# Patient Record
Sex: MALE | Race: BLACK OR AFRICAN AMERICAN | NOT HISPANIC OR LATINO | Employment: PART TIME | URBAN - METROPOLITAN AREA
[De-identification: names, ages, dates, MRNs, and addresses within clinical notes are randomized per-mention and may not be internally consistent; named-entity substitution may affect disease eponyms.]

---

## 2020-01-13 ENCOUNTER — APPOINTMENT (OUTPATIENT)
Dept: GENERAL RADIOLOGY | Facility: HOSPITAL | Age: 50
End: 2020-01-13

## 2020-01-13 ENCOUNTER — APPOINTMENT (OUTPATIENT)
Dept: CT IMAGING | Facility: HOSPITAL | Age: 50
End: 2020-01-13

## 2020-01-13 ENCOUNTER — HOSPITAL ENCOUNTER (INPATIENT)
Facility: HOSPITAL | Age: 50
LOS: 10 days | Discharge: HOME | End: 2020-01-23
Attending: EMERGENCY MEDICINE | Admitting: INTERNAL MEDICINE

## 2020-01-13 DIAGNOSIS — E83.41 HYPERMAGNESEMIA: ICD-10-CM

## 2020-01-13 DIAGNOSIS — R40.0 SOMNOLENCE: ICD-10-CM

## 2020-01-13 DIAGNOSIS — E13.10 DIABETIC KETOACIDOSIS WITHOUT COMA ASSOCIATED WITH OTHER SPECIFIED DIABETES MELLITUS (HCC): ICD-10-CM

## 2020-01-13 DIAGNOSIS — E11.9 DIABETES MELLITUS, NEW ONSET (HCC): ICD-10-CM

## 2020-01-13 DIAGNOSIS — N17.9 ACUTE RENAL FAILURE, UNSPECIFIED ACUTE RENAL FAILURE TYPE (HCC): ICD-10-CM

## 2020-01-13 DIAGNOSIS — R93.3 ABNORMAL ESOPHAGRAM: Primary | ICD-10-CM

## 2020-01-13 DIAGNOSIS — G93.40 ENCEPHALOPATHY: ICD-10-CM

## 2020-01-13 DIAGNOSIS — E83.39 HYPERPHOSPHATEMIA: ICD-10-CM

## 2020-01-13 DIAGNOSIS — E86.0 DEHYDRATION: ICD-10-CM

## 2020-01-13 DIAGNOSIS — E87.5 HYPERKALEMIA: ICD-10-CM

## 2020-01-13 PROBLEM — E11.10 DIABETIC KETOACIDOSIS WITHOUT COMA ASSOCIATED WITH TYPE 2 DIABETES MELLITUS (HCC): Status: ACTIVE | Noted: 2020-01-13

## 2020-01-13 LAB
ALBUMIN SERPL-MCNC: 3.7 G/DL (ref 3.5–5.2)
ALBUMIN SERPL-MCNC: 3.7 G/DL (ref 3.5–5.2)
ALBUMIN SERPL-MCNC: 4 G/DL (ref 3.5–5.2)
ALBUMIN/GLOB SERPL: 0.9 G/DL
ALBUMIN/GLOB SERPL: 0.9 G/DL
ALBUMIN/GLOB SERPL: 1 G/DL
ALP SERPL-CCNC: 84 U/L (ref 39–117)
ALP SERPL-CCNC: 84 U/L (ref 39–117)
ALP SERPL-CCNC: 89 U/L (ref 39–117)
ALT SERPL W P-5'-P-CCNC: 13 U/L (ref 1–41)
ALT SERPL W P-5'-P-CCNC: 15 U/L (ref 1–41)
ALT SERPL W P-5'-P-CCNC: 17 U/L (ref 1–41)
ANION GAP SERPL CALCULATED.3IONS-SCNC: 13.1 MMOL/L (ref 5–15)
ANION GAP SERPL CALCULATED.3IONS-SCNC: 17 MMOL/L (ref 5–15)
ANION GAP SERPL CALCULATED.3IONS-SCNC: 19 MMOL/L (ref 5–15)
ANION GAP SERPL CALCULATED.3IONS-SCNC: 28.1 MMOL/L (ref 5–15)
ANION GAP SERPL CALCULATED.3IONS-SCNC: 29.6 MMOL/L (ref 5–15)
ANION GAP SERPL CALCULATED.3IONS-SCNC: 29.9 MMOL/L (ref 5–15)
ARTERIAL PATENCY WRIST A: POSITIVE
AST SERPL-CCNC: 31 U/L (ref 1–40)
AST SERPL-CCNC: 35 U/L (ref 1–40)
AST SERPL-CCNC: 38 U/L (ref 1–40)
ATMOSPHERIC PRESS: 758.1 MMHG
BACTERIA UR QL AUTO: NORMAL /HPF
BASE EXCESS BLDA CALC-SCNC: -11.4 MMOL/L (ref 0–2)
BASOPHILS # BLD AUTO: 0.03 10*3/MM3 (ref 0–0.2)
BASOPHILS NFR BLD AUTO: 0.2 % (ref 0–1.5)
BDY SITE: ABNORMAL
BILIRUB SERPL-MCNC: 0.6 MG/DL (ref 0.2–1.2)
BILIRUB SERPL-MCNC: 0.7 MG/DL (ref 0.2–1.2)
BILIRUB SERPL-MCNC: 0.8 MG/DL (ref 0.2–1.2)
BILIRUB UR QL STRIP: NEGATIVE
BUN BLD-MCNC: 68 MG/DL (ref 6–20)
BUN BLD-MCNC: 84 MG/DL (ref 6–20)
BUN BLD-MCNC: 86 MG/DL (ref 6–20)
BUN BLD-MCNC: 92 MG/DL (ref 6–20)
BUN BLD-MCNC: 96 MG/DL (ref 6–20)
BUN BLD-MCNC: 99 MG/DL (ref 6–20)
BUN/CREAT SERPL: 36.3 (ref 7–25)
BUN/CREAT SERPL: 39 (ref 7–25)
BUN/CREAT SERPL: 40.9 (ref 7–25)
BUN/CREAT SERPL: 41.7 (ref 7–25)
BUN/CREAT SERPL: 42.4 (ref 7–25)
BUN/CREAT SERPL: 46.9 (ref 7–25)
CALCIUM SPEC-SCNC: 8.6 MG/DL (ref 8.6–10.5)
CALCIUM SPEC-SCNC: 8.7 MG/DL (ref 8.6–10.5)
CALCIUM SPEC-SCNC: 9 MG/DL (ref 8.6–10.5)
CALCIUM SPEC-SCNC: 9 MG/DL (ref 8.6–10.5)
CALCIUM SPEC-SCNC: 9.1 MG/DL (ref 8.6–10.5)
CALCIUM SPEC-SCNC: 9.2 MG/DL (ref 8.6–10.5)
CHLORIDE SERPL-SCNC: 100 MMOL/L (ref 98–107)
CHLORIDE SERPL-SCNC: 101 MMOL/L (ref 98–107)
CHLORIDE SERPL-SCNC: 107 MMOL/L (ref 98–107)
CHLORIDE SERPL-SCNC: 75 MMOL/L (ref 98–107)
CHLORIDE SERPL-SCNC: 83 MMOL/L (ref 98–107)
CHLORIDE SERPL-SCNC: 93 MMOL/L (ref 98–107)
CLARITY UR: CLEAR
CO2 SERPL-SCNC: 10.9 MMOL/L (ref 22–29)
CO2 SERPL-SCNC: 13.1 MMOL/L (ref 22–29)
CO2 SERPL-SCNC: 13.4 MMOL/L (ref 22–29)
CO2 SERPL-SCNC: 21 MMOL/L (ref 22–29)
CO2 SERPL-SCNC: 22.9 MMOL/L (ref 22–29)
CO2 SERPL-SCNC: 23 MMOL/L (ref 22–29)
COLOR UR: YELLOW
CREAT BLD-MCNC: 1.45 MG/DL (ref 0.76–1.27)
CREAT BLD-MCNC: 1.98 MG/DL (ref 0.76–1.27)
CREAT BLD-MCNC: 2.06 MG/DL (ref 0.76–1.27)
CREAT BLD-MCNC: 2.25 MG/DL (ref 0.76–1.27)
CREAT BLD-MCNC: 2.46 MG/DL (ref 0.76–1.27)
CREAT BLD-MCNC: 2.73 MG/DL (ref 0.76–1.27)
DEPRECATED RDW RBC AUTO: 36.7 FL (ref 37–54)
EOSINOPHIL # BLD AUTO: 0 10*3/MM3 (ref 0–0.4)
EOSINOPHIL NFR BLD AUTO: 0 % (ref 0.3–6.2)
ERYTHROCYTE [DISTWIDTH] IN BLOOD BY AUTOMATED COUNT: 12.3 % (ref 12.3–15.4)
GFR SERPL CREATININE-BSD FRML MDRD: 30 ML/MIN/1.73
GFR SERPL CREATININE-BSD FRML MDRD: 34 ML/MIN/1.73
GFR SERPL CREATININE-BSD FRML MDRD: 38 ML/MIN/1.73
GFR SERPL CREATININE-BSD FRML MDRD: 42 ML/MIN/1.73
GFR SERPL CREATININE-BSD FRML MDRD: 44 ML/MIN/1.73
GFR SERPL CREATININE-BSD FRML MDRD: 63 ML/MIN/1.73
GLOBULIN UR ELPH-MCNC: 3.8 GM/DL
GLOBULIN UR ELPH-MCNC: 3.9 GM/DL
GLOBULIN UR ELPH-MCNC: 4.3 GM/DL
GLUCOSE BLD-MCNC: 196 MG/DL (ref 65–99)
GLUCOSE BLD-MCNC: 341 MG/DL (ref 65–99)
GLUCOSE BLD-MCNC: 714 MG/DL (ref 65–99)
GLUCOSE BLD-MCNC: 823 MG/DL (ref 65–99)
GLUCOSE BLD-MCNC: 909 MG/DL (ref 65–99)
GLUCOSE BLD-MCNC: 96 MG/DL (ref 65–99)
GLUCOSE BLDC GLUCOMTR-MCNC: 128 MG/DL (ref 70–130)
GLUCOSE BLDC GLUCOMTR-MCNC: 167 MG/DL (ref 70–130)
GLUCOSE BLDC GLUCOMTR-MCNC: 182 MG/DL (ref 70–130)
GLUCOSE BLDC GLUCOMTR-MCNC: 192 MG/DL (ref 70–130)
GLUCOSE BLDC GLUCOMTR-MCNC: 208 MG/DL (ref 70–130)
GLUCOSE BLDC GLUCOMTR-MCNC: 209 MG/DL (ref 70–130)
GLUCOSE BLDC GLUCOMTR-MCNC: 217 MG/DL (ref 70–130)
GLUCOSE BLDC GLUCOMTR-MCNC: 234 MG/DL (ref 70–130)
GLUCOSE BLDC GLUCOMTR-MCNC: 252 MG/DL (ref 70–130)
GLUCOSE BLDC GLUCOMTR-MCNC: 262 MG/DL (ref 70–130)
GLUCOSE BLDC GLUCOMTR-MCNC: 347 MG/DL (ref 70–130)
GLUCOSE BLDC GLUCOMTR-MCNC: 443 MG/DL (ref 70–130)
GLUCOSE BLDC GLUCOMTR-MCNC: 550 MG/DL (ref 70–130)
GLUCOSE BLDC GLUCOMTR-MCNC: 575 MG/DL (ref 70–130)
GLUCOSE BLDC GLUCOMTR-MCNC: 94 MG/DL (ref 70–130)
GLUCOSE BLDC GLUCOMTR-MCNC: >599 MG/DL (ref 70–130)
GLUCOSE BLDC GLUCOMTR-MCNC: >599 MG/DL (ref 70–130)
GLUCOSE UR STRIP-MCNC: ABNORMAL MG/DL
HBA1C MFR BLD: 14.4 % (ref 4.8–5.6)
HCO3 BLDA-SCNC: 12.5 MMOL/L (ref 22–28)
HCT VFR BLD AUTO: 56.6 % (ref 37.5–51)
HGB BLD-MCNC: 18.7 G/DL (ref 13–17.7)
HGB UR QL STRIP.AUTO: ABNORMAL
HYALINE CASTS UR QL AUTO: NORMAL /LPF
IMM GRANULOCYTES # BLD AUTO: 0.12 10*3/MM3 (ref 0–0.05)
IMM GRANULOCYTES NFR BLD AUTO: 0.8 % (ref 0–0.5)
KETONES UR QL STRIP: ABNORMAL
LEUKOCYTE ESTERASE UR QL STRIP.AUTO: NEGATIVE
LYMPHOCYTES # BLD AUTO: 0.71 10*3/MM3 (ref 0.7–3.1)
LYMPHOCYTES NFR BLD AUTO: 4.9 % (ref 19.6–45.3)
MAGNESIUM SERPL-MCNC: 3.5 MG/DL (ref 1.6–2.6)
MAGNESIUM SERPL-MCNC: 3.8 MG/DL (ref 1.6–2.6)
MAGNESIUM SERPL-MCNC: 3.8 MG/DL (ref 1.6–2.6)
MAGNESIUM SERPL-MCNC: 4.2 MG/DL (ref 1.6–2.6)
MAGNESIUM SERPL-MCNC: 4.5 MG/DL (ref 1.6–2.6)
MCH RBC QN AUTO: 28.2 PG (ref 26.6–33)
MCHC RBC AUTO-ENTMCNC: 33 G/DL (ref 31.5–35.7)
MCV RBC AUTO: 85.5 FL (ref 79–97)
MODALITY: ABNORMAL
MONOCYTES # BLD AUTO: 1.22 10*3/MM3 (ref 0.1–0.9)
MONOCYTES NFR BLD AUTO: 8.4 % (ref 5–12)
NEUTROPHILS # BLD AUTO: 12.42 10*3/MM3 (ref 1.7–7)
NEUTROPHILS NFR BLD AUTO: 85.7 % (ref 42.7–76)
NITRITE UR QL STRIP: NEGATIVE
NRBC BLD AUTO-RTO: 0 /100 WBC (ref 0–0.2)
OSMOLALITY SERPL: 379 MOSM/KG (ref 275–300)
PCO2 BLDA: 24.2 MM HG (ref 35–45)
PH BLDA: 7.32 PH UNITS (ref 7.35–7.45)
PH UR STRIP.AUTO: <=5 [PH] (ref 5–8)
PHOSPHATE SERPL-MCNC: 1.7 MG/DL (ref 2.5–4.5)
PHOSPHATE SERPL-MCNC: 3.7 MG/DL (ref 2.5–4.5)
PHOSPHATE SERPL-MCNC: 3.7 MG/DL (ref 2.5–4.5)
PHOSPHATE SERPL-MCNC: 4.4 MG/DL (ref 2.5–4.5)
PHOSPHATE SERPL-MCNC: 6.6 MG/DL (ref 2.5–4.5)
PHOSPHATE SERPL-MCNC: 7.1 MG/DL (ref 2.5–4.5)
PLATELET # BLD AUTO: 425 10*3/MM3 (ref 140–450)
PMV BLD AUTO: 11.1 FL (ref 6–12)
PO2 BLDA: 139.7 MM HG (ref 80–100)
POTASSIUM BLD-SCNC: 3.7 MMOL/L (ref 3.5–5.2)
POTASSIUM BLD-SCNC: 3.8 MMOL/L (ref 3.5–5.2)
POTASSIUM BLD-SCNC: 4.1 MMOL/L (ref 3.5–5.2)
POTASSIUM BLD-SCNC: 4.1 MMOL/L (ref 3.5–5.2)
POTASSIUM BLD-SCNC: 5.6 MMOL/L (ref 3.5–5.2)
POTASSIUM BLD-SCNC: 6.1 MMOL/L (ref 3.5–5.2)
POTASSIUM BLD-SCNC: 7.2 MMOL/L (ref 3.5–5.2)
PROCALCITONIN SERPL-MCNC: 0.37 NG/ML (ref 0.1–0.25)
PROT SERPL-MCNC: 7.5 G/DL (ref 6–8.5)
PROT SERPL-MCNC: 7.6 G/DL (ref 6–8.5)
PROT SERPL-MCNC: 8.3 G/DL (ref 6–8.5)
PROT UR QL STRIP: NEGATIVE
RBC # BLD AUTO: 6.62 10*6/MM3 (ref 4.14–5.8)
RBC # UR: NORMAL /HPF
REF LAB TEST METHOD: NORMAL
SAO2 % BLDCOA: 99 % (ref 92–99)
SODIUM BLD-SCNC: 118 MMOL/L (ref 136–145)
SODIUM BLD-SCNC: 126 MMOL/L (ref 136–145)
SODIUM BLD-SCNC: 132 MMOL/L (ref 136–145)
SODIUM BLD-SCNC: 140 MMOL/L (ref 136–145)
SODIUM BLD-SCNC: 141 MMOL/L (ref 136–145)
SODIUM BLD-SCNC: 143 MMOL/L (ref 136–145)
SP GR UR STRIP: 1.03 (ref 1–1.03)
SQUAMOUS #/AREA URNS HPF: NORMAL /HPF
TOTAL RATE: 18 BREATHS/MINUTE
TROPONIN T SERPL-MCNC: <0.01 NG/ML (ref 0–0.03)
UROBILINOGEN UR QL STRIP: ABNORMAL
WBC NRBC COR # BLD: 14.5 10*3/MM3 (ref 3.4–10.8)
WBC UR QL AUTO: NORMAL /HPF

## 2020-01-13 PROCEDURE — 36600 WITHDRAWAL OF ARTERIAL BLOOD: CPT

## 2020-01-13 PROCEDURE — 82803 BLOOD GASES ANY COMBINATION: CPT

## 2020-01-13 PROCEDURE — 70450 CT HEAD/BRAIN W/O DYE: CPT

## 2020-01-13 PROCEDURE — 84145 PROCALCITONIN (PCT): CPT | Performed by: INTERNAL MEDICINE

## 2020-01-13 PROCEDURE — 36415 COLL VENOUS BLD VENIPUNCTURE: CPT | Performed by: EMERGENCY MEDICINE

## 2020-01-13 PROCEDURE — 25010000002 ENOXAPARIN PER 10 MG: Performed by: INTERNAL MEDICINE

## 2020-01-13 PROCEDURE — 82962 GLUCOSE BLOOD TEST: CPT

## 2020-01-13 PROCEDURE — 80053 COMPREHEN METABOLIC PANEL: CPT | Performed by: INTERNAL MEDICINE

## 2020-01-13 PROCEDURE — 93005 ELECTROCARDIOGRAM TRACING: CPT | Performed by: EMERGENCY MEDICINE

## 2020-01-13 PROCEDURE — 84132 ASSAY OF SERUM POTASSIUM: CPT | Performed by: INTERNAL MEDICINE

## 2020-01-13 PROCEDURE — 93010 ELECTROCARDIOGRAM REPORT: CPT | Performed by: INTERNAL MEDICINE

## 2020-01-13 PROCEDURE — 83735 ASSAY OF MAGNESIUM: CPT | Performed by: INTERNAL MEDICINE

## 2020-01-13 PROCEDURE — 87040 BLOOD CULTURE FOR BACTERIA: CPT | Performed by: INTERNAL MEDICINE

## 2020-01-13 PROCEDURE — P9612 CATHETERIZE FOR URINE SPEC: HCPCS

## 2020-01-13 PROCEDURE — 83036 HEMOGLOBIN GLYCOSYLATED A1C: CPT | Performed by: EMERGENCY MEDICINE

## 2020-01-13 PROCEDURE — 80053 COMPREHEN METABOLIC PANEL: CPT | Performed by: EMERGENCY MEDICINE

## 2020-01-13 PROCEDURE — 25010000002 FUROSEMIDE PER 20 MG: Performed by: EMERGENCY MEDICINE

## 2020-01-13 PROCEDURE — 99285 EMERGENCY DEPT VISIT HI MDM: CPT

## 2020-01-13 PROCEDURE — 25010000002 CALCIUM GLUCONATE PER 10 ML: Performed by: EMERGENCY MEDICINE

## 2020-01-13 PROCEDURE — 84484 ASSAY OF TROPONIN QUANT: CPT | Performed by: EMERGENCY MEDICINE

## 2020-01-13 PROCEDURE — 80048 BASIC METABOLIC PNL TOTAL CA: CPT | Performed by: INTERNAL MEDICINE

## 2020-01-13 PROCEDURE — 83735 ASSAY OF MAGNESIUM: CPT | Performed by: EMERGENCY MEDICINE

## 2020-01-13 PROCEDURE — 63710000001 INSULIN REGULAR HUMAN PER 5 UNITS: Performed by: EMERGENCY MEDICINE

## 2020-01-13 PROCEDURE — 81001 URINALYSIS AUTO W/SCOPE: CPT | Performed by: EMERGENCY MEDICINE

## 2020-01-13 PROCEDURE — 94799 UNLISTED PULMONARY SVC/PX: CPT

## 2020-01-13 PROCEDURE — 25010000002 CALCIUM GLUCONATE PER 10 ML: Performed by: INTERNAL MEDICINE

## 2020-01-13 PROCEDURE — 71045 X-RAY EXAM CHEST 1 VIEW: CPT

## 2020-01-13 PROCEDURE — 83930 ASSAY OF BLOOD OSMOLALITY: CPT | Performed by: EMERGENCY MEDICINE

## 2020-01-13 PROCEDURE — 84100 ASSAY OF PHOSPHORUS: CPT | Performed by: EMERGENCY MEDICINE

## 2020-01-13 PROCEDURE — 85025 COMPLETE CBC W/AUTO DIFF WBC: CPT | Performed by: EMERGENCY MEDICINE

## 2020-01-13 PROCEDURE — 80048 BASIC METABOLIC PNL TOTAL CA: CPT | Performed by: EMERGENCY MEDICINE

## 2020-01-13 PROCEDURE — 94640 AIRWAY INHALATION TREATMENT: CPT

## 2020-01-13 RX ORDER — CLINDAMYCIN HYDROCHLORIDE 150 MG/1
150 CAPSULE ORAL 3 TIMES DAILY
Status: ON HOLD | COMMUNITY
End: 2020-01-13

## 2020-01-13 RX ORDER — MAGNESIUM SULFATE HEPTAHYDRATE 40 MG/ML
2 INJECTION, SOLUTION INTRAVENOUS AS NEEDED
Status: DISCONTINUED | OUTPATIENT
Start: 2020-01-13 | End: 2020-01-23 | Stop reason: HOSPADM

## 2020-01-13 RX ORDER — SODIUM CHLORIDE AND POTASSIUM CHLORIDE 300; 900 MG/100ML; MG/100ML
250 INJECTION, SOLUTION INTRAVENOUS CONTINUOUS PRN
Status: DISCONTINUED | OUTPATIENT
Start: 2020-01-13 | End: 2020-01-14

## 2020-01-13 RX ORDER — SODIUM CHLORIDE 0.9 % (FLUSH) 0.9 %
10 SYRINGE (ML) INJECTION AS NEEDED
Status: DISCONTINUED | OUTPATIENT
Start: 2020-01-13 | End: 2020-01-23 | Stop reason: HOSPADM

## 2020-01-13 RX ORDER — SODIUM CHLORIDE AND POTASSIUM CHLORIDE 150; 900 MG/100ML; MG/100ML
250 INJECTION, SOLUTION INTRAVENOUS CONTINUOUS PRN
Status: DISCONTINUED | OUTPATIENT
Start: 2020-01-13 | End: 2020-01-14

## 2020-01-13 RX ORDER — SODIUM CHLORIDE 9 MG/ML
250 INJECTION, SOLUTION INTRAVENOUS CONTINUOUS
Status: ACTIVE | OUTPATIENT
Start: 2020-01-13 | End: 2020-01-13

## 2020-01-13 RX ORDER — ONDANSETRON 4 MG/1
4 TABLET, FILM COATED ORAL EVERY 6 HOURS PRN
Status: DISCONTINUED | OUTPATIENT
Start: 2020-01-13 | End: 2020-01-23 | Stop reason: HOSPADM

## 2020-01-13 RX ORDER — POTASSIUM CHLORIDE 7.45 MG/ML
10 INJECTION INTRAVENOUS
Status: DISCONTINUED | OUTPATIENT
Start: 2020-01-13 | End: 2020-01-23 | Stop reason: HOSPADM

## 2020-01-13 RX ORDER — MAGNESIUM SULFATE 1 G/100ML
1 INJECTION INTRAVENOUS AS NEEDED
Status: DISCONTINUED | OUTPATIENT
Start: 2020-01-13 | End: 2020-01-23 | Stop reason: HOSPADM

## 2020-01-13 RX ORDER — DEXTROSE, SODIUM CHLORIDE, AND POTASSIUM CHLORIDE 5; .45; .15 G/100ML; G/100ML; G/100ML
250 INJECTION INTRAVENOUS CONTINUOUS PRN
Status: DISCONTINUED | OUTPATIENT
Start: 2020-01-13 | End: 2020-01-14

## 2020-01-13 RX ORDER — DEXTROSE AND SODIUM CHLORIDE 5; .45 G/100ML; G/100ML
250 INJECTION, SOLUTION INTRAVENOUS CONTINUOUS PRN
Status: DISCONTINUED | OUTPATIENT
Start: 2020-01-13 | End: 2020-01-14

## 2020-01-13 RX ORDER — DEXTROSE MONOHYDRATE 25 G/50ML
12.5 INJECTION, SOLUTION INTRAVENOUS AS NEEDED
Status: DISCONTINUED | OUTPATIENT
Start: 2020-01-13 | End: 2020-01-23 | Stop reason: HOSPADM

## 2020-01-13 RX ORDER — POTASSIUM CHLORIDE 1.5 G/1.77G
40 POWDER, FOR SOLUTION ORAL AS NEEDED
Status: DISCONTINUED | OUTPATIENT
Start: 2020-01-13 | End: 2020-01-23 | Stop reason: HOSPADM

## 2020-01-13 RX ORDER — POTASSIUM CHLORIDE 750 MG/1
40 CAPSULE, EXTENDED RELEASE ORAL AS NEEDED
Status: DISCONTINUED | OUTPATIENT
Start: 2020-01-13 | End: 2020-01-23 | Stop reason: HOSPADM

## 2020-01-13 RX ORDER — SODIUM CHLORIDE 9 MG/ML
10 INJECTION, SOLUTION INTRAVENOUS CONTINUOUS PRN
Status: DISCONTINUED | OUTPATIENT
Start: 2020-01-13 | End: 2020-01-14

## 2020-01-13 RX ORDER — SODIUM CHLORIDE 9 MG/ML
250 INJECTION, SOLUTION INTRAVENOUS CONTINUOUS
Status: DISCONTINUED | OUTPATIENT
Start: 2020-01-13 | End: 2020-01-13

## 2020-01-13 RX ORDER — CLINDAMYCIN HYDROCHLORIDE 300 MG/1
300 CAPSULE ORAL 3 TIMES DAILY
COMMUNITY
Start: 2020-01-07 | End: 2020-01-23 | Stop reason: HOSPADM

## 2020-01-13 RX ORDER — FUROSEMIDE 10 MG/ML
40 INJECTION INTRAMUSCULAR; INTRAVENOUS ONCE
Status: COMPLETED | OUTPATIENT
Start: 2020-01-13 | End: 2020-01-13

## 2020-01-13 RX ORDER — SODIUM CHLORIDE 450 MG/100ML
250 INJECTION, SOLUTION INTRAVENOUS CONTINUOUS PRN
Status: DISCONTINUED | OUTPATIENT
Start: 2020-01-13 | End: 2020-01-14

## 2020-01-13 RX ORDER — SODIUM CHLORIDE AND POTASSIUM CHLORIDE 150; 450 MG/100ML; MG/100ML
250 INJECTION, SOLUTION INTRAVENOUS CONTINUOUS PRN
Status: DISCONTINUED | OUTPATIENT
Start: 2020-01-13 | End: 2020-01-14

## 2020-01-13 RX ORDER — DEXTROSE, SODIUM CHLORIDE, AND POTASSIUM CHLORIDE 5; .45; .3 G/100ML; G/100ML; G/100ML
250 INJECTION INTRAVENOUS CONTINUOUS PRN
Status: DISCONTINUED | OUTPATIENT
Start: 2020-01-13 | End: 2020-01-14

## 2020-01-13 RX ORDER — HALOPERIDOL 5 MG/ML
5 INJECTION INTRAMUSCULAR
Status: CANCELLED | OUTPATIENT
Start: 2020-01-13

## 2020-01-13 RX ORDER — MAGNESIUM SULFATE HEPTAHYDRATE 40 MG/ML
4 INJECTION, SOLUTION INTRAVENOUS AS NEEDED
Status: DISCONTINUED | OUTPATIENT
Start: 2020-01-13 | End: 2020-01-23 | Stop reason: HOSPADM

## 2020-01-13 RX ORDER — ONDANSETRON 2 MG/ML
4 INJECTION INTRAMUSCULAR; INTRAVENOUS EVERY 6 HOURS PRN
Status: DISCONTINUED | OUTPATIENT
Start: 2020-01-13 | End: 2020-01-23 | Stop reason: HOSPADM

## 2020-01-13 RX ORDER — SODIUM CHLORIDE 0.9 % (FLUSH) 0.9 %
10 SYRINGE (ML) INJECTION ONCE AS NEEDED
Status: DISCONTINUED | OUTPATIENT
Start: 2020-01-13 | End: 2020-01-23 | Stop reason: HOSPADM

## 2020-01-13 RX ORDER — SODIUM CHLORIDE 0.9 % (FLUSH) 0.9 %
10 SYRINGE (ML) INJECTION EVERY 12 HOURS SCHEDULED
Status: DISCONTINUED | OUTPATIENT
Start: 2020-01-13 | End: 2020-01-23 | Stop reason: HOSPADM

## 2020-01-13 RX ADMIN — DEXTROSE MONOHYDRATE 12.5 G: 25 INJECTION, SOLUTION INTRAVENOUS at 22:40

## 2020-01-13 RX ADMIN — POTASSIUM CHLORIDE, DEXTROSE MONOHYDRATE AND SODIUM CHLORIDE 250 ML/HR: 150; 5; 450 INJECTION, SOLUTION INTRAVENOUS at 15:57

## 2020-01-13 RX ADMIN — SODIUM CHLORIDE 250 ML/HR: 9 INJECTION, SOLUTION INTRAVENOUS at 09:16

## 2020-01-13 RX ADMIN — SODIUM CHLORIDE 250 ML/HR: 9 INJECTION, SOLUTION INTRAVENOUS at 13:31

## 2020-01-13 RX ADMIN — ENOXAPARIN SODIUM 30 MG: 30 INJECTION SUBCUTANEOUS at 19:45

## 2020-01-13 RX ADMIN — SODIUM CHLORIDE, PRESERVATIVE FREE 10 ML: 5 INJECTION INTRAVENOUS at 20:29

## 2020-01-13 RX ADMIN — SODIUM CHLORIDE 1000 ML: 9 INJECTION, SOLUTION INTRAVENOUS at 07:37

## 2020-01-13 RX ADMIN — CALCIUM GLUCONATE 1 G: 98 INJECTION, SOLUTION INTRAVENOUS at 10:07

## 2020-01-13 RX ADMIN — SODIUM CHLORIDE 1000 ML: 9 INJECTION, SOLUTION INTRAVENOUS at 06:25

## 2020-01-13 RX ADMIN — SODIUM CHLORIDE 250 ML/HR: 4.5 INJECTION, SOLUTION INTRAVENOUS at 14:48

## 2020-01-13 RX ADMIN — ALBUTEROL SULFATE 10 MG: 2.5 SOLUTION RESPIRATORY (INHALATION) at 07:44

## 2020-01-13 RX ADMIN — POTASSIUM CHLORIDE AND SODIUM CHLORIDE 250 ML/HR: 450; 150 INJECTION, SOLUTION INTRAVENOUS at 19:50

## 2020-01-13 RX ADMIN — CALCIUM GLUCONATE 1 G: 98 INJECTION, SOLUTION INTRAVENOUS at 08:02

## 2020-01-13 RX ADMIN — SODIUM CHLORIDE 7 UNITS/HR: 9 INJECTION, SOLUTION INTRAVENOUS at 19:46

## 2020-01-13 RX ADMIN — FUROSEMIDE 40 MG: 10 INJECTION, SOLUTION INTRAVENOUS at 07:59

## 2020-01-13 RX ADMIN — INSULIN HUMAN 10 UNITS: 100 INJECTION, SOLUTION PARENTERAL at 07:56

## 2020-01-13 RX ADMIN — SODIUM CHLORIDE 6 UNITS/HR: 9 INJECTION, SOLUTION INTRAVENOUS at 08:10

## 2020-01-13 NOTE — ED PROVIDER NOTES
" EMERGENCY DEPARTMENT ENCOUNTER    CHIEF COMPLAINT  Chief Complaint: AMS  History given by: EMS, Spouse  History limited by: AMS and poor historian  Room Number: 16/16  PMD: No primary care provider on file.      HPI:  Pt is a 49 y.o. male who presents via EMS for AMS. Per spouse, pt was \"not acting himself yesterday\" and appeared increasingly drowsy and generally weak. Today on EMS arrival, pt glucose was >500. He does not have a hx of DM. Pt is supposed to be taking Clindamycin, but is not taking. Pt spouse at bedside provides hx, but is a poor historian.     PAST MEDICAL HISTORY  Active Ambulatory Problems     Diagnosis Date Noted   • No Active Ambulatory Problems     Resolved Ambulatory Problems     Diagnosis Date Noted   • No Resolved Ambulatory Problems     No Additional Past Medical History       PAST SURGICAL HISTORY  No past surgical history on file.    FAMILY HISTORY  No family history on file.    SOCIAL HISTORY       ALLERGIES  Patient has no known allergies.    REVIEW OF SYSTEMS  Review of Systems   Unable to perform ROS: Mental status change       PHYSICAL EXAM  ED Triage Vitals [01/13/20 0628]   Temp Heart Rate Resp BP SpO2   -- 85 20 99/66 92 %      Temp src Heart Rate Source Patient Position BP Location FiO2 (%)   -- Monitor Lying Left arm --         Physical Exam   Constitutional: No distress.   HENT:   Head: Normocephalic and atraumatic.   Eyes: Pupils are equal, round, and reactive to light. EOM are normal.   Neck: Normal range of motion. Neck supple.   Cardiovascular: Regular rhythm and normal heart sounds. Tachycardia present.   Pulmonary/Chest: Effort normal and breath sounds normal. No respiratory distress.   Abdominal: Soft. There is no tenderness. There is no rebound and no guarding.   Musculoskeletal: Normal range of motion. He exhibits no edema.   Neurological: He is alert. He has normal sensation and normal strength.   Skin: Skin is warm and dry.   Nursing note and vitals " reviewed.      LAB RESULTS  Lab Results (last 24 hours)     Procedure Component Value Units Date/Time    POC Glucose Once [[id number]]  (Abnormal) Collected:  01/13/20 0622    Specimen:  Blood Updated:  01/13/20 0624     Glucose >599 mg/dL     CBC & Differential [[id number]] Collected:  01/13/20 0633    Specimen:  Blood Updated:  01/13/20 0649    Narrative:       The following orders were created for panel order CBC & Differential.  Procedure                               Abnormality         Status                     ---------                               -----------         ------                     CBC Auto Differential[[id number]]        Abnormal            Final result                 Please view results for these tests on the individual orders.    Comprehensive Metabolic Panel [[id number]] Collected:  01/13/20 0633    Specimen:  Blood Updated:  01/13/20 0643    Troponin [[id number]] Collected:  01/13/20 0633    Specimen:  Blood Updated:  01/13/20 0643    CBC Auto Differential [[id number]]  (Abnormal) Collected:  01/13/20 0633    Specimen:  Blood Updated:  01/13/20 0649     WBC 14.50 10*3/mm3      RBC 6.62 10*6/mm3      Hemoglobin 18.7 g/dL      Hematocrit 56.6 %      MCV 85.5 fL      MCH 28.2 pg      MCHC 33.0 g/dL      RDW 12.3 %      RDW-SD 36.7 fl      MPV 11.1 fL      Platelets 425 10*3/mm3      Neutrophil % 85.7 %      Lymphocyte % 4.9 %      Monocyte % 8.4 %      Eosinophil % 0.0 %      Basophil % 0.2 %      Immature Grans % 0.8 %      Neutrophils, Absolute 12.42 10*3/mm3      Lymphocytes, Absolute 0.71 10*3/mm3      Monocytes, Absolute 1.22 10*3/mm3      Eosinophils, Absolute 0.00 10*3/mm3      Basophils, Absolute 0.03 10*3/mm3      Immature Grans, Absolute 0.12 10*3/mm3      nRBC 0.0 /100 WBC     Urinalysis With Microscopic If Indicated (No Culture) - Urine, Catheter [[id number]]  (Abnormal) Collected:  01/13/20 0635    Specimen:  Urine, Catheter Updated:  01/13/20 0650     Color, UA Yellow     Appearance,  UA Clear     pH, UA <=5.0     Specific Gravity, UA 1.028     Glucose, UA >=1000 mg/dL (3+)     Ketones, UA 15 mg/dL (1+)     Bilirubin, UA Negative     Blood, UA Trace     Protein, UA Negative     Leuk Esterase, UA Negative     Nitrite, UA Negative     Urobilinogen, UA 0.2 E.U./dL    Urinalysis, Microscopic Only - Urine, Catheter [[id number]] Collected:  01/13/20 0635    Specimen:  Urine, Catheter Updated:  01/13/20 0646    Blood Gas, Arterial [[id number]]  (Abnormal) Collected:  01/13/20 0654    Specimen:  Arterial Blood Updated:  01/13/20 0657     Site Arterial: right radial     Rigoberto's Test Positive     pH, Arterial 7.321 pH units      pCO2, Arterial 24.2 mm Hg      pO2, Arterial 139.7 mm Hg      HCO3, Arterial 12.5 mmol/L      Base Excess, Arterial -11.4 mmol/L      O2 Saturation Calculated 99.0 %      Barometric Pressure for Blood Gas 758.1 mmHg      Modality Room Air     Rate 18 Breaths/minute           I ordered the above labs and reviewed the results    RADIOLOGY  XR Chest 1 View   Final Result   1. No acute process.       This report was finalized on 1/13/2020 6:41 AM by Dr. Reddy Lizama M.D.          CT Head Without Contrast    (Results Pending)        I ordered the above noted radiological studies. Interpreted by radiologist. Reviewed by me in PACS.       PROCEDURES  Procedures  EKG          EKG time: 0633  Rhythm/Rate: NSR,82BPM  P waves and SD: nml  QRS, axis: nml   ST and T waves: early repolarization V2, V3, slightly prolonged QT     Interpreted Contemporaneously by me, independently viewed  No prior EKG for comparison      PROGRESS AND CONSULTS     6:27 AM  Ordered blood work, EKG, CXR, CT head.    7:00 AM  Turned over to Dr. Arzola pending labs for admission.      MEDICAL DECISION MAKING  Results were reviewed/discussed with the patient and they were also made aware of online access. Pt also made aware that some labs, such as cultures, will not be resulted during ER visit and follow up with PMD  is necessary.     MDM  Number of Diagnoses or Management Options     Amount and/or Complexity of Data Reviewed  Clinical lab tests: reviewed and ordered (Glucose >599)  Tests in the radiology section of CPT®: ordered and reviewed  Tests in the medicine section of CPT®: reviewed and ordered (EKG - See EKG procedure note  )  Review and summarize past medical records: yes (Pt seen at [facility] on 1/2 and 1/3/19 for paronychia. Pt had I&D performed and was started on Bactrim.  )           DIAGNOSIS  Final diagnoses:   Diabetes mellitus, new onset (CMS/HCC)   Somnolence       DISPOSITION  ADMISSION    Discussed treatment plan and reason for admission with pt/family and admitting physician.  Pt/family voiced understanding of the plan for admission for further testing/treatment as needed.         Latest Documented Vital Signs:  As of 7:01 AM  BP- 99/66 HR- 85 Temp- 96.8 °F (36 °C) (Tympanic) O2 sat- 92%    --  Documentation assistance provided by jimmie Hagen for Dr Shabazz.  Information recorded by the scribe was done at my direction and has been verified and validated by me.     Noah Hagen  01/13/20 0639       Marlon Shabazz MD  01/13/20 0792

## 2020-01-13 NOTE — ED PROVIDER NOTES
EMERGENCY DEPARTMENT ENCOUNTER    Room number:  N334/1  Date Seen:  1/13/2020  Time of transfer:0700  PCP:  Provider, No Known    HPI  Altered mental status since yesterday, increasingly drowsy and weak.  Had recent paronychia and is on clindamycin but not taking it.  Able to obtain other significant history due to his confusion.    PHYSICAL EXAM  General: Awake, alert, ill-appearing, toxic, nondiaphoretic  HEENT: Mucous membranes dry, atraumatic, normocephalic, EOMI  Neck: Full ROM  Pulm: Symmetric, non-labored, lungs CTAB  Cardiovascular: Regular rate and rhythm, normal S1/S2, intact distal pulses  GI: Soft, non-tender, non-distended, no rebound, no guarding, bowel sounds present  MSK: Full ROM, no deformity  Skin: Warm, dry  Neuro: Alert and oriented to person and place,, GCS 15, moving all extremities, no focal deficits but does demonstrate some confusion  Psych: Calm, cooperative      LABORATORY RESULTS:  Recent Results (from the past 24 hour(s))   POC Glucose Once    Collection Time: 01/13/20  6:22 AM   Result Value Ref Range    Glucose >599 (C) 70 - 130 mg/dL   Comprehensive Metabolic Panel    Collection Time: 01/13/20  6:33 AM   Result Value Ref Range    Glucose 909 (C) 65 - 99 mg/dL    BUN 99 (H) 6 - 20 mg/dL    Creatinine 2.73 (H) 0.76 - 1.27 mg/dL    Sodium 118 (C) 136 - 145 mmol/L    Potassium 7.2 (C) 3.5 - 5.2 mmol/L    Chloride 75 (L) 98 - 107 mmol/L    CO2 13.1 (L) 22.0 - 29.0 mmol/L    Calcium 9.1 8.6 - 10.5 mg/dL    Total Protein 8.3 6.0 - 8.5 g/dL    Albumin 4.00 3.50 - 5.20 g/dL    ALT (SGPT) 15 1 - 41 U/L    AST (SGOT) 38 1 - 40 U/L    Alkaline Phosphatase 89 39 - 117 U/L    Total Bilirubin 0.8 0.2 - 1.2 mg/dL    eGFR  African Amer 30 (L) >60 mL/min/1.73    Globulin 4.3 gm/dL    A/G Ratio 0.9 g/dL    BUN/Creatinine Ratio 36.3 (H) 7.0 - 25.0    Anion Gap 29.9 (H) 5.0 - 15.0 mmol/L   Troponin    Collection Time: 01/13/20  6:33 AM   Result Value Ref Range    Troponin T <0.010 0.000 - 0.030 ng/mL    CBC Auto Differential    Collection Time: 01/13/20  6:33 AM   Result Value Ref Range    WBC 14.50 (H) 3.40 - 10.80 10*3/mm3    RBC 6.62 (H) 4.14 - 5.80 10*6/mm3    Hemoglobin 18.7 (H) 13.0 - 17.7 g/dL    Hematocrit 56.6 (H) 37.5 - 51.0 %    MCV 85.5 79.0 - 97.0 fL    MCH 28.2 26.6 - 33.0 pg    MCHC 33.0 31.5 - 35.7 g/dL    RDW 12.3 12.3 - 15.4 %    RDW-SD 36.7 (L) 37.0 - 54.0 fl    MPV 11.1 6.0 - 12.0 fL    Platelets 425 140 - 450 10*3/mm3    Neutrophil % 85.7 (H) 42.7 - 76.0 %    Lymphocyte % 4.9 (L) 19.6 - 45.3 %    Monocyte % 8.4 5.0 - 12.0 %    Eosinophil % 0.0 (L) 0.3 - 6.2 %    Basophil % 0.2 0.0 - 1.5 %    Immature Grans % 0.8 (H) 0.0 - 0.5 %    Neutrophils, Absolute 12.42 (H) 1.70 - 7.00 10*3/mm3    Lymphocytes, Absolute 0.71 0.70 - 3.10 10*3/mm3    Monocytes, Absolute 1.22 (H) 0.10 - 0.90 10*3/mm3    Eosinophils, Absolute 0.00 0.00 - 0.40 10*3/mm3    Basophils, Absolute 0.03 0.00 - 0.20 10*3/mm3    Immature Grans, Absolute 0.12 (H) 0.00 - 0.05 10*3/mm3    nRBC 0.0 0.0 - 0.2 /100 WBC   Phosphorus    Collection Time: 01/13/20  6:33 AM   Result Value Ref Range    Phosphorus 7.1 (H) 2.5 - 4.5 mg/dL   Magnesium    Collection Time: 01/13/20  6:33 AM   Result Value Ref Range    Magnesium 4.5 (H) 1.6 - 2.6 mg/dL   Hemoglobin A1c    Collection Time: 01/13/20  6:33 AM   Result Value Ref Range    Hemoglobin A1C 14.40 (H) 4.80 - 5.60 %   Urinalysis With Microscopic If Indicated (No Culture) - Urine, Catheter    Collection Time: 01/13/20  6:35 AM   Result Value Ref Range    Color, UA Yellow Yellow, Straw    Appearance, UA Clear Clear    pH, UA <=5.0 5.0 - 8.0    Specific Gravity, UA 1.028 1.005 - 1.030    Glucose, UA >=1000 mg/dL (3+) (A) Negative    Ketones, UA 15 mg/dL (1+) (A) Negative    Bilirubin, UA Negative Negative    Blood, UA Trace (A) Negative    Protein, UA Negative Negative    Leuk Esterase, UA Negative Negative    Nitrite, UA Negative Negative    Urobilinogen, UA 0.2 E.U./dL 0.2 - 1.0 E.U./dL    Urinalysis, Microscopic Only - Urine, Catheter    Collection Time: 01/13/20  6:35 AM   Result Value Ref Range    RBC, UA None Seen None Seen, 0-2 /HPF    WBC, UA None Seen None Seen, 0-2 /HPF    Bacteria, UA None Seen None Seen /HPF    Squamous Epithelial Cells, UA 0-2 None Seen, 0-2 /HPF    Hyaline Casts, UA None Seen None Seen /LPF    Methodology Manual Light Microscopy    Blood Gas, Arterial    Collection Time: 01/13/20  6:54 AM   Result Value Ref Range    Site Arterial: right radial     Rigoberto's Test Positive     pH, Arterial 7.321 (L) 7.350 - 7.450 pH units    pCO2, Arterial 24.2 (L) 35.0 - 45.0 mm Hg    pO2, Arterial 139.7 (H) 80.0 - 100.0 mm Hg    HCO3, Arterial 12.5 (L) 22.0 - 28.0 mmol/L    Base Excess, Arterial -11.4 (L) 0.0 - 2.0 mmol/L    O2 Saturation Calculated 99.0 92.0 - 99.0 %    Barometric Pressure for Blood Gas 758.1 mmHg    Modality Room Air     Rate 18 Breaths/minute   Comprehensive Metabolic Panel    Collection Time: 01/13/20  7:36 AM   Result Value Ref Range    Glucose 823 (C) 65 - 99 mg/dL    BUN 96 (H) 6 - 20 mg/dL    Creatinine 2.46 (H) 0.76 - 1.27 mg/dL    Sodium 126 (L) 136 - 145 mmol/L    Potassium 5.6 (H) 3.5 - 5.2 mmol/L    Chloride 83 (L) 98 - 107 mmol/L    CO2 13.4 (L) 22.0 - 29.0 mmol/L    Calcium 8.7 8.6 - 10.5 mg/dL    Total Protein 7.5 6.0 - 8.5 g/dL    Albumin 3.70 3.50 - 5.20 g/dL    ALT (SGPT) 13 1 - 41 U/L    AST (SGOT) 31 1 - 40 U/L    Alkaline Phosphatase 84 39 - 117 U/L    Total Bilirubin 0.7 0.2 - 1.2 mg/dL    eGFR  African Amer 34 (L) >60 mL/min/1.73    Globulin 3.8 gm/dL    A/G Ratio 1.0 g/dL    BUN/Creatinine Ratio 39.0 (H) 7.0 - 25.0    Anion Gap 29.6 (H) 5.0 - 15.0 mmol/L   Osmolality, Serum    Collection Time: 01/13/20  7:36 AM   Result Value Ref Range    Osmolality 379 (H) 275 - 300 mOsm/kg   Magnesium    Collection Time: 01/13/20  7:36 AM   Result Value Ref Range    Magnesium 4.2 (H) 1.6 - 2.6 mg/dL   Phosphorus    Collection Time: 01/13/20  7:36 AM   Result  Value Ref Range    Phosphorus 6.6 (H) 2.5 - 4.5 mg/dL   POC Glucose Once    Collection Time: 01/13/20  9:12 AM   Result Value Ref Range    Glucose >599 (C) 70 - 130 mg/dL   Comprehensive Metabolic Panel    Collection Time: 01/13/20  9:30 AM   Result Value Ref Range    Glucose 714 (C) 65 - 99 mg/dL    BUN 92 (H) 6 - 20 mg/dL    Creatinine 2.25 (H) 0.76 - 1.27 mg/dL    Sodium 132 (L) 136 - 145 mmol/L    Potassium 6.1 (C) 3.5 - 5.2 mmol/L    Chloride 93 (L) 98 - 107 mmol/L    CO2 10.9 (L) 22.0 - 29.0 mmol/L    Calcium 8.6 8.6 - 10.5 mg/dL    Total Protein 7.6 6.0 - 8.5 g/dL    Albumin 3.70 3.50 - 5.20 g/dL    ALT (SGPT) 17 1 - 41 U/L    AST (SGOT) 35 1 - 40 U/L    Alkaline Phosphatase 84 39 - 117 U/L    Total Bilirubin 0.6 0.2 - 1.2 mg/dL    eGFR  African Amer 38 (L) >60 mL/min/1.73    Globulin 3.9 gm/dL    A/G Ratio 0.9 g/dL    BUN/Creatinine Ratio 40.9 (H) 7.0 - 25.0    Anion Gap 28.1 (H) 5.0 - 15.0 mmol/L   POC Glucose Once    Collection Time: 01/13/20 11:08 AM   Result Value Ref Range    Glucose 550 (C) 70 - 130 mg/dL   POC Glucose Once    Collection Time: 01/13/20 11:44 AM   Result Value Ref Range    Glucose 575 (C) 70 - 130 mg/dL   POC Glucose Once    Collection Time: 01/13/20 12:44 PM   Result Value Ref Range    Glucose 167 (H) 70 - 130 mg/dL   POC Glucose Once    Collection Time: 01/13/20 12:45 PM   Result Value Ref Range    Glucose 217 (H) 70 - 130 mg/dL   POC Glucose Once    Collection Time: 01/13/20 12:46 PM   Result Value Ref Range    Glucose 443 (C) 70 - 130 mg/dL   Basic Metabolic Panel    Collection Time: 01/13/20  1:52 PM   Result Value Ref Range    Glucose 341 (H) 65 - 99 mg/dL    BUN 86 (H) 6 - 20 mg/dL    Creatinine 2.06 (H) 0.76 - 1.27 mg/dL    Sodium 140 136 - 145 mmol/L    Potassium 3.8 3.5 - 5.2 mmol/L    Chloride 100 98 - 107 mmol/L    CO2 21.0 (L) 22.0 - 29.0 mmol/L    Calcium 9.0 8.6 - 10.5 mg/dL    eGFR  African Amer 42 (L) >60 mL/min/1.73    BUN/Creatinine Ratio 41.7 (H) 7.0 - 25.0    Anion  Gap 19.0 (H) 5.0 - 15.0 mmol/L   Magnesium    Collection Time: 01/13/20  1:52 PM   Result Value Ref Range    Magnesium 3.8 (H) 1.6 - 2.6 mg/dL   Phosphorus    Collection Time: 01/13/20  1:52 PM   Result Value Ref Range    Phosphorus 4.4 2.5 - 4.5 mg/dL   POC Glucose Once    Collection Time: 01/13/20  1:53 PM   Result Value Ref Range    Glucose 347 (H) 70 - 130 mg/dL   POC Glucose Once    Collection Time: 01/13/20  2:53 PM   Result Value Ref Range    Glucose 262 (H) 70 - 130 mg/dL     I reviewed the above results.     RADIOLOGY:  CT Head Without Contrast   Final Result   1.  No findings of acute intracranial abnormality.           This report was finalized on 1/13/2020 7:20 AM by Dr. Bobby Clemons M.D.          XR Chest 1 View   Final Result   1. No acute process.       This report was finalized on 1/13/2020 6:41 AM by Dr. Reddy Lizama M.D.            I reviewed the above results    MEDICATIONS ORDERED IN THE ED:  Medications   sodium chloride 0.9 % flush 10 mL (has no administration in time range)   sodium chloride 0.9 % infusion (250 mL/hr Intravenous New Bag 1/13/20 1331)   sodium chloride 0.9 % with KCl 20 mEq/L infusion (has no administration in time range)   sodium chloride 0.9 % with KCl 40 mEq/L infusion (has no administration in time range)   sodium chloride 0.45 % infusion (250 mL/hr Intravenous New Bag 1/13/20 1448)   sodium chloride 0.45 % with KCl 20 mEq/L infusion (has no administration in time range)   sodium chloride 0.45 % 1,000 mL with potassium chloride 40 mEq infusion (has no administration in time range)   dextrose 5 % and sodium chloride 0.45 % infusion (has no administration in time range)   dextrose 5 % and sodium chloride 0.45 % with KCl 20 mEq/L infusion (has no administration in time range)   dextrose 5 % and sodium chloride 0.45 % with KCl 40 mEq/L infusion (has no administration in time range)   insulin regular (HumuLIN R,NovoLIN R) 100 Units in sodium chloride 0.9 % 100 mL (1  Units/mL) infusion (10 Units/hr Intravenous Currently Infusing 1/13/20 1145)   dextrose (D50W) 25 g/ 50mL Intravenous Solution 12.5 g (has no administration in time range)   sodium chloride 0.9 % flush 10 mL (has no administration in time range)   sodium chloride 0.9 % infusion (10 mL/hr Intravenous Canceled Entry 1/13/20 1329)   influenza vac split quad (FLUZONE,FLUARIX,AFLURIA) injection 0.5 mL (has no administration in time range)   sodium chloride 0.9 % infusion (0 mL/hr Intravenous Stopped 1/13/20 1449)   sodium chloride 0.9 % bolus 1,000 mL (0 mL Intravenous Stopped 1/13/20 0736)   insulin regular (humuLIN R,novoLIN R) injection 10 Units (10 Units Intravenous Given 1/13/20 0756)   calcium gluconate 1 g/100 mL (10 mg/mL) NS IVPB (VTB) (0 g Intravenous Stopped 1/13/20 0915)   albuterol (PROVENTIL) nebulizer solution 0.5% 2.5 mg/0.5mL (10 mg Nebulization Given 1/13/20 0744)   furosemide (LASIX) injection 40 mg (40 mg Intravenous Given 1/13/20 0759)   sodium chloride 0.9 % bolus 1,000 mL (0 mL Intravenous Stopped 1/13/20 0915)   calcium gluconate 1 g/100 mL (10 mg/mL) NS IVPB (VTB) (0 g Intravenous Stopped 1/13/20 1110)     PROCEDURES  Critical Care  Performed by: Tacos Arzola MD  Authorized by: Tacos Arzola MD     Critical care provider statement:     Critical care time (minutes):  35    Critical care time was exclusive of:  Separately billable procedures and treating other patients    Critical care was necessary to treat or prevent imminent or life-threatening deterioration of the following conditions:  Endocrine crisis, dehydration and renal failure    Critical care was time spent personally by me on the following activities:  Blood draw for specimens, discussions with consultants, development of treatment plan with patient or surrogate, evaluation of patient's response to treatment, examination of patient, ordering and performing treatments and interventions, ordering and review of laboratory studies,  ordering and review of radiographic studies, pulse oximetry, re-evaluation of patient's condition, review of old charts and obtaining history from patient or surrogate          PROGRESS AND CONSULT NOTES:  0700:  Patient care transferred from Dr. Shabazz pending labs and admission.    0731 pt checked and resting. Wife denies hx of DM and kidney issues. She states pt has had vomiting. Informed wife and pt of plan to admit for further evaluation. Pt and wife understand and agree with the plan. All questions have been answered.    0756 placed call to pulmonology    0838 discussed pt case with Dr. Parrish Petersen, pulmonology, who agrees to admit the pt to the ICU.     DIAGNOSIS:  Final diagnoses:   Diabetes mellitus, new onset (CMS/HCC)   Somnolence   Diabetic ketoacidosis without coma associated with other specified diabetes mellitus (CMS/HCC)   Acute renal failure, unspecified acute renal failure type (CMS/HCC)   Hyperkalemia   Hypermagnesemia   Hyperphosphatemia   Dehydration   Encephalopathy       DISPOSITION:  ADMISSION TO ICU    Discussed treatment plan and reason for admission with pt/family and admitting physician.  Pt/family voiced understanding of the plan for admission for further testing/treatment as needed.      DIAGNOSIS  Final diagnoses:   Diabetes mellitus, new onset (CMS/HCC)   Somnolence   Diabetic ketoacidosis without coma associated with other specified diabetes mellitus (CMS/HCC)   Acute renal failure, unspecified acute renal failure type (CMS/HCC)   Hyperkalemia   Hypermagnesemia   Hyperphosphatemia   Dehydration   Encephalopathy              Provider attestation:  I personally reviewed the past medical history, past surgical history, social history, family history, current medications, and allergies as they appear in the chart.    Documentation assistance provided by jimmie Marin for Dr. Arzola.  Information recorded by the jimmie was done at my direction and has been verified and validated  by me.                 Quin Marin  01/13/20 0842       Tacos Arzola MD  01/13/20 3675

## 2020-01-13 NOTE — PLAN OF CARE
Problem: Patient Care Overview  Goal: Plan of Care Review  1/13/2020 1715 by Bertha Martinez RN  Outcome: Ongoing (interventions implemented as appropriate)  Flowsheets  Taken 1/13/2020 1715  Progress: improving  Taken 1/13/2020 1711  Plan of Care Reviewed With: patient  Note:   Patient admitted from CCU from ED with new onset diabetes with DKA presentation. Vitals stable. Sugars above 800 upon arrival, now below 200. Initially hyperkalemic, resolved with fluids and insulin. Very dehydrated, improving. Blood cultures drawn. Infected right middle finger noted, labs pending. Very letharic, but awakens to voice. Required straight cath x1 for retention, previously incontinent in ED. ERIBERTO noted, monitor. Continue to monitor carefully in unit.

## 2020-01-13 NOTE — ED NOTES
Received report from Heather/Jayla ARMENTA. Assumed care    Pt very somnolent,  Very high bgs, potassium DKA protocol in place.     Ellis García, GEOVANY  01/13/20 6469

## 2020-01-13 NOTE — H&P
Group: [facility]         Critical care H&P admission    Patient Identification:  Joby Kulkarni  49 y.o.  male  1970  [id number]              CC: Confusion and weakness    History of Present Illness:  49-year-old -American male who presents with confusion and weakness.  He is a very poor historian because he is drowsy but he is arousable and he does answer a few questions.  Apparently he does not have a primary care physician and is not on any medications.  Recently went to urgent care and received clindamycin for swollen, painful and possibly infected right third finger, possible paronychia.  He took 1 dose only and then became too confused to continue his prescriptions.  He has had severe weakness over the past 24 hours, increased thirst and urination.  Has had no appetite.    No old records available for review in this hospital's computer system.  Old records have been requested.      Review of Systems   Constitutional: Positive for fatigue. Negative for diaphoresis and fever.   HENT: Negative for ear pain and sore throat.    Eyes: Negative for pain and visual disturbance.   Respiratory: Negative for cough and shortness of breath.    Cardiovascular: Negative for chest pain and leg swelling.   Gastrointestinal: Negative for abdominal pain and diarrhea.   Endocrine: Negative for cold intolerance and polyuria.   Genitourinary: Negative for dysuria and hematuria.   Musculoskeletal: Negative for joint swelling and myalgias.   Skin: Negative for rash and wound.   Neurological: Positive for weakness. Negative for speech difficulty and numbness.   Hematological: Negative for adenopathy. Does not bruise/bleed easily.   Psychiatric/Behavioral: Positive for confusion. Negative for agitation.       Past Medical History:  Past Medical History:   Diagnosis Date   • Finger infection     right middle finger   Diabetes mellitus type 2      Past Surgical History:  Past Surgical History:   Procedure  "Laterality Date   • NO PAST SURGERIES          Home Meds:  Medications Prior to Admission   Medication Sig Dispense Refill Last Dose   • clindamycin (CLEOCIN) 300 MG capsule Take 300 mg by mouth 3 (Three) Times a Day.          Allergies:  No Known Allergies    Social History:   Social History     Socioeconomic History   • Marital status: Unknown     Spouse name: Not on file   • Number of children: Not on file   • Years of education: Not on file   • Highest education level: Not on file   Tobacco Use   • Smoking status: Light Tobacco Smoker     Types: Cigars   • Tobacco comment: 2nd hand smoke from parents; cigars when drinking   Substance and Sexual Activity   • Alcohol use: Yes     Frequency: Monthly or less     Drinks per session: 1 or 2     Binge frequency: Never   • Drug use: Never   • Sexual activity: Never       Family History:  History reviewed. No pertinent family history.    Physical Exam:  /71   Pulse 98   Temp 97 °F (36.1 °C) (Oral)   Resp 22   Ht 182.9 cm (72\")   Wt 63.9 kg (140 lb 14 oz)   SpO2 97%   BMI 19.11 kg/m²  Body mass index is 19.11 kg/m². 97% 63.9 kg (140 lb 14 oz)  Physical Exam   Constitutional:   Male patient who appears poorly nourished, appears acutely ill   HENT:   Right Ear: External ear normal.   Left Ear: External ear normal.   Nose: Nose normal.   Mouth/Throat: Oropharynx is clear and moist.   Eyes: Pupils are equal, round, and reactive to light. Conjunctivae are normal. No scleral icterus.   Neck: No JVD present. No thyromegaly present.   Cardiovascular: Normal rate and regular rhythm.   No murmur heard.  No edema   Pulmonary/Chest: Effort normal. He has no wheezes. He has no rales.   No use of accessory muscles, no dullness to percussion   Abdominal: Soft. He exhibits no distension.   No liver or spleen enlargment palpable   Musculoskeletal: Normal range of motion. He exhibits no deformity.   No deformity in all 4 extrem.  Decreased muscle mass symmetrically throughout. "  There is some swelling and hypopigmentation of the tissue around his nail of the third finger of the right hand, consistent with perhaps paronychia   Neurological: He is alert. No cranial nerve deficit or sensory deficit.   Patient is alert, he does follow commands and moves all 4 extremities without focal deficits but he seems confused on recent events and current situation and location.  He is oriented to himself and his date of birth   Skin: No rash noted. No erythema.   No palpable nodules.  Some cool and clammy extremities and dry extremities.  He has skin tenting over his legs   Psychiatric:   Patient is somewhat confused.  Difficult to determine his mood.  He seems to have flat affect and judgment is impaired       LABS:  Lab Results   Component Value Date    CALCIUM 9.0 01/13/2020    PHOS 4.4 01/13/2020     Results from last 7 days   Lab Units 01/13/20  1352 01/13/20  0930 01/13/20  0736 01/13/20  0633   MAGNESIUM mg/dL 3.8*  --  4.2* 4.5*   SODIUM mmol/L 140 132* 126* 118*   POTASSIUM mmol/L 3.8 6.1* 5.6* 7.2*   CHLORIDE mmol/L 100 93* 83* 75*   CO2 mmol/L 21.0* 10.9* 13.4* 13.1*   BUN mg/dL 86* 92* 96* 99*   CREATININE mg/dL 2.06* 2.25* 2.46* 2.73*   GLUCOSE mg/dL 341* 714* 823* 909*   CALCIUM mg/dL 9.0 8.6 8.7 9.1   WBC 10*3/mm3  --   --   --  14.50*   HEMOGLOBIN g/dL  --   --   --  18.7*   PLATELETS 10*3/mm3  --   --   --  425   ALT (SGPT) U/L  --  17 13 15   AST (SGOT) U/L  --  35 31 38     Lab Results   Component Value Date    TROPONINT <0.010 01/13/2020     Results from last 7 days   Lab Units 01/13/20  0633   TROPONIN T ng/mL <0.010             Results from last 7 days   Lab Units 01/13/20  0654   PH, ARTERIAL pH units 7.321*   PCO2, ARTERIAL mm Hg 24.2*   PO2 ART mm Hg 139.7*   MODALITY  Room Air   O2 SATURATION CALC % 99.0                 No results found for: TSH  Estimated Creatinine Clearance: 40.1 mL/min (A) (by C-G formula based on SCr of 2.06 mg/dL (H)).  Results from last 7 days   Lab Units  01/13/20  0635   NITRITE UA  Negative   WBC UA /HPF None Seen   BACTERIA UA /HPF None Seen   SQUAM EPITHEL UA /HPF 0-2        Imaging: I personally visualized the images of chest x-ray showing no acute cardiopulmonary abnormalities, no infiltrates or pneumonia.    I directly visualized images of CT of the head.  I do not see any intracranial abnormalities, which is in agreement with the radiologist interpretation.      Assessment:  Diabetic ketoacidosis  Altered mental status due to the above  Acute kidney injury  Severe dehydration  Hyponatremia  Metabolic acidosis  Paronychia right hand, third finger      Recommendations:  Patient is critically ill.  Patient will be admitted to critical care unit due to altered mental status and diabetic ketoacidosis.  He will receive large volume IV fluid resuscitation.  DKA protocol will be initiated with IV insulin drip.  All his electrolytes will be replaced aggressively and his urine output will be monitored closely.  Basic metabolic panel will be checked every 4 hours until anion gap improves and other parameters improved.  Nothing by mouth for now.  We will watch him closely and see if his right finger manifests any purulent drainage or swelling, reason for which we may have to begin antibiotics.  Check procalcitonin levels and obtain blood cultures.  Subcutaneous heparin for DVT prophylaxis.  Total critical care time 36 minutes excluding any separately billable procedure time        Cameron Morgan MD  1/13/2020  3:23 PM      Much of this encounter note is an electronic transcription/translation of spoken language to printed text using Dragon Software.

## 2020-01-14 LAB
AMPHET+METHAMPHET UR QL: NEGATIVE
ANION GAP SERPL CALCULATED.3IONS-SCNC: 11 MMOL/L (ref 5–15)
ANION GAP SERPL CALCULATED.3IONS-SCNC: 13.9 MMOL/L (ref 5–15)
ANION GAP SERPL CALCULATED.3IONS-SCNC: 20.9 MMOL/L (ref 5–15)
BARBITURATES UR QL SCN: NEGATIVE
BENZODIAZ UR QL SCN: NEGATIVE
BUN BLD-MCNC: 38 MG/DL (ref 6–20)
BUN BLD-MCNC: 50 MG/DL (ref 6–20)
BUN BLD-MCNC: 62 MG/DL (ref 6–20)
BUN/CREAT SERPL: 30.2 (ref 7–25)
BUN/CREAT SERPL: 37 (ref 7–25)
BUN/CREAT SERPL: 47 (ref 7–25)
CALCIUM SPEC-SCNC: 8 MG/DL (ref 8.6–10.5)
CALCIUM SPEC-SCNC: 8.6 MG/DL (ref 8.6–10.5)
CALCIUM SPEC-SCNC: 8.8 MG/DL (ref 8.6–10.5)
CANNABINOIDS SERPL QL: NEGATIVE
CHLORIDE SERPL-SCNC: 107 MMOL/L (ref 98–107)
CHLORIDE SERPL-SCNC: 117 MMOL/L (ref 98–107)
CHLORIDE SERPL-SCNC: 120 MMOL/L (ref 98–107)
CO2 SERPL-SCNC: 15.1 MMOL/L (ref 22–29)
CO2 SERPL-SCNC: 19.1 MMOL/L (ref 22–29)
CO2 SERPL-SCNC: 21 MMOL/L (ref 22–29)
COCAINE UR QL: NEGATIVE
CREAT BLD-MCNC: 1.26 MG/DL (ref 0.76–1.27)
CREAT BLD-MCNC: 1.32 MG/DL (ref 0.76–1.27)
CREAT BLD-MCNC: 1.35 MG/DL (ref 0.76–1.27)
GFR SERPL CREATININE-BSD FRML MDRD: 69 ML/MIN/1.73
GFR SERPL CREATININE-BSD FRML MDRD: 70 ML/MIN/1.73
GFR SERPL CREATININE-BSD FRML MDRD: 74 ML/MIN/1.73
GLUCOSE BLD-MCNC: 136 MG/DL (ref 65–99)
GLUCOSE BLD-MCNC: 139 MG/DL (ref 65–99)
GLUCOSE BLD-MCNC: 355 MG/DL (ref 65–99)
GLUCOSE BLDC GLUCOMTR-MCNC: 106 MG/DL (ref 70–130)
GLUCOSE BLDC GLUCOMTR-MCNC: 112 MG/DL (ref 70–130)
GLUCOSE BLDC GLUCOMTR-MCNC: 119 MG/DL (ref 70–130)
GLUCOSE BLDC GLUCOMTR-MCNC: 125 MG/DL (ref 70–130)
GLUCOSE BLDC GLUCOMTR-MCNC: 126 MG/DL (ref 70–130)
GLUCOSE BLDC GLUCOMTR-MCNC: 131 MG/DL (ref 70–130)
GLUCOSE BLDC GLUCOMTR-MCNC: 165 MG/DL (ref 70–130)
GLUCOSE BLDC GLUCOMTR-MCNC: 230 MG/DL (ref 70–130)
GLUCOSE BLDC GLUCOMTR-MCNC: 249 MG/DL (ref 70–130)
GLUCOSE BLDC GLUCOMTR-MCNC: 373 MG/DL (ref 70–130)
GLUCOSE BLDC GLUCOMTR-MCNC: 385 MG/DL (ref 70–130)
GLUCOSE BLDC GLUCOMTR-MCNC: 401 MG/DL (ref 70–130)
GLUCOSE BLDC GLUCOMTR-MCNC: 446 MG/DL (ref 70–130)
GLUCOSE BLDC GLUCOMTR-MCNC: 94 MG/DL (ref 70–130)
MAGNESIUM SERPL-MCNC: 3.2 MG/DL (ref 1.6–2.6)
MAGNESIUM SERPL-MCNC: 3.4 MG/DL (ref 1.6–2.6)
METHADONE UR QL SCN: NEGATIVE
OPIATES UR QL: NEGATIVE
OXYCODONE UR QL SCN: NEGATIVE
PHOSPHATE SERPL-MCNC: 1.9 MG/DL (ref 2.5–4.5)
PHOSPHATE SERPL-MCNC: 2.6 MG/DL (ref 2.5–4.5)
POTASSIUM BLD-SCNC: 3.8 MMOL/L (ref 3.5–5.2)
POTASSIUM BLD-SCNC: 4 MMOL/L (ref 3.5–5.2)
POTASSIUM BLD-SCNC: 6.1 MMOL/L (ref 3.5–5.2)
SODIUM BLD-SCNC: 143 MMOL/L (ref 136–145)
SODIUM BLD-SCNC: 150 MMOL/L (ref 136–145)
SODIUM BLD-SCNC: 152 MMOL/L (ref 136–145)

## 2020-01-14 PROCEDURE — G0008 ADMIN INFLUENZA VIRUS VAC: HCPCS | Performed by: INTERNAL MEDICINE

## 2020-01-14 PROCEDURE — 80307 DRUG TEST PRSMV CHEM ANLYZR: CPT | Performed by: INTERNAL MEDICINE

## 2020-01-14 PROCEDURE — 82962 GLUCOSE BLOOD TEST: CPT

## 2020-01-14 PROCEDURE — 25010000002 INFLUENZA VAC SPLIT QUAD 0.5 ML SUSPENSION PREFILLED SYRINGE: Performed by: INTERNAL MEDICINE

## 2020-01-14 PROCEDURE — 83735 ASSAY OF MAGNESIUM: CPT | Performed by: EMERGENCY MEDICINE

## 2020-01-14 PROCEDURE — 25010000002 FUROSEMIDE PER 20 MG: Performed by: INTERNAL MEDICINE

## 2020-01-14 PROCEDURE — 80048 BASIC METABOLIC PNL TOTAL CA: CPT | Performed by: INTERNAL MEDICINE

## 2020-01-14 PROCEDURE — 84100 ASSAY OF PHOSPHORUS: CPT | Performed by: EMERGENCY MEDICINE

## 2020-01-14 PROCEDURE — 80048 BASIC METABOLIC PNL TOTAL CA: CPT | Performed by: EMERGENCY MEDICINE

## 2020-01-14 PROCEDURE — 63710000001 INSULIN REGULAR HUMAN PER 5 UNITS: Performed by: INTERNAL MEDICINE

## 2020-01-14 PROCEDURE — 25010000002 ENOXAPARIN PER 10 MG: Performed by: INTERNAL MEDICINE

## 2020-01-14 PROCEDURE — 25010000002 CEFTRIAXONE PER 250 MG: Performed by: INTERNAL MEDICINE

## 2020-01-14 PROCEDURE — 90686 IIV4 VACC NO PRSV 0.5 ML IM: CPT | Performed by: INTERNAL MEDICINE

## 2020-01-14 PROCEDURE — 25010000002 HALOPERIDOL LACTATE PER 5 MG: Performed by: INTERNAL MEDICINE

## 2020-01-14 PROCEDURE — 63710000001 INSULIN LISPRO (HUMAN) PER 5 UNITS: Performed by: INTERNAL MEDICINE

## 2020-01-14 PROCEDURE — 63710000001 INSULIN GLARGINE PER 5 UNITS: Performed by: INTERNAL MEDICINE

## 2020-01-14 RX ORDER — NICOTINE POLACRILEX 4 MG
15 LOZENGE BUCCAL
Status: DISCONTINUED | OUTPATIENT
Start: 2020-01-14 | End: 2020-01-22 | Stop reason: SDUPTHER

## 2020-01-14 RX ORDER — HALOPERIDOL 5 MG/ML
2 INJECTION INTRAMUSCULAR EVERY 6 HOURS PRN
Status: DISCONTINUED | OUTPATIENT
Start: 2020-01-14 | End: 2020-01-17

## 2020-01-14 RX ORDER — SODIUM CHLORIDE 9 MG/ML
500 INJECTION, SOLUTION INTRAVENOUS CONTINUOUS
Status: ACTIVE | OUTPATIENT
Start: 2020-01-14 | End: 2020-01-14

## 2020-01-14 RX ORDER — HALOPERIDOL 5 MG/ML
5 INJECTION INTRAMUSCULAR ONCE
Status: COMPLETED | OUTPATIENT
Start: 2020-01-14 | End: 2020-01-14

## 2020-01-14 RX ORDER — DEXTROSE MONOHYDRATE 25 G/50ML
25 INJECTION, SOLUTION INTRAVENOUS
Status: DISCONTINUED | OUTPATIENT
Start: 2020-01-14 | End: 2020-01-22 | Stop reason: SDUPTHER

## 2020-01-14 RX ORDER — FUROSEMIDE 10 MG/ML
40 INJECTION INTRAMUSCULAR; INTRAVENOUS ONCE
Status: COMPLETED | OUTPATIENT
Start: 2020-01-14 | End: 2020-01-14

## 2020-01-14 RX ORDER — NICOTINE POLACRILEX 4 MG
15 LOZENGE BUCCAL
Status: DISCONTINUED | OUTPATIENT
Start: 2020-01-14 | End: 2020-01-23 | Stop reason: HOSPADM

## 2020-01-14 RX ORDER — CEFTRIAXONE SODIUM 1 G/50ML
1 INJECTION, SOLUTION INTRAVENOUS EVERY 24 HOURS
Status: COMPLETED | OUTPATIENT
Start: 2020-01-14 | End: 2020-01-16

## 2020-01-14 RX ORDER — DEXTROSE MONOHYDRATE 25 G/50ML
25 INJECTION, SOLUTION INTRAVENOUS
Status: DISCONTINUED | OUTPATIENT
Start: 2020-01-14 | End: 2020-01-23 | Stop reason: HOSPADM

## 2020-01-14 RX ORDER — MORPHINE SULFATE 2 MG/ML
4 INJECTION, SOLUTION INTRAMUSCULAR; INTRAVENOUS EVERY 4 HOURS PRN
Status: DISCONTINUED | OUTPATIENT
Start: 2020-01-14 | End: 2020-01-15

## 2020-01-14 RX ORDER — INSULIN GLARGINE 100 [IU]/ML
25 INJECTION, SOLUTION SUBCUTANEOUS DAILY
Status: DISCONTINUED | OUTPATIENT
Start: 2020-01-14 | End: 2020-01-17

## 2020-01-14 RX ADMIN — SODIUM CHLORIDE, PRESERVATIVE FREE 10 ML: 5 INJECTION INTRAVENOUS at 08:07

## 2020-01-14 RX ADMIN — SODIUM CHLORIDE, PRESERVATIVE FREE 10 ML: 5 INJECTION INTRAVENOUS at 21:18

## 2020-01-14 RX ADMIN — SODIUM CHLORIDE 500 ML/HR: 9 INJECTION, SOLUTION INTRAVENOUS at 14:17

## 2020-01-14 RX ADMIN — SODIUM CHLORIDE 250 ML/HR: 4.5 INJECTION, SOLUTION INTRAVENOUS at 11:58

## 2020-01-14 RX ADMIN — CEFTRIAXONE SODIUM 1 G: 1 INJECTION, SOLUTION INTRAVENOUS at 14:15

## 2020-01-14 RX ADMIN — DEXMEDETOMIDINE HYDROCHLORIDE 0.2 MCG/KG/HR: 100 INJECTION, SOLUTION, CONCENTRATE INTRAVENOUS at 23:00

## 2020-01-14 RX ADMIN — HALOPERIDOL LACTATE 2 MG: 5 INJECTION INTRAMUSCULAR at 22:50

## 2020-01-14 RX ADMIN — INSULIN GLARGINE 25 UNITS: 100 INJECTION, SOLUTION SUBCUTANEOUS at 15:25

## 2020-01-14 RX ADMIN — INSULIN HUMAN 10 UNITS: 100 INJECTION, SOLUTION PARENTERAL at 07:59

## 2020-01-14 RX ADMIN — SODIUM CHLORIDE 250 ML/HR: 4.5 INJECTION, SOLUTION INTRAVENOUS at 07:46

## 2020-01-14 RX ADMIN — FUROSEMIDE 40 MG: 40 INJECTION, SOLUTION INTRAMUSCULAR; INTRAVENOUS at 04:19

## 2020-01-14 RX ADMIN — DEXTROSE AND SODIUM CHLORIDE 250 ML/HR: 5; 450 INJECTION, SOLUTION INTRAVENOUS at 14:21

## 2020-01-14 RX ADMIN — INFLUENZA VIRUS VACCINE 0.5 ML: 15; 15; 15; 15 SUSPENSION INTRAMUSCULAR at 12:01

## 2020-01-14 RX ADMIN — SODIUM CHLORIDE 500 ML/HR: 9 INJECTION, SOLUTION INTRAVENOUS at 12:30

## 2020-01-14 RX ADMIN — SODIUM CHLORIDE 1000 ML: 9 INJECTION, SOLUTION INTRAVENOUS at 04:19

## 2020-01-14 RX ADMIN — SODIUM CHLORIDE 6 UNITS/HR: 9 INJECTION, SOLUTION INTRAVENOUS at 07:59

## 2020-01-14 RX ADMIN — SODIUM PHOSPHATE, MONOBASIC, MONOHYDRATE 20 MMOL: 276; 142 INJECTION, SOLUTION INTRAVENOUS at 01:25

## 2020-01-14 RX ADMIN — HALOPERIDOL LACTATE 5 MG: 5 INJECTION INTRAMUSCULAR at 00:24

## 2020-01-14 RX ADMIN — POTASSIUM PHOSPHATE, MONOBASIC AND POTASSIUM PHOSPHATE, DIBASIC 15 MMOL: 224; 236 INJECTION, SOLUTION INTRAVENOUS at 17:34

## 2020-01-14 RX ADMIN — INSULIN LISPRO 5 UNITS: 100 INJECTION, SOLUTION INTRAVENOUS; SUBCUTANEOUS at 17:34

## 2020-01-14 NOTE — NURSING NOTE
Pt has been agitated for past hour, attempting to pull out IV's an refusing care. Will not let us check his blood sugar or touch his IV's. Pt attempting to get out of bed, pt refusing to talk to nurse when asked questions, refusing to do what the nurse is asking pt to do and stares in distance blankly. Refuses to answer orientation questions. Mother at bedside trying to console pt and get him to be compliant but pt refusing to listen. SR RN

## 2020-01-14 NOTE — PROGRESS NOTES
Dr. TSERING Morgan    [facility]    1/14/2020    Patient ID:  Name:  Joby Kulkarni  MRN:  [id number]  1970  49 y.o.  male            CC/Reason for visit: Diabetic ketoacidosis, agitation    Interval hx: Patient became agitated and confused, combative last night.  Interfering in his own medical care.  Not cooperating with staff, pulling out his IV lines.  Found covered in urine and blood earlier today after removing his IV sites.    ROS: Unobtainable due to confusion    Vitals:  Vitals:    01/14/20 1100 01/14/20 1200 01/14/20 1206 01/14/20 1210   BP: 103/68  110/66    BP Location:       Patient Position:       Pulse: 106 106  109   Resp:  28     Temp:       TempSrc:       SpO2: 99% 98%  99%   Weight:       Height:               Body mass index is 18.99 kg/m².    Intake/Output Summary (Last 24 hours) at 1/14/2020 1335  Last data filed at 1/14/2020 1201  Gross per 24 hour   Intake 4037 ml   Output 1550 ml   Net 2487 ml       Exam:  GEN:  Restless  Awake, does not follow all my commands.  He is not answering my questions coherently.  He is confused  LUNGS: Clear breath sounds bilat, no use of accessory muscles  CV:  Normal S1S2, without murmur, no edema  ABD:  Non tender, no enlarged liver or masses      Scheduled meds:    enoxaparin 30 mg Subcutaneous Q24H   sodium chloride 10 mL Intravenous Q12H     IV meds:                        dextrose 5 % and sodium chloride 0.45 % 250 mL/hr    dextrose 5 % and sodium chloride 0.45 % with KCl 20 mEq/L 250 mL/hr Last Rate: Stopped (01/13/20 2300)   dextrose 5 % and sodium chloride 0.45 % with KCl 40 mEq/L 250 mL/hr    insulin 6 Units/hr Last Rate: 7 Units/hr (01/14/20 1301)   custom IV KCl infusion builder 250 mL/hr    sodium chloride 250 mL/hr Last Rate: 250 mL/hr (01/14/20 1158)   sodium chloride 0.45 % with KCl 20 mEq 250 mL/hr Last Rate: 250 mL/hr (01/13/20 1950)   sodium chloride 10 mL/hr    sodium chloride 500 mL/hr Last Rate: 500 mL/hr (01/14/20  1230)   sodium chloride 0.9 % with KCl 20 mEq 250 mL/hr    sodium chloride 0.9 % with KCl 40 mEq/L 250 mL/hr        Data Review:   I reviewed the patient's medications and new clinical results.  Lab Results   Component Value Date    CALCIUM 8.8 01/14/2020    PHOS 2.6 01/14/2020    MG 3.4 (H) 01/14/2020    MG 3.5 (H) 01/13/2020    MG 3.8 (H) 01/13/2020     Results from last 7 days   Lab Units 01/14/20  0248 01/13/20  2235 01/13/20  1545  01/13/20  0930 01/13/20  0736 01/13/20  0633   SODIUM mmol/L 143 143 141   < > 132* 126* 118*   POTASSIUM mmol/L 6.1* 4.1  4.1 3.7   < > 6.1* 5.6* 7.2*   CHLORIDE mmol/L 107 107 101   < > 93* 83* 75*   CO2 mmol/L 15.1* 22.9 23.0   < > 10.9* 13.4* 13.1*   BUN mg/dL 62* 68* 84*   < > 92* 96* 99*   CREATININE mg/dL 1.32* 1.45* 1.98*   < > 2.25* 2.46* 2.73*   CALCIUM mg/dL 8.8 9.0 9.2   < > 8.6 8.7 9.1   BILIRUBIN mg/dL  --   --   --   --  0.6 0.7 0.8   ALK PHOS U/L  --   --   --   --  84 84 89   ALT (SGPT) U/L  --   --   --   --  17 13 15   AST (SGOT) U/L  --   --   --   --  35 31 38   GLUCOSE mg/dL 355* 96 196*   < > 714* 823* 909*   WBC 10*3/mm3  --   --   --   --   --   --  14.50*   HEMOGLOBIN g/dL  --   --   --   --   --   --  18.7*   PLATELETS 10*3/mm3  --   --   --   --   --   --  425   PROCALCITONIN ng/mL  --   --  0.37*  --   --   --   --     < > = values in this interval not displayed.     Results from last 7 days   Lab Units 01/13/20  1545   BLOODCX  No growth at less than 24 hours  No growth at less than 24 hours           Results from last 7 days   Lab Units 01/13/20  0633   TROPONIN T ng/mL <0.010     Results from last 7 days   Lab Units 01/13/20  0654   PH, ARTERIAL pH units 7.321*   PCO2, ARTERIAL mm Hg 24.2*   PO2 ART mm Hg 139.7*   MODALITY  Room Air   O2 SATURATION CALC % 99.0       Estimated Creatinine Clearance: 62.1 mL/min (A) (by C-G formula based on SCr of 1.32 mg/dL (H)).      ASSESSMENT:   Diabetic ketoacidosis  Altered mental status due to the above  Acute  kidney injury  Severe dehydration  Hyponatremia  Metabolic acidosis  Paronychia right hand, third finger      PLAN:  Patient became confused and agitated, belligerent and combative.  Not cooperating with care.  We will have to give IV sedation, possibly Ativan to keep him calm if he does not cooperate.  Procalcitonin level is elevated.  I will start Rocephin empirically for recent paronychia, finger infection.  So far blood cultures are negative.  We will have to resume insulin drip and IV fluid boluses with DKA protocol after he pulled out all of his IVs.  Discussed with nursing staff        Cameron Morgan MD  1/14/2020

## 2020-01-14 NOTE — NURSING NOTE
Pt found in room covered in urine and blood. Had pulled out both right arm AC PIV sites. As result right forearm PIV leaking profusely. Patient cleaned up, bath done. Attempted reorientation - pt still refuses to talk or make eye contact w/ nurse. Placed new PIV on left forearm. IV therapy called to place additional sites. Pt difficult stick w/ diabetes and dehydration.

## 2020-01-14 NOTE — PLAN OF CARE
Problem: Patient Care Overview  Goal: Plan of Care Review  Outcome: Ongoing (interventions implemented as appropriate)  Flowsheets (Taken 1/14/2020 6609)  Progress: improving  Plan of Care Reviewed With: patient  Note:   Patient remains in CCU. Restarted DKA protocol/insulin gtt this morning around 0800 this morning. Successfully closed anion gap with this afternoon's labs. Started on diet and subQ insulin. Confusion has improved, but still very flat and irritated w/ nursing care. Phosphorus replacement initiated. IVF stopped after 2L NS bolus along w/ DKA fluids. Continue to monitor carefully.

## 2020-01-14 NOTE — NURSING NOTE
Pt still refusing care, will not let nurse assess him, nurse able to barely auscultate lungs without pt getting agitated. Pt has threatened to leave AMA again, threatening to pull IVs and started getting frustrated when administering IV meds. Pt still refusing to answer any orientation questions or respond to nurse at all.  RN

## 2020-01-14 NOTE — PROGRESS NOTES
Discharge Planning Assessment  [facility]     Patient Name: Joby Kulkarni  MRN: [id number]  Today's Date: 1/14/2020    Admit Date: 1/13/2020    Discharge Needs Assessment     Row Name 01/14/20 1403       Living Environment    Lives With  alone    Current Living Arrangements  home/apartment/condo    Primary Care Provided by  self;parent(s)    Family Caregiver if Needed  parent(s);sibling(s)    Able to Return to Prior Arrangements  other (see comments)       Resource/Environmental Concerns    Resource/Environmental Concerns  none    Transportation Concerns  car, none       Transition Planning    Patient/Family Anticipates Transition to  home with family    Patient/Family Anticipated Services at Transition  none    Transportation Anticipated  family or friend will provide       Discharge Needs Assessment    Concerns to be Addressed  discharge planning    Equipment Currently Used at Home  none    Anticipated Changes Related to Illness  none    Equipment Needed After Discharge  none        Discharge Plan     Row Name 01/14/20 1404       Plan    Plan  Undetermined    Plan Comments  IMM noted  CCP spoke to patient's mother [place] [phone] to discuss d/c planning. Face sheet verified. CCP role explained. Pt emergency contact is his mother.   Pt lives in a first story apartment  with his mother  He just reseently moved in with her when he started getting sick.  Pt is independent with ADL's.  He uses no DME to ambulate.  His primary care provider is   not known.  Pt is confused presently.    He has no home health history.  He has not been to rehab.  Plan is undecided.  CCP following        Destination      Coordination has not been started for this encounter.      Durable Medical Equipment      Coordination has not been started for this encounter.      Dialysis/Infusion      Coordination has not been started for this encounter.      Home Medical Care      Coordination has not been started for this encounter.       Therapy      Coordination has not been started for this encounter.      Community Resources      Coordination has not been started for this encounter.          Demographic Summary    No documentation.       Functional Status    No documentation.       Psychosocial    No documentation.       Abuse/Neglect    No documentation.       Legal    No documentation.       Substance Abuse    No documentation.       Patient Forms    No documentation.           Augusta Simon RN

## 2020-01-14 NOTE — NURSING NOTE
"Pt tried to get out of bed because he had to void. Three nurses came in and helped his legs get back in bed because he was resisting. Pt eventually relaxed and allowed nurses to put him back to bed. Pt voided all over himself, the bed and his lines. Lines were all cleaned with CHG and linens change. Pt was also cleaned. Pt still refusing to respond to nurse or her questions or any of the other nurses on the unit. Pt shows no emotion in face and will not follow simple commands when being cleaned like \"turn towards the other nurse.\" Pt still showing signs of irritation and potential aggression. SR RN  "

## 2020-01-14 NOTE — PAYOR COMM NOTE
"Joby Kulkarni (49 y.o. Male)                            ATTENTION NURSE REVIEW , AUTH PENDING [id number], CLINICALS FOR NURSE REVIEW.                        REPLY TO UR DEPT, [facility] N  UR  [phone]                        SUBSCRIBER ID # UNKNOWN AT THIS TIME, PATIENT ADMITTED TO THE INTENSIVE CARE UNIT       Date of Birth Social Security Number Address Home Phone MRN    1970  [id number] [address] [phone] [id number]    Mandaeism Marital Status          None Unknown       Admission Date Admission Type Admitting Provider Attending Provider Department, Room/Bed    1/13/20 Emergency Parrish Petersen MD Anaya, Ervin H., MD [facility], N334/1    Discharge Date Discharge Disposition Discharge Destination                       Attending Provider:  Cameron Morgan MD    Allergies:  No Known Allergies    Isolation:  None   Infection:  None   Code Status:  CPR    Ht:  182.9 cm (72\")   Wt:  63.5 kg (139 lb 15.9 oz)    Admission Cmt:  None   Principal Problem:  Diabetic ketoacidosis without coma associated with type 2 diabetes mellitus (CMS/[facility]) [E11.10]                 Active Insurance as of 1/13/2020     [employer]            Emergency Contacts      (Rel.) Home Phone Work Phone Mobile Phone    CARMELITA KULKARNI (Mother) [phone] -- [phone]    Daksha Churchill (Sister) -- -- [phone]               History & Physical      Cameron Morgan MD at 01/13/20 1523          Group: [facility]         Critical care H&P admission    Patient Identification:  Joby Kulkarni  49 y.o.  male  1970  [id number]              CC: Confusion and weakness    History of Present Illness:  49-year-old -American male who presents with confusion and weakness.  He is a very poor historian because he is drowsy but he is arousable and he does answer a few questions.  Apparently he does not " have a primary care physician and is not on any medications.  Recently went to urgent care and received clindamycin for swollen, painful and possibly infected right third finger, possible paronychia.  He took 1 dose only and then became too confused to continue his prescriptions.  He has had severe weakness over the past 24 hours, increased thirst and urination.  Has had no appetite.    No old records available for review in this hospital's computer system.  Old records have been requested.      Review of Systems   Constitutional: Positive for fatigue. Negative for diaphoresis and fever.   HENT: Negative for ear pain and sore throat.    Eyes: Negative for pain and visual disturbance.   Respiratory: Negative for cough and shortness of breath.    Cardiovascular: Negative for chest pain and leg swelling.   Gastrointestinal: Negative for abdominal pain and diarrhea.   Endocrine: Negative for cold intolerance and polyuria.   Genitourinary: Negative for dysuria and hematuria.   Musculoskeletal: Negative for joint swelling and myalgias.   Skin: Negative for rash and wound.   Neurological: Positive for weakness. Negative for speech difficulty and numbness.   Hematological: Negative for adenopathy. Does not bruise/bleed easily.   Psychiatric/Behavioral: Positive for confusion. Negative for agitation.       Past Medical History:  Past Medical History:   Diagnosis Date   • Finger infection     right middle finger   Diabetes mellitus type 2      Past Surgical History:  Past Surgical History:   Procedure Laterality Date   • NO PAST SURGERIES          Home Meds:  Medications Prior to Admission   Medication Sig Dispense Refill Last Dose   • clindamycin (CLEOCIN) 300 MG capsule Take 300 mg by mouth 3 (Three) Times a Day.          Allergies:  No Known Allergies    Social History:   Social History     Socioeconomic History   • Marital status: Unknown     Spouse name: Not on file   • Number of children: Not on file   • Years of  "education: Not on file   • Highest education level: Not on file   Tobacco Use   • Smoking status: Light Tobacco Smoker     Types: Cigars   • Tobacco comment: 2nd hand smoke from parents; cigars when drinking   Substance and Sexual Activity   • Alcohol use: Yes     Frequency: Monthly or less     Drinks per session: 1 or 2     Binge frequency: Never   • Drug use: Never   • Sexual activity: Never       Family History:  History reviewed. No pertinent family history.    Physical Exam:  /71   Pulse 98   Temp 97 °F (36.1 °C) (Oral)   Resp 22   Ht 182.9 cm (72\")   Wt 63.9 kg (140 lb 14 oz)   SpO2 97%   BMI 19.11 kg/m²   Body mass index is 19.11 kg/m². 97% 63.9 kg (140 lb 14 oz)  Physical Exam   Constitutional:   Male patient who appears poorly nourished, appears acutely ill   HENT:   Right Ear: External ear normal.   Left Ear: External ear normal.   Nose: Nose normal.   Mouth/Throat: Oropharynx is clear and moist.   Eyes: Pupils are equal, round, and reactive to light. Conjunctivae are normal. No scleral icterus.   Neck: No JVD present. No thyromegaly present.   Cardiovascular: Normal rate and regular rhythm.   No murmur heard.  No edema   Pulmonary/Chest: Effort normal. He has no wheezes. He has no rales.   No use of accessory muscles, no dullness to percussion   Abdominal: Soft. He exhibits no distension.   No liver or spleen enlargment palpable   Musculoskeletal: Normal range of motion. He exhibits no deformity.   No deformity in all 4 extrem.  Decreased muscle mass symmetrically throughout.  There is some swelling and hypopigmentation of the tissue around his nail of the third finger of the right hand, consistent with perhaps paronychia   Neurological: He is alert. No cranial nerve deficit or sensory deficit.   Patient is alert, he does follow commands and moves all 4 extremities without focal deficits but he seems confused on recent events and current situation and location.  He is oriented to himself and " his date of birth   Skin: No rash noted. No erythema.   No palpable nodules.  Some cool and clammy extremities and dry extremities.  He has skin tenting over his legs   Psychiatric:   Patient is somewhat confused.  Difficult to determine his mood.  He seems to have flat affect and judgment is impaired       LABS:  Lab Results   Component Value Date    CALCIUM 9.0 01/13/2020    PHOS 4.4 01/13/2020     Results from last 7 days   Lab Units 01/13/20  1352 01/13/20  0930 01/13/20  0736 01/13/20  0633   MAGNESIUM mg/dL 3.8*  --  4.2* 4.5*   SODIUM mmol/L 140 132* 126* 118*   POTASSIUM mmol/L 3.8 6.1* 5.6* 7.2*   CHLORIDE mmol/L 100 93* 83* 75*   CO2 mmol/L 21.0* 10.9* 13.4* 13.1*   BUN mg/dL 86* 92* 96* 99*   CREATININE mg/dL 2.06* 2.25* 2.46* 2.73*   GLUCOSE mg/dL 341* 714* 823* 909*   CALCIUM mg/dL 9.0 8.6 8.7 9.1   WBC 10*3/mm3  --   --   --  14.50*   HEMOGLOBIN g/dL  --   --   --  18.7*   PLATELETS 10*3/mm3  --   --   --  425   ALT (SGPT) U/L  --  17 13 15   AST (SGOT) U/L  --  35 31 38     Lab Results   Component Value Date    TROPONINT <0.010 01/13/2020     Results from last 7 days   Lab Units 01/13/20  0633   TROPONIN T ng/mL <0.010             Results from last 7 days   Lab Units 01/13/20  0654   PH, ARTERIAL pH units 7.321*   PCO2, ARTERIAL mm Hg 24.2*   PO2 ART mm Hg 139.7*   MODALITY  Room Air   O2 SATURATION CALC % 99.0                 No results found for: TSH  Estimated Creatinine Clearance: 40.1 mL/min (A) (by C-G formula based on SCr of 2.06 mg/dL (H)).  Results from last 7 days   Lab Units 01/13/20  0635   NITRITE UA  Negative   WBC UA /HPF None Seen   BACTERIA UA /HPF None Seen   SQUAM EPITHEL UA /HPF 0-2        Imaging: I personally visualized the images of chest x-ray showing no acute cardiopulmonary abnormalities, no infiltrates or pneumonia.    I directly visualized images of CT of the head.  I do not see any intracranial abnormalities, which is in agreement with the radiologist  "interpretation.      Assessment:  Diabetic ketoacidosis  Altered mental status due to the above  Acute kidney injury  Severe dehydration  Hyponatremia  Metabolic acidosis  Paronychia right hand, third finger      Recommendations:  Patient is critically ill.  Patient will be admitted to critical care unit due to altered mental status and diabetic ketoacidosis.  He will receive large volume IV fluid resuscitation.  DKA protocol will be initiated with IV insulin drip.  All his electrolytes will be replaced aggressively and his urine output will be monitored closely.  Basic metabolic panel will be checked every 4 hours until anion gap improves and other parameters improved.  Nothing by mouth for now.  We will watch him closely and see if his right finger manifests any purulent drainage or swelling, reason for which we may have to begin antibiotics.  Check procalcitonin levels and obtain blood cultures.  Subcutaneous heparin for DVT prophylaxis.  Total critical care time 36 minutes excluding any separately billable procedure time        Cameron Morgan MD  1/13/2020  3:23 PM      Much of this encounter note is an electronic transcription/translation of spoken language to printed text using Dragon Software.    Electronically signed by Cameron Morgan MD at 01/13/20 1531          Emergency Department Notes      Marlon Shabazz MD at 01/13/20 0619           EMERGENCY DEPARTMENT ENCOUNTER    CHIEF COMPLAINT  Chief Complaint: AMS  History given by: EMS, Spouse  History limited by: AMS and poor historian  Room Number: 16/16  PMD: No primary care provider on file.      HPI:  Pt is a 49 y.o. male who presents via EMS for AMS. Per spouse, pt was \"not acting himself yesterday\" and appeared increasingly drowsy and generally weak. Today on EMS arrival, pt glucose was >500. He does not have a hx of DM. Pt is supposed to be taking Clindamycin, but is not taking. Pt spouse at bedside provides hx, but is a poor historian.     PAST " MEDICAL HISTORY  Active Ambulatory Problems     Diagnosis Date Noted   • No Active Ambulatory Problems     Resolved Ambulatory Problems     Diagnosis Date Noted   • No Resolved Ambulatory Problems     No Additional Past Medical History       PAST SURGICAL HISTORY  No past surgical history on file.    FAMILY HISTORY  No family history on file.    SOCIAL HISTORY       ALLERGIES  Patient has no known allergies.    REVIEW OF SYSTEMS  Review of Systems   Unable to perform ROS: Mental status change       PHYSICAL EXAM  ED Triage Vitals [01/13/20 0628]   Temp Heart Rate Resp BP SpO2   -- 85 20 99/66 92 %      Temp src Heart Rate Source Patient Position BP Location FiO2 (%)   -- Monitor Lying Left arm --         Physical Exam   Constitutional: No distress.   HENT:   Head: Normocephalic and atraumatic.   Eyes: Pupils are equal, round, and reactive to light. EOM are normal.   Neck: Normal range of motion. Neck supple.   Cardiovascular: Regular rhythm and normal heart sounds. Tachycardia present.   Pulmonary/Chest: Effort normal and breath sounds normal. No respiratory distress.   Abdominal: Soft. There is no tenderness. There is no rebound and no guarding.   Musculoskeletal: Normal range of motion. He exhibits no edema.   Neurological: He is alert. He has normal sensation and normal strength.   Skin: Skin is warm and dry.   Nursing note and vitals reviewed.      LAB RESULTS  Lab Results (last 24 hours)     Procedure Component Value Units Date/Time    POC Glucose Once [[id number]]  (Abnormal) Collected:  01/13/20 0622    Specimen:  Blood Updated:  01/13/20 0624     Glucose >599 mg/dL     CBC & Differential [[id number]] Collected:  01/13/20 0633    Specimen:  Blood Updated:  01/13/20 0649    Narrative:       The following orders were created for panel order CBC & Differential.  Procedure                               Abnormality         Status                     ---------                               -----------         ------                      CBC Auto Differential[[id number]]        Abnormal            Final result                 Please view results for these tests on the individual orders.    Comprehensive Metabolic Panel [[id number]] Collected:  01/13/20 0633    Specimen:  Blood Updated:  01/13/20 0643    Troponin [[id number]] Collected:  01/13/20 0633    Specimen:  Blood Updated:  01/13/20 0643    CBC Auto Differential [[id number]]  (Abnormal) Collected:  01/13/20 0633    Specimen:  Blood Updated:  01/13/20 0649     WBC 14.50 10*3/mm3      RBC 6.62 10*6/mm3      Hemoglobin 18.7 g/dL      Hematocrit 56.6 %      MCV 85.5 fL      MCH 28.2 pg      MCHC 33.0 g/dL      RDW 12.3 %      RDW-SD 36.7 fl      MPV 11.1 fL      Platelets 425 10*3/mm3      Neutrophil % 85.7 %      Lymphocyte % 4.9 %      Monocyte % 8.4 %      Eosinophil % 0.0 %      Basophil % 0.2 %      Immature Grans % 0.8 %      Neutrophils, Absolute 12.42 10*3/mm3      Lymphocytes, Absolute 0.71 10*3/mm3      Monocytes, Absolute 1.22 10*3/mm3      Eosinophils, Absolute 0.00 10*3/mm3      Basophils, Absolute 0.03 10*3/mm3      Immature Grans, Absolute 0.12 10*3/mm3      nRBC 0.0 /100 WBC     Urinalysis With Microscopic If Indicated (No Culture) - Urine, Catheter [[id number]]  (Abnormal) Collected:  01/13/20 0635    Specimen:  Urine, Catheter Updated:  01/13/20 0650     Color, UA Yellow     Appearance, UA Clear     pH, UA <=5.0     Specific Gravity, UA 1.028     Glucose, UA >=1000 mg/dL (3+)     Ketones, UA 15 mg/dL (1+)     Bilirubin, UA Negative     Blood, UA Trace     Protein, UA Negative     Leuk Esterase, UA Negative     Nitrite, UA Negative     Urobilinogen, UA 0.2 E.U./dL    Urinalysis, Microscopic Only - Urine, Catheter [[id number]] Collected:  01/13/20 0635    Specimen:  Urine, Catheter Updated:  01/13/20 0646    Blood Gas, Arterial [[id number]]  (Abnormal) Collected:  01/13/20 0654    Specimen:  Arterial Blood Updated:  01/13/20 0657     Site Arterial: right radial      Rigoberto's Test Positive     pH, Arterial 7.321 pH units      pCO2, Arterial 24.2 mm Hg      pO2, Arterial 139.7 mm Hg      HCO3, Arterial 12.5 mmol/L      Base Excess, Arterial -11.4 mmol/L      O2 Saturation Calculated 99.0 %      Barometric Pressure for Blood Gas 758.1 mmHg      Modality Room Air     Rate 18 Breaths/minute           I ordered the above labs and reviewed the results    RADIOLOGY  XR Chest 1 View   Final Result   1. No acute process.       This report was finalized on 1/13/2020 6:41 AM by Dr. Reddy Lizama M.D.          CT Head Without Contrast    (Results Pending)        I ordered the above noted radiological studies. Interpreted by radiologist. Reviewed by me in PACS.       PROCEDURES  Procedures  EKG          EKG time: 0633  Rhythm/Rate: NSR,82BPM  P waves and UT: nml  QRS, axis: nml   ST and T waves: early repolarization V2, V3, slightly prolonged QT     Interpreted Contemporaneously by me, independently viewed  No prior EKG for comparison      PROGRESS AND CONSULTS     6:27 AM  Ordered blood work, EKG, CXR, CT head.    7:00 AM  Turned over to Dr. Arzola pending labs for admission.      MEDICAL DECISION MAKING  Results were reviewed/discussed with the patient and they were also made aware of online access. Pt also made aware that some labs, such as cultures, will not be resulted during ER visit and follow up with PMD is necessary.     MDM  Number of Diagnoses or Management Options     Amount and/or Complexity of Data Reviewed  Clinical lab tests: reviewed and ordered (Glucose >599)  Tests in the radiology section of CPT®:  ordered and reviewed  Tests in the medicine section of CPT®:  reviewed and ordered (EKG - See EKG procedure note  )  Review and summarize past medical records: yes (Pt seen at [facility] on 1/2 and 1/3/19 for paronychia. Pt had I&D performed and was started on Bactrim.  )           DIAGNOSIS  Final diagnoses:   Diabetes mellitus, new onset (CMS/HCC)   Somnolence        DISPOSITION  ADMISSION    Discussed treatment plan and reason for admission with pt/family and admitting physician.  Pt/family voiced understanding of the plan for admission for further testing/treatment as needed.         Latest Documented Vital Signs:  As of 7:01 AM  BP- 99/66 HR- 85 Temp- 96.8 °F (36 °C) (Tympanic) O2 sat- 92%    --  Documentation assistance provided by jimmie Hagen for Dr Shabazz.  Information recorded by the scribe was done at my direction and has been verified and validated by me.     Noah Hagen  01/13/20 0639       Marlon Shabazz MD  01/13/20 0701      Electronically signed by Marlon Shabazz MD at 01/13/20 0701     Heather Schmitt, RN at 01/13/20 0637        600ml urine output     Heather Schmitt RN  01/13/20 0637      Electronically signed by Heather Schmitt, RN at 01/13/20 0637     Ellis García RN at 01/13/20 0715        Received report from Heather/Jayla ARMENTA. Assumed care    Pt very somnolent,  Very high bgs, potassium DKA protocol in place.     Ellis García, GEOVANY  01/13/20 1057      Electronically signed by Ellis García RN at 01/13/20 1057     Tacos Arzola MD at 01/13/20 0729      Procedure Orders    1. Critical Care [[id number]] ordered by Tacos Arzola MD at 01/13/20 0841                EMERGENCY DEPARTMENT ENCOUNTER    Room number:  N334/1  Date Seen:  1/13/2020  Time of transfer:0700  PCP:  Provider, No Known    HPI  Altered mental status since yesterday, increasingly drowsy and weak.  Had recent paronychia and is on clindamycin but not taking it.  Able to obtain other significant history due to his confusion.    PHYSICAL EXAM  General: Awake, alert, ill-appearing, toxic, nondiaphoretic  HEENT: Mucous membranes dry, atraumatic, normocephalic, EOMI  Neck: Full ROM  Pulm: Symmetric, non-labored, lungs CTAB  Cardiovascular: Regular rate and rhythm, normal S1/S2, intact distal pulses  GI: Soft, non-tender, non-distended, no rebound, no guarding, bowel  sounds present  MSK: Full ROM, no deformity  Skin: Warm, dry  Neuro: Alert and oriented to person and place,, GCS 15, moving all extremities, no focal deficits but does demonstrate some confusion  Psych: Calm, cooperative      LABORATORY RESULTS:  Recent Results (from the past 24 hour(s))   POC Glucose Once    Collection Time: 01/13/20  6:22 AM   Result Value Ref Range    Glucose >599 (C) 70 - 130 mg/dL   Comprehensive Metabolic Panel    Collection Time: 01/13/20  6:33 AM   Result Value Ref Range    Glucose 909 (C) 65 - 99 mg/dL    BUN 99 (H) 6 - 20 mg/dL    Creatinine 2.73 (H) 0.76 - 1.27 mg/dL    Sodium 118 (C) 136 - 145 mmol/L    Potassium 7.2 (C) 3.5 - 5.2 mmol/L    Chloride 75 (L) 98 - 107 mmol/L    CO2 13.1 (L) 22.0 - 29.0 mmol/L    Calcium 9.1 8.6 - 10.5 mg/dL    Total Protein 8.3 6.0 - 8.5 g/dL    Albumin 4.00 3.50 - 5.20 g/dL    ALT (SGPT) 15 1 - 41 U/L    AST (SGOT) 38 1 - 40 U/L    Alkaline Phosphatase 89 39 - 117 U/L    Total Bilirubin 0.8 0.2 - 1.2 mg/dL    eGFR  African Amer 30 (L) >60 mL/min/1.73    Globulin 4.3 gm/dL    A/G Ratio 0.9 g/dL    BUN/Creatinine Ratio 36.3 (H) 7.0 - 25.0    Anion Gap 29.9 (H) 5.0 - 15.0 mmol/L   Troponin    Collection Time: 01/13/20  6:33 AM   Result Value Ref Range    Troponin T <0.010 0.000 - 0.030 ng/mL   CBC Auto Differential    Collection Time: 01/13/20  6:33 AM   Result Value Ref Range    WBC 14.50 (H) 3.40 - 10.80 10*3/mm3    RBC 6.62 (H) 4.14 - 5.80 10*6/mm3    Hemoglobin 18.7 (H) 13.0 - 17.7 g/dL    Hematocrit 56.6 (H) 37.5 - 51.0 %    MCV 85.5 79.0 - 97.0 fL    MCH 28.2 26.6 - 33.0 pg    MCHC 33.0 31.5 - 35.7 g/dL    RDW 12.3 12.3 - 15.4 %    RDW-SD 36.7 (L) 37.0 - 54.0 fl    MPV 11.1 6.0 - 12.0 fL    Platelets 425 140 - 450 10*3/mm3    Neutrophil % 85.7 (H) 42.7 - 76.0 %    Lymphocyte % 4.9 (L) 19.6 - 45.3 %    Monocyte % 8.4 5.0 - 12.0 %    Eosinophil % 0.0 (L) 0.3 - 6.2 %    Basophil % 0.2 0.0 - 1.5 %    Immature Grans % 0.8 (H) 0.0 - 0.5 %    Neutrophils,  Absolute 12.42 (H) 1.70 - 7.00 10*3/mm3    Lymphocytes, Absolute 0.71 0.70 - 3.10 10*3/mm3    Monocytes, Absolute 1.22 (H) 0.10 - 0.90 10*3/mm3    Eosinophils, Absolute 0.00 0.00 - 0.40 10*3/mm3    Basophils, Absolute 0.03 0.00 - 0.20 10*3/mm3    Immature Grans, Absolute 0.12 (H) 0.00 - 0.05 10*3/mm3    nRBC 0.0 0.0 - 0.2 /100 WBC   Phosphorus    Collection Time: 01/13/20  6:33 AM   Result Value Ref Range    Phosphorus 7.1 (H) 2.5 - 4.5 mg/dL   Magnesium    Collection Time: 01/13/20  6:33 AM   Result Value Ref Range    Magnesium 4.5 (H) 1.6 - 2.6 mg/dL   Hemoglobin A1c    Collection Time: 01/13/20  6:33 AM   Result Value Ref Range    Hemoglobin A1C 14.40 (H) 4.80 - 5.60 %   Urinalysis With Microscopic If Indicated (No Culture) - Urine, Catheter    Collection Time: 01/13/20  6:35 AM   Result Value Ref Range    Color, UA Yellow Yellow, Straw    Appearance, UA Clear Clear    pH, UA <=5.0 5.0 - 8.0    Specific Gravity, UA 1.028 1.005 - 1.030    Glucose, UA >=1000 mg/dL (3+) (A) Negative    Ketones, UA 15 mg/dL (1+) (A) Negative    Bilirubin, UA Negative Negative    Blood, UA Trace (A) Negative    Protein, UA Negative Negative    Leuk Esterase, UA Negative Negative    Nitrite, UA Negative Negative    Urobilinogen, UA 0.2 E.U./dL 0.2 - 1.0 E.U./dL   Urinalysis, Microscopic Only - Urine, Catheter    Collection Time: 01/13/20  6:35 AM   Result Value Ref Range    RBC, UA None Seen None Seen, 0-2 /HPF    WBC, UA None Seen None Seen, 0-2 /HPF    Bacteria, UA None Seen None Seen /HPF    Squamous Epithelial Cells, UA 0-2 None Seen, 0-2 /HPF    Hyaline Casts, UA None Seen None Seen /LPF    Methodology Manual Light Microscopy    Blood Gas, Arterial    Collection Time: 01/13/20  6:54 AM   Result Value Ref Range    Site Arterial: right radial     Rigoberto's Test Positive     pH, Arterial 7.321 (L) 7.350 - 7.450 pH units    pCO2, Arterial 24.2 (L) 35.0 - 45.0 mm Hg    pO2, Arterial 139.7 (H) 80.0 - 100.0 mm Hg    HCO3, Arterial 12.5 (L)  22.0 - 28.0 mmol/L    Base Excess, Arterial -11.4 (L) 0.0 - 2.0 mmol/L    O2 Saturation Calculated 99.0 92.0 - 99.0 %    Barometric Pressure for Blood Gas 758.1 mmHg    Modality Room Air     Rate 18 Breaths/minute   Comprehensive Metabolic Panel    Collection Time: 01/13/20  7:36 AM   Result Value Ref Range    Glucose 823 (C) 65 - 99 mg/dL    BUN 96 (H) 6 - 20 mg/dL    Creatinine 2.46 (H) 0.76 - 1.27 mg/dL    Sodium 126 (L) 136 - 145 mmol/L    Potassium 5.6 (H) 3.5 - 5.2 mmol/L    Chloride 83 (L) 98 - 107 mmol/L    CO2 13.4 (L) 22.0 - 29.0 mmol/L    Calcium 8.7 8.6 - 10.5 mg/dL    Total Protein 7.5 6.0 - 8.5 g/dL    Albumin 3.70 3.50 - 5.20 g/dL    ALT (SGPT) 13 1 - 41 U/L    AST (SGOT) 31 1 - 40 U/L    Alkaline Phosphatase 84 39 - 117 U/L    Total Bilirubin 0.7 0.2 - 1.2 mg/dL    eGFR  African Amer 34 (L) >60 mL/min/1.73    Globulin 3.8 gm/dL    A/G Ratio 1.0 g/dL    BUN/Creatinine Ratio 39.0 (H) 7.0 - 25.0    Anion Gap 29.6 (H) 5.0 - 15.0 mmol/L   Osmolality, Serum    Collection Time: 01/13/20  7:36 AM   Result Value Ref Range    Osmolality 379 (H) 275 - 300 mOsm/kg   Magnesium    Collection Time: 01/13/20  7:36 AM   Result Value Ref Range    Magnesium 4.2 (H) 1.6 - 2.6 mg/dL   Phosphorus    Collection Time: 01/13/20  7:36 AM   Result Value Ref Range    Phosphorus 6.6 (H) 2.5 - 4.5 mg/dL   POC Glucose Once    Collection Time: 01/13/20  9:12 AM   Result Value Ref Range    Glucose >599 (C) 70 - 130 mg/dL   Comprehensive Metabolic Panel    Collection Time: 01/13/20  9:30 AM   Result Value Ref Range    Glucose 714 (C) 65 - 99 mg/dL    BUN 92 (H) 6 - 20 mg/dL    Creatinine 2.25 (H) 0.76 - 1.27 mg/dL    Sodium 132 (L) 136 - 145 mmol/L    Potassium 6.1 (C) 3.5 - 5.2 mmol/L    Chloride 93 (L) 98 - 107 mmol/L    CO2 10.9 (L) 22.0 - 29.0 mmol/L    Calcium 8.6 8.6 - 10.5 mg/dL    Total Protein 7.6 6.0 - 8.5 g/dL    Albumin 3.70 3.50 - 5.20 g/dL    ALT (SGPT) 17 1 - 41 U/L    AST (SGOT) 35 1 - 40 U/L    Alkaline Phosphatase  84 39 - 117 U/L    Total Bilirubin 0.6 0.2 - 1.2 mg/dL    eGFR  African Amer 38 (L) >60 mL/min/1.73    Globulin 3.9 gm/dL    A/G Ratio 0.9 g/dL    BUN/Creatinine Ratio 40.9 (H) 7.0 - 25.0    Anion Gap 28.1 (H) 5.0 - 15.0 mmol/L   POC Glucose Once    Collection Time: 01/13/20 11:08 AM   Result Value Ref Range    Glucose 550 (C) 70 - 130 mg/dL   POC Glucose Once    Collection Time: 01/13/20 11:44 AM   Result Value Ref Range    Glucose 575 (C) 70 - 130 mg/dL   POC Glucose Once    Collection Time: 01/13/20 12:44 PM   Result Value Ref Range    Glucose 167 (H) 70 - 130 mg/dL   POC Glucose Once    Collection Time: 01/13/20 12:45 PM   Result Value Ref Range    Glucose 217 (H) 70 - 130 mg/dL   POC Glucose Once    Collection Time: 01/13/20 12:46 PM   Result Value Ref Range    Glucose 443 (C) 70 - 130 mg/dL   Basic Metabolic Panel    Collection Time: 01/13/20  1:52 PM   Result Value Ref Range    Glucose 341 (H) 65 - 99 mg/dL    BUN 86 (H) 6 - 20 mg/dL    Creatinine 2.06 (H) 0.76 - 1.27 mg/dL    Sodium 140 136 - 145 mmol/L    Potassium 3.8 3.5 - 5.2 mmol/L    Chloride 100 98 - 107 mmol/L    CO2 21.0 (L) 22.0 - 29.0 mmol/L    Calcium 9.0 8.6 - 10.5 mg/dL    eGFR  African Amer 42 (L) >60 mL/min/1.73    BUN/Creatinine Ratio 41.7 (H) 7.0 - 25.0    Anion Gap 19.0 (H) 5.0 - 15.0 mmol/L   Magnesium    Collection Time: 01/13/20  1:52 PM   Result Value Ref Range    Magnesium 3.8 (H) 1.6 - 2.6 mg/dL   Phosphorus    Collection Time: 01/13/20  1:52 PM   Result Value Ref Range    Phosphorus 4.4 2.5 - 4.5 mg/dL   POC Glucose Once    Collection Time: 01/13/20  1:53 PM   Result Value Ref Range    Glucose 347 (H) 70 - 130 mg/dL   POC Glucose Once    Collection Time: 01/13/20  2:53 PM   Result Value Ref Range    Glucose 262 (H) 70 - 130 mg/dL     I reviewed the above results.     RADIOLOGY:  CT Head Without Contrast   Final Result   1.  No findings of acute intracranial abnormality.           This report was finalized on 1/13/2020 7:20 AM by   Bobby Clemons M.D.          XR Chest 1 View   Final Result   1. No acute process.       This report was finalized on 1/13/2020 6:41 AM by Dr. Reddy Lizama M.D.            I reviewed the above results    MEDICATIONS ORDERED IN THE ED:  Medications   sodium chloride 0.9 % flush 10 mL (has no administration in time range)   sodium chloride 0.9 % infusion (250 mL/hr Intravenous New Bag 1/13/20 1331)   sodium chloride 0.9 % with KCl 20 mEq/L infusion (has no administration in time range)   sodium chloride 0.9 % with KCl 40 mEq/L infusion (has no administration in time range)   sodium chloride 0.45 % infusion (250 mL/hr Intravenous New Bag 1/13/20 1448)   sodium chloride 0.45 % with KCl 20 mEq/L infusion (has no administration in time range)   sodium chloride 0.45 % 1,000 mL with potassium chloride 40 mEq infusion (has no administration in time range)   dextrose 5 % and sodium chloride 0.45 % infusion (has no administration in time range)   dextrose 5 % and sodium chloride 0.45 % with KCl 20 mEq/L infusion (has no administration in time range)   dextrose 5 % and sodium chloride 0.45 % with KCl 40 mEq/L infusion (has no administration in time range)   insulin regular (HumuLIN R,NovoLIN R) 100 Units in sodium chloride 0.9 % 100 mL (1 Units/mL) infusion (10 Units/hr Intravenous Currently Infusing 1/13/20 1145)   dextrose (D50W) 25 g/ 50mL Intravenous Solution 12.5 g (has no administration in time range)   sodium chloride 0.9 % flush 10 mL (has no administration in time range)   sodium chloride 0.9 % infusion (10 mL/hr Intravenous Canceled Entry 1/13/20 1329)   influenza vac split quad (FLUZONE,FLUARIX,AFLURIA) injection 0.5 mL (has no administration in time range)   sodium chloride 0.9 % infusion (0 mL/hr Intravenous Stopped 1/13/20 1449)   sodium chloride 0.9 % bolus 1,000 mL (0 mL Intravenous Stopped 1/13/20 0736)   insulin regular (humuLIN R,novoLIN R) injection 10 Units (10 Units Intravenous Given 1/13/20 0756)    calcium gluconate 1 g/100 mL (10 mg/mL) NS IVPB (VTB) (0 g Intravenous Stopped 1/13/20 0915)   albuterol (PROVENTIL) nebulizer solution 0.5% 2.5 mg/0.5mL (10 mg Nebulization Given 1/13/20 0744)   furosemide (LASIX) injection 40 mg (40 mg Intravenous Given 1/13/20 0759)   sodium chloride 0.9 % bolus 1,000 mL (0 mL Intravenous Stopped 1/13/20 0915)   calcium gluconate 1 g/100 mL (10 mg/mL) NS IVPB (VTB) (0 g Intravenous Stopped 1/13/20 1110)     PROCEDURES  Critical Care  Performed by: Tacos Arzola MD  Authorized by: Tacos Arzola MD     Critical care provider statement:     Critical care time (minutes):  35    Critical care time was exclusive of:  Separately billable procedures and treating other patients    Critical care was necessary to treat or prevent imminent or life-threatening deterioration of the following conditions:  Endocrine crisis, dehydration and renal failure    Critical care was time spent personally by me on the following activities:  Blood draw for specimens, discussions with consultants, development of treatment plan with patient or surrogate, evaluation of patient's response to treatment, examination of patient, ordering and performing treatments and interventions, ordering and review of laboratory studies, ordering and review of radiographic studies, pulse oximetry, re-evaluation of patient's condition, review of old charts and obtaining history from patient or surrogate          PROGRESS AND CONSULT NOTES:  0700:  Patient care transferred from Dr. Shabazz pending labs and admission.    0731 pt checked and resting. Wife denies hx of DM and kidney issues. She states pt has had vomiting. Informed wife and pt of plan to admit for further evaluation. Pt and wife understand and agree with the plan. All questions have been answered.    0756 placed call to pulmonology    0838 discussed pt case with Dr. Parrish Petersen, pulmonology, who agrees to admit the pt to the ICU.     DIAGNOSIS:  Final  diagnoses:   Diabetes mellitus, new onset (CMS/HCC)   Somnolence   Diabetic ketoacidosis without coma associated with other specified diabetes mellitus (CMS/HCC)   Acute renal failure, unspecified acute renal failure type (CMS/HCC)   Hyperkalemia   Hypermagnesemia   Hyperphosphatemia   Dehydration   Encephalopathy       DISPOSITION:  ADMISSION TO ICU    Discussed treatment plan and reason for admission with pt/family and admitting physician.  Pt/family voiced understanding of the plan for admission for further testing/treatment as needed.      DIAGNOSIS  Final diagnoses:   Diabetes mellitus, new onset (CMS/HCC)   Somnolence   Diabetic ketoacidosis without coma associated with other specified diabetes mellitus (CMS/HCC)   Acute renal failure, unspecified acute renal failure type (CMS/HCC)   Hyperkalemia   Hypermagnesemia   Hyperphosphatemia   Dehydration   Encephalopathy              Provider attestation:  I personally reviewed the past medical history, past surgical history, social history, family history, current medications, and allergies as they appear in the chart.    Documentation assistance provided by jimmie Marin for Dr. Arzola.  Information recorded by the scribe was done at my direction and has been verified and validated by me.                 Quin Marin  01/13/20 0842       Tacos Arzola MD  01/13/20 1512      Electronically signed by Tacos Arzola MD at 01/13/20 1512       Vital Signs (last day)     Date/Time   Temp   Temp src   Pulse   Resp   BP   Patient Position   SpO2    01/14/20 1418   --   --   101   --   --   --   98    01/14/20 1400   --   --   101   --   123/71   --   98    01/14/20 1210   --   --   109   --   --   --   99    01/14/20 1206   --   --   --   --   110/66   --   --    01/14/20 1200   --   --   106   28   --   Lying   98    01/14/20 1100   --   --   106   --   103/68   --   99    01/14/20 1000   --   --   --   --   100/62   --   --    01/14/20 0912   --   --    110   --   --   --   97    01/14/20 0900   --   --   --   --   (!) 88/57   --   --    01/14/20 0808   --   --   107   --   --   --   97    01/14/20 0800   --   --   106   (!) 34   99/63   Lying   97    01/14/20 0700   --   --   107   --   102/66   --   97    01/14/20 0600   --   --   111   --   101/72   --   98    01/14/20 0505   --   --   105   --   110/61   --   98    01/14/20 0400   --   --   106   --   93/54   --   96    01/14/20 0300   --   --   104   --   109/64   --   97    01/14/20 0105   --   --   103   --   96/48   --   96    01/14/20 0000   --   --   101   --   111/75   --   98    01/13/20 2300   --   --   101   --   124/74   --   96    01/13/20 2200   --   --   103   --   124/84   --   98    01/13/20 2100   --   --   97   --   117/75   --   98    01/13/20 2000   --   --   101   --   112/78   --   98    01/13/20 1926   97.2 (36.2)   Oral   101   --   --   --   99    01/13/20 1900   --   --   --   --   130/95   --   --    01/13/20 1850   --   --   100   --   --   --   98    01/13/20 1800   --   --   --   --   117/80   --   --    01/13/20 1703   --   --   98   --   --   --   99    01/13/20 1700   --   --   --   --   121/82   --   --    01/13/20 1610   --   --   100   --   --   --   98    01/13/20 1600   97.4 (36.3)   Oral   99   --   107/69   --   97    01/13/20 1500   --   --   98   --   --   --   97    01/13/20 1448   --   --   --   --   117/71   --   --    01/13/20 1430   --   --   101   --   (!) 99/36   --   98    01/13/20 1400   --   --   101   22   105/66   Lying   99    01/13/20 1330   --   --   --   --   109/71   --   --    01/13/20 1313   --   --   92   --   --   --   98    01/13/20 1300   --   --   --   --   108/70   --   --    01/13/20 1251   --   --   98   --   --   --   99    01/13/20 1230   --   --   --   --   111/75   --   --    01/13/20 1217   --   --   93   --   --   --   97    01/13/20 1215   --   --   95   --   --   --   98    01/13/20 1200   --   --   90   --   108/75   --   98     01/13/20 1145   97 (36.1)   Oral   90   22   --   Lying   98    01/13/20 1131   --   --   --   --   --   --   99    01/13/20 1130   --   --   91   --   --   --   99    01/13/20 1120   --   --   --   --   118/69   --   --    01/13/20 1105   --   --   89   --   98/74   --   98    01/13/20 1102   --   --   92   --   108/74   --   97    01/13/20 1035   --   --   91   --   99/64   --   98    01/13/20 1020   --   --   85   --   105/72   --   98    01/13/20 1005   --   --   86   --   108/62   --   99    01/13/20 0950   --   --   89   --   107/70   --   97    01/13/20 0935   --   --   88   --   113/80   --   99    01/13/20 0810   --   --   83   --   130/76   --   --    01/13/20 0755   --   --   81   --   112/79   --   99    01/13/20 0748   --   --   82   --   --   --   100    01/13/20 0744   --   --   83   18   --   --   99    01/13/20 0740   --   --   83   --   113/74   --   100    01/13/20 0725   --   --   81   --   108/62   --   99    01/13/20 0710   --   --   80   --   110/65   --   98    01/13/20 0640   96.8 (36)   Tympanic   --   --   --   --   --    01/13/20 06:28:14   --   --   85   20   99/66   Lying   92              Oxygen Therapy (last day)     Date/Time   SpO2   Device (Oxygen Therapy)   Flow (L/min)   Oxygen Concentration (%)   ETCO2 (mmHg)    01/14/20 1418   98   --   --   --   --    01/14/20 1400   98   --   --   --   --    01/14/20 1210   99   --   --   --   --    01/14/20 1200   98   room air   --   --   --    01/14/20 1100   99   --   --   --   --    01/14/20 0912   97   --   --   --   --    01/14/20 0808   97   --   --   --   --    01/14/20 0800   97   room air   --   --   --    01/14/20 0700   97   --   --   --   --    01/14/20 0600   98   --   --   --   --    01/14/20 0505   98   --   --   --   --    01/14/20 0400   96   room air   --   --   --    01/14/20 0300   97   --   --   --   --    01/14/20 0105   96   --   --   --   --    01/14/20 0000   98   room air   --   --   --    01/13/20 2300   96   --    --   --   --    01/13/20 2200   98   --   --   --   --    01/13/20 2100   98   --   --   --   --    01/13/20 2000   98   room air   --   --   --    01/13/20 1926   99   --   --   --   --    01/13/20 1850   98   --   --   --   --    01/13/20 1703   99   --   --   --   --    01/13/20 1610   98   --   --   --   --    01/13/20 1600   97   room air   --   --   --    01/13/20 1500   97   --   --   --   --    01/13/20 1430   98   --   --   --   --    01/13/20 1400   99   room air   --   --   --    01/13/20 1313   98   --   --   --   --    01/13/20 1251   99   --   --   --   --    01/13/20 1217   97   --   --   --   --    01/13/20 1215   98   --   --   --   --    01/13/20 1200   98   --   --   --   --    01/13/20 1145   98   room air   --   --   --    01/13/20 1131   99   --   --   --   --    01/13/20 1130   99   --   --   --   --    01/13/20 1105   98   --   --   --   --    01/13/20 1102   97   --   --   --   --    01/13/20 1035   98   --   --   --   --    01/13/20 1020   98   --   --   --   --    01/13/20 1005   99   --   --   --   --    01/13/20 0950   97   --   --   --   --    01/13/20 0935   99   --   --   --   --    01/13/20 0755   99   --   --   --   --    01/13/20 0748   100   --   --   --   --    01/13/20 0744   99   room air   --   --   --    01/13/20 0740   100   --   --   --   --    01/13/20 0725   99   --   --   --   --    01/13/20 0710   98   --   --   --   --    01/13/20 06:28:14   92   room air   --   --   --              Lines, Drains & Airways    Active LDAs     Name:   Placement date:   Placement time:   Site:   Days:    Peripheral IV 01/14/20 0253 Anterior;Right Forearm   01/14/20    0253    Forearm   less than 1    Peripheral IV 01/14/20 1223 Anterior;Distal;Left Hand   01/14/20    1223    Hand   less than 1    Peripheral IV 01/14/20 1130 Left Forearm   01/14/20    1130    Forearm   less than 1         Inactive LDAs     Name:   Placement date:   Placement time:   Removal date:   Removal time:   Site:    Days:    [REMOVED] Peripheral IV 01/13/20 0601 Right Antecubital   01/13/20    0601    01/14/20    1100    Antecubital   1    [REMOVED] Peripheral IV 01/13/20 0601 Left Antecubital   01/13/20    0601    01/13/20    1127    Antecubital   less than 1    [REMOVED] Peripheral IV 01/13/20 0623 Right Antecubital   01/13/20    0623    01/14/20    1100    Antecubital   1    [REMOVED] Peripheral IV 01/13/20 1128 Left Forearm   01/13/20    1128    01/14/20    0253    Forearm   less than 1    [REMOVED] External Urinary Catheter   01/13/20    1145    01/13/20    1400    --   less than 1                  Facility-Administered Medications as of 1/14/2020   Medication Dose Route Frequency Provider Last Rate Last Dose   • [COMPLETED] albuterol (PROVENTIL) nebulizer solution 0.5% 2.5 mg/0.5mL  10 mg Nebulization Once Tacos Arzola MD   10 mg at 01/13/20 0744   • [COMPLETED] calcium gluconate 1 g/100 mL (10 mg/mL) NS IVPB (VTB)  1 g Intravenous Once Tacos Arzola MD   Stopped at 01/13/20 0915   • [COMPLETED] calcium gluconate 1 g/100 mL (10 mg/mL) NS IVPB (VTB)  1 g Intravenous Once Parrish Petersen MD   Stopped at 01/13/20 1110   • cefTRIAXone (ROCEPHIN) IVPB 1 g  1 g Intravenous Q24H Cameron Morgan  mL/hr at 01/14/20 1415 1 g at 01/14/20 1415   • dextrose (D50W) 25 g/ 50mL Intravenous Solution 12.5 g  12.5 g Intravenous PRN Tacos Arzola MD   12.5 g at 01/13/20 2240   • dextrose (D50W) 25 g/ 50mL Intravenous Solution 25 g  25 g Intravenous Q15 Min PRN Shandra Delcid MD       • dextrose (D50W) 25 g/ 50mL Intravenous Solution 25 g  25 g Intravenous Q15 Min PRN Cameron Morgan MD       • dextrose (GLUTOSE) oral gel 15 g  15 g Oral Q15 Min PRN Shandra Delcid MD       • dextrose (GLUTOSE) oral gel 15 g  15 g Oral Q15 Min PRN Cameron Morgan MD       • enoxaparin (LOVENOX) syringe 30 mg  30 mg Subcutaneous Q24H Cameron Morgan MD   30 mg at 01/13/20 1945   • [COMPLETED] furosemide (LASIX) injection 40 mg  40 mg  Intravenous Once Tacos Arzola MD   40 mg at 01/13/20 0759   • [COMPLETED] furosemide (LASIX) injection 40 mg  40 mg Intravenous Once Shandra Delcid MD   40 mg at 01/14/20 0419   • glucagon (human recombinant) (GLUCAGEN DIAGNOSTIC) injection 1 mg  1 mg Subcutaneous Q15 Min PRN Shandra Delcid MD       • glucagon (human recombinant) (GLUCAGEN DIAGNOSTIC) injection 1 mg  1 mg Subcutaneous Q15 Min PRN Cameron Morgan MD       • [COMPLETED] haloperidol lactate (HALDOL) injection 5 mg  5 mg Intravenous Once Shandra Delcid MD   5 mg at 01/14/20 0024   • [COMPLETED] influenza vac split quad (FLUZONE,FLUARIX,AFLURIA) injection 0.5 mL  0.5 mL Intramuscular Once Parrish Petersen MD   0.5 mL at 01/14/20 1201   • insulin glargine (LANTUS) injection 25 Units  25 Units Subcutaneous Daily Cameron Morgan MD   25 Units at 01/14/20 1525   • insulin lispro (humaLOG) injection 0-9 Units  0-9 Units Subcutaneous Q4H Cameron Morgan MD       • insulin lispro (humaLOG) injection 5 Units  5 Units Subcutaneous TID With Meals Lexie Doe MD       • insulin regular (HumuLIN R,NovoLIN R) 100 Units in sodium chloride 0.9 % 100 mL (1 Units/mL) infusion  6 Units/hr Intravenous Titrated Cameron Morgan MD   Stopped at 01/14/20 1601   • [COMPLETED] insulin regular (humuLIN R,novoLIN R) injection 10 Units  10 Units Intravenous Once Tacos Arzola MD   10 Units at 01/13/20 0756   • [COMPLETED] insulin regular (humuLIN R,novoLIN R) injection 10 Units  10 Units Intravenous Once Cameron Morgan MD   10 Units at 01/14/20 0759   • magnesium sulfate 4 gram infusion - Mg less than or equal to 1mg/dL  4 g Intravenous PRN Cameron Morgan MD        Or   • magnesium sulfate 3 gram infusion (1gm x 3) - Mg 1.1 - 1.5 mg/dL  1 g Intravenous PRN Cameron Morgan MD        Or   • Magnesium Sulfate 2 gram infusion- Mg 1.6 - 1.9 mg/dL  2 g Intravenous PRN Cameron Morgan MD       • morphine injection 4 mg  4 mg Intravenous Q4H PRN Cameron Morgan MD        • ondansetron (ZOFRAN) tablet 4 mg  4 mg Oral Q6H PRN Cameron Morgan MD        Or   • ondansetron (ZOFRAN) injection 4 mg  4 mg Intravenous Q6H PRN Cameron Morgan MD       • potassium chloride (MICRO-K) CR capsule 40 mEq  40 mEq Oral PRN Cameron Morgan MD        Or   • potassium chloride (KLOR-CON) packet 40 mEq  40 mEq Oral PRN Cameorn Morgan MD        Or   • potassium chloride 10 mEq in 100 mL IVPB  10 mEq Intravenous Q1H PRN Cameron Morgan MD       • potassium phosphate 45 mmol in sodium chloride 0.9 % 500 mL infusion  45 mmol Intravenous PRN Cameron Morgan MD        Or   • potassium phosphate 30 mmol in sodium chloride 0.9 % 250 mL infusion  30 mmol Intravenous PRN Cameron Morgan MD        Or   • potassium phosphate 15 mmol in sodium chloride 0.9 % 100 mL infusion  15 mmol Intravenous PRN Cameron Morgan MD        Or   • sodium phosphates 40 mmol in sodium chloride 0.9 % 500 mL IVPB  40 mmol Intravenous PRN Cameron Morgan MD        Or   • sodium phosphates 20 mmol in sodium chloride 0.9 % 250 mL IVPB  20 mmol Intravenous PRN Cameron Morgan MD   Stopped at 20 0746   • [COMPLETED] sodium chloride 0.9 % bolus 1,000 mL  1,000 mL Intravenous Once Marlon Shabazz MD   Stopped at 20 0736   • [COMPLETED] sodium chloride 0.9 % bolus 1,000 mL  1,000 mL Intravenous Once Tacos Arzola MD   Stopped at 20 0915   • [COMPLETED] sodium chloride 0.9 % bolus 1,000 mL  1,000 mL Intravenous Once Shandra Delcid  mL/hr at 20 0419 1,000 mL at 20 0419   • sodium chloride 0.9 % flush 10 mL  10 mL Intravenous PRN Marlon Shabazz MD       • sodium chloride 0.9 % flush 10 mL  10 mL Intravenous Once PRN Tacos Arzola MD       • sodium chloride 0.9 % flush 10 mL  10 mL Intravenous Q12H Cameron Morgan MD   10 mL at 20 0807   • sodium chloride 0.9 % flush 10 mL  10 mL Intravenous PRN Cameron Morgan MD       • [] sodium chloride 0.9 % infusion  250 mL/hr Intravenous  Continuous Marlon Shabazz  mL/hr at 20 1331 250 mL/hr at 20 1331   • [] sodium chloride 0.9 % infusion  500 mL/hr Intravenous Continuous Cameron Morgan  mL/hr at 20 1417 500 mL/hr at 20 1417     Orders (last 24 hrs)      Start     Ordered    20 0600  Basic Metabolic Panel  Daily      20 1514    01/15/20 0600  Procalcitonin  Morning Draw      20 1338    01/15/20 0600  Magnesium  Morning Draw      20 1507    01/15/20 0600  Phosphorus  Morning Draw      20 1507    01/15/20 0600  Platelet Count  Morning Draw      20 1545    20 1800  Basic Metabolic Panel  Every 12 Hours      20 1507    20 1800  insulin lispro (humaLOG) injection 5 Units  3 Times Daily With Meals      20 1609    20 1700  POC Glucose 4x Daily AC & at Bedtime  4 Times Daily Before Meals & at Bedtime,   Status:  Canceled      20 1507    20 1602  POC Glucose Once  Once      20 1559    20 1600  insulin regular (HumuLIN R,NovoLIN R) 100 Units in sodium chloride 0.9 % 100 mL (1 Units/mL) infusion  Titrated      20 1507    20 1600  insulin glargine (LANTUS) injection 25 Units  Daily      20 1507    20 1600  insulin lispro (humaLOG) injection 0-9 Units  Every 4 Hours      20 1507    20 1600  POC Glucose Q4H  Every 4 Hours      20 1514    20 1507  Inpatient Endocrinology Consult  Once     Specialty:  Endocrinology  Provider:  Lexie Doe MD    20 1507    20 1506  Diet Regular; Consistent Carbohydrate  Diet Effective Now      20 1507    20 1504  Do NOT Hold Basal or Correction Scale Insulin When Patient is NPO, Hold Scheduled Mealtime (Bolus) Insulin if NPO  Continuous      20 1507    20 1504  Follow [facility] Hypoglycemia Standing Orders For Blood Glucose Less Than 70 mg/dL  Until Discontinued      20 1507    20 1504  dextrose (GLUTOSE)  oral gel 15 g  Every 15 Minutes PRN      01/14/20 1507    01/14/20 1504  dextrose (D50W) 25 g/ 50mL Intravenous Solution 25 g  Every 15 Minutes PRN      01/14/20 1507    01/14/20 1504  glucagon (human recombinant) (GLUCAGEN DIAGNOSTIC) injection 1 mg  Every 15 Minutes PRN      01/14/20 1507    01/14/20 1504  Hypoglycemia Treatment - Alert Patient That is Not NPO and Can Safely Swallow  Until Discontinued     Comments:  Administer 4 oz Fruit Juice OR 4 oz Regular Soda OR 8 oz Milk OR 15-30 grams (1 tube) of Glucose Gel.  Recheck Blood Glucose 15 Minutes After Ingestion, Repeat Treatment & Continue to Recheck Blood Sugar Every 15 Minutes Until Blood Glucose is 70 mg/dL or Higher.  Once Blood Glucose is 70 mg/dL or Higher and if It Will Be more than 60 Minutes Until the Next Meal, Provide Appropriate Snack (Including Carbohydrate Food) Based on Meal Plan Order. Give Meal Tray As Soon As Possible.    01/14/20 1507    01/14/20 1504  Hypoglycemia Treatment - Patient Has IV Access - Unresponsive, NPO or Unable To Safely Swallow  Until Discontinued     Comments:  Administer 25g (50ml) D50W IV Push.  Recheck Blood Glucose 15 Minutes After Administration, if Blood Glucose Remains Less Than 70 mg/dl, Repeat Treatment   Recheck Blood Glucose 15 Minutes After 2nd Administration, if Blood Glucose Remains Less Than 70 mg/dL After 2nd Dose of D50W, Contact Provider for Further Treatment Orders & Consider Adding IVF With D5W for Maintenance    01/14/20 1507    01/14/20 1504  Hypoglycemia Treatment - Patient Without IV Access - Unresponsive, NPO or Unable To Safely Swallow  Until Discontinued     Comments:  Administer 1mg Glucagon SQ & Establish IV Access.  Turn Patient on Side - Nausea / Vomiting May Occur.  Recheck Blood Glucose 15 Minutes After Administration.  If Blood Glucose Remains Less Than 70, Administer 25g D50W IV Push (50ml).  Recheck Blood Glucose 15 Minutes After Administration of D50W, if Blood Glucose Remains Less  Than 70 mg/dl, Contact Provider for Further Treatment Orders & Consider Adding IVF With D5 for Maintenance    01/14/20 1507    01/14/20 1504  Hypoglycemia Treatment - Document Event & Patient Response to Interventions EMR, Document Medications on MAR  Continuous      01/14/20 1507    01/14/20 1504  Notify Provider - Hypoglycemia Treatment  Until Discontinued      01/14/20 1507    01/14/20 1454  POC Glucose Once  Once      01/14/20 1452    01/14/20 1430  cefTRIAXone (ROCEPHIN) IVPB 1 g  Every 24 Hours      01/14/20 1338    01/14/20 1355  POC Glucose Once  Once      01/14/20 1353    01/14/20 1304  POC Glucose Once  Once      01/14/20 1300    01/14/20 1230  sodium chloride 0.9 % infusion  Continuous      01/14/20 1137    01/14/20 1212  POC Glucose Once  Once      01/14/20 1153    01/14/20 1200  influenza vac split quad (FLUZONE,FLUARIX,AFLURIA) injection 0.5 mL  Once      01/13/20 1207    01/14/20 1128  POC Glucose Once  Once      01/14/20 1120    01/14/20 1002  POC Glucose Once  Once      01/14/20 1000    01/14/20 0910  POC Glucose Once  Once      01/14/20 0907    01/14/20 0845  insulin regular (humuLIN R,novoLIN R) injection 10 Units  Once      01/14/20 0752    01/14/20 0837  POC Glucose Once  Once      01/14/20 0834    01/14/20 0800  insulin lispro (humaLOG) injection 0-7 Units  4 Times Daily With Meals & Nightly,   Status:  Discontinued      01/14/20 0143    01/14/20 0800  Basic Metabolic Panel  Once,   Status:  Canceled      01/14/20 0411    01/14/20 0752  Restart insulin gtt, with bolus x1 and protocol. Restart fluids.  Nursing Communication  Once     Comments:  Restart insulin gtt, with bolus x1 and protocol. Restart fluids.    01/14/20 0752    01/14/20 0751  morphine injection 4 mg  Every 4 Hours PRN      01/14/20 0752    01/14/20 0747  POC Glucose Once  Once      01/14/20 0743    01/14/20 0700  POC Glucose 4x Daily AC & at Bedtime  4 Times Daily Before Meals & at Bedtime,   Status:  Canceled      01/14/20 0143     01/14/20 0500  furosemide (LASIX) injection 40 mg  Once      01/14/20 0411    01/14/20 0500  sodium chloride 0.9 % bolus 1,000 mL  Once      01/14/20 0411    01/14/20 0142  Do NOT Hold Basal or Correction Scale Insulin When Patient is NPO, Hold Scheduled Mealtime (Bolus) Insulin if NPO  Continuous,   Status:  Canceled      01/14/20 0143 01/14/20 0142  Follow [facility] Hypoglycemia Standing Orders For Blood Glucose Less Than 70 mg/dL  Until Discontinued,   Status:  Canceled      01/14/20 0143 01/14/20 0142  Hypoglycemia Treatment - Alert Patient That is Not NPO and Can Safely Swallow  Until Discontinued,   Status:  Canceled     Comments:  Administer 4 oz Fruit Juice OR 4 oz Regular Soda OR 8 oz Milk OR 15-30 grams (1 tube) of Glucose Gel.  Recheck Blood Glucose 15 Minutes After Ingestion, Repeat Treatment & Continue to Recheck Blood Sugar Every 15 Minutes Until Blood Glucose is 70 mg/dL or Higher.  Once Blood Glucose is 70 mg/dL or Higher and if It Will Be more than 60 Minutes Until the Next Meal, Provide Appropriate Snack (Including Carbohydrate Food) Based on Meal Plan Order. Give Meal Tray As Soon As Possible.    01/14/20 0143 01/14/20 0142  Hypoglycemia Treatment - Patient Has IV Access - Unresponsive, NPO or Unable To Safely Swallow  Until Discontinued,   Status:  Canceled     Comments:  Administer 25g (50ml) D50W IV Push.  Recheck Blood Glucose 15 Minutes After Administration, if Blood Glucose Remains Less Than 70 mg/dl, Repeat Treatment   Recheck Blood Glucose 15 Minutes After 2nd Administration, if Blood Glucose Remains Less Than 70 mg/dL After 2nd Dose of D50W, Contact Provider for Further Treatment Orders & Consider Adding IVF With D5W for Maintenance    01/14/20 0143 01/14/20 0142  Hypoglycemia Treatment - Patient Without IV Access - Unresponsive, NPO or Unable To Safely Swallow  Until Discontinued,   Status:  Canceled     Comments:  Administer 1mg Glucagon SQ & Establish IV Access.  Turn Patient  on Side - Nausea / Vomiting May Occur.  Recheck Blood Glucose 15 Minutes After Administration.  If Blood Glucose Remains Less Than 70, Administer 25g D50W IV Push (50ml).  Recheck Blood Glucose 15 Minutes After Administration of D50W, if Blood Glucose Remains Less Than 70 mg/dl, Contact Provider for Further Treatment Orders & Consider Adding IVF With D5 for Maintenance    01/14/20 0143    01/14/20 0142  Hypoglycemia Treatment - Document Event & Patient Response to Interventions EMR, Document Medications on MAR  Continuous,   Status:  Canceled      01/14/20 0143    01/14/20 0142  Notify Provider - Hypoglycemia Treatment  Until Discontinued,   Status:  Canceled      01/14/20 0143    01/14/20 0141  dextrose (GLUTOSE) oral gel 15 g  Every 15 Minutes PRN      01/14/20 0143    01/14/20 0141  dextrose (D50W) 25 g/ 50mL Intravenous Solution 25 g  Every 15 Minutes PRN      01/14/20 0143    01/14/20 0141  glucagon (human recombinant) (GLUCAGEN DIAGNOSTIC) injection 1 mg  Every 15 Minutes PRN      01/14/20 0143    01/14/20 0100  haloperidol lactate (HALDOL) injection 5 mg  Once      01/14/20 0012    01/14/20 0012  Urine Drug Screen - Urine, Clean Catch  Once      01/14/20 0011    01/13/20 2316  Blood Gas, Venous  Once,   Status:  Canceled      01/13/20 2316    01/13/20 2236  POC Glucose Once  Once      01/13/20 2227    01/13/20 2234  Basic Metabolic Panel  STAT      01/13/20 2234    01/13/20 2234  Potassium  STAT      01/13/20 2234    01/13/20 2234  Phosphorus  STAT,   Status:  Canceled      01/13/20 2234    01/13/20 2234  Magnesium  STAT      01/13/20 2234    01/13/20 2134  POC Glucose Once  Once      01/13/20 2130    01/13/20 2100  sodium chloride 0.9 % flush 10 mL  Every 12 Hours Scheduled      01/13/20 1522    01/13/20 2031  POC Glucose Once  Once      01/13/20 2028    01/13/20 2000  enoxaparin (LOVENOX) syringe 30 mg  Every 24 Hours      01/13/20 1723    01/13/20 1947  POC Glucose Once  Once      01/13/20 1944 01/13/20  1838  POC Glucose Once  Once      01/13/20 1836    01/13/20 1728  POC Glucose Once  Once      01/13/20 1714    01/13/20 1724  Place Sequential Compression Device  Once      01/13/20 1723    01/13/20 1724  Maintain Sequential Compression Device  Continuous,   Status:  Canceled      01/13/20 1723    01/13/20 1703  Procalcitonin  Once      01/13/20 1702    01/13/20 1640  POC Glucose Once  Once      01/13/20 1637    01/13/20 1559  potassium phosphate 45 mmol in sodium chloride 0.9 % 500 mL infusion  As Needed      01/13/20 1600    01/13/20 1559  potassium phosphate 30 mmol in sodium chloride 0.9 % 250 mL infusion  As Needed      01/13/20 1600    01/13/20 1559  potassium phosphate 15 mmol in sodium chloride 0.9 % 100 mL infusion  As Needed      01/13/20 1600    01/13/20 1559  sodium phosphates 40 mmol in sodium chloride 0.9 % 500 mL IVPB  As Needed      01/13/20 1600    01/13/20 1559  sodium phosphates 20 mmol in sodium chloride 0.9 % 250 mL IVPB  As Needed      01/13/20 1600    01/13/20 1559  magnesium sulfate 4 gram infusion - Mg less than or equal to 1mg/dL  As Needed      01/13/20 1600    01/13/20 1559  magnesium sulfate 3 gram infusion (1gm x 3) - Mg 1.1 - 1.5 mg/dL  As Needed      01/13/20 1600    01/13/20 1559  Magnesium Sulfate 2 gram infusion- Mg 1.6 - 1.9 mg/dL  As Needed      01/13/20 1600    01/13/20 1559  potassium chloride (MICRO-K) CR capsule 40 mEq  As Needed      01/13/20 1600    01/13/20 1559  potassium chloride (KLOR-CON) packet 40 mEq  As Needed      01/13/20 1600    01/13/20 1559  potassium chloride 10 mEq in 100 mL IVPB  Every 1 Hour PRN      01/13/20 1600    01/13/20 1521  sodium chloride 0.9 % flush 10 mL  As Needed      01/13/20 1522    01/13/20 1521  ondansetron (ZOFRAN) tablet 4 mg  Every 6 Hours PRN      01/13/20 1522    01/13/20 1521  ondansetron (ZOFRAN) injection 4 mg  Every 6 Hours PRN      01/13/20 1522    01/13/20 1430  sodium chloride 0.9 % infusion  Continuous,   Status:  Discontinued       01/13/20 1331    01/13/20 0800  POC Glucose Q1H  Every Hour,   Status:  Canceled      01/13/20 0726 01/13/20 0800  Basic Metabolic Panel  Every 4 Hours,   Status:  Canceled      01/13/20 0726 01/13/20 0800  Magnesium  Every 4 Hours,   Status:  Canceled      01/13/20 0726 01/13/20 0800  Phosphorus  Every 4 Hours,   Status:  Canceled      01/13/20 0726 01/13/20 0728  insulin regular (HumuLIN R,NovoLIN R) 100 Units in sodium chloride 0.9 % 100 mL (1 Units/mL) infusion  Titrated,   Status:  Discontinued      01/13/20 0726 01/13/20 0722  sodium chloride 0.9 % with KCl 40 mEq/L infusion  Continuous PRN,   Status:  Discontinued      01/13/20 0726 01/13/20 0722  sodium chloride 0.45 % infusion  Continuous PRN,   Status:  Discontinued      01/13/20 0726 01/13/20 0722  sodium chloride 0.45 % with KCl 20 mEq/L infusion  Continuous PRN,   Status:  Discontinued      01/13/20 0726 01/13/20 0722  sodium chloride 0.45 % 1,000 mL with potassium chloride 40 mEq infusion  Continuous PRN,   Status:  Discontinued      01/13/20 0726 01/13/20 0722  dextrose 5 % and sodium chloride 0.45 % infusion  Continuous PRN,   Status:  Discontinued      01/13/20 0726 01/13/20 0722  dextrose 5 % and sodium chloride 0.45 % with KCl 20 mEq/L infusion  Continuous PRN,   Status:  Discontinued      01/13/20 0726 01/13/20 0722  dextrose 5 % and sodium chloride 0.45 % with KCl 40 mEq/L infusion  Continuous PRN,   Status:  Discontinued      01/13/20 0726 01/13/20 0722  dextrose (D50W) 25 g/ 50mL Intravenous Solution 12.5 g  As Needed      01/13/20 0726 01/13/20 0722  sodium chloride 0.9 % flush 10 mL  Once As Needed      01/13/20 0726 01/13/20 0722  sodium chloride 0.9 % infusion  Continuous PRN,   Status:  Discontinued      01/13/20 0726 01/13/20 0722  sodium chloride 0.9 % with KCl 20 mEq/L infusion  Continuous PRN,   Status:  Discontinued      01/13/20 0726    01/13/20 0623  sodium chloride 0.9 % flush 10 mL   As Needed      01/13/20 0624    01/07/20 0000  clindamycin (CLEOCIN) 300 MG capsule  3 Times Daily      01/13/20 1204    Unscheduled  ECG 12 Lead  As Needed     Comments:  For standing ICU/CCU Standing Orders.  Nurse to Release if Patient Experiences Acute Chest Pain or Dysrhythmias    01/13/20 1522    Unscheduled  Potassium  As Needed     Comments:  Sudden Ventricular Dysrhythmias. Notify Physician.      01/13/20 1522    Unscheduled  Magnesium  As Needed     Comments:  For ICU/CCU Standing Orders      01/13/20 1522    Unscheduled  Magnesium  As Needed      01/13/20 1600    Unscheduled  Potassium  As Needed      01/13/20 1600                   Physician Progress Notes (last 24 hours) (Notes from 01/13/20 1615 through 01/14/20 1615)      Cameron Morgan MD at 01/14/20 1335          Dr. TSERING Morgan    [facility] CORONARY CARE    1/14/2020    Patient ID:  Name:  Joby Kulkarni  MRN:  [id number]  1970  49 y.o.  male            CC/Reason for visit: Diabetic ketoacidosis, agitation    Interval hx: Patient became agitated and confused, combative last night.  Interfering in his own medical care.  Not cooperating with staff, pulling out his IV lines.  Found covered in urine and blood earlier today after removing his IV sites.    ROS: Unobtainable due to confusion    Vitals:  Vitals:    01/14/20 1100 01/14/20 1200 01/14/20 1206 01/14/20 1210   BP: 103/68  110/66    BP Location:       Patient Position:       Pulse: 106 106  109   Resp:  28     Temp:       TempSrc:       SpO2: 99% 98%  99%   Weight:       Height:               Body mass index is 18.99 kg/m².    Intake/Output Summary (Last 24 hours) at 1/14/2020 1335  Last data filed at 1/14/2020 1201  Gross per 24 hour   Intake 4037 ml   Output 1550 ml   Net 2487 ml       Exam:  GEN:  Restless  Awake, does not follow all my commands.  He is not answering my questions coherently.  He is confused  LUNGS: Clear breath sounds bilat, no use of accessory  muscles  CV:  Normal S1S2, without murmur, no edema  ABD:  Non tender, no enlarged liver or masses      Scheduled meds:    enoxaparin 30 mg Subcutaneous Q24H   sodium chloride 10 mL Intravenous Q12H     IV meds:                        dextrose 5 % and sodium chloride 0.45 % 250 mL/hr    dextrose 5 % and sodium chloride 0.45 % with KCl 20 mEq/L 250 mL/hr Last Rate: Stopped (01/13/20 2300)   dextrose 5 % and sodium chloride 0.45 % with KCl 40 mEq/L 250 mL/hr    insulin 6 Units/hr Last Rate: 7 Units/hr (01/14/20 1301)   custom IV KCl infusion builder 250 mL/hr    sodium chloride 250 mL/hr Last Rate: 250 mL/hr (01/14/20 1158)   sodium chloride 0.45 % with KCl 20 mEq 250 mL/hr Last Rate: 250 mL/hr (01/13/20 1950)   sodium chloride 10 mL/hr    sodium chloride 500 mL/hr Last Rate: 500 mL/hr (01/14/20 1230)   sodium chloride 0.9 % with KCl 20 mEq 250 mL/hr    sodium chloride 0.9 % with KCl 40 mEq/L 250 mL/hr        Data Review:   I reviewed the patient's medications and new clinical results.  Lab Results   Component Value Date    CALCIUM 8.8 01/14/2020    PHOS 2.6 01/14/2020    MG 3.4 (H) 01/14/2020    MG 3.5 (H) 01/13/2020    MG 3.8 (H) 01/13/2020     Results from last 7 days   Lab Units 01/14/20  0248 01/13/20  2235 01/13/20  1545  01/13/20  0930 01/13/20  0736 01/13/20  0633   SODIUM mmol/L 143 143 141   < > 132* 126* 118*   POTASSIUM mmol/L 6.1* 4.1  4.1 3.7   < > 6.1* 5.6* 7.2*   CHLORIDE mmol/L 107 107 101   < > 93* 83* 75*   CO2 mmol/L 15.1* 22.9 23.0   < > 10.9* 13.4* 13.1*   BUN mg/dL 62* 68* 84*   < > 92* 96* 99*   CREATININE mg/dL 1.32* 1.45* 1.98*   < > 2.25* 2.46* 2.73*   CALCIUM mg/dL 8.8 9.0 9.2   < > 8.6 8.7 9.1   BILIRUBIN mg/dL  --   --   --   --  0.6 0.7 0.8   ALK PHOS U/L  --   --   --   --  84 84 89   ALT (SGPT) U/L  --   --   --   --  17 13 15   AST (SGOT) U/L  --   --   --   --  35 31 38   GLUCOSE mg/dL 355* 96 196*   < > 714* 823* 909*   WBC 10*3/mm3  --   --   --   --   --   --  14.50*   HEMOGLOBIN  g/dL  --   --   --   --   --   --  18.7*   PLATELETS 10*3/mm3  --   --   --   --   --   --  425   PROCALCITONIN ng/mL  --   --  0.37*  --   --   --   --     < > = values in this interval not displayed.     Results from last 7 days   Lab Units 01/13/20  1545   BLOODCX  No growth at less than 24 hours  No growth at less than 24 hours           Results from last 7 days   Lab Units 01/13/20  0633   TROPONIN T ng/mL <0.010     Results from last 7 days   Lab Units 01/13/20  0654   PH, ARTERIAL pH units 7.321*   PCO2, ARTERIAL mm Hg 24.2*   PO2 ART mm Hg 139.7*   MODALITY  Room Air   O2 SATURATION CALC % 99.0       Estimated Creatinine Clearance: 62.1 mL/min (A) (by C-G formula based on SCr of 1.32 mg/dL (H)).      ASSESSMENT:   Diabetic ketoacidosis  Altered mental status due to the above  Acute kidney injury  Severe dehydration  Hyponatremia  Metabolic acidosis  Paronychia right hand, third finger      PLAN:  Patient became confused and agitated, belligerent and combative.  Not cooperating with care.  We will have to give IV sedation, possibly Ativan to keep him calm if he does not cooperate.  Procalcitonin level is elevated.  I will start Rocephin empirically for recent paronychia, finger infection.  So far blood cultures are negative.  We will have to resume insulin drip and IV fluid boluses with DKA protocol after he pulled out all of his IVs.  Discussed with nursing staff        Cameron Morgan MD  1/14/2020    Electronically signed by Cameron Morgan MD at 01/14/20 7389

## 2020-01-15 LAB
ANION GAP SERPL CALCULATED.3IONS-SCNC: 15.2 MMOL/L (ref 5–15)
BUN BLD-MCNC: 25 MG/DL (ref 6–20)
BUN/CREAT SERPL: 24 (ref 7–25)
CALCIUM SPEC-SCNC: 8.5 MG/DL (ref 8.6–10.5)
CHLORIDE SERPL-SCNC: 114 MMOL/L (ref 98–107)
CO2 SERPL-SCNC: 19.8 MMOL/L (ref 22–29)
CREAT BLD-MCNC: 1.04 MG/DL (ref 0.76–1.27)
GFR SERPL CREATININE-BSD FRML MDRD: 93 ML/MIN/1.73
GLUCOSE BLD-MCNC: 153 MG/DL (ref 65–99)
GLUCOSE BLDC GLUCOMTR-MCNC: 132 MG/DL (ref 70–130)
GLUCOSE BLDC GLUCOMTR-MCNC: 134 MG/DL (ref 70–130)
GLUCOSE BLDC GLUCOMTR-MCNC: 138 MG/DL (ref 70–130)
GLUCOSE BLDC GLUCOMTR-MCNC: 153 MG/DL (ref 70–130)
GLUCOSE BLDC GLUCOMTR-MCNC: 156 MG/DL (ref 70–130)
GLUCOSE BLDC GLUCOMTR-MCNC: 83 MG/DL (ref 70–130)
MAGNESIUM SERPL-MCNC: 2.6 MG/DL (ref 1.6–2.6)
PHOSPHATE SERPL-MCNC: 2.3 MG/DL (ref 2.5–4.5)
PLATELET # BLD AUTO: 289 10*3/MM3 (ref 140–450)
POTASSIUM BLD-SCNC: 4.3 MMOL/L (ref 3.5–5.2)
PROCALCITONIN SERPL-MCNC: 0.23 NG/ML (ref 0.1–0.25)
SODIUM BLD-SCNC: 149 MMOL/L (ref 136–145)

## 2020-01-15 PROCEDURE — 85049 AUTOMATED PLATELET COUNT: CPT | Performed by: INTERNAL MEDICINE

## 2020-01-15 PROCEDURE — 80048 BASIC METABOLIC PNL TOTAL CA: CPT | Performed by: INTERNAL MEDICINE

## 2020-01-15 PROCEDURE — 25010000002 ENOXAPARIN PER 10 MG: Performed by: INTERNAL MEDICINE

## 2020-01-15 PROCEDURE — 82962 GLUCOSE BLOOD TEST: CPT

## 2020-01-15 PROCEDURE — 84145 PROCALCITONIN (PCT): CPT | Performed by: INTERNAL MEDICINE

## 2020-01-15 PROCEDURE — 84100 ASSAY OF PHOSPHORUS: CPT | Performed by: INTERNAL MEDICINE

## 2020-01-15 PROCEDURE — 83735 ASSAY OF MAGNESIUM: CPT | Performed by: INTERNAL MEDICINE

## 2020-01-15 PROCEDURE — 63710000001 INSULIN LISPRO (HUMAN) PER 5 UNITS: Performed by: INTERNAL MEDICINE

## 2020-01-15 PROCEDURE — 63710000001 INSULIN GLARGINE PER 5 UNITS: Performed by: INTERNAL MEDICINE

## 2020-01-15 PROCEDURE — 25010000002 CEFTRIAXONE PER 250 MG: Performed by: INTERNAL MEDICINE

## 2020-01-15 RX ADMIN — INSULIN LISPRO 2 UNITS: 100 INJECTION, SOLUTION INTRAVENOUS; SUBCUTANEOUS at 08:01

## 2020-01-15 RX ADMIN — INSULIN LISPRO 5 UNITS: 100 INJECTION, SOLUTION INTRAVENOUS; SUBCUTANEOUS at 12:34

## 2020-01-15 RX ADMIN — ENOXAPARIN SODIUM 40 MG: 40 INJECTION SUBCUTANEOUS at 11:29

## 2020-01-15 RX ADMIN — POTASSIUM PHOSPHATE, MONOBASIC AND POTASSIUM PHOSPHATE, DIBASIC 15 MMOL: 224; 236 INJECTION, SOLUTION INTRAVENOUS at 13:55

## 2020-01-15 RX ADMIN — INSULIN LISPRO 5 UNITS: 100 INJECTION, SOLUTION INTRAVENOUS; SUBCUTANEOUS at 08:01

## 2020-01-15 RX ADMIN — INSULIN GLARGINE 25 UNITS: 100 INJECTION, SOLUTION SUBCUTANEOUS at 08:04

## 2020-01-15 RX ADMIN — INSULIN LISPRO 2 UNITS: 100 INJECTION, SOLUTION INTRAVENOUS; SUBCUTANEOUS at 20:11

## 2020-01-15 RX ADMIN — CEFTRIAXONE SODIUM 1 G: 1 INJECTION, SOLUTION INTRAVENOUS at 13:55

## 2020-01-15 RX ADMIN — SODIUM CHLORIDE, PRESERVATIVE FREE 10 ML: 5 INJECTION INTRAVENOUS at 20:13

## 2020-01-15 RX ADMIN — SODIUM CHLORIDE, PRESERVATIVE FREE 10 ML: 5 INJECTION INTRAVENOUS at 08:02

## 2020-01-15 NOTE — NURSING NOTE
"Pt still refusing to answer nurse questions or follow commands such as \"move your feet or squeeze my hands\". Pt refused to get a lovenox shot at 8pm and said he knows the MD did not order it he has a picture. When told he was in the hospital he stared blankly at the nurse and responded that he had to leave. When told he could not leave that he was very sick and would be risking his life he stared blankly at the nurse and repeated he had to leave. Pt kept saying \"I get off at 8pm for work and it is 8pm right not I have to go home.\" When nurse tried to reorient pt there were no signs of comprehension. Pt refusing to care for himself, refusing to assist in holding urinal. Pt has already pulled out his IV in his forearm. When asked about pulling it out he stared blankly at the nurse and did not respond. Nurse tried to listen to pt's abdomen and at first pt started pushing nurse's hand away. Eventually nurse was able to listen.  RN  "

## 2020-01-15 NOTE — PLAN OF CARE
Problem: Patient Care Overview  Goal: Plan of Care Review  Outcome: Ongoing (interventions implemented as appropriate)  Flowsheets (Taken 1/15/2020 5066)  Progress: improving  Plan of Care Reviewed With: patient  Note:   Patient remains in CCU awaiting med-surg bed. Vitals stable. Precedex stopped early this morning. No further episodes of agitation. Accepting of blood glucose checks and insulin injections. Attempted education w/ diabetes educator, was confused with her. Small amount of confusion noted in conversation w/ patient, but answers orientation questions appropriately for evening assessment. Eating small (25%) amount of meals. Great urine output. Antibiotics for finger infection. Frequent reorientation. Blood sugars 150-80, treated with insulin appropriately per orders. Phosphorus replaced. Still awaiting endocrine MD to see. Continue to monitor and attempt DM education.

## 2020-01-15 NOTE — PLAN OF CARE
Problem: Patient Care Overview  Goal: Plan of Care Review  Outcome: Ongoing (interventions implemented as appropriate)  Flowsheets  Taken 1/15/2020 3391  Progress: improving  Outcome Summary: Pt still very agitated and refusing care but was compliant after precedex started last night. Restraints were placed for a couple hours until meds started to work. Pt acted violent towards nurse, see note. BG has been stable 90's-130's and no coverage insulin was given during shift. PM labs yesterday were good, no electrolyte replacement needed, sodium was coming down. AM labs pending results. Mother still at bedside. Pt still agitated but not refusing care. PO water was given during shift. Pt voided twice during shift.  RN  Taken 1/15/2020 0400  Plan of Care Reviewed With: patient

## 2020-01-15 NOTE — NURSING NOTE
Nurse tried to administer IV medication (haldol) and pt got agitated, started jerking arm away from nurse and attempting to pull out IV's. Pt moved arms towards nurse as if to hit. Pt now in restraints and getting Precedex. Mother entered room after episode and is now at bedside. Education has been done on reason for restraints and safety of restraints and how it will not harm pt all questions by mother answered. Mother voiced understanding of education. SR RN

## 2020-01-15 NOTE — NURSING NOTE
Pt agitated and refusing to get his blood pressure checked, refusing all care, will not let nurse touch him. MD called and tori ordered PRN. SR ARMENTA

## 2020-01-15 NOTE — CONSULTS
"Diabetes Education  Assessment/Teaching    Patient Name:  Joby Kulkarni  YOB: 1970  MRN: [id number]  Admit Date:  1/13/2020      Assessment Date:  1/15/2020    Most Recent Value   General Information    Referral From:  A1c   Height  182.9 cm (72\")   Height Method  Stated   Weight  66.2 kg (145 lb 15.1 oz)   Weight Method  Bed scale   Pregnancy Assessment   Diabetes History   What type of diabetes do you have?  Unknown   Length of Diabetes Diagnosis  Newly diagnosed <6 months   Current DM knowledge  poor   Have you had diabetes education/teaching in the past?  no   Education Preferences   Barriers to Learning  other (comment) [Slight confusion noted.   Staff RN notified.  ]   Nutrition Information   Assessment Topics   Healthy Eating - Assessment  Needs education   Being Active - Assessment  Needs education   Taking Medication - Assessment  Needs education   Problem Solving - Assessment  Needs education   Reducing Risk - Assessment  Needs education   Monitoring - Assessment  Needs education   DM Goals   Healthy Eating - Goal  0-30 days from discharge   Being Active - Goal  0-30 days from discharge   Taking Medication - Goal  0-7 days from discharge   Problem Solving - Goal  0-30 days from discharge   Reducing Risk - Goal  30-90 days from discharge   Monitoring - Goal  30-90 days from discharge   Contact Plan  Follow-up medical care            Most Recent Value   DM Education Needs   Meter  Meter provided   Meter Type  One Touch [Verio Flex w/ Delica lancets]   Frequency of Testing  AC/HS   Blood Glucose Target Range   mg/dL   Problem Solving  Hypoglycemia, Hyperglycemia, Signs, Symptoms, Treatment   Reducing Risks  A1C testing [14.4%]   Healthy Eating  RD consult   Motivation  Moderate   Teaching Method  Discussion, Explanation   Patient Response  Needs reinforcement            Other Comments:  Met with both patient and mother.  Diabetes education initiated.  During process, noted pt's weakness " and inability to be able to hold glucometer supplies in pt's hands to give return demonstration.  Pt also displayed slight verbal confusion. Unable to assess teach back for pt's retention.  Pt verbalized multiple times his concern to leave the hospital to pay his car insurance.      Education halted.  Discussed w/ both patient and mother, patient not appropriate for education at this time.  Mother agreed patient not at baseline and able to retain information.  Above discussed w/ staff RN.          Electronically signed by:  Columba Teixeira RN  01/15/20 9:26 AM

## 2020-01-15 NOTE — PROGRESS NOTES
Dr. TSERING Morgan    [facility] CORONARY CARE    1/15/2020    Patient ID:  Name:  Joby Kulkarni  MRN:  [id number]  1970  49 y.o.  male            CC/Reason for visit: Altered mental status, combative patient, diabetic ketoacidosis    Interval hx: Last night again the patient became combative, confused and belligerent.  Would not allow nurses to provide care, physically trying to strike nurses and refusing care.  He is eating and drinking adequately.    ROS: Complains of someone having stuck his finger with a needle but could not be more specific about the details.  Otherwise denies chest pain or abdominal pain.    Vitals:  Vitals:    01/15/20 1002 01/15/20 1121 01/15/20 1127 01/15/20 1239   BP:   118/68    BP Location:   Left arm    Patient Position:   Lying    Pulse: 86 93 90    Resp:   22    Temp:   98.7 °F (37.1 °C)    TempSrc:   Oral    SpO2: 97% 98% 98% 97%   Weight:       Height:               Body mass index is 19.79 kg/m².    Intake/Output Summary (Last 24 hours) at 1/15/2020 1305  Last data filed at 1/15/2020 1200  Gross per 24 hour   Intake 7122.5 ml   Output 3300 ml   Net 3822.5 ml       Exam:  GEN:  No distress  Alert, oriented x 3.   LUNGS: Clear breath sounds bilat, no use of accessory muscles  CV:  Normal S1S2, without murmur, no edema  ABD:  Non tender, no enlarged liver or masses      Scheduled meds:    cefTRIAXone 1 g Intravenous Q24H   enoxaparin 40 mg Subcutaneous Q24H   insulin glargine 25 Units Subcutaneous Daily   insulin lispro 0-9 Units Subcutaneous Q4H   insulin lispro 5 Units Subcutaneous TID With Meals   sodium chloride 10 mL Intravenous Q12H     IV meds:                           Data Review:   I reviewed the patient's medications and new clinical results.  Lab Results   Component Value Date    CALCIUM 8.5 (L) 01/15/2020    PHOS 2.3 (L) 01/15/2020    MG 2.6 01/15/2020    MG 3.2 (H) 01/14/2020    MG 3.4 (H) 01/14/2020     Results from last 7 days   Lab Units 01/15/20  8693  01/14/20  1824 01/14/20  1358  01/13/20  1545  01/13/20  0930 01/13/20  0736 01/13/20  0633   SODIUM mmol/L 149* 150* 152*   < > 141   < > 132* 126* 118*   POTASSIUM mmol/L 4.3 3.8 4.0   < > 3.7   < > 6.1* 5.6* 7.2*   CHLORIDE mmol/L 114* 117* 120*   < > 101   < > 93* 83* 75*   CO2 mmol/L 19.8* 19.1* 21.0*   < > 23.0   < > 10.9* 13.4* 13.1*   BUN mg/dL 25* 38* 50*   < > 84*   < > 92* 96* 99*   CREATININE mg/dL 1.04 1.26 1.35*   < > 1.98*   < > 2.25* 2.46* 2.73*   CALCIUM mg/dL 8.5* 8.0* 8.6   < > 9.2   < > 8.6 8.7 9.1   BILIRUBIN mg/dL  --   --   --   --   --   --  0.6 0.7 0.8   ALK PHOS U/L  --   --   --   --   --   --  84 84 89   ALT (SGPT) U/L  --   --   --   --   --   --  17 13 15   AST (SGOT) U/L  --   --   --   --   --   --  35 31 38   GLUCOSE mg/dL 153* 139* 136*   < > 196*   < > 714* 823* 909*   WBC 10*3/mm3  --   --   --   --   --   --   --   --  14.50*   HEMOGLOBIN g/dL  --   --   --   --   --   --   --   --  18.7*   PLATELETS 10*3/mm3 289  --   --   --   --   --   --   --  425   PROCALCITONIN ng/mL 0.23  --   --   --  0.37*  --   --   --   --     < > = values in this interval not displayed.     Results from last 7 days   Lab Units 01/13/20  1545   BLOODCX  No growth at 24 hours  No growth at 24 hours         ASSESSMENT:   Diabetic ketoacidosis  Altered mental status due to the above  Acute kidney injury  Severe dehydration  Hyponatremia  Metabolic acidosis  Paronychia right hand, third finger      PLAN:  Diabetic educator evaluated the patient and tried to teach the patient how to use a glucometer and check his own blood sugar but the patient was unable to demonstrate proper technique or verbal understanding.  At this time he is not appropriate for diabetes education because of confusion and lack of understanding.  We will have to try to educate him once again how to use a glucometer, and about his diet tomorrow or over the next few days.  Transfer out of CCU today.  Give a total of 5 days of Rocephin  for possible paronychia with infection.  Procalcitonin was elevated on admission.  Advance diet.  Advance patient's mobility      Cameron Morgan MD  1/15/2020

## 2020-01-16 LAB
ANION GAP SERPL CALCULATED.3IONS-SCNC: 14.2 MMOL/L (ref 5–15)
BASOPHILS # BLD AUTO: 0.02 10*3/MM3 (ref 0–0.2)
BASOPHILS NFR BLD AUTO: 0.2 % (ref 0–1.5)
BUN BLD-MCNC: 12 MG/DL (ref 6–20)
BUN/CREAT SERPL: 14 (ref 7–25)
CALCIUM SPEC-SCNC: 8.7 MG/DL (ref 8.6–10.5)
CHLORIDE SERPL-SCNC: 104 MMOL/L (ref 98–107)
CO2 SERPL-SCNC: 22.8 MMOL/L (ref 22–29)
CREAT BLD-MCNC: 0.86 MG/DL (ref 0.76–1.27)
DEPRECATED RDW RBC AUTO: 39.6 FL (ref 37–54)
EOSINOPHIL # BLD AUTO: 0.1 10*3/MM3 (ref 0–0.4)
EOSINOPHIL NFR BLD AUTO: 1 % (ref 0.3–6.2)
ERYTHROCYTE [DISTWIDTH] IN BLOOD BY AUTOMATED COUNT: 13.4 % (ref 12.3–15.4)
GFR SERPL CREATININE-BSD FRML MDRD: 116 ML/MIN/1.73
GLUCOSE BLD-MCNC: 139 MG/DL (ref 65–99)
GLUCOSE BLDC GLUCOMTR-MCNC: 133 MG/DL (ref 70–130)
GLUCOSE BLDC GLUCOMTR-MCNC: 136 MG/DL (ref 70–130)
GLUCOSE BLDC GLUCOMTR-MCNC: 142 MG/DL (ref 70–130)
GLUCOSE BLDC GLUCOMTR-MCNC: 171 MG/DL (ref 70–130)
GLUCOSE BLDC GLUCOMTR-MCNC: 274 MG/DL (ref 70–130)
HCT VFR BLD AUTO: 40.6 % (ref 37.5–51)
HGB BLD-MCNC: 14 G/DL (ref 13–17.7)
IMM GRANULOCYTES # BLD AUTO: 0.07 10*3/MM3 (ref 0–0.05)
IMM GRANULOCYTES NFR BLD AUTO: 0.7 % (ref 0–0.5)
LYMPHOCYTES # BLD AUTO: 2.1 10*3/MM3 (ref 0.7–3.1)
LYMPHOCYTES NFR BLD AUTO: 21.3 % (ref 19.6–45.3)
MCH RBC QN AUTO: 28.2 PG (ref 26.6–33)
MCHC RBC AUTO-ENTMCNC: 34.5 G/DL (ref 31.5–35.7)
MCV RBC AUTO: 81.9 FL (ref 79–97)
MONOCYTES # BLD AUTO: 1.38 10*3/MM3 (ref 0.1–0.9)
MONOCYTES NFR BLD AUTO: 14 % (ref 5–12)
NEUTROPHILS # BLD AUTO: 6.19 10*3/MM3 (ref 1.7–7)
NEUTROPHILS NFR BLD AUTO: 62.8 % (ref 42.7–76)
NRBC BLD AUTO-RTO: 0 /100 WBC (ref 0–0.2)
PHOSPHATE SERPL-MCNC: 2 MG/DL (ref 2.5–4.5)
PLATELET # BLD AUTO: 286 10*3/MM3 (ref 140–450)
PMV BLD AUTO: 10.6 FL (ref 6–12)
POTASSIUM BLD-SCNC: 3.5 MMOL/L (ref 3.5–5.2)
RBC # BLD AUTO: 4.96 10*6/MM3 (ref 4.14–5.8)
SODIUM BLD-SCNC: 141 MMOL/L (ref 136–145)
WBC NRBC COR # BLD: 9.86 10*3/MM3 (ref 3.4–10.8)

## 2020-01-16 PROCEDURE — 63710000001 INSULIN LISPRO (HUMAN) PER 5 UNITS: Performed by: INTERNAL MEDICINE

## 2020-01-16 PROCEDURE — 80048 BASIC METABOLIC PNL TOTAL CA: CPT | Performed by: INTERNAL MEDICINE

## 2020-01-16 PROCEDURE — 25010000002 CEFTRIAXONE PER 250 MG: Performed by: INTERNAL MEDICINE

## 2020-01-16 PROCEDURE — 92610 EVALUATE SWALLOWING FUNCTION: CPT

## 2020-01-16 PROCEDURE — 85025 COMPLETE CBC W/AUTO DIFF WBC: CPT | Performed by: INTERNAL MEDICINE

## 2020-01-16 PROCEDURE — 63710000001 INSULIN GLARGINE PER 5 UNITS: Performed by: INTERNAL MEDICINE

## 2020-01-16 PROCEDURE — 25010000002 ENOXAPARIN PER 10 MG: Performed by: INTERNAL MEDICINE

## 2020-01-16 PROCEDURE — 82962 GLUCOSE BLOOD TEST: CPT

## 2020-01-16 PROCEDURE — 99254 IP/OBS CNSLTJ NEW/EST MOD 60: CPT | Performed by: INTERNAL MEDICINE

## 2020-01-16 PROCEDURE — 84100 ASSAY OF PHOSPHORUS: CPT | Performed by: INTERNAL MEDICINE

## 2020-01-16 RX ORDER — SENNA AND DOCUSATE SODIUM 50; 8.6 MG/1; MG/1
2 TABLET, FILM COATED ORAL NIGHTLY
Status: DISCONTINUED | OUTPATIENT
Start: 2020-01-16 | End: 2020-01-23 | Stop reason: HOSPADM

## 2020-01-16 RX ADMIN — INSULIN LISPRO 6 UNITS: 100 INJECTION, SOLUTION INTRAVENOUS; SUBCUTANEOUS at 11:56

## 2020-01-16 RX ADMIN — SODIUM CHLORIDE, PRESERVATIVE FREE 10 ML: 5 INJECTION INTRAVENOUS at 08:09

## 2020-01-16 RX ADMIN — INSULIN LISPRO 5 UNITS: 100 INJECTION, SOLUTION INTRAVENOUS; SUBCUTANEOUS at 11:57

## 2020-01-16 RX ADMIN — SENNOSIDES AND DOCUSATE SODIUM 2 TABLET: 8.6; 5 TABLET ORAL at 21:00

## 2020-01-16 RX ADMIN — CEFTRIAXONE SODIUM 1 G: 1 INJECTION, SOLUTION INTRAVENOUS at 17:04

## 2020-01-16 RX ADMIN — INSULIN LISPRO 5 UNITS: 100 INJECTION, SOLUTION INTRAVENOUS; SUBCUTANEOUS at 08:08

## 2020-01-16 RX ADMIN — ENOXAPARIN SODIUM 40 MG: 40 INJECTION SUBCUTANEOUS at 11:56

## 2020-01-16 RX ADMIN — POTASSIUM & SODIUM PHOSPHATES POWDER PACK 280-160-250 MG 2 PACKET: 280-160-250 PACK at 11:57

## 2020-01-16 RX ADMIN — INSULIN LISPRO 2 UNITS: 100 INJECTION, SOLUTION INTRAVENOUS; SUBCUTANEOUS at 17:03

## 2020-01-16 RX ADMIN — INSULIN GLARGINE 25 UNITS: 100 INJECTION, SOLUTION SUBCUTANEOUS at 08:08

## 2020-01-16 RX ADMIN — INSULIN LISPRO 5 UNITS: 100 INJECTION, SOLUTION INTRAVENOUS; SUBCUTANEOUS at 17:04

## 2020-01-16 NOTE — PLAN OF CARE
"  Problem: Patient Care Overview  Goal: Plan of Care Review  Outcome: Ongoing (interventions implemented as appropriate)  Flowsheets (Taken 1/16/2020 1430)  Progress: improving  Plan of Care Reviewed With: patient  Outcome Summary: Patient seen for clinical swallow assessment. Pt c/o \"it's hard to swallow\". Oral mech exam was unremarkable. No overt s/s of aspiration noted across all trials of PO. No oral residue post swallow. Pt did exhibit a consistent double swallow with all PO. No voice change noted. SLP recs continuing current diet, rec small bites and alternating liquids/solids as a precaution. Pt appears very fearful to swallow.      "

## 2020-01-16 NOTE — PLAN OF CARE
Swallow eval- pt stated he was having a hard time swallowing. Pt consulted for unsteady gait. Patient administered dinner dose of insulin- needs practice- almost stuck himself. Constipation-stool softener added. Will CTM

## 2020-01-16 NOTE — PROGRESS NOTES
Dr. TSERING Morgan    [address]    1/16/2020    Patient ID:  Name:  Joby Kulkarni  MRN:  [id number]  1970  49 y.o.  male            CC/Reason for visit: Altered mental status, combative patient, diabetic ketoacidosis     Interval hx:  Patient seems a little more oriented and less belligerence today.  Still does not recall much about how to use glucometer and his phone for Bluetooth connectivity.  Continues to ask me the same questions over and over again.     ROS: No fever, chest pain or abdominal pain    Vitals:  Vitals:    01/15/20 2121 01/15/20 2250 01/16/20 0529 01/16/20 0802   BP: 129/76 132/71  121/69   BP Location: Right arm Right arm  Right arm   Patient Position: Lying Lying  Lying   Pulse:    105   Resp: 18 18  17   Temp:  98.8 °F (37.1 °C)  99.4 °F (37.4 °C)   TempSrc:  Oral  Oral   SpO2:    96%   Weight:   66.2 kg (145 lb 14.4 oz)    Height:               Body mass index is 19.79 kg/m².    Intake/Output Summary (Last 24 hours) at 1/16/2020 1323  Last data filed at 1/15/2020 1800  Gross per 24 hour   Intake 750 ml   Output 725 ml   Net 25 ml       Exam:  GEN:  No distress  Alert, oriented x 3.   LUNGS: Clear breath sounds bilat, no use of accessory muscles  CV:  Normal S1S2, without murmur, no edema  ABD:  Non tender, no enlarged liver or masses      Scheduled meds:    cefTRIAXone 1 g Intravenous Q24H   enoxaparin 40 mg Subcutaneous Q24H   insulin glargine 25 Units Subcutaneous Daily   insulin lispro 0-9 Units Subcutaneous Q4H   insulin lispro 5 Units Subcutaneous TID With Meals   sodium chloride 10 mL Intravenous Q12H     IV meds:                           Data Review:   I reviewed the patient's medications and new clinical results.  Lab Results   Component Value Date    CALCIUM 8.7 01/16/2020    PHOS 2.0 (L) 01/16/2020    MG 2.6 01/15/2020    MG 3.2 (H) 01/14/2020    MG 3.4 (H) 01/14/2020     Results from last 7 days   Lab Units 01/16/20  0535 01/15/20  0449 01/14/20  1824   01/13/20  1545  01/13/20  0930 01/13/20  0736 01/13/20  0633   SODIUM mmol/L 141 149* 150*   < > 141   < > 132* 126* 118*   POTASSIUM mmol/L 3.5 4.3 3.8   < > 3.7   < > 6.1* 5.6* 7.2*   CHLORIDE mmol/L 104 114* 117*   < > 101   < > 93* 83* 75*   CO2 mmol/L 22.8 19.8* 19.1*   < > 23.0   < > 10.9* 13.4* 13.1*   BUN mg/dL 12 25* 38*   < > 84*   < > 92* 96* 99*   CREATININE mg/dL 0.86 1.04 1.26   < > 1.98*   < > 2.25* 2.46* 2.73*   CALCIUM mg/dL 8.7 8.5* 8.0*   < > 9.2   < > 8.6 8.7 9.1   BILIRUBIN mg/dL  --   --   --   --   --   --  0.6 0.7 0.8   ALK PHOS U/L  --   --   --   --   --   --  84 84 89   ALT (SGPT) U/L  --   --   --   --   --   --  17 13 15   AST (SGOT) U/L  --   --   --   --   --   --  35 31 38   GLUCOSE mg/dL 139* 153* 139*   < > 196*   < > 714* 823* 909*   WBC 10*3/mm3 9.86  --   --   --   --   --   --   --  14.50*   HEMOGLOBIN g/dL 14.0  --   --   --   --   --   --   --  18.7*   PLATELETS 10*3/mm3 286 289  --   --   --   --   --   --  425   PROCALCITONIN ng/mL  --  0.23  --   --  0.37*  --   --   --   --     < > = values in this interval not displayed.     Results from last 7 days   Lab Units 01/13/20  1545   BLOODCX  No growth at 2 days  No growth at 2 days         ASSESSMENT:   Diabetic ketoacidosis  Altered mental status due to the above  Acute kidney injury  Severe dehydration  Hyponatremia  Metabolic acidosis  Paronychia right hand, third finger    PLAN:  Patient still does not follow directions and cannot demonstrate proper learning during diabetic teaching and education. For this reason he needs to remain in the hospital.         Cameron Morgan MD  1/16/2020

## 2020-01-16 NOTE — PLAN OF CARE
Patient alert and oriented x4, very pleasant and asking questions on diabetic management outside of hospital, assisted with education on diet, phone apps for meal planing and monitoring with glucometer from hospital, up with standby to assist x1 to BR, BG's well controlled without insulin over shift, on REG CC diet, encouraged low sugar drinks,VSS, will CTM

## 2020-01-16 NOTE — CONSULTS
49 y.o.  Patient Care Team:  Provider, No Known as PCP - General      Chief Complaint   Patient presents with   • Altered Mental Status     Reason for consultation-elevated blood sugars    HPI  49-year-old -American male who presents with confusion and weakness.  Has been placed on the insulin drip.  Patient has never been diagnosed with diabetes.  Endocrine has been consulted for elevated blood sugars.    On discussing with the patient he definitely has an element of confusion.  He asks the same questions again and again.  His blood sugars are decent on the current insulin regimen here in the hospital.  Never been on any diabetic medications.  Does have family history of diabetes in his grandparents.  Did have increased urination and thirst prior to the admission..      Past Medical History:   Diagnosis Date   • Diabetes (CMS/HCC)    • Finger infection     right middle finger       History reviewed. No pertinent family history.    Social History     Socioeconomic History   • Marital status: Unknown     Spouse name: Not on file   • Number of children: Not on file   • Years of education: Not on file   • Highest education level: Not on file   Tobacco Use   • Smoking status: Light Tobacco Smoker     Types: Cigars   • Tobacco comment: 2nd hand smoke from parents; cigars when drinking   Substance and Sexual Activity   • Alcohol use: Yes     Frequency: Monthly or less     Drinks per session: 1 or 2     Binge frequency: Never   • Drug use: Never   • Sexual activity: Never       No Known Allergies      Current Facility-Administered Medications:   •  cefTRIAXone (ROCEPHIN) IVPB 1 g, 1 g, Intravenous, Q24H, Cameron Morgan MD, Last Rate: 100 mL/hr at 01/16/20 1704, 1 g at 01/16/20 1704  •  dextrose (D50W) 25 g/ 50mL Intravenous Solution 12.5 g, 12.5 g, Intravenous, PRN, Tacos Arzola MD, 12.5 g at 01/13/20 2240  •  dextrose (D50W) 25 g/ 50mL Intravenous Solution 25 g, 25 g, Intravenous, Q15 Min PRN, Shandra Delcid  MD  •  dextrose (D50W) 25 g/ 50mL Intravenous Solution 25 g, 25 g, Intravenous, Q15 Min PRN, Cameron Morgan MD  •  dextrose (GLUTOSE) oral gel 15 g, 15 g, Oral, Q15 Min PRN, Shandra Delcid MD  •  dextrose (GLUTOSE) oral gel 15 g, 15 g, Oral, Q15 Min PRN, Cameron Morgan MD  •  enoxaparin (LOVENOX) syringe 40 mg, 40 mg, Subcutaneous, Q24H, Cameron Morgan MD, 40 mg at 01/16/20 1156  •  glucagon (human recombinant) (GLUCAGEN DIAGNOSTIC) injection 1 mg, 1 mg, Subcutaneous, Q15 Min PRN, Shandra Delcid MD  •  glucagon (human recombinant) (GLUCAGEN DIAGNOSTIC) injection 1 mg, 1 mg, Subcutaneous, Q15 Min PRN, Cameron Morgan MD  •  haloperidol lactate (HALDOL) injection 2 mg, 2 mg, Intravenous, Q6H PRN, Shandra Delcid MD, 2 mg at 01/14/20 2250  •  insulin glargine (LANTUS) injection 25 Units, 25 Units, Subcutaneous, Daily, Cameron Morgan MD, 25 Units at 01/16/20 0808  •  insulin lispro (humaLOG) injection 0-9 Units, 0-9 Units, Subcutaneous, Q4H, Cameron Morgan MD, 2 Units at 01/16/20 1703  •  insulin lispro (humaLOG) injection 5 Units, 5 Units, Subcutaneous, TID With Meals, Lexie Doe MD, 5 Units at 01/16/20 1704  •  magnesium sulfate 4 gram infusion - Mg less than or equal to 1mg/dL, 4 g, Intravenous, PRN **OR** magnesium sulfate 3 gram infusion (1gm x 3) - Mg 1.1 - 1.5 mg/dL, 1 g, Intravenous, PRN **OR** Magnesium Sulfate 2 gram infusion- Mg 1.6 - 1.9 mg/dL, 2 g, Intravenous, PRN, Cameron Morgan MD  •  ondansetron (ZOFRAN) tablet 4 mg, 4 mg, Oral, Q6H PRN **OR** ondansetron (ZOFRAN) injection 4 mg, 4 mg, Intravenous, Q6H PRN, Cameron Morgan MD  •  potassium & sodium phosphates (PHOS-NAK) 280-160-250 MG packet - for Phosphorus less than 1.25 mg/dL, 2 packet, Oral, Q6H PRN **OR** potassium & sodium phosphates (PHOS-NAK) 280-160-250 MG packet - for Phosphorus 1.25 - 2.5 mg/dL, 2 packet, Oral, Q6H PRN, Cameron Morgan MD, 2 packet at 01/16/20 1157  •  potassium chloride (MICRO-K) CR capsule 40 mEq, 40  mEq, Oral, PRN **OR** potassium chloride (KLOR-CON) packet 40 mEq, 40 mEq, Oral, PRN **OR** potassium chloride 10 mEq in 100 mL IVPB, 10 mEq, Intravenous, Q1H PRN, Cameron Morgan MD  •  potassium phosphate 45 mmol in sodium chloride 0.9 % 500 mL infusion, 45 mmol, Intravenous, PRN **OR** potassium phosphate 30 mmol in sodium chloride 0.9 % 250 mL infusion, 30 mmol, Intravenous, PRN **OR** potassium phosphate 15 mmol in sodium chloride 0.9 % 100 mL infusion, 15 mmol, Intravenous, PRN, 15 mmol at 01/15/20 1355 **OR** sodium phosphates 40 mmol in sodium chloride 0.9 % 500 mL IVPB, 40 mmol, Intravenous, PRN **OR** sodium phosphates 20 mmol in sodium chloride 0.9 % 250 mL IVPB, 20 mmol, Intravenous, PRN, Cameron Morgan MD, Stopped at 01/14/20 0746  •  senna-docusate sodium (SENOKOT-S) 8.6-50 MG tablet 2 tablet, 2 tablet, Oral, Nightly, Cameron Morgan MD  •  [COMPLETED] Insert peripheral IV, , , Once **AND** sodium chloride 0.9 % flush 10 mL, 10 mL, Intravenous, PRN, Marlon Shabazz MD  •  sodium chloride 0.9 % flush 10 mL, 10 mL, Intravenous, Once PRN, Tacos Arzola MD  •  sodium chloride 0.9 % flush 10 mL, 10 mL, Intravenous, Q12H, Cameron Morgan MD, 10 mL at 01/16/20 0809  •  sodium chloride 0.9 % flush 10 mL, 10 mL, Intravenous, PRN, Cameron Morgan MD         Review of Systems   Constitutional: Positive for appetite change and fatigue. Negative for fever.   Eyes: Negative for visual disturbance.   Respiratory: Negative for shortness of breath.    Cardiovascular: Negative for palpitations and leg swelling.   Gastrointestinal: Negative for abdominal pain and vomiting.   Endocrine: Negative for polydipsia and polyuria.   Musculoskeletal: Negative for joint swelling and neck pain.   Skin: Negative for rash.   Neurological: Negative for weakness and numbness.   Psychiatric/Behavioral: Positive for confusion. Negative for behavioral problems.     Objective     Vital Signs  Temp:  [98 °F (36.7 °C)-99.5 °F (37.5  °C)] 98 °F (36.7 °C)  Heart Rate:  [100-105] 100  Resp:  [16-18] 16  BP: (110-132)/(62-76) 110/62    Physical Exam  Physical Exam   Constitutional: He is oriented to person, place, and time. He appears well-nourished.   HENT:   Head: Normocephalic and atraumatic.   Eyes: Conjunctivae and EOM are normal. No scleral icterus.   Neck: No thyromegaly present.   Cardiovascular: Normal rate and regular rhythm.   Pulmonary/Chest: Effort normal and breath sounds normal. No stridor. No respiratory distress. He has no wheezes.   Abdominal: Soft. Bowel sounds are normal. He exhibits no distension. There is no tenderness.   Musculoskeletal: He exhibits deformity. He exhibits no edema.   Lymphadenopathy:     He has no cervical adenopathy.   Neurological: He is alert and oriented to person, place, and time.   Skin: Skin is warm and dry. No rash noted. He is not diaphoretic.   Psychiatric: He has a normal mood and affect.   Vitals reviewed.      Results Review:    I reviewed the patient's new clinical results and summarized them in the HPI and in plan.  Glucose   Date/Time Value Ref Range Status   01/16/2020 1609 171 (H) 70 - 130 mg/dL Final   01/16/2020 1143 274 (H) 70 - 130 mg/dL Final   01/16/2020 0457 136 (H) 70 - 130 mg/dL Final   01/16/2020 0143 133 (H) 70 - 130 mg/dL Final   01/15/2020 2125 134 (H) 70 - 130 mg/dL Final   01/15/2020 2008 156 (H) 70 - 130 mg/dL Final   01/15/2020 1625 83 70 - 130 mg/dL Final   01/15/2020 1126 132 (H) 70 - 130 mg/dL Final   01/15/2020 0719 153 (H) 70 - 130 mg/dL Final   01/15/2020 0430 138 (H) 70 - 130 mg/dL Final   01/14/2020 2346 112 70 - 130 mg/dL Final   01/14/2020 1936 94 70 - 130 mg/dL Final     Lab Results (last 24 hours)     Procedure Component Value Units Date/Time    Blood Culture - Blood, Arm, Right [[id number]] Collected:  01/13/20 1542    Specimen:  Blood from Arm, Right Updated:  01/16/20 1615     Blood Culture No growth at 3 days    Blood Culture - Blood, Arm, Left [[id number]]  Collected:  01/13/20 1545    Specimen:  Blood from Arm, Left Updated:  01/16/20 1615     Blood Culture No growth at 3 days    POC Glucose Once [[id number]]  (Abnormal) Collected:  01/16/20 1609    Specimen:  Blood Updated:  01/16/20 1611     Glucose 171 mg/dL     POC Glucose Once [[id number]]  (Abnormal) Collected:  01/16/20 1143    Specimen:  Blood Updated:  01/16/20 1159     Glucose 274 mg/dL     Basic Metabolic Panel [[id number]]  (Abnormal) Collected:  01/16/20 0535    Specimen:  Blood Updated:  01/16/20 0711     Glucose 139 mg/dL      BUN 12 mg/dL      Creatinine 0.86 mg/dL      Sodium 141 mmol/L      Potassium 3.5 mmol/L      Chloride 104 mmol/L      CO2 22.8 mmol/L      Calcium 8.7 mg/dL      eGFR  African Amer 116 mL/min/1.73      BUN/Creatinine Ratio 14.0     Anion Gap 14.2 mmol/L     Narrative:       GFR Normal >60  Chronic Kidney Disease <60  Kidney Failure <15      Phosphorus [[id number]]  (Abnormal) Collected:  01/16/20 0535    Specimen:  Blood Updated:  01/16/20 0708     Phosphorus 2.0 mg/dL     CBC & Differential [[id number]] Collected:  01/16/20 0535    Specimen:  Blood Updated:  01/16/20 0634    Narrative:       The following orders were created for panel order CBC & Differential.  Procedure                               Abnormality         Status                     ---------                               -----------         ------                     CBC Auto Differential[[id number]]        Abnormal            Final result                 Please view results for these tests on the individual orders.    CBC Auto Differential [[id number]]  (Abnormal) Collected:  01/16/20 0535    Specimen:  Blood Updated:  01/16/20 0634     WBC 9.86 10*3/mm3      RBC 4.96 10*6/mm3      Hemoglobin 14.0 g/dL      Hematocrit 40.6 %      MCV 81.9 fL      MCH 28.2 pg      MCHC 34.5 g/dL      RDW 13.4 %      RDW-SD 39.6 fl      MPV 10.6 fL      Platelets 286 10*3/mm3      Neutrophil % 62.8 %      Lymphocyte % 21.3 %      Monocyte  % 14.0 %      Eosinophil % 1.0 %      Basophil % 0.2 %      Immature Grans % 0.7 %      Neutrophils, Absolute 6.19 10*3/mm3      Lymphocytes, Absolute 2.10 10*3/mm3      Monocytes, Absolute 1.38 10*3/mm3      Eosinophils, Absolute 0.10 10*3/mm3      Basophils, Absolute 0.02 10*3/mm3      Immature Grans, Absolute 0.07 10*3/mm3      nRBC 0.0 /100 WBC     POC Glucose Once [[id number]]  (Abnormal) Collected:  01/16/20 0457    Specimen:  Blood Updated:  01/16/20 0459     Glucose 136 mg/dL     POC Glucose Once [[id number]]  (Abnormal) Collected:  01/16/20 0143    Specimen:  Blood Updated:  01/16/20 0143     Glucose 133 mg/dL     POC Glucose Once [[id number]]  (Abnormal) Collected:  01/15/20 2125    Specimen:  Blood Updated:  01/15/20 2126     Glucose 134 mg/dL     POC Glucose Once [[id number]]  (Abnormal) Collected:  01/15/20 2008    Specimen:  Blood Updated:  01/15/20 2009     Glucose 156 mg/dL           Imaging Results (Last 24 Hours)     ** No results found for the last 24 hours. **          Assessment/Plan     Active Hospital Problems    Diagnosis  POA   • **Diabetic ketoacidosis without coma associated with type 2 diabetes mellitus (CMS/HCC) [E11.10]  Yes   • Diabetes mellitus, new onset (CMS/HCC) [E11.9]  Yes      Resolved Hospital Problems   No resolved problems to display.       Diabetes mellitus-significantly uncontrolled  It is unclear if patient is a type I or type II  Check C-peptide, john 65 antibodies  Continue Lantus 25 units in the morning  Continue Humalog 5 units with each meal  Continue Humalog sliding scale 3 times daily before meals and at bedtime.    Discussed with the nurse extensively about patient's situation.  We will reconsult the diabetic educator and have her educate the patient on diet restrictions and also watch the patient's clinical condition in terms of administration of the insulin.    Discussed plan with nurse.     Thank you for your consultation.  Will follow the pt while in hospital.  "    Lexie Doe MD.  01/16/20  5:27 PM      EMR Dragon / transcription disclaimer:    \"Dictated utilizing Dragon dictation\".   "

## 2020-01-16 NOTE — CONSULTS
"Diabetes Education  Assessment/Teaching    Patient Name:  Joby Kulkarni  YOB: 1970  MRN: [id number]  Admit Date:  1/13/2020      Assessment Date:  1/16/2020    Most Recent Value   General Information    Referral From:  A1c   Height  182.9 cm (72\")   Height Method  Stated   Weight  66.2 kg (145 lb 14.4 oz)   Weight Method  Bed scale   Have you had weight changes?  No   Are you currently involved in an activity/exercise program?   No   Is patient pregnant?  n/a   Pregnancy Assessment   Diabetes History   What type of diabetes do you have?  Type 2   Length of Diabetes Diagnosis  Newly diagnosed <6 months   Current DM knowledge  poor   Have you had diabetes education/teaching in the past?  no   Do you test your blood sugar at home?  no   How would you rate your diabetes control?  poor   How often do you check your feet?  never   Do you perform your own nail care?  yes   Education Preferences   What areas of diabetes would you like to learn about?  avoiding high blood sugar, diabetes complications, diet information, exercise information, understanding diabetes, testing my blood sugar at home   Barriers to Learning  other (comment) [Slight confusion noted.   Staff RN notified.  ]   Nutrition Information   Assessment Topics   Healthy Eating - Assessment  Needs education   Being Active - Assessment  Needs education   Taking Medication - Assessment  Needs education   Problem Solving - Assessment  Needs education   Reducing Risk - Assessment  Needs education   Healthy Coping - Assessment  Needs education   Monitoring - Assessment  Needs education   DM Goals   Healthy Eating - Goal  0-30 days from discharge   Being Active - Goal  0-30 days from discharge   Taking Medication - Goal  0-7 days from discharge   Problem Solving - Goal  0-30 days from discharge   Reducing Risk - Goal  30-90 days from discharge   Monitoring - Goal  30-90 days from discharge   Contact Plan  Follow-up medical care            Most Recent " Value   DM Education Needs   Meter  Meter provided   Meter Type  One Touch [Verio Flex w/ Delica lancets]   Frequency of Testing  AC/HS   Blood Glucose Target Range   mg/dL   Problem Solving  Hyperglycemia, Sick days, Symptoms, Signs, Treatment   Reducing Risks  A1C testing [14.4%]   Healthy Eating  Reviewed meal plan, Basic meal plan provided   Physical Activity  Walking   Physical Activity Frequency  Rarely   Healthy Coping  Anxious   Discharge Plan  Home   Motivation  Engaged   Teaching Method  Discussion, Demonstration, Teach back, Handouts   Patient Response  Needs reinforcement            Other Comments:  Attempted to educate patient x 2. Patient not able to return demonstrate glucometer use or insulin administration. Patient with seeming memory/cognition issues, asking the same question several times.         Electronically signed by:  Nik Jj RD  01/16/20 4:12 PM

## 2020-01-16 NOTE — PROGRESS NOTES
Continued Stay Note  [facility]     Patient Name: Joby Kulkarni  MRN: [id number]  Today's Date: 1/16/2020    Admit Date: 1/13/2020    Discharge Plan     Row Name 01/16/20 1514       Plan    Plan  Home- follow for additional dc needs    Plan Comments  Attempted to follow up with pt and mom. DM educator at bedside, CCP will follow up if additional dc needs. Per RN plan is home. ANDREW ARMENTA/CCP        Discharge Codes    No documentation.             Sheyla Treadwell, RN

## 2020-01-16 NOTE — THERAPY EVALUATION
Acute Care - Speech Language Pathology   Swallow Initial Evaluation [facility]     Patient Name: Joby Kulkarni  : 1970  MRN: [id number]  Today's Date: 2020               Admit Date: 2020    Visit Dx:     ICD-10-CM ICD-9-CM   1. Diabetes mellitus, new onset (CMS/HCC) E11.9 250.00   2. Somnolence R40.0 780.09   3. Diabetic ketoacidosis without coma associated with other specified diabetes mellitus (CMS/HCC) E13.10 250.12   4. Acute renal failure, unspecified acute renal failure type (CMS/HCC) N17.9 584.9   5. Hyperkalemia E87.5 276.7   6. Hypermagnesemia E83.41 275.2   7. Hyperphosphatemia E83.39 275.3   8. Dehydration E86.0 276.51   9. Encephalopathy G93.40 348.30     Patient Active Problem List   Diagnosis   • Diabetes mellitus, new onset (CMS/HCC)   • Diabetic ketoacidosis without coma associated with type 2 diabetes mellitus (CMS/HCC)     Past Medical History:   Diagnosis Date   • Diabetes (CMS/HCC)    • Finger infection     right middle finger     Past Surgical History:   Procedure Laterality Date   • NO PAST SURGERIES          SWALLOW EVALUATION (last 72 hours)      SLP Adult Swallow Evaluation     Row Name 20 1400                   Rehab Evaluation    Document Type  evaluation  -SH        Subjective Information  no complaints  -SH        Patient Observations  alert;cooperative  -SH        Patient Effort  excellent  -SH           General Information    Patient Profile Reviewed  yes  -SH        Pertinent History Of Current Problem  DKA  -SH        Current Method of Nutrition  regular textures;thin liquids  -        Precautions/Limitations, Vision  WFL;for purposes of eval  -SH        Precautions/Limitations, Hearing  WFL;for purposes of eval  -SH        Prior Level of Function-Communication  WFL  -        Prior Level of Function-Swallowing  no diet consistency restrictions  -        Plans/Goals Discussed with  patient;family;agreed upon  -        Barriers to Rehab  medically  "complex  -        Patient's Goals for Discharge  -- improved swallow  -        Family Goals for Discharge  family did not state  -           Pain Assessment    Additional Documentation  Pain Scale: Numbers Pre/Post-Treatment (Group)  -           Pain Scale: Numbers Pre/Post-Treatment    Pain Scale: Numbers, Pretreatment  0/10 - no pain  -        Pain Scale: Numbers, Post-Treatment  0/10 - no pain  -           Oral Motor and Function    Dentition Assessment  natural, present and adequate  -        Secretion Management  WNL/WFL  -        Volitional Swallow  WF  -        Volitional Cough  WFL  -           Oral Musculature and Cranial Nerve Assessment    Oral Motor General Assessment  WFL  -           Clinical Swallow Eval    Clinical Swallow Evaluation Summary  Patient seen for clinical swallow assessment. Pt c/o \"it's hard to swallow\". Oral mech exam was unremarkable. No overt s/s of aspiration noted across all trials of PO. No oral residue post swallow. Pt did exhibit a consistent double swallow with all PO. No voice change noted. SLP recs continuing current diet, rec small bites and alternating liquids/solids as a precaution. Pt appears very fearful to swallow. SLP educated patient to notify MD if dysphagia persists, could order VFSS in outpatient setting to further assess.-           Clinical Impression    SLP Swallowing Diagnosis  functional oral phase;functional pharyngeal phase  -        Functional Impact  no impact on function  -        Rehab Potential/Prognosis, Swallowing  good, to achieve stated therapy goals  -        Swallow Criteria for Skilled Therapeutic Interventions Met  demonstrates skilled criteria  -           Recommendations    Therapy Frequency (Swallow)  PRN  -        Predicted Duration Therapy Intervention (Days)  until discharge  -        SLP Diet Recommendation  regular textures;thin liquids  -        Recommended Diagnostics  reassess via clinical " swallow evaluation  -        Recommended Precautions and Strategies  upright posture during/after eating;small bites of food and sips of liquid;alternate between small bites of food and sips of liquid  -        SLP Rec. for Method of Medication Administration  meds whole;with thin liquids;as tolerated  -        Monitor for Signs of Aspiration  yes;notify SLP if any concerns  -        Anticipated Dischage Disposition  unknown  -           Swallow Goals (SLP)    Oral Nutrition/Hydration Goal Selection (SLP)  oral nutrition/hydration, SLP goal 1  -           Oral Nutrition/Hydration Goal 1 (SLP)    Oral Nutrition/Hydration Goal 1, SLP  Pt will stacie PO without overt s/s of asp.  -        Time Frame (Oral Nutrition/Hydration Goal 1, SLP)  by discharge  -          User Key  (r) = Recorded By, (t) = Taken By, (c) = Cosigned By    Initials Name Effective Dates     Ruby Vivar, MS CCC-SLP 03/07/18 -           EDUCATION  The patient has been educated in the following areas:   Dysphagia (Swallowing Impairment).    SLP Recommendation and Plan  SLP Swallowing Diagnosis: functional oral phase, functional pharyngeal phase  SLP Diet Recommendation: regular textures, thin liquids  Recommended Precautions and Strategies: upright posture during/after eating, small bites of food and sips of liquid, alternate between small bites of food and sips of liquid  SLP Rec. for Method of Medication Administration: meds whole, with thin liquids, as tolerated     Monitor for Signs of Aspiration: yes, notify SLP if any concerns  Recommended Diagnostics: reassess via clinical swallow evaluation  Swallow Criteria for Skilled Therapeutic Interventions Met: demonstrates skilled criteria  Anticipated Dischage Disposition: unknown  Rehab Potential/Prognosis, Swallowing: good, to achieve stated therapy goals  Therapy Frequency (Swallow): PRN  Predicted Duration Therapy Intervention (Days): until discharge       Plan of Care Reviewed  "With: patient  Progress: improving  Outcome Summary: Patient seen for clinical swallow assessment. Pt c/o \"it's hard to swallow\" during his hospitalization. Oral mech exam was unremarkable. No overt s/s of aspiration noted across all trials of PO. No oral residue post swallow. Pt did exhibit a consistent double swallow with all PO. No voice change noted. SLP recs continuing current diet, rec small bites and alternating liquids/solids as a precaution.    SLP GOALS     Row Name 01/16/20 1400             Oral Nutrition/Hydration Goal 1 (SLP)    Oral Nutrition/Hydration Goal 1, SLP  Pt will stacie PO without overt s/s of asp.  -      Time Frame (Oral Nutrition/Hydration Goal 1, SLP)  by discharge  -        User Key  (r) = Recorded By, (t) = Taken By, (c) = Cosigned By    Initials Name Provider Type    Ruby Godoy MS CCC-SLP Speech and Language Pathologist           SLP Outcome Measures (last 72 hours)      SLP Outcome Measures     Row Name 01/16/20 1400             SLP Outcome Measures    Outcome Measure Used?  Adult NOMS  -         Adult FCM Scores    FCM Chosen  Swallowing  -      Swallowing FCM Score  7  -        User Key  (r) = Recorded By, (t) = Taken By, (c) = Cosigned By    Initials Name Effective Dates     Ruby Vivar MS CCC-SLP 03/07/18 -            Time Calculation:   Time Calculation- SLP     Row Name 01/16/20 1437             Time Calculation- [facility]    SLP Start Time  1345  -      SLP Received On  01/16/20  -        User Key  (r) = Recorded By, (t) = Taken By, (c) = Cosigned By    Initials Name Provider Type     Ruby Vivar MS CCC-SLP Speech and Language Pathologist          Therapy Charges for Today     Code Description Service Date Service Provider Modifiers Qty    91994016365 HC ST EVAL ORAL PHARYNG SWALLOW 3 1/16/2020 Ruby Vivar MS CCC-SLP GN 1               Ruby Vivar MS CCC-EMMA  1/16/2020  "

## 2020-01-16 NOTE — PROGRESS NOTES
Continued Stay Note  [facility]     Patient Name: Joby Kulkarni  MRN: [id number]  Today's Date: 1/16/2020    Admit Date: 1/13/2020    Discharge Plan     Row Name 01/16/20 1721       Plan    Plan  Home follow for additional dc needs    Provided post acute provider list?  Yes    Post Acute Provider List  Home Health;Physician;Nursing Home    Post Acute Provider Quality & Resource List  Yes    Delivered To  Patient    Method of Delivery  In person    Patient/Family in Agreement with Plan  yes    Plan Comments  CCP followed up with pt and mom [place] at bedside, provided them education on PCP and [facility]. Discussed Meds to Beds Program and pt agreeable. CCP discussed disease management, encouraged pt to work with nursing and nursing assistance on checking blood sugars and giving himself own injections. Awaiting finalization of DM medication regimen. CCP encouaged pt to attend DM education classes. PT Eval placed also. andrew ARMENTA/CCP    Row Name 01/16/20 0184       Plan    Plan  Home- follow for additional dc needs    Plan Comments  Attempted to follow up with pt and mom. DM educator at bedside, CCP will follow up if additional dc needs. Per RN plan is home. ANDREW ARMENTA/CCP        Discharge Codes    No documentation.             Sheyla Treadwell RN

## 2020-01-17 LAB
GLUCOSE BLDC GLUCOMTR-MCNC: 108 MG/DL (ref 70–130)
GLUCOSE BLDC GLUCOMTR-MCNC: 157 MG/DL (ref 70–130)
GLUCOSE BLDC GLUCOMTR-MCNC: 182 MG/DL (ref 70–130)
GLUCOSE BLDC GLUCOMTR-MCNC: 248 MG/DL (ref 70–130)
T4 FREE SERPL-MCNC: 1.02 NG/DL (ref 0.93–1.7)
TSH SERPL DL<=0.05 MIU/L-ACNC: 1.32 UIU/ML (ref 0.27–4.2)

## 2020-01-17 PROCEDURE — 63710000001 INSULIN GLARGINE PER 5 UNITS: Performed by: INTERNAL MEDICINE

## 2020-01-17 PROCEDURE — 63710000001 INSULIN LISPRO (HUMAN) PER 5 UNITS: Performed by: INTERNAL MEDICINE

## 2020-01-17 PROCEDURE — 97165 OT EVAL LOW COMPLEX 30 MIN: CPT

## 2020-01-17 PROCEDURE — 97161 PT EVAL LOW COMPLEX 20 MIN: CPT

## 2020-01-17 PROCEDURE — 84439 ASSAY OF FREE THYROXINE: CPT | Performed by: INTERNAL MEDICINE

## 2020-01-17 PROCEDURE — 86341 ISLET CELL ANTIBODY: CPT | Performed by: INTERNAL MEDICINE

## 2020-01-17 PROCEDURE — 99232 SBSQ HOSP IP/OBS MODERATE 35: CPT | Performed by: INTERNAL MEDICINE

## 2020-01-17 PROCEDURE — 84443 ASSAY THYROID STIM HORMONE: CPT | Performed by: INTERNAL MEDICINE

## 2020-01-17 PROCEDURE — 25010000002 ENOXAPARIN PER 10 MG: Performed by: INTERNAL MEDICINE

## 2020-01-17 PROCEDURE — 82962 GLUCOSE BLOOD TEST: CPT

## 2020-01-17 PROCEDURE — 97110 THERAPEUTIC EXERCISES: CPT

## 2020-01-17 PROCEDURE — 84681 ASSAY OF C-PEPTIDE: CPT | Performed by: INTERNAL MEDICINE

## 2020-01-17 PROCEDURE — 97535 SELF CARE MNGMENT TRAINING: CPT

## 2020-01-17 RX ORDER — INSULIN GLARGINE 100 [IU]/ML
28 INJECTION, SOLUTION SUBCUTANEOUS DAILY
Status: DISCONTINUED | OUTPATIENT
Start: 2020-01-18 | End: 2020-01-18

## 2020-01-17 RX ADMIN — SENNOSIDES AND DOCUSATE SODIUM 2 TABLET: 8.6; 5 TABLET ORAL at 20:43

## 2020-01-17 RX ADMIN — INSULIN LISPRO 2 UNITS: 100 INJECTION, SOLUTION INTRAVENOUS; SUBCUTANEOUS at 21:49

## 2020-01-17 RX ADMIN — INSULIN LISPRO 5 UNITS: 100 INJECTION, SOLUTION INTRAVENOUS; SUBCUTANEOUS at 12:58

## 2020-01-17 RX ADMIN — INSULIN LISPRO 5 UNITS: 100 INJECTION, SOLUTION INTRAVENOUS; SUBCUTANEOUS at 17:18

## 2020-01-17 RX ADMIN — SODIUM CHLORIDE, PRESERVATIVE FREE 10 ML: 5 INJECTION INTRAVENOUS at 20:43

## 2020-01-17 RX ADMIN — ENOXAPARIN SODIUM 40 MG: 40 INJECTION SUBCUTANEOUS at 12:57

## 2020-01-17 RX ADMIN — SODIUM CHLORIDE, PRESERVATIVE FREE 10 ML: 5 INJECTION INTRAVENOUS at 08:47

## 2020-01-17 RX ADMIN — INSULIN LISPRO 4 UNITS: 100 INJECTION, SOLUTION INTRAVENOUS; SUBCUTANEOUS at 08:46

## 2020-01-17 RX ADMIN — INSULIN LISPRO 5 UNITS: 100 INJECTION, SOLUTION INTRAVENOUS; SUBCUTANEOUS at 08:47

## 2020-01-17 RX ADMIN — INSULIN LISPRO 2 UNITS: 100 INJECTION, SOLUTION INTRAVENOUS; SUBCUTANEOUS at 12:58

## 2020-01-17 RX ADMIN — INSULIN GLARGINE 25 UNITS: 100 INJECTION, SOLUTION SUBCUTANEOUS at 08:46

## 2020-01-17 NOTE — PROGRESS NOTES
Dr. TSERING Morgan    [address]    1/17/2020    Patient ID:  Name:  Joby Kulkarni  MRN:  [id number]  1970  49 y.o.  male            CC/Reason for visit: Confusion, diabetic ketoacidosis    Interval hx: Discussed with diabetes educator.  Patient is incapable of demonstrating proper use of glucometer and blood sugar sticks.  He remains sometimes confused and does not have good judgment.  I discussed this with the diabetes educator as well with the nurse.  He is complaining of swallowing difficulty.      ROS: Choking during swallowing.  Coughing during swallowing.  Has not been eating lately due to difficulty swallowing.    Vitals:  Vitals:    01/16/20 2340 01/17/20 0517 01/17/20 0814 01/17/20 1437   BP: 124/69  119/74 105/57   BP Location: Right arm  Right arm Right arm   Patient Position: Lying  Lying Lying   Pulse: 86  92 103   Resp: 16      Temp: 98.8 °F (37.1 °C)  99.3 °F (37.4 °C) 98.1 °F (36.7 °C)   TempSrc: Oral  Oral Oral   SpO2: 96%  96% 95%   Weight:  66 kg (145 lb 8 oz)     Height:               Body mass index is 19.73 kg/m².    Intake/Output Summary (Last 24 hours) at 1/17/2020 1456  Last data filed at 1/17/2020 0618  Gross per 24 hour   Intake --   Output 1750 ml   Net -1750 ml       Exam:  GEN:  No distress  Alert, oriented x 3.   LUNGS: Clear breath sounds bilat, no use of accessory muscles  CV:  Normal S1S2, without murmur, no edema  ABD:  Non tender, no enlarged liver or masses      Scheduled meds:    enoxaparin 40 mg Subcutaneous Q24H   [START ON 1/18/2020] insulin glargine 28 Units Subcutaneous Daily   insulin lispro 0-9 Units Subcutaneous 4x Daily With Meals & Nightly   insulin lispro 5 Units Subcutaneous TID With Meals   senna-docusate sodium 2 tablet Oral Nightly   sodium chloride 10 mL Intravenous Q12H     IV meds:                           Data Review:   I reviewed the patient's medications and new clinical results.  Lab Results   Component Value Date    CALCIUM 8.7  01/16/2020    PHOS 2.0 (L) 01/16/2020    MG 2.6 01/15/2020    MG 3.2 (H) 01/14/2020    MG 3.4 (H) 01/14/2020     Results from last 7 days   Lab Units 01/16/20  0535 01/15/20  0449 01/14/20  1824  01/13/20  1545  01/13/20  0930 01/13/20  0736 01/13/20  0633   SODIUM mmol/L 141 149* 150*   < > 141   < > 132* 126* 118*   POTASSIUM mmol/L 3.5 4.3 3.8   < > 3.7   < > 6.1* 5.6* 7.2*   CHLORIDE mmol/L 104 114* 117*   < > 101   < > 93* 83* 75*   CO2 mmol/L 22.8 19.8* 19.1*   < > 23.0   < > 10.9* 13.4* 13.1*   BUN mg/dL 12 25* 38*   < > 84*   < > 92* 96* 99*   CREATININE mg/dL 0.86 1.04 1.26   < > 1.98*   < > 2.25* 2.46* 2.73*   CALCIUM mg/dL 8.7 8.5* 8.0*   < > 9.2   < > 8.6 8.7 9.1   BILIRUBIN mg/dL  --   --   --   --   --   --  0.6 0.7 0.8   ALK PHOS U/L  --   --   --   --   --   --  84 84 89   ALT (SGPT) U/L  --   --   --   --   --   --  17 13 15   AST (SGOT) U/L  --   --   --   --   --   --  35 31 38   GLUCOSE mg/dL 139* 153* 139*   < > 196*   < > 714* 823* 909*   WBC 10*3/mm3 9.86  --   --   --   --   --   --   --  14.50*   HEMOGLOBIN g/dL 14.0  --   --   --   --   --   --   --  18.7*   PLATELETS 10*3/mm3 286 289  --   --   --   --   --   --  425   PROCALCITONIN ng/mL  --  0.23  --   --  0.37*  --   --   --   --     < > = values in this interval not displayed.     Results from last 7 days   Lab Units 01/13/20  1545   BLOODCX  No growth at 3 days  No growth at 3 days         ASSESSMENT:   Diabetic ketoacidosis  Altered mental status due to the above  Acute kidney injury  Severe dehydration  Hyponatremia  Metabolic acidosis  Paronychia right hand, third finger      PLAN:  Patient continues to have difficulty understanding directions on how to use his glucometer.  He is failing to demonstrate proper use of glucometer and performing his own blood glucose checks.  Diabetes educator is very hesitant for discharge because she believes he is incapable of administering insulin at home, let alone check his blood sugars.  We  will order speech therapy to evaluate cognitive function as well as for possible dysphagia.  Patient tells me he has been having difficulty swallowing at home for several weeks, causing food to get stuck and some midsternal chest pain while swallowing.  I will order barium esophagram.  May have to consult GI.  Continue ceftriaxone for 3 more days to complete 1 week of treatment for infected finger, paronychia.  He is also very weak.  Continue physical therapy for ambulation and strengthening        Cameron Morgan MD  1/17/2020

## 2020-01-17 NOTE — THERAPY EVALUATION
Patient Name: Joby Kulkarni  : 1970    MRN: [id number]                              Today's Date: 2020       Admit Date: 2020    Visit Dx:     ICD-10-CM ICD-9-CM   1. Diabetes mellitus, new onset (CMS/HCC) E11.9 250.00   2. Somnolence R40.0 780.09   3. Diabetic ketoacidosis without coma associated with other specified diabetes mellitus (CMS/HCC) E13.10 250.12   4. Acute renal failure, unspecified acute renal failure type (CMS/HCC) N17.9 584.9   5. Hyperkalemia E87.5 276.7   6. Hypermagnesemia E83.41 275.2   7. Hyperphosphatemia E83.39 275.3   8. Dehydration E86.0 276.51   9. Encephalopathy G93.40 348.30     Patient Active Problem List   Diagnosis   • Diabetes mellitus, new onset (CMS/HCC)   • Diabetic ketoacidosis without coma associated with type 2 diabetes mellitus (CMS/HCC)     Past Medical History:   Diagnosis Date   • Diabetes (CMS/HCC)    • Finger infection     right middle finger     Past Surgical History:   Procedure Laterality Date   • NO PAST SURGERIES       General Information     Row Name 20 1110          PT Evaluation Time/Intention    Document Type  evaluation Pt. admitted with A.M.S.; Weakness; Diabetic Ketoacidosis.   -MS     Mode of Treatment  physical therapy;individual therapy  -MS     Row Name 20 111          General Information    Patient Profile Reviewed?  yes  -MS     Prior Level of Function  independent: No use of A.D.   -MS     Existing Precautions/Restrictions  (S) fall Exit alarm   -MS     Barriers to Rehab  none identified  -MS     Row Name 20 111          Cognitive Assessment/Intervention- PT/OT    Orientation Status (Cognition)  oriented to;person  -MS     Row Name 20 1110          Safety Issues, Functional Mobility    Comment, Safety Issues/Impairments (Mobility)  Gait belt used for safety.   -MS       User Key  (r) = Recorded By, (t) = Taken By, (c) = Cosigned By    Initials Name Provider Type    Westley Whitt, PT Physical Therapist  "       Mobility     Row Name 01/17/20 1111          Bed Mobility Assessment/Treatment    Bed Mobility Assessment/Treatment  supine-sit;sit-supine  -MS     Supine-Sit [place] (Bed Mobility)  contact guard  -MS     Row Name 01/17/20 1111          Sit-Stand Transfer    Sit-Stand [place] (Transfers)  contact guard  -MS     Row Name 01/17/20 1111          Gait/Stairs Assessment/Training    [place] Level (Gait)  minimum assist (75% patient effort)  -MS     Distance in Feet (Gait)  150 feet  -MS     Pattern (Gait)  step-through  -MS     Deviations/Abnormal Patterns (Gait)  alistair decreased;base of support, narrow  -MS     Comment (Gait/Stairs)  Unsteady with upright mobility but no overt losses of balance.  Pt. c/o bilateral calf \"navarro horses\" during ambulation this AM.   -MS       User Key  (r) = Recorded By, (t) = Taken By, (c) = Cosigned By    Initials Name Provider Type    MS CapellanMorfinWestley, PT Physical Therapist        Obj/Interventions     Row Name 01/17/20 1112          General ROM    GENERAL ROM COMMENTS  BUE/LE (WFL's)  -MS     Row Name 01/17/20 1112          MMT (Manual Muscle Testing)    General MMT Comments  BUE/LE (3+/5)   -MS       User Key  (r) = Recorded By, (t) = Taken By, (c) = Cosigned By    Initials Name Provider Type    MS Morfin Westley SILVEIRA, PT Physical Therapist        Goals/Plan     Row Name 01/17/20 1113          Bed Mobility Goal 1 (PT)    Activity/Assistive Device (Bed Mobility Goal 1, PT)  bed mobility activities, all  -MS     [place] Level/Cues Needed (Bed Mobility Goal 1, PT)  independent  -MS     Time Frame (Bed Mobility Goal 1, PT)  long term goal (LTG);5 days  -MS     Row Name 01/17/20 1113          Transfer Goal 1 (PT)    Activity/Assistive Device (Transfer Goal 1, PT)  transfers, all  -MS     [place] Level/Cues Needed (Transfer Goal 1, PT)  independent  -MS     Time Frame (Transfer Goal 1, PT)  long term goal (LTG);5 days  -MS     Row Name 01/17/20 1113    " "      Gait Training Goal 1 (PT)    Activity/Assistive Device (Gait Training Goal 1, PT)  gait (walking locomotion)  -MS     [place] Level (Gait Training Goal 1, PT)  independent  -MS     Distance (Gait Goal 1, PT)  300 feet  -MS     Time Frame (Gait Training Goal 1, PT)  long term goal (LTG);5 days  -MS       User Key  (r) = Recorded By, (t) = Taken By, (c) = Cosigned By    Initials Name Provider Type    Westley Whitt, PT Physical Therapist        Clinical Impression     Row Name 01/17/20 1112          Pain Assessment    Additional Documentation  Pain Scale: Numbers Pre/Post-Treatment (Group)  -MS     Row Name 01/17/20 1112          Pain Scale: Numbers Pre/Post-Treatment    Pain Scale: Numbers, Pretreatment  3/10  -MS     Pain Scale: Numbers, Post-Treatment  3/10  -MS     Pain Location - Side  Bilateral  -MS     Pain Location  calf  -MS     Pre/Post Treatment Pain Comment  \"navarro horses\" per pt. report  -MS     Row Name 01/17/20 1112          Plan of Care Review    Plan of Care Reviewed With  patient;family  -MS     Row Name 01/17/20 1112          Physical Therapy Clinical Impression    Criteria for Skilled Interventions Met (PT Clinical Impression)  treatment indicated  -MS     Rehab Potential (PT Clinical Summary)  good, to achieve stated therapy goals  -MS     Row Name 01/17/20 1112          Positioning and Restraints    Pre-Treatment Position  in bed  -MS     Post Treatment Position  bed  -MS     In Bed  notified nsg;sitting EOB;call light within reach;encouraged to call for assist;with family/caregiver;with OT  -MS       User Key  (r) = Recorded By, (t) = Taken By, (c) = Cosigned By    Initials Name Provider Type    Westley Whitt, PT Physical Therapist        Outcome Measures     Row Name 01/17/20 1113          How much help from another person do you currently need...    Turning from your back to your side while in flat bed without using bedrails?  4  -MS     Moving from lying on back to " sitting on the side of a flat bed without bedrails?  3  -MS     Moving to and from a bed to a chair (including a wheelchair)?  3  -MS     Standing up from a chair using your arms (e.g., wheelchair, bedside chair)?  3  -MS     Climbing 3-5 steps with a railing?  3  -MS     To walk in hospital room?  3  -MS     AM-PAC 6 Clicks Score (PT)  19  -MS     Row Name 01/17/20 1113          Functional Assessment    Outcome Measure Options  AM-PAC 6 Clicks Basic Mobility (PT)  -MS       User Key  (r) = Recorded By, (t) = Taken By, (c) = Cosigned By    Initials Name Provider Type    MS Westley Morfin, PT Physical Therapist          PT Recommendation and Plan  Planned Therapy Interventions (PT Eval): balance training, bed mobility training, gait training, home exercise program, patient/family education, postural re-education, transfer training, strengthening  Outcome Summary/Treatment Plan (PT)  Anticipated Discharge Disposition (PT): home with assist, home with home health  Plan of Care Reviewed With: patient  Outcome Summary: Pt. admitted to the hospital with Altered mental status and general weakness.  Pt. diagosed with Diabetic Ketoacidosis.  Pt. reports that prior to admission he was completely independent with functional mobility and no use of A.D.  Pt. currently presents with decreased strength and decreased overall balance. Pt. will benefit from skilled inpt. P.T. to address his functional deficits and to assist pt. in regaining his maximum level of independence with functional mobility.     Time Calculation:   PT Charges     Row Name 01/17/20 1117             Time Calculation    Start Time  1038  -MS      Stop Time  1052  -MS      Time Calculation (min)  14 min  -MS      PT Received On  01/17/20  -MS      PT - Next Appointment  01/18/20  -MS      PT Goal Re-Cert Due Date  01/22/20  -MS         Time Calculation- PT    Total Timed Code Minutes- PT  12 minute(s)  -MS        User Key  (r) = Recorded By, (t) = Taken By,  (c) = Cosigned By    Initials Name Provider Type    Westley Whitt, PT Physical Therapist        Therapy Charges for Today     Code Description Service Date Service Provider Modifiers Qty    07195884619 HC PT EVAL LOW COMPLEXITY 1 1/17/2020 Westley Morfin, PT GP 1    69199563317 HC PT THER PROC EA 15 MIN 1/17/2020 Westley Morfin, PT GP 1          PT G-Codes  Outcome Measure Options: AM-PAC 6 Clicks Basic Mobility (PT)  AM-PAC 6 Clicks Score (PT): 19    Westley Morfin, PT  1/17/2020

## 2020-01-17 NOTE — PLAN OF CARE
Patient is having difficulty retaining education on diabetic management, very unsteady gait, PT is working with patient, VSS, GB's are better maintained with Lantus and sliding scale, stool softener ordered per patient request, will CTM

## 2020-01-17 NOTE — CONSULTS
Diabetes Education  Assessment/Teaching    Patient Name:  Joby Kulkarni  YOB: 1970  MRN: [id number]  Admit Date:  1/13/2020      Assessment Date:  1/17/2020        Most Recent Value   DM Education Needs   Meter  Meter provided   Meter Type  One Touch [Verio Flex w/ Delica lancets]   Frequency of Testing  AC/HS   Blood Glucose Target Range   mg/dL   Medication  Insulin, Actions, Side effects, Administration, Vial, Pen [Lantus vial/Lantus Solostar and Humalog vial/Humalog Kwikpen w/ 4 mm 32 G Aydee pen needles.  ]   Healthy Coping  Appropriate   Motivation  Engaged   Teaching Method  Explanation, Discussion, Demonstration, Handouts, Teach back   Patient Response  Verbalized understanding, Demonstrates adequately, Needs reinforcement            Other Comments:  Much improvement noted with patient's ability to perform BGM correctly.  Pt was able to give return demonstration with very little coaching and cues.      Patient educated on basal bolus concept along with insulin administration via vial/syringe ( 6 mm ).  Nursing staff has been educating patient on giving self insulin.  Pt educated on drawing up insulin.  Pt was able to correctly return demonstrate with coaching but had difficulty seeing lines on syringe due to prescription glasses being at home.  Asked mother at bedside if glasses could be brought to hospital for patient to be able to see properly and continue to practice.  Saline vials and syringes given to staff RN to have pt continue to practice drawing up insulin.      Patient requested being taught insulin pen devices.  Patient educated along with written materials.  However patient was not able to correctly return demonstrate.  Encouraged pt to look at acquiring pen devices in the future as patient was currently more proficient with vial/syringe method.  Discussed importance of being precise with insulin and that safety was of paramount concern.  Mother at bedside in agreement.       Unclear of patient discharge placement but if patient goes home, home health would be beneficial to reinforce diabetes education.  Discussed with CCP.  Encouraged outpatient follow up [facility].          Electronically signed by:  Columba Teixeira RN  01/17/20 4:15 PM

## 2020-01-17 NOTE — THERAPY EVALUATION
Acute Care - Occupational Therapy Initial Evaluation  [facility]     Patient Name: Joby Kulkarni  : 1970  MRN: [id number]  Today's Date: 2020             Admit Date: 2020       ICD-10-CM ICD-9-CM   1. Diabetes mellitus, new onset (CMS/HCC) E11.9 250.00   2. Somnolence R40.0 780.09   3. Diabetic ketoacidosis without coma associated with other specified diabetes mellitus (CMS/HCC) E13.10 250.12   4. Acute renal failure, unspecified acute renal failure type (CMS/HCC) N17.9 584.9   5. Hyperkalemia E87.5 276.7   6. Hypermagnesemia E83.41 275.2   7. Hyperphosphatemia E83.39 275.3   8. Dehydration E86.0 276.51   9. Encephalopathy G93.40 348.30     Patient Active Problem List   Diagnosis   • Diabetes mellitus, new onset (CMS/HCC)   • Diabetic ketoacidosis without coma associated with type 2 diabetes mellitus (CMS/HCC)     Past Medical History:   Diagnosis Date   • Diabetes (CMS/HCC)    • Finger infection     right middle finger     Past Surgical History:   Procedure Laterality Date   • NO PAST SURGERIES            OT ASSESSMENT FLOWSHEET (last 12 hours)      Occupational Therapy Evaluation     Row Name 20 1144                   OT Evaluation Time/Intention    Subjective Information  no complaints  -SG        Document Type  evaluation  -SG        Mode of Treatment  individual therapy;occupational therapy  -SG        Patient Effort  good  -SG           General Information    Patient Profile Reviewed?  yes  -SG        Patient Observations  alert;cooperative;agree to therapy  -SG        Existing Precautions/Restrictions  fall  -SG           Cognitive Assessment/Intervention- PT/OT    Orientation Status (Cognition)  oriented to;person;place  -SG        Follows Commands (Cognition)  follows one step commands;repetition of directions required  -SG           Bed Mobility Assessment/Treatment    Bed Mobility Assessment/Treatment  sit-supine  -SG        Sit-Supine [place] (Bed Mobility)  supervision   -SG           ADL Assessment/Intervention    BADL Assessment/Intervention  lower body dressing;grooming  -           Lower Body Dressing Assessment/Training    Lower Body Dressing [place] Level  lower body dressing skills;contact guard assist  -        Lower Body Dressing Position  supported standing;unsupported sitting  -           Grooming Assessment/Training    [place] Level (Grooming)  grooming skills;supervision  -        Grooming Position  edge of bed sitting  -           BADL Safety/Performance    Impairments, BADL Safety/Performance  balance;strength  -SG        Skilled BADL Treatment/Intervention  BADL process/adaptation training  -           General ROM    GENERAL ROM COMMENTS  BUE's WFL except for right 3rd finger limited due to pain and edema   -           Motor Assessment/Interventions    Additional Documentation  Balance Interventions (Group);Balance (Group)  -SG           Balance    Balance  static standing balance;static sitting balance;dynamic sitting balance;dynamic standing balance  -           Static Sitting Balance    Level of [place] (Unsupported Sitting, Static Balance)  supervision  -        Sitting Position (Unsupported Sitting, Static Balance)  sitting on edge of bed  -           Dynamic Sitting Balance    Level of [place], Reaches Outside Midline (Sitting, Dynamic Balance)  contact guard assist  -SG        Sitting Position, Reaches Outside Midline (Sitting, Dynamic Balance)  sitting on edge of bed  -           Static Standing Balance    Level of [place] (Supported Standing, Static Balance)  minimal assist, 75% patient effort  -SG        Assistive Device Utilized (Supported Standing, Static Balance)  -- hand held assist  -           Dynamic Standing Balance    Level of [place], Reaches Outside Midline (Standing, Dynamic Balance)  moderate assist, 50 to 74% patient effort  -SG           Positioning and Restraints    Pre-Treatment Position   in bed  -SG        Post Treatment Position  bed  -SG        In Bed  supine;call light within reach;encouraged to call for assist;exit alarm on;with family/caregiver  -SG           Pain Assessment    Additional Documentation  Pain Scale: Word Pre/Post-Treatment (Group)  -SG           Pain Scale: Word Pre/Post-Treatment    Pain: Word Scale, Pretreatment  2 - mild pain  -SG        Pain: Word Scale, Post-Treatment  2 - mild pain  -SG           Wound 01/13/20 1145 Right finger    Wound - Properties Group Date first assessed: 01/13/20  -SHAY Time first assessed: 1145  -SHAY Present on Hospital Admission: Y  -SHAY Side: Right  -SHAY Location: finger  -SHAY, RIGHT MIDDLE FINGER, nailbed        Clinical Impression (OT)    OT Diagnosis  need for assist with personal care  -SG        Criteria for Skilled Therapeutic Interventions Met (OT Eval)  yes;treatment indicated  -SG        Therapy Frequency (OT Eval)  5 times/wk  -SG        Care Plan Review (OT)  evaluation/treatment results reviewed  -SG           Planned OT Interventions    Planned Therapy Interventions (OT Eval)  BADL retraining;strengthening exercise;transfer/mobility retraining  -SG           OT Goals    Transfer Goal Selection (OT)  transfer, OT goal 1  -SG        Dressing Goal Selection (OT)  dressing, OT goal 1  -SG        Toileting Goal Selection (OT)  toileting, OT goal 1  -SG           Transfer Goal 1 (OT)    Activity/Assistive Device (Transfer Goal 1, OT)  toilet  -SG        [place] Level/Cues Needed (Transfer Goal 1, OT)  contact guard assist  -SG        Time Frame (Transfer Goal 1, OT)  1 week  -SG        Progress/Outcome (Transfer Goal 1, OT)  goal ongoing  -SG           Dressing Goal 1 (OT)    Activity/Assistive Device (Dressing Goal 1, OT)  dressing skills, all  -SG        [place]/Cues Needed (Dressing Goal 1, OT)  contact guard assist  -SG        Time Frame (Dressing Goal 1, OT)  1 week  -SG        Progress/Outcome (Dressing Goal 1, OT)  goal ongoing   -SG           Toileting Goal 1 (OT)    Activity/Device (Toileting Goal 1, OT)  toileting skills, all  -SG        [place] Level/Cues Needed (Toileting Goal 1, OT)  contact guard assist  -SG        Time Frame (Toileting Goal 1, OT)  1 week  -SG        Progress/Outcome (Toileting Goal 1, OT)  goal ongoing  -SG          User Key  (r) = Recorded By, (t) = Taken By, (c) = Cosigned By    Initials Name Effective Dates    Nola Chinchilla OTR 06/08/18 -     Bertha Griffith RN 06/16/16 -                OT Recommendation and Plan  Planned Therapy Interventions (OT Eval): BADL retraining, strengthening exercise, transfer/mobility retraining  Therapy Frequency (OT Eval): 5 times/wk  Plan of Care Review  Plan of Care Reviewed With: patient  Plan of Care Reviewed With: patient  Outcome Summary: Pt presents to OT with decreased strength and endurance and decreased safety for adls. Pt required CGA for bed mobiity and min to mod A for functional standing balance task today. Pt will continue to benefit from OT for increasing safety and independence for adls    Outcome Measures     Row Name 01/17/20 4969             How much help from another is currently needed...    Putting on and taking off regular lower body clothing?  3  -SG      Bathing (including washing, rinsing, and drying)  3  -SG      Toileting (which includes using toilet bed pan or urinal)  3  -SG      Putting on and taking off regular upper body clothing  3  -SG      Taking care of personal grooming (such as brushing teeth)  3  -SG      Eating meals  4  -SG      AM-PAC 6 Clicks Score (OT)  19  -SG         Functional Assessment    Outcome Measure Options  AM-PAC 6 Clicks Daily Activity (OT)  -SG        User Key  (r) = Recorded By, (t) = Taken By, (c) = Cosigned By    Initials Name Provider Type    Nola Chinchilla OTR Occupational Therapist          Time Calculation:   Time Calculation- OT     Row Name 01/17/20 7586             Time Calculation- OT     OT Start Time  1045  -      OT Stop Time  1058  -      OT Time Calculation (min)  13 min  -      Total Timed Code Minutes- OT  8 minute(s)  -      OT Received On  01/17/20  -      OT Goal Re-Cert Due Date  01/24/20  -        User Key  (r) = Recorded By, (t) = Taken By, (c) = Cosigned By    Initials Name Provider Type     Nola Jones OTR Occupational Therapist        Therapy Charges for Today     Code Description Service Date Service Provider Modifiers Qty    [id number]  OT EVAL LOW COMPLEXITY 2 1/17/2020 Nola Jones OTR GO 1    [id number]  OT SELF CARE/MGMT/TRAIN EA 15 MIN 1/17/2020 Nola Jones OTR GO 1               JOSE A Bridges  1/17/2020

## 2020-01-17 NOTE — PAYOR COMM NOTE
"Johnson Kulkarni (49 y.o. Male)     PLEASE SEE ATTACHED CLINICAL   REVIEW.    REF#[id number]    PLEASE CALL   OR  [phone] WITH INPT  CONTINUED STAY AUTH.     THANK YOU    BETH MCKEON LPN CCP    Date of Birth Social Security Number Address Home Phone MRN    1970  [id number] [address] [phone] [id number]    Druze Marital Status          None Unknown       Admission Date Admission Type Admitting Provider Attending Provider Department, Room/Bed    1/13/20 Emergency Parrish Petersen MD Anaya, Ervin H., MD [address], [address]/1    Discharge Date Discharge Disposition Discharge Destination                       Attending Provider:  Cameron Morgan MD    Allergies:  No Known Allergies    Isolation:  None   Infection:  None   Code Status:  CPR    Ht:  182.9 cm (72\")   Wt:  66 kg (145 lb 8 oz)    Admission Cmt:  None   Principal Problem:  Diabetic ketoacidosis without coma associated with type 2 diabetes mellitus (CMS/[facility]) [E11.10]                 Active Insurance as of 1/13/2020     Primary Coverage     Payor Plan Insurance Group Employer/Plan Group    [employer] BLUE [employer]OSS [employer]UE [employer] BLUE [employer] PPO [id number]     Payor Plan Address Payor Plan Phone Number Payor Plan Fax Number Effective Dates    [address] [phone]  1/1/2018 - None Entered    [address] [id number]       Subscriber Name Subscriber Birth Date Member ID       JOHNSON KULKARNI 1970 [id number]                 Emergency Contacts      (Rel.) Home Phone Work Phone Mobile Phone    CARMELITA KULKARNI (Mother) [phone] -- [phone]    Daksha Churchill (Sister) -- -- [phone]    Tere Markham (Daughter) -- -- --            Oxygen Therapy (last 3 days)     Date/Time   SpO2   Device (Oxygen Therapy)   Flow (L/min)   Oxygen Concentration (%)   ETCO2 (mmHg)    01/17/20 0814   96   --   --   --   --    01/16/20 2340   96   --   --   --   -- "    01/16/20 2020   --   room air   --   --   --    01/16/20 1933   95   room air   --   --   --    01/16/20 1431   --   room air   --   --   --    01/16/20 1404   96   --   --   --   --    01/16/20 0808   --   room air   --   --   --    01/16/20 0802   96   --   --   --   --    01/15/20 2250   --   room air   --   --   --    01/15/20 2132   --   room air   --   --   --    01/15/20 2121   --   room air   --   --   --    01/15/20 1926   96   --   --   --   --    01/15/20 1816   99   --   --   --   --    01/15/20 1800   97   --   --   --   --    01/15/20 1623   95   room air   --   --   --    01/15/20 1518   97   --   --   --   --    01/15/20 1500   98   --   --   --   --    01/15/20 1400   98   room air   --   --   --    01/15/20 1239   97   --   --   --   --    01/15/20 1127   98   room air   --   --   --    01/15/20 1121   98   --   --   --   --    01/15/20 1002   97   --   --   --   --    01/15/20 1000   97   --   --   --   --    01/15/20 0844   97   --   --   --   --    01/15/20 0800   97   room air   --   --   --    01/15/20 0700   97   --   --   --   --    01/15/20 0600   97   --   --   --   --    01/15/20 0400   99   room air   --   --   --    01/15/20 0300   99   --   --   --   --    01/15/20 0200   97   --   --   --   --    01/15/20 0100   97   --   --   --   --    01/15/20 0005   100   --   --   --   --    01/15/20 0000   --   room air   --   --   --    01/14/20 2313   98   --   --   --   --    01/14/20 2200   99   --   --   --   --    01/14/20 2000   --   room air   --   --   --    01/14/20 1927   97   --   --   --   --    01/14/20 1807   98   --   --   --   --    01/14/20 1702   97   --   --   --   --    01/14/20 1700   98   --   --   --   --    01/14/20 1600   98   room air   --   --   --    01/14/20 1418   98   --   --   --   --    01/14/20 1400   98   --   --   --   --    01/14/20 1210   99   --   --   --   --    01/14/20 1200   98   room air   --   --   --    01/14/20 1100   99   --   --   --   --     01/14/20 0912   97   --   --   --   --    01/14/20 0808   97   --   --   --   --    01/14/20 0800   97   room air   --   --   --    01/14/20 0700   97   --   --   --   --    01/14/20 0600   98   --   --   --   --    01/14/20 0505   98   --   --   --   --    01/14/20 0400   96   room air   --   --   --    01/14/20 0300   97   --   --   --   --    01/14/20 0105   96   --   --   --   --    01/14/20 0000   98   room air   --   --   --            Intake & Output (last 3 days)       01/14 0701 - 01/15 0700 01/15 0701 - 01/16 0700 01/16 0701 - 01/17 0700 01/17 0701 - 01/18 0700    P.O. 1620 1440      I.V. (mL/kg) 4646.5 (70.2)       IV Piggyback 50 150      Total Intake(mL/kg) 6316.5 (95.4) 1590 (24)      Urine (mL/kg/hr) 2000 (1.3) 2025 (1.3) 1750 (1.1)     Total Output 2000 2025 1750     Net +4316.5 -435 -1750             Urine Unmeasured Occurrence 2 x            Lines, Drains & Airways    Active LDAs     Name:   Placement date:   Placement time:   Site:   Days:    Peripheral IV 01/14/20 1223 Anterior;Distal;Left Hand   01/14/20    1223    Hand   3    Peripheral IV 01/15/20 1400 Left Forearm   01/15/20    1400    Forearm   1                Orders (last 72 hrs)      Start     Ordered    01/16/20 1724  PT Consult: Eval & Treat As Tolerated; Discharge Placement Assessment, Functional Mobility Below Baseline, Unsuccessful Mobility by Nursing or Primary Service  Once     Comments:  Nursing walked pt to restroom and knee almost buckled. Thanks    01/16/20 1724    01/16/20 1724  OT Consult: Eval & Treat  Once      01/16/20 1724    01/16/20 1340  SLP Consult: Eval & Treat Other; Pt states he is having difficulty swallowing  Once      01/16/20 1340    01/15/20 1734  Inpatient Case Management  Consult  Once     Provider:  (Not yet assigned)    01/15/20 1734    01/15/20 1202  Transfer Patient  Once      01/15/20 1202    01/14/20 2314  Restraints Non-Violent or Non-Self Destructive  Calendar Day,   Status:  Canceled       01/14/20 2313    01/14/20 1700  POC Glucose 4x Daily AC & at Bedtime  4 Times Daily Before Meals & at Bedtime,   Status:  Canceled      01/14/20 1507    01/14/20 1600  POC Glucose Q4H  Every 4 Hours      01/14/20 1514    01/14/20 1507  Inpatient Endocrinology Consult  Once     Specialty:  Endocrinology  Provider:  Lexie Doe MD    01/14/20 1507    01/14/20 1506  Diet Regular; Consistent Carbohydrate  Diet Effective Now      01/14/20 1507    01/14/20 1504  Do NOT Hold Basal or Correction Scale Insulin When Patient is NPO, Hold Scheduled Mealtime (Bolus) Insulin if NPO  Continuous      01/14/20 1507    01/14/20 1504  Follow [facility] Hypoglycemia Standing Orders For Blood Glucose Less Than 70 mg/dL  Until Discontinued      01/14/20 1507    01/14/20 1504  dextrose (GLUTOSE) oral gel 15 g  Every 15 Minutes PRN      01/14/20 1507    01/14/20 1504  dextrose (D50W) 25 g/ 50mL Intravenous Solution 25 g  Every 15 Minutes PRN      01/14/20 1507    01/14/20 1504  glucagon (human recombinant) (GLUCAGEN DIAGNOSTIC) injection 1 mg  Every 15 Minutes PRN      01/14/20 1507    01/14/20 1504  Hypoglycemia Treatment - Alert Patient That is Not NPO and Can Safely Swallow  Until Discontinued     Comments:  Administer 4 oz Fruit Juice OR 4 oz Regular Soda OR 8 oz Milk OR 15-30 grams (1 tube) of Glucose Gel.  Recheck Blood Glucose 15 Minutes After Ingestion, Repeat Treatment & Continue to Recheck Blood Sugar Every 15 Minutes Until Blood Glucose is 70 mg/dL or Higher.  Once Blood Glucose is 70 mg/dL or Higher and if It Will Be more than 60 Minutes Until the Next Meal, Provide Appropriate Snack (Including Carbohydrate Food) Based on Meal Plan Order. Give Meal Tray As Soon As Possible.    01/14/20 1507    01/14/20 1504  Hypoglycemia Treatment - Patient Has IV Access - Unresponsive, NPO or Unable To Safely Swallow  Until Discontinued     Comments:  Administer 25g (50ml) D50W IV Push.  Recheck Blood Glucose 15 Minutes After Administration,  if Blood Glucose Remains Less Than 70 mg/dl, Repeat Treatment   Recheck Blood Glucose 15 Minutes After 2nd Administration, if Blood Glucose Remains Less Than 70 mg/dL After 2nd Dose of D50W, Contact Provider for Further Treatment Orders & Consider Adding IVF With D5W for Maintenance    01/14/20 1507    01/14/20 1504  Hypoglycemia Treatment - Patient Without IV Access - Unresponsive, NPO or Unable To Safely Swallow  Until Discontinued     Comments:  Administer 1mg Glucagon SQ & Establish IV Access.  Turn Patient on Side - Nausea / Vomiting May Occur.  Recheck Blood Glucose 15 Minutes After Administration.  If Blood Glucose Remains Less Than 70, Administer 25g D50W IV Push (50ml).  Recheck Blood Glucose 15 Minutes After Administration of D50W, if Blood Glucose Remains Less Than 70 mg/dl, Contact Provider for Further Treatment Orders & Consider Adding IVF With D5 for Maintenance    01/14/20 1507    01/14/20 1504  Hypoglycemia Treatment - Document Event & Patient Response to Interventions EMR, Document Medications on MAR  Continuous      01/14/20 1507    01/14/20 1504  Notify Provider - Hypoglycemia Treatment  Until Discontinued      01/14/20 1507    01/14/20 0700  POC Glucose 4x Daily AC & at Bedtime  4 Times Daily Before Meals & at Bedtime,   Status:  Canceled      01/14/20 0143                     Physician Progress Notes (all)      Lexie Doe MD at 01/17/20 1217          49 y.o.   LOS: 4 days   Patient Care Team:  Provider, No Known as PCP - General    Chief Complaint: Elevated blood sugars    Chief Complaint   Patient presents with   • Altered Mental Status       Subjective   Fasting blood sugars are still elevated.  Pre-meal blood sugars are decently controlled.  Patient still has some episodes of confusion.    Interval History:    Review of Systems:   Constitutional: Positive for appetite change and fatigue.   Eyes: Negative for visual disturbance.   Respiratory: no shortness of breath.    Cardiovascular:  no leg swelling.   Gastrointestinal: Negative for abdominal pain.   Endocrine: Negative for polydipsia and polyuria.   Musculoskeletal: Positive for arthralgias and myalgias.   Skin: Negative for pallor.   Neurological: Positive for weakness and numbness.   Psychiatric/Behavioral: Positive for sleep disturbance.  Review of Systems  Objective     Vital Signs   Temp:  [98 °F (36.7 °C)-99.3 °F (37.4 °C)] 99.3 °F (37.4 °C)  Heart Rate:  [] 92  Resp:  [16] 16  BP: (104-124)/(62-74) 119/74    Physical Exam:  General exam-no distress, appears stated age.  Eyes-PERRL, anicteric sclera  ENT-external ears/nose normal.  Neck-no adenopathy, midline trachea.  Lungs-normal chest exam on inspection, diminished bilateral breath sounds on auscultation.  Cardiovascular -normal S1-S2, positive murmur.  ABD-nontender, nondistended, bowel sounds heard.  Extremities-no edema, cyanosis.  Hammertoes noted.          Physical ExamResults Review:     I reviewed the patient's new clinical results and summarized them in subjective and in plan.      Glucose   Date/Time Value Ref Range Status   01/16/2020 0535 139 (H) 65 - 99 mg/dL Final   01/15/2020 0449 153 (H) 65 - 99 mg/dL Final   01/14/2020 1824 139 (H) 65 - 99 mg/dL Final   01/14/2020 1358 136 (H) 65 - 99 mg/dL Final     Lab Results (last 24 hours)     Procedure Component Value Units Date/Time    POC Glucose Once [[id number]]  (Abnormal) Collected:  01/17/20 1148    Specimen:  Blood Updated:  01/17/20 1204     Glucose 157 mg/dL     POC Glucose Once [[id number]]  (Abnormal) Collected:  01/17/20 0619    Specimen:  Blood Updated:  01/17/20 0620     Glucose 248 mg/dL     TSH [[id number]]  (Normal) Collected:  01/17/20 0440    Specimen:  Blood from Arm, Right Updated:  01/17/20 0602     TSH 1.320 uIU/mL     T4, Free [[id number]]  (Normal) Collected:  01/17/20 0440    Specimen:  Blood from Arm, Right Updated:  01/17/20 0602     Free T4 1.02 ng/dL     Narrative:       Results may be falsely  increased if patient taking Biotin.      C-Peptide [[id number]] Collected:  01/17/20 0440    Specimen:  Blood from Arm, Right Updated:  01/17/20 0527    Glutamic Acid Decarboxylase [[id number]] Collected:  01/17/20 0440    Specimen:  Blood from Arm, Right Updated:  01/17/20 0527    POC Glucose Once [[id number]]  (Abnormal) Collected:  01/16/20 2047    Specimen:  Blood Updated:  01/16/20 2049     Glucose 142 mg/dL     Blood Culture - Blood, Arm, Right [[id number]] Collected:  01/13/20 1545    Specimen:  Blood from Arm, Right Updated:  01/16/20 1615     Blood Culture No growth at 3 days    Blood Culture - Blood, Arm, Left [[id number]] Collected:  01/13/20 1545    Specimen:  Blood from Arm, Left Updated:  01/16/20 1615     Blood Culture No growth at 3 days    POC Glucose Once [[id number]]  (Abnormal) Collected:  01/16/20 1609    Specimen:  Blood Updated:  01/16/20 1611     Glucose 171 mg/dL         Imaging Results (Last 24 Hours)     ** No results found for the last 24 hours. **          Medication Review: done      Current Facility-Administered Medications:   •  dextrose (D50W) 25 g/ 50mL Intravenous Solution 12.5 g, 12.5 g, Intravenous, PRN, Tacos Arzola MD, 12.5 g at 01/13/20 2240  •  dextrose (D50W) 25 g/ 50mL Intravenous Solution 25 g, 25 g, Intravenous, Q15 Min PRN, Shandra Delcid MD  •  dextrose (D50W) 25 g/ 50mL Intravenous Solution 25 g, 25 g, Intravenous, Q15 Min PRN, Cameron Morgan MD  •  dextrose (GLUTOSE) oral gel 15 g, 15 g, Oral, Q15 Min PRNEIL, Shandra Delcid MD  •  dextrose (GLUTOSE) oral gel 15 g, 15 g, Oral, Q15 Min PRN, Cameron Morgan MD  •  enoxaparin (LOVENOX) syringe 40 mg, 40 mg, Subcutaneous, Q24H, Cameron Morgan MD, 40 mg at 01/16/20 1156  •  glucagon (human recombinant) (GLUCAGEN DIAGNOSTIC) injection 1 mg, 1 mg, Subcutaneous, Q15 Min PRN, Shandra Delcid MD  •  glucagon (human recombinant) (GLUCAGEN DIAGNOSTIC) injection 1 mg, 1 mg, Subcutaneous, Q15 Min PRN, Cameron Morgan MD  •   haloperidol lactate (HALDOL) injection 2 mg, 2 mg, Intravenous, Q6H PRN, Shandra Delcid MD, 2 mg at 01/14/20 2250  •  [START ON 1/18/2020] insulin glargine (LANTUS) injection 28 Units, 28 Units, Subcutaneous, Daily, Lexie Doe MD  •  insulin lispro (humaLOG) injection 0-9 Units, 0-9 Units, Subcutaneous, 4x Daily With Meals & Nightly, Lexie Doe MD, 4 Units at 01/17/20 0846  •  insulin lispro (humaLOG) injection 5 Units, 5 Units, Subcutaneous, TID With Meals, Lexie Doe MD, 5 Units at 01/17/20 0847  •  magnesium sulfate 4 gram infusion - Mg less than or equal to 1mg/dL, 4 g, Intravenous, PRN **OR** magnesium sulfate 3 gram infusion (1gm x 3) - Mg 1.1 - 1.5 mg/dL, 1 g, Intravenous, PRN **OR** Magnesium Sulfate 2 gram infusion- Mg 1.6 - 1.9 mg/dL, 2 g, Intravenous, PRN, Cameron Morgan MD  •  ondansetron (ZOFRAN) tablet 4 mg, 4 mg, Oral, Q6H PRN **OR** ondansetron (ZOFRAN) injection 4 mg, 4 mg, Intravenous, Q6H PRN, Cameron Morgan MD  •  potassium & sodium phosphates (PHOS-NAK) 280-160-250 MG packet - for Phosphorus less than 1.25 mg/dL, 2 packet, Oral, Q6H PRN **OR** potassium & sodium phosphates (PHOS-NAK) 280-160-250 MG packet - for Phosphorus 1.25 - 2.5 mg/dL, 2 packet, Oral, Q6H PRN, Cameron Morgan MD, 2 packet at 01/16/20 1157  •  potassium chloride (MICRO-K) CR capsule 40 mEq, 40 mEq, Oral, PRN **OR** potassium chloride (KLOR-CON) packet 40 mEq, 40 mEq, Oral, PRN **OR** potassium chloride 10 mEq in 100 mL IVPB, 10 mEq, Intravenous, Q1H PRN, Cameron Morgan MD  •  potassium phosphate 45 mmol in sodium chloride 0.9 % 500 mL infusion, 45 mmol, Intravenous, PRN **OR** potassium phosphate 30 mmol in sodium chloride 0.9 % 250 mL infusion, 30 mmol, Intravenous, PRN **OR** potassium phosphate 15 mmol in sodium chloride 0.9 % 100 mL infusion, 15 mmol, Intravenous, PRN, 15 mmol at 01/15/20 1355 **OR** sodium phosphates 40 mmol in sodium chloride 0.9 % 500 mL IVPB, 40 mmol, Intravenous, PRN **OR**  "sodium phosphates 20 mmol in sodium chloride 0.9 % 250 mL IVPB, 20 mmol, Intravenous, PRN, Cameron Morgan MD, Stopped at 01/14/20 0746  •  senna-docusate sodium (SENOKOT-S) 8.6-50 MG tablet 2 tablet, 2 tablet, Oral, Nightly, Cameron Morgan MD, 2 tablet at 01/16/20 2100  •  [COMPLETED] Insert peripheral IV, , , Once **AND** sodium chloride 0.9 % flush 10 mL, 10 mL, Intravenous, PRN, Marlon Shabazz MD  •  sodium chloride 0.9 % flush 10 mL, 10 mL, Intravenous, Once PRN, Tacos Arzola MD  •  sodium chloride 0.9 % flush 10 mL, 10 mL, Intravenous, Q12H, Cameron Morgan MD, 10 mL at 01/17/20 0847  •  sodium chloride 0.9 % flush 10 mL, 10 mL, Intravenous, PRN, Cameron Morgan MD    Assessment/Plan     Active Hospital Problems    Diagnosis  POA   • **Diabetic ketoacidosis without coma associated with type 2 diabetes mellitus (CMS/HCC) [E11.10]  Yes   • Diabetes mellitus, new onset (CMS/HCC) [E11.9]  Yes      Resolved Hospital Problems   No resolved problems to display.     Diabetes mellitus-significantly uncontrolled  It is unclear if patient is a type I or type II  Check C-peptide, john 65 antibodies  Increase Lantus to 28 units in the morning.  Continue Humalog 5 units with each meal  Continue Humalog sliding scale 3 times daily before meals and at bedtime.     Discussed with the nurse extensively about patient's situation. Diabetic educator has Educated the patient again    Discussed plan with Nurse.     Lexie Doe MD.  01/17/20  12:17 PM      EMR Dragon / transcription disclaimer:    \"Dictated utilizing Dragon dictation\".     Electronically signed by Lexie Doe MD at 01/17/20 1219     Cameron Morgan MD at 01/16/20 6289          Dr. TSERING Morgan    [address]    1/16/2020    Patient ID:  Name:  Joby Kulkarni  MRN:  [id number]  1970  49 y.o.  male            CC/Reason for visit: Altered mental status, combative patient, diabetic ketoacidosis     Interval hx:   Patient seems a " little more oriented and less belligerence today.  Still does not recall much about how to use glucometer and his phone for Bluetooth connectivity.  Continues to ask me the same questions over and over again.     ROS: No fever, chest pain or abdominal pain    Vitals:  Vitals:    01/15/20 2121 01/15/20 2250 01/16/20 0529 01/16/20 0802   BP: 129/76 132/71  121/69   BP Location: Right arm Right arm  Right arm   Patient Position: Lying Lying  Lying   Pulse:    105   Resp: 18 18  17   Temp:  98.8 °F (37.1 °C)  99.4 °F (37.4 °C)   TempSrc:  Oral  Oral   SpO2:    96%   Weight:   66.2 kg (145 lb 14.4 oz)    Height:               Body mass index is 19.79 kg/m².    Intake/Output Summary (Last 24 hours) at 1/16/2020 1323  Last data filed at 1/15/2020 1800  Gross per 24 hour   Intake 750 ml   Output 725 ml   Net 25 ml       Exam:  GEN:  No distress  Alert, oriented x 3.   LUNGS: Clear breath sounds bilat, no use of accessory muscles  CV:  Normal S1S2, without murmur, no edema  ABD:  Non tender, no enlarged liver or masses      Scheduled meds:    cefTRIAXone 1 g Intravenous Q24H   enoxaparin 40 mg Subcutaneous Q24H   insulin glargine 25 Units Subcutaneous Daily   insulin lispro 0-9 Units Subcutaneous Q4H   insulin lispro 5 Units Subcutaneous TID With Meals   sodium chloride 10 mL Intravenous Q12H     IV meds:                           Data Review:   I reviewed the patient's medications and new clinical results.  Lab Results   Component Value Date    CALCIUM 8.7 01/16/2020    PHOS 2.0 (L) 01/16/2020    MG 2.6 01/15/2020    MG 3.2 (H) 01/14/2020    MG 3.4 (H) 01/14/2020     Results from last 7 days   Lab Units 01/16/20  0535 01/15/20  0449 01/14/20  1824  01/13/20  1545  01/13/20  0930 01/13/20  0736 01/13/20  0633   SODIUM mmol/L 141 149* 150*   < > 141   < > 132* 126* 118*   POTASSIUM mmol/L 3.5 4.3 3.8   < > 3.7   < > 6.1* 5.6* 7.2*   CHLORIDE mmol/L 104 114* 117*   < > 101   < > 93* 83* 75*   CO2 mmol/L 22.8 19.8* 19.1*   < >  23.0   < > 10.9* 13.4* 13.1*   BUN mg/dL 12 25* 38*   < > 84*   < > 92* 96* 99*   CREATININE mg/dL 0.86 1.04 1.26   < > 1.98*   < > 2.25* 2.46* 2.73*   CALCIUM mg/dL 8.7 8.5* 8.0*   < > 9.2   < > 8.6 8.7 9.1   BILIRUBIN mg/dL  --   --   --   --   --   --  0.6 0.7 0.8   ALK PHOS U/L  --   --   --   --   --   --  84 84 89   ALT (SGPT) U/L  --   --   --   --   --   --  17 13 15   AST (SGOT) U/L  --   --   --   --   --   --  35 31 38   GLUCOSE mg/dL 139* 153* 139*   < > 196*   < > 714* 823* 909*   WBC 10*3/mm3 9.86  --   --   --   --   --   --   --  14.50*   HEMOGLOBIN g/dL 14.0  --   --   --   --   --   --   --  18.7*   PLATELETS 10*3/mm3 286 289  --   --   --   --   --   --  425   PROCALCITONIN ng/mL  --  0.23  --   --  0.37*  --   --   --   --     < > = values in this interval not displayed.     Results from last 7 days   Lab Units 01/13/20  1545   BLOODCX  No growth at 2 days  No growth at 2 days         ASSESSMENT:   Diabetic ketoacidosis  Altered mental status due to the above  Acute kidney injury  Severe dehydration  Hyponatremia  Metabolic acidosis  Paronychia right hand, third finger    PLAN:  Patient still does not follow directions and cannot demonstrate proper learning during diabetic teaching and education. For this reason he needs to remain in the hospital.         Cameron Morgan MD  1/16/2020    Electronically signed by Cameron Morgan MD at 01/16/20 [id number]     Cameron Morgan MD at 01/15/20 5924          Dr. TSERING Morgan    [facility]    1/15/2020    Patient ID:  Name:  Joby Kulkarni  MRN:  [id number]  1970  49 y.o.  male            CC/Reason for visit: Altered mental status, combative patient, diabetic ketoacidosis    Interval hx: Last night again the patient became combative, confused and belligerent.  Would not allow nurses to provide care, physically trying to strike nurses and refusing care.  He is eating and drinking adequately.    ROS: Complains of someone having  stuck his finger with a needle but could not be more specific about the details.  Otherwise denies chest pain or abdominal pain.    Vitals:  Vitals:    01/15/20 1002 01/15/20 1121 01/15/20 1127 01/15/20 1239   BP:   118/68    BP Location:   Left arm    Patient Position:   Lying    Pulse: 86 93 90    Resp:   22    Temp:   98.7 °F (37.1 °C)    TempSrc:   Oral    SpO2: 97% 98% 98% 97%   Weight:       Height:               Body mass index is 19.79 kg/m².    Intake/Output Summary (Last 24 hours) at 1/15/2020 1305  Last data filed at 1/15/2020 1200  Gross per 24 hour   Intake 7122.5 ml   Output 3300 ml   Net 3822.5 ml       Exam:  GEN:  No distress  Alert, oriented x 3.   LUNGS: Clear breath sounds bilat, no use of accessory muscles  CV:  Normal S1S2, without murmur, no edema  ABD:  Non tender, no enlarged liver or masses      Scheduled meds:    cefTRIAXone 1 g Intravenous Q24H   enoxaparin 40 mg Subcutaneous Q24H   insulin glargine 25 Units Subcutaneous Daily   insulin lispro 0-9 Units Subcutaneous Q4H   insulin lispro 5 Units Subcutaneous TID With Meals   sodium chloride 10 mL Intravenous Q12H     IV meds:                           Data Review:   I reviewed the patient's medications and new clinical results.  Lab Results   Component Value Date    CALCIUM 8.5 (L) 01/15/2020    PHOS 2.3 (L) 01/15/2020    MG 2.6 01/15/2020    MG 3.2 (H) 01/14/2020    MG 3.4 (H) 01/14/2020     Results from last 7 days   Lab Units 01/15/20  0449 01/14/20  1824 01/14/20  1358  01/13/20  1545  01/13/20  0930 01/13/20  0736 01/13/20  0633   SODIUM mmol/L 149* 150* 152*   < > 141   < > 132* 126* 118*   POTASSIUM mmol/L 4.3 3.8 4.0   < > 3.7   < > 6.1* 5.6* 7.2*   CHLORIDE mmol/L 114* 117* 120*   < > 101   < > 93* 83* 75*   CO2 mmol/L 19.8* 19.1* 21.0*   < > 23.0   < > 10.9* 13.4* 13.1*   BUN mg/dL 25* 38* 50*   < > 84*   < > 92* 96* 99*   CREATININE mg/dL 1.04 1.26 1.35*   < > 1.98*   < > 2.25* 2.46* 2.73*   CALCIUM mg/dL 8.5* 8.0* 8.6   < > 9.2    < > 8.6 8.7 9.1   BILIRUBIN mg/dL  --   --   --   --   --   --  0.6 0.7 0.8   ALK PHOS U/L  --   --   --   --   --   --  84 84 89   ALT (SGPT) U/L  --   --   --   --   --   --  17 13 15   AST (SGOT) U/L  --   --   --   --   --   --  35 31 38   GLUCOSE mg/dL 153* 139* 136*   < > 196*   < > 714* 823* 909*   WBC 10*3/mm3  --   --   --   --   --   --   --   --  14.50*   HEMOGLOBIN g/dL  --   --   --   --   --   --   --   --  18.7*   PLATELETS 10*3/mm3 289  --   --   --   --   --   --   --  425   PROCALCITONIN ng/mL 0.23  --   --   --  0.37*  --   --   --   --     < > = values in this interval not displayed.     Results from last 7 days   Lab Units 01/13/20  1545   BLOODCX  No growth at 24 hours  No growth at 24 hours         ASSESSMENT:   Diabetic ketoacidosis  Altered mental status due to the above  Acute kidney injury  Severe dehydration  Hyponatremia  Metabolic acidosis  Paronychia right hand, third finger      PLAN:  Diabetic educator evaluated the patient and tried to teach the patient how to use a glucometer and check his own blood sugar but the patient was unable to demonstrate proper technique or verbal understanding.  At this time he is not appropriate for diabetes education because of confusion and lack of understanding.  We will have to try to educate him once again how to use a glucometer, and about his diet tomorrow or over the next few days.  Transfer out of CCU today.  Give a total of 5 days of Rocephin for possible paronychia with infection.  Procalcitonin was elevated on admission.  Advance diet.  Advance patient's mobility      Cameron Morgan MD  1/15/2020    Electronically signed by Cameron Morgan MD at 01/15/20 1305     Cameron Morgan MD at 01/14/20 0813          Dr. TSERING Morgan    [facility] CARE    1/14/2020    Patient ID:  Name:  Joby Kulkarni  MRN:  [id number]  1970  49 y.o.  male            CC/Reason for visit: Diabetic ketoacidosis, agitation    Interval hx:  Patient became agitated and confused, combative last night.  Interfering in his own medical care.  Not cooperating with staff, pulling out his IV lines.  Found covered in urine and blood earlier today after removing his IV sites.    ROS: Unobtainable due to confusion    Vitals:  Vitals:    01/14/20 1100 01/14/20 1200 01/14/20 1206 01/14/20 1210   BP: 103/68  110/66    BP Location:       Patient Position:       Pulse: 106 106  109   Resp:  28     Temp:       TempSrc:       SpO2: 99% 98%  99%   Weight:       Height:               Body mass index is 18.99 kg/m².    Intake/Output Summary (Last 24 hours) at 1/14/2020 1335  Last data filed at 1/14/2020 1201  Gross per 24 hour   Intake 4037 ml   Output 1550 ml   Net 2487 ml       Exam:  GEN:  Restless  Awake, does not follow all my commands.  He is not answering my questions coherently.  He is confused  LUNGS: Clear breath sounds bilat, no use of accessory muscles  CV:  Normal S1S2, without murmur, no edema  ABD:  Non tender, no enlarged liver or masses      Scheduled meds:    enoxaparin 30 mg Subcutaneous Q24H   sodium chloride 10 mL Intravenous Q12H     IV meds:                        dextrose 5 % and sodium chloride 0.45 % 250 mL/hr    dextrose 5 % and sodium chloride 0.45 % with KCl 20 mEq/L 250 mL/hr Last Rate: Stopped (01/13/20 2300)   dextrose 5 % and sodium chloride 0.45 % with KCl 40 mEq/L 250 mL/hr    insulin 6 Units/hr Last Rate: 7 Units/hr (01/14/20 1301)   custom IV KCl infusion builder 250 mL/hr    sodium chloride 250 mL/hr Last Rate: 250 mL/hr (01/14/20 1158)   sodium chloride 0.45 % with KCl 20 mEq 250 mL/hr Last Rate: 250 mL/hr (01/13/20 1950)   sodium chloride 10 mL/hr    sodium chloride 500 mL/hr Last Rate: 500 mL/hr (01/14/20 1230)   sodium chloride 0.9 % with KCl 20 mEq 250 mL/hr    sodium chloride 0.9 % with KCl 40 mEq/L 250 mL/hr        Data Review:   I reviewed the patient's medications and new clinical results.  Lab Results   Component Value Date     CALCIUM 8.8 01/14/2020    PHOS 2.6 01/14/2020    MG 3.4 (H) 01/14/2020    MG 3.5 (H) 01/13/2020    MG 3.8 (H) 01/13/2020     Results from last 7 days   Lab Units 01/14/20  0248 01/13/20  2235 01/13/20  1545  01/13/20  0930 01/13/20  0736 01/13/20  0633   SODIUM mmol/L 143 143 141   < > 132* 126* 118*   POTASSIUM mmol/L 6.1* 4.1  4.1 3.7   < > 6.1* 5.6* 7.2*   CHLORIDE mmol/L 107 107 101   < > 93* 83* 75*   CO2 mmol/L 15.1* 22.9 23.0   < > 10.9* 13.4* 13.1*   BUN mg/dL 62* 68* 84*   < > 92* 96* 99*   CREATININE mg/dL 1.32* 1.45* 1.98*   < > 2.25* 2.46* 2.73*   CALCIUM mg/dL 8.8 9.0 9.2   < > 8.6 8.7 9.1   BILIRUBIN mg/dL  --   --   --   --  0.6 0.7 0.8   ALK PHOS U/L  --   --   --   --  84 84 89   ALT (SGPT) U/L  --   --   --   --  17 13 15   AST (SGOT) U/L  --   --   --   --  35 31 38   GLUCOSE mg/dL 355* 96 196*   < > 714* 823* 909*   WBC 10*3/mm3  --   --   --   --   --   --  14.50*   HEMOGLOBIN g/dL  --   --   --   --   --   --  18.7*   PLATELETS 10*3/mm3  --   --   --   --   --   --  425   PROCALCITONIN ng/mL  --   --  0.37*  --   --   --   --     < > = values in this interval not displayed.     Results from last 7 days   Lab Units 01/13/20  1545   BLOODCX  No growth at less than 24 hours  No growth at less than 24 hours           Results from last 7 days   Lab Units 01/13/20  0633   TROPONIN T ng/mL <0.010     Results from last 7 days   Lab Units 01/13/20  0654   PH, ARTERIAL pH units 7.321*   PCO2, ARTERIAL mm Hg 24.2*   PO2 ART mm Hg 139.7*   MODALITY  Room Air   O2 SATURATION CALC % 99.0       Estimated Creatinine Clearance: 62.1 mL/min (A) (by C-G formula based on SCr of 1.32 mg/dL (H)).      ASSESSMENT:   Diabetic ketoacidosis  Altered mental status due to the above  Acute kidney injury  Severe dehydration  Hyponatremia  Metabolic acidosis  Paronychia right hand, third finger      PLAN:  Patient became confused and agitated, belligerent and combative.  Not cooperating with care.  We will have to give  IV sedation, possibly Ativan to keep him calm if he does not cooperate.  Procalcitonin level is elevated.  I will start Rocephin empirically for recent paronychia, finger infection.  So far blood cultures are negative.  We will have to resume insulin drip and IV fluid boluses with DKA protocol after he pulled out all of his IVs.  Discussed with nursing staff        Cameron Morgan MD  1/14/2020    Electronically signed by Cameron Morgan MD at 01/14/20 1338          Consult Notes (all)      Lexie Doe MD at 01/16/20 1727      Consult Orders    1. Inpatient Endocrinology Consult [[id number]] ordered by Cameron Morgan MD at 01/14/20 1507                49 y.o.  Patient Care Team:  Provider, No Known as PCP - General      Chief Complaint   Patient presents with   • Altered Mental Status     Reason for consultation-elevated blood sugars    HPI  49-year-old -American male who presents with confusion and weakness.  Has been placed on the insulin drip.  Patient has never been diagnosed with diabetes.  Endocrine has been consulted for elevated blood sugars.    On discussing with the patient he definitely has an element of confusion.  He asks the same questions again and again.  His blood sugars are decent on the current insulin regimen here in the hospital.  Never been on any diabetic medications.  Does have family history of diabetes in his grandparents.  Did have increased urination and thirst prior to the admission..      Past Medical History:   Diagnosis Date   • Diabetes (CMS/HCC)    • Finger infection     right middle finger       History reviewed. No pertinent family history.    Social History     Socioeconomic History   • Marital status: Unknown     Spouse name: Not on file   • Number of children: Not on file   • Years of education: Not on file   • Highest education level: Not on file   Tobacco Use   • Smoking status: Light Tobacco Smoker     Types: Cigars   • Tobacco comment: 2nd hand smoke from  parents; cigars when drinking   Substance and Sexual Activity   • Alcohol use: Yes     Frequency: Monthly or less     Drinks per session: 1 or 2     Binge frequency: Never   • Drug use: Never   • Sexual activity: Never       No Known Allergies      Current Facility-Administered Medications:   •  cefTRIAXone (ROCEPHIN) IVPB 1 g, 1 g, Intravenous, Q24H, Cameron Morgan MD, Last Rate: 100 mL/hr at 01/16/20 1704, 1 g at 01/16/20 1704  •  dextrose (D50W) 25 g/ 50mL Intravenous Solution 12.5 g, 12.5 g, Intravenous, PRN, Tacos Arzola MD, 12.5 g at 01/13/20 2240  •  dextrose (D50W) 25 g/ 50mL Intravenous Solution 25 g, 25 g, Intravenous, Q15 Min PRN, Shandra Delcid MD  •  dextrose (D50W) 25 g/ 50mL Intravenous Solution 25 g, 25 g, Intravenous, Q15 Min PRN, Cameron Morgan MD  •  dextrose (GLUTOSE) oral gel 15 g, 15 g, Oral, Q15 Min PRN, Shandra Delcid MD  •  dextrose (GLUTOSE) oral gel 15 g, 15 g, Oral, Q15 Min PRN, Cameron Morgan MD  •  enoxaparin (LOVENOX) syringe 40 mg, 40 mg, Subcutaneous, Q24H, Cameron Morgan MD, 40 mg at 01/16/20 1156  •  glucagon (human recombinant) (GLUCAGEN DIAGNOSTIC) injection 1 mg, 1 mg, Subcutaneous, Q15 Min PRN, Shandra Delcid MD  •  glucagon (human recombinant) (GLUCAGEN DIAGNOSTIC) injection 1 mg, 1 mg, Subcutaneous, Q15 Min PRN, Cameron Morgan MD  •  haloperidol lactate (HALDOL) injection 2 mg, 2 mg, Intravenous, Q6H PRN, Shandra Delcid MD, 2 mg at 01/14/20 2250  •  insulin glargine (LANTUS) injection 25 Units, 25 Units, Subcutaneous, Daily, Cameron Morgan MD, 25 Units at 01/16/20 0808  •  insulin lispro (humaLOG) injection 0-9 Units, 0-9 Units, Subcutaneous, Q4H, Cameron Morgan MD, 2 Units at 01/16/20 1703  •  insulin lispro (humaLOG) injection 5 Units, 5 Units, Subcutaneous, TID With Meals, Lexie Doe MD, 5 Units at 01/16/20 1704  •  magnesium sulfate 4 gram infusion - Mg less than or equal to 1mg/dL, 4 g, Intravenous, PRN **OR** magnesium sulfate 3 gram infusion  (1gm x 3) - Mg 1.1 - 1.5 mg/dL, 1 g, Intravenous, PRN **OR** Magnesium Sulfate 2 gram infusion- Mg 1.6 - 1.9 mg/dL, 2 g, Intravenous, PRN, Cameron Morgan MD  •  ondansetron (ZOFRAN) tablet 4 mg, 4 mg, Oral, Q6H PRN **OR** ondansetron (ZOFRAN) injection 4 mg, 4 mg, Intravenous, Q6H PRN, Cameron Morgan MD  •  potassium & sodium phosphates (PHOS-NAK) 280-160-250 MG packet - for Phosphorus less than 1.25 mg/dL, 2 packet, Oral, Q6H PRN **OR** potassium & sodium phosphates (PHOS-NAK) 280-160-250 MG packet - for Phosphorus 1.25 - 2.5 mg/dL, 2 packet, Oral, Q6H PRN, Cameron Morgan MD, 2 packet at 01/16/20 1157  •  potassium chloride (MICRO-K) CR capsule 40 mEq, 40 mEq, Oral, PRN **OR** potassium chloride (KLOR-CON) packet 40 mEq, 40 mEq, Oral, PRN **OR** potassium chloride 10 mEq in 100 mL IVPB, 10 mEq, Intravenous, Q1H PRN, Cameron Morgan MD  •  potassium phosphate 45 mmol in sodium chloride 0.9 % 500 mL infusion, 45 mmol, Intravenous, PRN **OR** potassium phosphate 30 mmol in sodium chloride 0.9 % 250 mL infusion, 30 mmol, Intravenous, PRN **OR** potassium phosphate 15 mmol in sodium chloride 0.9 % 100 mL infusion, 15 mmol, Intravenous, PRN, 15 mmol at 01/15/20 1355 **OR** sodium phosphates 40 mmol in sodium chloride 0.9 % 500 mL IVPB, 40 mmol, Intravenous, PRN **OR** sodium phosphates 20 mmol in sodium chloride 0.9 % 250 mL IVPB, 20 mmol, Intravenous, PRN, Cameron Morgan MD, Stopped at 01/14/20 0746  •  senna-docusate sodium (SENOKOT-S) 8.6-50 MG tablet 2 tablet, 2 tablet, Oral, Nightly, Cameron Morgan MD  •  [COMPLETED] Insert peripheral IV, , , Once **AND** sodium chloride 0.9 % flush 10 mL, 10 mL, Intravenous, PRN, Marlon Shabazz MD  •  sodium chloride 0.9 % flush 10 mL, 10 mL, Intravenous, Once PRN, Tacos Arzola MD  •  sodium chloride 0.9 % flush 10 mL, 10 mL, Intravenous, Q12H, Cameron Morgan MD, 10 mL at 01/16/20 0809  •  sodium chloride 0.9 % flush 10 mL, 10 mL, Intravenous, PRN, Cameron Morgan,  MD         Review of Systems   Constitutional: Positive for appetite change and fatigue. Negative for fever.   Eyes: Negative for visual disturbance.   Respiratory: Negative for shortness of breath.    Cardiovascular: Negative for palpitations and leg swelling.   Gastrointestinal: Negative for abdominal pain and vomiting.   Endocrine: Negative for polydipsia and polyuria.   Musculoskeletal: Negative for joint swelling and neck pain.   Skin: Negative for rash.   Neurological: Negative for weakness and numbness.   Psychiatric/Behavioral: Positive for confusion. Negative for behavioral problems.     Objective     Vital Signs  Temp:  [98 °F (36.7 °C)-99.5 °F (37.5 °C)] 98 °F (36.7 °C)  Heart Rate:  [100-105] 100  Resp:  [16-18] 16  BP: (110-132)/(62-76) 110/62    Physical Exam  Physical Exam   Constitutional: He is oriented to person, place, and time. He appears well-nourished.   HENT:   Head: Normocephalic and atraumatic.   Eyes: Conjunctivae and EOM are normal. No scleral icterus.   Neck: No thyromegaly present.   Cardiovascular: Normal rate and regular rhythm.   Pulmonary/Chest: Effort normal and breath sounds normal. No stridor. No respiratory distress. He has no wheezes.   Abdominal: Soft. Bowel sounds are normal. He exhibits no distension. There is no tenderness.   Musculoskeletal: He exhibits deformity. He exhibits no edema.   Lymphadenopathy:     He has no cervical adenopathy.   Neurological: He is alert and oriented to person, place, and time.   Skin: Skin is warm and dry. No rash noted. He is not diaphoretic.   Psychiatric: He has a normal mood and affect.   Vitals reviewed.      Results Review:    I reviewed the patient's new clinical results and summarized them in the HPI and in plan.  Glucose   Date/Time Value Ref Range Status   01/16/2020 1609 171 (H) 70 - 130 mg/dL Final   01/16/2020 1143 274 (H) 70 - 130 mg/dL Final   01/16/2020 0457 136 (H) 70 - 130 mg/dL Final   01/16/2020 0143 133 (H) 70 - 130 mg/dL  Final   01/15/2020 2125 134 (H) 70 - 130 mg/dL Final   01/15/2020 2008 156 (H) 70 - 130 mg/dL Final   01/15/2020 1625 83 70 - 130 mg/dL Final   01/15/2020 1126 132 (H) 70 - 130 mg/dL Final   01/15/2020 0719 153 (H) 70 - 130 mg/dL Final   01/15/2020 0430 138 (H) 70 - 130 mg/dL Final   01/14/2020 2346 112 70 - 130 mg/dL Final   01/14/2020 1936 94 70 - 130 mg/dL Final     Lab Results (last 24 hours)     Procedure Component Value Units Date/Time    Blood Culture - Blood, Arm, Right [[id number]] Collected:  01/13/20 1545    Specimen:  Blood from Arm, Right Updated:  01/16/20 1615     Blood Culture No growth at 3 days    Blood Culture - Blood, Arm, Left [[id number]] Collected:  01/13/20 1545    Specimen:  Blood from Arm, Left Updated:  01/16/20 1615     Blood Culture No growth at 3 days    POC Glucose Once [[id number]]  (Abnormal) Collected:  01/16/20 1609    Specimen:  Blood Updated:  01/16/20 1611     Glucose 171 mg/dL     POC Glucose Once [[id number]]  (Abnormal) Collected:  01/16/20 1143    Specimen:  Blood Updated:  01/16/20 1159     Glucose 274 mg/dL     Basic Metabolic Panel [[id number]]  (Abnormal) Collected:  01/16/20 0535    Specimen:  Blood Updated:  01/16/20 0711     Glucose 139 mg/dL      BUN 12 mg/dL      Creatinine 0.86 mg/dL      Sodium 141 mmol/L      Potassium 3.5 mmol/L      Chloride 104 mmol/L      CO2 22.8 mmol/L      Calcium 8.7 mg/dL      eGFR  African Amer 116 mL/min/1.73      BUN/Creatinine Ratio 14.0     Anion Gap 14.2 mmol/L     Narrative:       GFR Normal >60  Chronic Kidney Disease <60  Kidney Failure <15      Phosphorus [[id number]]  (Abnormal) Collected:  01/16/20 0535    Specimen:  Blood Updated:  01/16/20 0708     Phosphorus 2.0 mg/dL     CBC & Differential [[id number]] Collected:  01/16/20 0535    Specimen:  Blood Updated:  01/16/20 0634    Narrative:       The following orders were created for panel order CBC & Differential.  Procedure                               Abnormality         Status                      ---------                               -----------         ------                     CBC Auto Differential[[id number]]        Abnormal            Final result                 Please view results for these tests on the individual orders.    CBC Auto Differential [[id number]]  (Abnormal) Collected:  01/16/20 0535    Specimen:  Blood Updated:  01/16/20 0634     WBC 9.86 10*3/mm3      RBC 4.96 10*6/mm3      Hemoglobin 14.0 g/dL      Hematocrit 40.6 %      MCV 81.9 fL      MCH 28.2 pg      MCHC 34.5 g/dL      RDW 13.4 %      RDW-SD 39.6 fl      MPV 10.6 fL      Platelets 286 10*3/mm3      Neutrophil % 62.8 %      Lymphocyte % 21.3 %      Monocyte % 14.0 %      Eosinophil % 1.0 %      Basophil % 0.2 %      Immature Grans % 0.7 %      Neutrophils, Absolute 6.19 10*3/mm3      Lymphocytes, Absolute 2.10 10*3/mm3      Monocytes, Absolute 1.38 10*3/mm3      Eosinophils, Absolute 0.10 10*3/mm3      Basophils, Absolute 0.02 10*3/mm3      Immature Grans, Absolute 0.07 10*3/mm3      nRBC 0.0 /100 WBC     POC Glucose Once [[id number]]  (Abnormal) Collected:  01/16/20 0457    Specimen:  Blood Updated:  01/16/20 0459     Glucose 136 mg/dL     POC Glucose Once [[id number]]  (Abnormal) Collected:  01/16/20 0143    Specimen:  Blood Updated:  01/16/20 0143     Glucose 133 mg/dL     POC Glucose Once [[id number]]  (Abnormal) Collected:  01/15/20 2125    Specimen:  Blood Updated:  01/15/20 2126     Glucose 134 mg/dL     POC Glucose Once [[id number]]  (Abnormal) Collected:  01/15/20 2008    Specimen:  Blood Updated:  01/15/20 2009     Glucose 156 mg/dL           Imaging Results (Last 24 Hours)     ** No results found for the last 24 hours. **          Assessment/Plan     Active Hospital Problems    Diagnosis  POA   • **Diabetic ketoacidosis without coma associated with type 2 diabetes mellitus (CMS/HCC) [E11.10]  Yes   • Diabetes mellitus, new onset (CMS/HCC) [E11.9]  Yes      Resolved Hospital Problems   No resolved problems to  "display.       Diabetes mellitus-significantly uncontrolled  It is unclear if patient is a type I or type II  Check C-peptide, john 65 antibodies  Continue Lantus 25 units in the morning  Continue Humalog 5 units with each meal  Continue Humalog sliding scale 3 times daily before meals and at bedtime.    Discussed with the nurse extensively about patient's situation.  We will reconsult the diabetic educator and have her educate the patient on diet restrictions and also watch the patient's clinical condition in terms of administration of the insulin.    Discussed plan with nurse.     Thank you for your consultation.  Will follow the pt while in hospital.     Lexie Doe MD.  01/16/20  5:27 PM      EMR Dragon / transcription disclaimer:    \"Dictated utilizing Dragon dictation\".     Electronically signed by Lexie Doe MD at 01/16/20 3642       "

## 2020-01-17 NOTE — PLAN OF CARE
Problem: Patient Care Overview  Goal: Plan of Care Review  Flowsheets (Taken 1/17/2020 1114)  Plan of Care Reviewed With: patient  Outcome Summary: Pt. admitted to the hospital with Altered mental status and general weakness.  Pt. diagosed with Diabetic Ketoacidosis.  Pt. reports that prior to admission he was completely independent with functional mobility and no use of A.D.  Pt. currently presents with decreased strength and decreased overall balance. Pt. will benefit from skilled inpt. P.T. to address his functional deficits and to assist pt. in regaining his maximum level of independence with functional mobility.

## 2020-01-17 NOTE — PROGRESS NOTES
49 y.o.   LOS: 4 days   Patient Care Team:  Provider, No Known as PCP - General    Chief Complaint: Elevated blood sugars    Chief Complaint   Patient presents with   • Altered Mental Status       Subjective   Fasting blood sugars are still elevated.  Pre-meal blood sugars are decently controlled.  Patient still has some episodes of confusion.    Interval History:    Review of Systems:   Constitutional: Positive for appetite change and fatigue.   Eyes: Negative for visual disturbance.   Respiratory: no shortness of breath.    Cardiovascular: no leg swelling.   Gastrointestinal: Negative for abdominal pain.   Endocrine: Negative for polydipsia and polyuria.   Musculoskeletal: Positive for arthralgias and myalgias.   Skin: Negative for pallor.   Neurological: Positive for weakness and numbness.   Psychiatric/Behavioral: Positive for sleep disturbance.  Review of Systems  Objective     Vital Signs   Temp:  [98 °F (36.7 °C)-99.3 °F (37.4 °C)] 99.3 °F (37.4 °C)  Heart Rate:  [] 92  Resp:  [16] 16  BP: (104-124)/(62-74) 119/74    Physical Exam:  General exam-no distress, appears stated age.  Eyes-PERRL, anicteric sclera  ENT-external ears/nose normal.  Neck-no adenopathy, midline trachea.  Lungs-normal chest exam on inspection, diminished bilateral breath sounds on auscultation.  Cardiovascular -normal S1-S2, positive murmur.  ABD-nontender, nondistended, bowel sounds heard.  Extremities-no edema, cyanosis.  Hammertoes noted.          Physical ExamResults Review:     I reviewed the patient's new clinical results and summarized them in subjective and in plan.      Glucose   Date/Time Value Ref Range Status   01/16/2020 0535 139 (H) 65 - 99 mg/dL Final   01/15/2020 0449 153 (H) 65 - 99 mg/dL Final   01/14/2020 1824 139 (H) 65 - 99 mg/dL Final   01/14/2020 1358 136 (H) 65 - 99 mg/dL Final     Lab Results (last 24 hours)     Procedure Component Value Units Date/Time    POC Glucose Once [[id number]]  (Abnormal) Collected:   01/17/20 1148    Specimen:  Blood Updated:  01/17/20 1204     Glucose 157 mg/dL     POC Glucose Once [[id number]]  (Abnormal) Collected:  01/17/20 0619    Specimen:  Blood Updated:  01/17/20 0620     Glucose 248 mg/dL     TSH [[id number]]  (Normal) Collected:  01/17/20 0440    Specimen:  Blood from Arm, Right Updated:  01/17/20 0602     TSH 1.320 uIU/mL     T4, Free [[id number]]  (Normal) Collected:  01/17/20 0440    Specimen:  Blood from Arm, Right Updated:  01/17/20 0602     Free T4 1.02 ng/dL     Narrative:       Results may be falsely increased if patient taking Biotin.      C-Peptide [[id number]] Collected:  01/17/20 0440    Specimen:  Blood from Arm, Right Updated:  01/17/20 0527    Glutamic Acid Decarboxylase [[id number]] Collected:  01/17/20 0440    Specimen:  Blood from Arm, Right Updated:  01/17/20 0527    POC Glucose Once [[id number]]  (Abnormal) Collected:  01/16/20 2047    Specimen:  Blood Updated:  01/16/20 2049     Glucose 142 mg/dL     Blood Culture - Blood, Arm, Right [[id number]] Collected:  01/13/20 1545    Specimen:  Blood from Arm, Right Updated:  01/16/20 1615     Blood Culture No growth at 3 days    Blood Culture - Blood, Arm, Left [[id number]] Collected:  01/13/20 1545    Specimen:  Blood from Arm, Left Updated:  01/16/20 1615     Blood Culture No growth at 3 days    POC Glucose Once [[id number]]  (Abnormal) Collected:  01/16/20 1609    Specimen:  Blood Updated:  01/16/20 1611     Glucose 171 mg/dL         Imaging Results (Last 24 Hours)     ** No results found for the last 24 hours. **          Medication Review: done      Current Facility-Administered Medications:   •  dextrose (D50W) 25 g/ 50mL Intravenous Solution 12.5 g, 12.5 g, Intravenous, PRN, Tacos Arzola MD, 12.5 g at 01/13/20 2240  •  dextrose (D50W) 25 g/ 50mL Intravenous Solution 25 g, 25 g, Intravenous, Q15 Min PRN, Shandra Delcid MD  •  dextrose (D50W) 25 g/ 50mL Intravenous Solution 25 g, 25 g, Intravenous, Q15 Min PRN, Cathy,  Cameron PALAFOX MD  •  dextrose (GLUTOSE) oral gel 15 g, 15 g, Oral, Q15 Min PRN, Shandra Delcid MD  •  dextrose (GLUTOSE) oral gel 15 g, 15 g, Oral, Q15 Min PRN, Cameron Morgan MD  •  enoxaparin (LOVENOX) syringe 40 mg, 40 mg, Subcutaneous, Q24H, Cameron Morgan MD, 40 mg at 01/16/20 1156  •  glucagon (human recombinant) (GLUCAGEN DIAGNOSTIC) injection 1 mg, 1 mg, Subcutaneous, Q15 Min PRN, Shandra Delcid MD  •  glucagon (human recombinant) (GLUCAGEN DIAGNOSTIC) injection 1 mg, 1 mg, Subcutaneous, Q15 Min PRN, Cameron Morgan MD  •  haloperidol lactate (HALDOL) injection 2 mg, 2 mg, Intravenous, Q6H PRN, Shandra Delcid MD, 2 mg at 01/14/20 2250  •  [START ON 1/18/2020] insulin glargine (LANTUS) injection 28 Units, 28 Units, Subcutaneous, Daily, Lexie Doe MD  •  insulin lispro (humaLOG) injection 0-9 Units, 0-9 Units, Subcutaneous, 4x Daily With Meals & Nightly, Lexie Doe MD, 4 Units at 01/17/20 0846  •  insulin lispro (humaLOG) injection 5 Units, 5 Units, Subcutaneous, TID With Meals, Lexie Doe MD, 5 Units at 01/17/20 0847  •  magnesium sulfate 4 gram infusion - Mg less than or equal to 1mg/dL, 4 g, Intravenous, PRN **OR** magnesium sulfate 3 gram infusion (1gm x 3) - Mg 1.1 - 1.5 mg/dL, 1 g, Intravenous, PRN **OR** Magnesium Sulfate 2 gram infusion- Mg 1.6 - 1.9 mg/dL, 2 g, Intravenous, PRN, Cameron Morgan MD  •  ondansetron (ZOFRAN) tablet 4 mg, 4 mg, Oral, Q6H PRN **OR** ondansetron (ZOFRAN) injection 4 mg, 4 mg, Intravenous, Q6H PRN, Cameron Morgan MD  •  potassium & sodium phosphates (PHOS-NAK) 280-160-250 MG packet - for Phosphorus less than 1.25 mg/dL, 2 packet, Oral, Q6H PRN **OR** potassium & sodium phosphates (PHOS-NAK) 280-160-250 MG packet - for Phosphorus 1.25 - 2.5 mg/dL, 2 packet, Oral, Q6H PRN, Cameron Morgan MD, 2 packet at 01/16/20 1157  •  potassium chloride (MICRO-K) CR capsule 40 mEq, 40 mEq, Oral, PRN **OR** potassium chloride (KLOR-CON) packet 40 mEq, 40 mEq, Oral,  PRN **OR** potassium chloride 10 mEq in 100 mL IVPB, 10 mEq, Intravenous, Q1H PRN, Cameron Morgan MD  •  potassium phosphate 45 mmol in sodium chloride 0.9 % 500 mL infusion, 45 mmol, Intravenous, PRN **OR** potassium phosphate 30 mmol in sodium chloride 0.9 % 250 mL infusion, 30 mmol, Intravenous, PRN **OR** potassium phosphate 15 mmol in sodium chloride 0.9 % 100 mL infusion, 15 mmol, Intravenous, PRN, 15 mmol at 01/15/20 1355 **OR** sodium phosphates 40 mmol in sodium chloride 0.9 % 500 mL IVPB, 40 mmol, Intravenous, PRN **OR** sodium phosphates 20 mmol in sodium chloride 0.9 % 250 mL IVPB, 20 mmol, Intravenous, PRN, Cameron Morgan MD, Stopped at 01/14/20 0746  •  senna-docusate sodium (SENOKOT-S) 8.6-50 MG tablet 2 tablet, 2 tablet, Oral, Nightly, Cameron Morgan MD, 2 tablet at 01/16/20 2100  •  [COMPLETED] Insert peripheral IV, , , Once **AND** sodium chloride 0.9 % flush 10 mL, 10 mL, Intravenous, PRN, Marlon Shabazz MD  •  sodium chloride 0.9 % flush 10 mL, 10 mL, Intravenous, Once PRN, Tacos Arzola MD  •  sodium chloride 0.9 % flush 10 mL, 10 mL, Intravenous, Q12H, Cameron Morgan MD, 10 mL at 01/17/20 0847  •  sodium chloride 0.9 % flush 10 mL, 10 mL, Intravenous, PRN, Cameron Morgan MD    Assessment/Plan     Active Hospital Problems    Diagnosis  POA   • **Diabetic ketoacidosis without coma associated with type 2 diabetes mellitus (CMS/HCC) [E11.10]  Yes   • Diabetes mellitus, new onset (CMS/HCC) [E11.9]  Yes      Resolved Hospital Problems   No resolved problems to display.     Diabetes mellitus-significantly uncontrolled  It is unclear if patient is a type I or type II  Check C-peptide, john 65 antibodies  Increase Lantus to 28 units in the morning.  Continue Humalog 5 units with each meal  Continue Humalog sliding scale 3 times daily before meals and at bedtime.     Discussed with the nurse extensively about patient's situation. Diabetic educator has Educated the patient again    Discussed  "plan with Nurse.     Lexie Doe MD.  01/17/20  12:17 PM      EMR Dragon / transcription disclaimer:    \"Dictated utilizing Dragon dictation\".   "

## 2020-01-17 NOTE — PLAN OF CARE
Problem: Patient Care Overview  Goal: Plan of Care Review  Flowsheets (Taken 1/17/2020 7727)  Plan of Care Reviewed With: patient  Outcome Summary: Pt presents to OT with decreased strength and endurance and decreased safety for adls. Pt required CGA for bed mobiity and min to mod A for functional standing balance task today. Pt will continue to benefit from OT for increasing safety and independence for adls

## 2020-01-17 NOTE — CONSULTS
Diabetes Education    Patient Name:  Joby Kulkarni  YOB: 1970  MRN: [id number]  Admit Date:  1/13/2020    Pt re-educated with much coaching and hands on cues with BGM.  Pt was not able to perform BGM without assistance.  Discussed pt's need to be able to perform BGM before education with insulin administration.  Encouraged pt to practice with equipment and re-look at written materials for the next several hours.  Will return to assess pt's ability to use glucometer without assistance.  Discussed w/ staff RN, CCP and sergio NUR.    Electronically signed by:  Columba Teixeira RN  01/17/20 10:18 AM

## 2020-01-18 ENCOUNTER — APPOINTMENT (OUTPATIENT)
Dept: GENERAL RADIOLOGY | Facility: HOSPITAL | Age: 50
End: 2020-01-18

## 2020-01-18 LAB
BACTERIA SPEC AEROBE CULT: NORMAL
BACTERIA SPEC AEROBE CULT: NORMAL
C PEPTIDE SERPL-MCNC: 2.5 NG/ML (ref 1.1–4.4)
GLUCOSE BLDC GLUCOMTR-MCNC: 133 MG/DL (ref 70–130)
GLUCOSE BLDC GLUCOMTR-MCNC: 201 MG/DL (ref 70–130)
GLUCOSE BLDC GLUCOMTR-MCNC: 236 MG/DL (ref 70–130)
GLUCOSE BLDC GLUCOMTR-MCNC: 93 MG/DL (ref 70–130)

## 2020-01-18 PROCEDURE — 63710000001 INSULIN GLARGINE PER 5 UNITS: Performed by: INTERNAL MEDICINE

## 2020-01-18 PROCEDURE — 97110 THERAPEUTIC EXERCISES: CPT

## 2020-01-18 PROCEDURE — 99232 SBSQ HOSP IP/OBS MODERATE 35: CPT | Performed by: INTERNAL MEDICINE

## 2020-01-18 PROCEDURE — 82962 GLUCOSE BLOOD TEST: CPT

## 2020-01-18 PROCEDURE — 25010000002 ENOXAPARIN PER 10 MG: Performed by: INTERNAL MEDICINE

## 2020-01-18 PROCEDURE — 63710000001 INSULIN LISPRO (HUMAN) PER 5 UNITS: Performed by: INTERNAL MEDICINE

## 2020-01-18 PROCEDURE — 74220 X-RAY XM ESOPHAGUS 1CNTRST: CPT

## 2020-01-18 RX ORDER — INSULIN GLARGINE 100 [IU]/ML
18 INJECTION, SOLUTION SUBCUTANEOUS EVERY 12 HOURS SCHEDULED
Status: DISCONTINUED | OUTPATIENT
Start: 2020-01-18 | End: 2020-01-19

## 2020-01-18 RX ADMIN — ANTACID/ANTIFLATULENT 1 TABLET: 380; 550; 10; 10 GRANULE, EFFERVESCENT ORAL at 13:15

## 2020-01-18 RX ADMIN — SODIUM CHLORIDE, PRESERVATIVE FREE 10 ML: 5 INJECTION INTRAVENOUS at 21:17

## 2020-01-18 RX ADMIN — INSULIN LISPRO 4 UNITS: 100 INJECTION, SOLUTION INTRAVENOUS; SUBCUTANEOUS at 08:24

## 2020-01-18 RX ADMIN — INSULIN GLARGINE 18 UNITS: 100 INJECTION, SOLUTION SUBCUTANEOUS at 21:16

## 2020-01-18 RX ADMIN — BARIUM SULFATE 135 ML: 980 POWDER, FOR SUSPENSION ORAL at 13:15

## 2020-01-18 RX ADMIN — SENNOSIDES AND DOCUSATE SODIUM 2 TABLET: 8.6; 5 TABLET ORAL at 21:17

## 2020-01-18 RX ADMIN — INSULIN LISPRO 5 UNITS: 100 INJECTION, SOLUTION INTRAVENOUS; SUBCUTANEOUS at 08:31

## 2020-01-18 RX ADMIN — BARIUM SULFATE 183 ML: 960 POWDER, FOR SUSPENSION ORAL at 13:15

## 2020-01-18 RX ADMIN — INSULIN GLARGINE 28 UNITS: 100 INJECTION, SOLUTION SUBCUTANEOUS at 08:25

## 2020-01-18 RX ADMIN — ENOXAPARIN SODIUM 40 MG: 40 INJECTION SUBCUTANEOUS at 12:06

## 2020-01-18 RX ADMIN — INSULIN LISPRO 5 UNITS: 100 INJECTION, SOLUTION INTRAVENOUS; SUBCUTANEOUS at 18:05

## 2020-01-18 RX ADMIN — SODIUM CHLORIDE, PRESERVATIVE FREE 10 ML: 5 INJECTION INTRAVENOUS at 08:25

## 2020-01-18 RX ADMIN — INSULIN LISPRO 4 UNITS: 100 INJECTION, SOLUTION INTRAVENOUS; SUBCUTANEOUS at 12:05

## 2020-01-18 RX ADMIN — INSULIN LISPRO 5 UNITS: 100 INJECTION, SOLUTION INTRAVENOUS; SUBCUTANEOUS at 12:06

## 2020-01-18 NOTE — PLAN OF CARE
"  Problem: Patient Care Overview  Goal: Plan of Care Review  Outcome: Ongoing (interventions implemented as appropriate)  Flowsheets (Taken 1/18/2020 7325)  Progress: improving  Plan of Care Reviewed With: patient;mother  Outcome Summary: Pt able to inc ambulation today with CGA and no episodes of LOB but slight unsteadiness on feet and complaining of \"cramping\" in B calves.      "

## 2020-01-18 NOTE — PROGRESS NOTES
LOS: 5 days   Patient Care Team:  Provider, No Known as PCP - General    Subjective     Patient says he is doing pretty well asking about his swallowing problem and he says psych when you go to swallow it is one big motion and it is like a headache but it is not its right here in your throat area risk to other liquids or solids are worse than others but has trouble remembering exactly what happened in fact he is having trouble remembering and he is cognizant of that.    Review of Systems:          Objective     Vital Signs  Vital Sign Min/Max for last 24 hours  Temp  Min: 97.8 °F (36.6 °C)  Max: 98.9 °F (37.2 °C)   BP  Min: 102/76  Max: 124/87   Pulse  Min: 88  Max: 99   Resp  Min: 16  Max: 18   SpO2  Min: 95 %  Max: 95 %   No data recorded   Weight  Min: 67.2 kg (148 lb 1.6 oz)  Max: 67.2 kg (148 lb 1.6 oz)        Ventilator/Non-Invasive Ventilation Settings (From admission, onward)    None                       Body mass index is 20.09 kg/m².  I/O last 3 completed shifts:  In: 720 [P.O.:720]  Out: 2350 [Urine:2350]  I/O this shift:  In: 240 [P.O.:240]  Out: -         Physical Exam:  General Appearance: Well-developed black male he is resting in bed talking on the phone does not appear in any acute distress  Eyes: Wilmer are clear and anicteric pupils are equal  ENT: His membranes are moist no erythema no exudates Mallampati type I airway nasal septum midline  Neck: No adenopathy or thyromegaly no jugular venous tension trachea is midline  Lungs: Clear nonlabored symmetric expansion no wheezes rales or rhonchi  Cardiac: Regular rate rhythm no murmur no gallop  Abdomen: Soft nontender no palpable hepatosplenomegaly or masses  : Not examined  Musculoskeletal: Grossly normal  Skin: No jaundice no petechiae skin is warm and dry  Neuro: Patient is alert he knows roughly where he is at he knows he is in the hospital he has extremely difficult time with memory he almost every other word is I cannot think what  I am trying to say.  He is moving all extremities well to command  Extremities/P Vascular: No clubbing no cyanosis no edema palpable radial and dorsalis pedis pulses.  I can hardly tell where his paronychia was now so much resolved  MSE: Hard to tell       Labs:  Results from last 7 days   Lab Units 01/16/20  0535 01/15/20  0449 01/14/20  1824 01/14/20  1358 01/14/20  0248 01/13/20  2235 01/13/20  1545 01/13/20  1352 01/13/20  0930 01/13/20  0736 01/13/20  0633   GLUCOSE mg/dL 139* 153* 139* 136* 355* 96 196* 341* 714* 823* 909*   SODIUM mmol/L 141 149* 150* 152* 143 143 141 140 132* 126* 118*   POTASSIUM mmol/L 3.5 4.3 3.8 4.0 6.1* 4.1  4.1 3.7 3.8 6.1* 5.6* 7.2*   MAGNESIUM mg/dL  --  2.6  --  3.2* 3.4* 3.5* 3.8* 3.8*  --  4.2* 4.5*   CO2 mmol/L 22.8 19.8* 19.1* 21.0* 15.1* 22.9 23.0 21.0* 10.9* 13.4* 13.1*   CHLORIDE mmol/L 104 114* 117* 120* 107 107 101 100 93* 83* 75*   ANION GAP mmol/L 14.2 15.2* 13.9 11.0 20.9* 13.1 17.0* 19.0* 28.1* 29.6* 29.9*   CREATININE mg/dL 0.86 1.04 1.26 1.35* 1.32* 1.45* 1.98* 2.06* 2.25* 2.46* 2.73*   BUN mg/dL 12 25* 38* 50* 62* 68* 84* 86* 92* 96* 99*   BUN / CREAT RATIO  14.0 24.0 30.2* 37.0* 47.0* 46.9* 42.4* 41.7* 40.9* 39.0* 36.3*   CALCIUM mg/dL 8.7 8.5* 8.0* 8.6 8.8 9.0 9.2 9.0 8.6 8.7 9.1   ALK PHOS U/L  --   --   --   --   --   --   --   --  84 84 89   TOTAL PROTEIN g/dL  --   --   --   --   --   --   --   --  7.6 7.5 8.3   ALT (SGPT) U/L  --   --   --   --   --   --   --   --  17 13 15   AST (SGOT) U/L  --   --   --   --   --   --   --   --  35 31 38   BILIRUBIN mg/dL  --   --   --   --   --   --   --   --  0.6 0.7 0.8   ALBUMIN g/dL  --   --   --   --   --   --   --   --  3.70 3.70 4.00   GLOBULIN gm/dL  --   --   --   --   --   --   --   --  3.9 3.8 4.3     Estimated Creatinine Clearance: 100.9 mL/min (by C-G formula based on SCr of 0.86 mg/dL).      Results from last 7 days   Lab Units 01/16/20  0535 01/15/20  0449 01/13/20  0633   WBC 10*3/mm3 9.86  --  14.50*   RBC  10*6/mm3 4.96  --  6.62*   HEMOGLOBIN g/dL 14.0  --  18.7*   HEMATOCRIT % 40.6  --  56.6*   MCV fL 81.9  --  85.5   MCH pg 28.2  --  28.2   MCHC g/dL 34.5  --  33.0   RDW % 13.4  --  12.3   RDW-SD fl 39.6  --  36.7*   MPV fL 10.6  --  11.1   PLATELETS 10*3/mm3 286 289 425   NEUTROPHIL % % 62.8  --  85.7*   LYMPHOCYTE % % 21.3  --  4.9*   MONOCYTES % % 14.0*  --  8.4   EOSINOPHIL % % 1.0  --  0.0*   BASOPHIL % % 0.2  --  0.2   IMM GRAN % % 0.7*  --  0.8*   NEUTROS ABS 10*3/mm3 6.19  --  12.42*   LYMPHS ABS 10*3/mm3 2.10  --  0.71   MONOS ABS 10*3/mm3 1.38*  --  1.22*   EOS ABS 10*3/mm3 0.10  --  0.00   BASOS ABS 10*3/mm3 0.02  --  0.03   IMMATURE GRANS (ABS) 10*3/mm3 0.07*  --  0.12*   NRBC /100 WBC 0.0  --  0.0     Results from last 7 days   Lab Units 01/13/20  0654   PH, ARTERIAL pH units 7.321*   PO2 ART mm Hg 139.7*   PCO2, ARTERIAL mm Hg 24.2*   HCO3 ART mmol/L 12.5*     Results from last 7 days   Lab Units 01/13/20  0633   TROPONIN T ng/mL <0.010         Results from last 7 days   Lab Units 01/17/20  0440   TSH uIU/mL 1.320   FREE T4 ng/dL 1.02     Results from last 7 days   Lab Units 01/15/20  0449 01/13/20  1545   PROCALCITONIN ng/mL 0.23 0.37*         Microbiology Results (last 10 days)     Procedure Component Value - Date/Time    Blood Culture - Blood, Arm, Right [[id number]] Collected:  01/13/20 1545    Lab Status:  Preliminary result Specimen:  Blood from Arm, Right Updated:  01/17/20 1616     Blood Culture No growth at 4 days    Blood Culture - Blood, Arm, Left [[id number]] Collected:  01/13/20 1543    Lab Status:  Preliminary result Specimen:  Blood from Arm, Left Updated:  01/17/20 1610     Blood Culture No growth at 4 days                enoxaparin 40 mg Subcutaneous Q24H   insulin glargine 18 Units Subcutaneous Q12H   insulin lispro 0-9 Units Subcutaneous 4x Daily With Meals & Nightly   insulin lispro 5 Units Subcutaneous TID With Meals   senna-docusate sodium 2 tablet Oral Nightly   sodium chloride 10  mL Intravenous Q12H          Diagnostics:  Ct Head Without Contrast    Result Date: 1/13/2020  CT HEAD  HISTORY:Altered mental status  TECHNIQUE: CT scan of the head was obtained with 3 mm axial images without intravenous contrast.  Radiation dose reduction techniques were utilized, including automated exposure control and exposure modulation based on body size.  COMPARISON:None  FINDINGS: There is no finding of acute infarct, hemorrhage, contusion or abnormal extra-axial collection. No hydrocephalus is present.      1.  No findings of acute intracranial abnormality.   This report was finalized on 1/13/2020 7:20 AM by Dr. Bobby Clemons M.D.      Fl Esophagram Complete    Result Date: 1/18/2020  FL ESOPHAGRAM COMPLETE SINGLE-CONTRAST-  INDICATIONS: Coughing, odynophagia  TECHNIQUE: Esophagram  COMPARISON: None available  FINDINGS:  A  image was obtained revealing no focal pulmonary consolidation or mass.  The patient ingested barium contrast material under intermittent fluoroscopic observation, with spot views obtained.  The swallowing mechanism was unremarkable with no penetration of the laryngeal folds or aspiration during swallowing.  Esophageal peristalsis was unremarkable. Several small rounded defects in the mucosal coating of the esophagus appear to persist, potentially polyps or may be persistent adherent air bubbles, suggest endoscopic correlation. The mucosa of the esophagus otherwise appears unremarkable. No tight stenosis is demonstrated.  No hiatal hernia was noted. Mild spontaneous gastroesophageal reflux occurred.  Fluoroscopy time: 53 seconds, 33 fluoroscopic images       No tight stenoses. Several small rounded defects in the mucosal coating of the esophagus appear to persist, potentially polyps or may be persistent adherent air bubbles, suggest endoscopic correlation.  This report was finalized on 1/18/2020 2:06 PM by Dr. Anton Bower M.D.      Xr Chest 1 View    Result Date:  1/13/2020  XR CHEST 1 VW-  01/13/2020  HISTORY: Altered mental status.  The heart size is within normal limits. Lungs appear free of acute infiltrates. Bones and soft tissues are unremarkable. There are no previous studies available for comparison.      1. No acute process.  This report was finalized on 1/13/2020 6:41 AM by Dr. Reddy Lizama M.D.               Active Hospital Problems    Diagnosis  POA   • **Diabetic ketoacidosis without coma associated with type 2 diabetes mellitus (CMS/HCC) [E11.10]  Yes   • Diabetes mellitus, new onset (CMS/HCC) [E11.9]  Yes      Resolved Hospital Problems   No resolved problems to display.         Assessment/Plan     1. DKA resolved  2. 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 Acute kidney injury resolved  4. Dysphasia work-up underway  5. Paronychia patient is on ceftriaxone for this  6. Hyponatremia resolved  7. Esophageal mucosal abnormalities question polyps question air bubbles on esophagram GI evaluation recommended    Plan for disposition:    Nick Rowan MD  01/18/20  3:16 PM    Time:

## 2020-01-18 NOTE — PLAN OF CARE
Patients vitals stable. Patient denies pain and discomfort this shift. Patient educated on changing diet habits and administering insulin. Patients mother at bedside this shift. Patient asleep most of shift. Will continue to monitor.

## 2020-01-18 NOTE — THERAPY TREATMENT NOTE
Patient Name: Joby Kulkarni  : 1970    MRN: [id number]                              Today's Date: 2020       Admit Date: 2020    Visit Dx:     ICD-10-CM ICD-9-CM   1. Diabetes mellitus, new onset (CMS/HCC) E11.9 250.00   2. Somnolence R40.0 780.09   3. Diabetic ketoacidosis without coma associated with other specified diabetes mellitus (CMS/HCC) E13.10 250.12   4. Acute renal failure, unspecified acute renal failure type (CMS/HCC) N17.9 584.9   5. Hyperkalemia E87.5 276.7   6. Hypermagnesemia E83.41 275.2   7. Hyperphosphatemia E83.39 275.3   8. Dehydration E86.0 276.51   9. Encephalopathy G93.40 348.30     Patient Active Problem List   Diagnosis   • Diabetes mellitus, new onset (CMS/HCC)   • Diabetic ketoacidosis without coma associated with type 2 diabetes mellitus (CMS/HCC)     Past Medical History:   Diagnosis Date   • Diabetes (CMS/HCC)    • Finger infection     right middle finger     Past Surgical History:   Procedure Laterality Date   • NO PAST SURGERIES       General Information     Row Name 20          PT Evaluation Time/Intention    Document Type  therapy note (daily note)  -LB     Mode of Treatment  physical therapy  -LB     Row Name 20          General Information    Patient Profile Reviewed?  yes  -LB       User Key  (r) = Recorded By, (t) = Taken By, (c) = Cosigned By    Initials Name Provider Type    LB Saima Hassan, PT Physical Therapist        Mobility     Row Name 20          Bed Mobility Assessment/Treatment    Bed Mobility Assessment/Treatment  sit-supine  -LB     Supine-Sit [place] (Bed Mobility)  supervision  -LB     Sit-Supine [place] (Bed Mobility)  supervision  -LB     Row Name 20          Bed-Chair Transfer    Bed-Chair [place] (Transfers)  contact guard  -LB     Assistive Device (Bed-Chair Transfers)  walker, front-wheeled  -LB     Row Name 20          Sit-Stand Transfer    Sit-Stand [place]  "(Transfers)  contact guard  -LB     Row Name 01/18/20 0920          Gait/Stairs Assessment/Training    Gait/Stairs Assessment/Training  gait/ambulation independence  -LB     [place] Level (Gait)  contact guard  -LB     Assistive Device (Gait)  walker, front-wheeled 150' with RW: 150' without AD  -LB     Distance in Feet (Gait)  300'  -LB     Pattern (Gait)  step-through  -LB     Deviations/Abnormal Patterns (Gait)  alistair decreased;base of support, narrow  -LB     Bilateral Gait Deviations  -- B muscle cramping gastroc complex  -LB     Comment (Gait/Stairs)  Pt does not have stairs at home. Able to  SLS with CGA to demonstrate where calf muscle was tightening.   -LB       User Key  (r) = Recorded By, (t) = Taken By, (c) = Cosigned By    Initials Name Provider Type    Saima Rasumssen PT Physical Therapist        Obj/Interventions     Row Name 01/18/20 0922          General ROM    GENERAL ROM COMMENTS  Continued complaints of pain in R 3rd finger  -LB     Row Name 01/18/20 0922          Therapeutic Exercise    Comment (Therapeutic Exercise)  seated LAQ x 10 B; supine ankle pumps x 10 B  -LB       User Key  (r) = Recorded By, (t) = Taken By, (c) = Cosigned By    Initials Name Provider Type    Saima Rasmussen PT Physical Therapist        Goals/Plan    No documentation.       Clinical Impression     Row Name 01/18/20 0923          Pain Scale: Numbers Pre/Post-Treatment    Pain Scale: Numbers, Pretreatment  3/10  -LB     Pain Scale: Numbers, Post-Treatment  3/10  -LB     Pain Location - Side  Bilateral  -LB     Pain Location  calf  -LB     Pre/Post Treatment Pain Comment  several instances of \"cramping\" with ambulation  -LB     Pain Intervention(s)  Repositioned;Ambulation/increased activity  -LB     Row Name 01/18/20 0923          Plan of Care Review    Plan of Care Reviewed With  patient;mother  -LB     Progress  improving  -LB     Outcome Summary  Pt able to inc ambulation today with CGA and no " "episodes of LOB but slight unsteadiness on feet and complaining of \"cramping\" in B calves.   -LB     Row Name 01/18/20 0923          Physical Therapy Clinical Impression    Patient/Family Goals Statement (PT Clinical Impression)  Pt plans to return home with assist.  -LB     Row Name 01/18/20 0923          Vital Signs    O2 Delivery Pre Treatment  room air  -LB     O2 Delivery Intra Treatment  room air  -LB     O2 Delivery Post Treatment  room air  -LB     Row Name 01/18/20 0923          Positioning and Restraints    Pre-Treatment Position  in bed  -LB     Post Treatment Position  bed  -LB     In Bed  call light within reach;encouraged to call for assist;exit alarm on;with family/caregiver  -LB       User Key  (r) = Recorded By, (t) = Taken By, (c) = Cosigned By    Initials Name Provider Type    Saima Rasmussen PT Physical Therapist        Outcome Measures     Row Name 01/18/20 0924          How much help from another person do you currently need...    Turning from your back to your side while in flat bed without using bedrails?  4  -LB     Moving from lying on back to sitting on the side of a flat bed without bedrails?  4  -LB     Moving to and from a bed to a chair (including a wheelchair)?  3  -LB     Standing up from a chair using your arms (e.g., wheelchair, bedside chair)?  3  -LB     Climbing 3-5 steps with a railing?  3  -LB     To walk in hospital room?  3  -LB     AM-PAC 6 Clicks Score (PT)  20  -LB     Row Name 01/18/20 0924          Functional Assessment    Outcome Measure Options  AM-PAC 6 Clicks Basic Mobility (PT)  -LB       User Key  (r) = Recorded By, (t) = Taken By, (c) = Cosigned By    Initials Name Provider Type    Saima Rasmussen PT Physical Therapist          PT Recommendation and Plan     Outcome Summary/Treatment Plan (PT)  Anticipated Discharge Disposition (PT): home with assist, home with home health  Plan of Care Reviewed With: patient, mother  Progress: improving  Outcome Summary: Pt " "able to inc ambulation today with CGA and no episodes of LOB but slight unsteadiness on feet and complaining of \"cramping\" in B calves.      Time Calculation:   PT Charges     Row Name 01/18/20 0925             Time Calculation    Start Time  0905  -LB      Stop Time  0920  -LB      Time Calculation (min)  15 min  -LB      PT Received On  01/18/20  -LB      PT - Next Appointment  01/20/20  -LB         Time Calculation- PT    Total Timed Code Minutes- PT  15 minute(s)  -LB        User Key  (r) = Recorded By, (t) = Taken By, (c) = Cosigned By    Initials Name Provider Type    Saima Rasmussen, PT Physical Therapist        Therapy Charges for Today     Code Description Service Date Service Provider Modifiers Qty    64569849237 HC PT THER PROC EA 15 MIN 1/18/2020 Saima Hassan PT GP 1          PT G-Codes  Outcome Measure Options: AM-PAC 6 Clicks Basic Mobility (PT)  AM-PAC 6 Clicks Score (PT): 20  AM-PAC 6 Clicks Score (OT): 19    Saima Hassan PT  1/18/2020       "

## 2020-01-18 NOTE — PROGRESS NOTES
49 y.o.   LOS: 5 days   Patient Care Team:  Provider, No Known as PCP - General    Chief Complaint: Elevated blood sugars    Chief Complaint   Patient presents with   • Altered Mental Status       Subjective     HPI  86 this morning  He is currently receiving Lantus in the morning and night is receiving tube feeding  He still appears confused at times    Interval History:      Review of Systems:      Review of Systems   Constitutional: Positive for fatigue.   HENT: Positive for trouble swallowing.    Respiratory: Negative.    Cardiovascular: Negative.    Gastrointestinal: Negative.    All other systems reviewed and are negative.    Objective     Vital Signs   Temp:  [97.8 °F (36.6 °C)-98.9 °F (37.2 °C)] 97.8 °F (36.6 °C)  Heart Rate:  [88-99] 99  Resp:  [16-18] 16  BP: (102-124)/(68-87) 111/70    Physical Exam:  Physical Exam   Constitutional: He is oriented to person, place, and time.   Eyes: Pupils are equal, round, and reactive to light. EOM are normal.   Neck: Normal range of motion. Neck supple. No thyromegaly present.   Cardiovascular: Normal rate, regular rhythm, normal heart sounds and intact distal pulses.   Pulmonary/Chest: Effort normal and breath sounds normal.   Abdominal: Soft. Bowel sounds are normal. He exhibits no distension. There is no tenderness.   Musculoskeletal: Normal range of motion. He exhibits no edema.   Neurological: He is alert and oriented to person, place, and time.   Skin: Skin is warm and dry.   Negative for acanthosis and vitiligo or purple striae   Psychiatric: He has a normal mood and affect. His behavior is normal.   Nursing note and vitals reviewed.  Results Review:     I reviewed the patient's new clinical results.       Glucose   Date/Time Value Ref Range Status   01/16/2020 0535 139 (H) 65 - 99 mg/dL Final     Lab Results (last 72 hours)     Procedure Component Value Units Date/Time    Blood Culture - Blood, Arm, Right [[id number]] Collected:  01/13/20 1545    Specimen:   Blood from Arm, Right Updated:  01/18/20 1615     Blood Culture No growth at 5 days    Blood Culture - Blood, Arm, Left [[id number]] Collected:  01/13/20 1545    Specimen:  Blood from Arm, Left Updated:  01/18/20 1615     Blood Culture No growth at 5 days    POC Glucose Once [[id number]]  (Normal) Collected:  01/18/20 1601    Specimen:  Blood Updated:  01/18/20 1603     Glucose 93 mg/dL     C-Peptide [[id number]] Collected:  01/17/20 0440    Specimen:  Blood from Arm, Right Updated:  01/18/20 1508     C-Peptide 2.5 ng/mL      Comment: C-Peptide reference interval is for fasting patients.       Narrative:       Performed at:  [address] [place] [address]  [id number]  : Padilla Joshi PhD, Phone:  [phone]    POC Glucose Once [[id number]]  (Abnormal) Collected:  01/18/20 1109    Specimen:  Blood Updated:  01/18/20 1111     Glucose 236 mg/dL     POC Glucose Once [[id number]]  (Abnormal) Collected:  01/18/20 0607    Specimen:  Blood Updated:  01/18/20 0608     Glucose 201 mg/dL     POC Glucose Once [[id number]]  (Abnormal) Collected:  01/17/20 2054    Specimen:  Blood Updated:  01/17/20 2056     Glucose 182 mg/dL     POC Glucose Once [[id number]]  (Normal) Collected:  01/17/20 1639    Specimen:  Blood Updated:  01/17/20 1645     Glucose 108 mg/dL     POC Glucose Once [[id number]]  (Abnormal) Collected:  01/17/20 1148    Specimen:  Blood Updated:  01/17/20 1204     Glucose 157 mg/dL     POC Glucose Once [[id number]]  (Abnormal) Collected:  01/17/20 0619    Specimen:  Blood Updated:  01/17/20 0620     Glucose 248 mg/dL     TSH [[id number]]  (Normal) Collected:  01/17/20 0440    Specimen:  Blood from Arm, Right Updated:  01/17/20 0602     TSH 1.320 uIU/mL     T4, Free [[id number]]  (Normal) Collected:  01/17/20 0440    Specimen:  Blood from Arm, Right Updated:  01/17/20 0602     Free T4 1.02 ng/dL     Narrative:       Results may be falsely increased if patient taking Biotin.      Glutamic Acid  Decarboxylase [[id number]] Collected:  01/17/20 0440    Specimen:  Blood from Arm, Right Updated:  01/17/20 0527    POC Glucose Once [[id number]]  (Abnormal) Collected:  01/16/20 2047    Specimen:  Blood Updated:  01/16/20 2049     Glucose 142 mg/dL     POC Glucose Once [[id number]]  (Abnormal) Collected:  01/16/20 1609    Specimen:  Blood Updated:  01/16/20 1611     Glucose 171 mg/dL     POC Glucose Once [[id number]]  (Abnormal) Collected:  01/16/20 1143    Specimen:  Blood Updated:  01/16/20 1159     Glucose 274 mg/dL     Basic Metabolic Panel [[id number]]  (Abnormal) Collected:  01/16/20 0535    Specimen:  Blood Updated:  01/16/20 0711     Glucose 139 mg/dL      BUN 12 mg/dL      Creatinine 0.86 mg/dL      Sodium 141 mmol/L      Potassium 3.5 mmol/L      Chloride 104 mmol/L      CO2 22.8 mmol/L      Calcium 8.7 mg/dL      eGFR  African Amer 116 mL/min/1.73      BUN/Creatinine Ratio 14.0     Anion Gap 14.2 mmol/L     Narrative:       GFR Normal >60  Chronic Kidney Disease <60  Kidney Failure <15      Phosphorus [[id number]]  (Abnormal) Collected:  01/16/20 0535    Specimen:  Blood Updated:  01/16/20 0708     Phosphorus 2.0 mg/dL     CBC & Differential [[id number]] Collected:  01/16/20 0535    Specimen:  Blood Updated:  01/16/20 0634    Narrative:       The following orders were created for panel order CBC & Differential.  Procedure                               Abnormality         Status                     ---------                               -----------         ------                     CBC Auto Differential[[id number]]        Abnormal            Final result                 Please view results for these tests on the individual orders.    CBC Auto Differential [[id number]]  (Abnormal) Collected:  01/16/20 0535    Specimen:  Blood Updated:  01/16/20 0634     WBC 9.86 10*3/mm3      RBC 4.96 10*6/mm3      Hemoglobin 14.0 g/dL      Hematocrit 40.6 %      MCV 81.9 fL      MCH 28.2 pg      MCHC 34.5 g/dL      RDW 13.4 %       RDW-SD 39.6 fl      MPV 10.6 fL      Platelets 286 10*3/mm3      Neutrophil % 62.8 %      Lymphocyte % 21.3 %      Monocyte % 14.0 %      Eosinophil % 1.0 %      Basophil % 0.2 %      Immature Grans % 0.7 %      Neutrophils, Absolute 6.19 10*3/mm3      Lymphocytes, Absolute 2.10 10*3/mm3      Monocytes, Absolute 1.38 10*3/mm3      Eosinophils, Absolute 0.10 10*3/mm3      Basophils, Absolute 0.02 10*3/mm3      Immature Grans, Absolute 0.07 10*3/mm3      nRBC 0.0 /100 WBC     POC Glucose Once [[id number]]  (Abnormal) Collected:  01/16/20 0457    Specimen:  Blood Updated:  01/16/20 0459     Glucose 136 mg/dL     POC Glucose Once [[id number]]  (Abnormal) Collected:  01/16/20 0143    Specimen:  Blood Updated:  01/16/20 0143     Glucose 133 mg/dL     POC Glucose Once [[id number]]  (Abnormal) Collected:  01/15/20 2125    Specimen:  Blood Updated:  01/15/20 2126     Glucose 134 mg/dL     POC Glucose Once [[id number]]  (Abnormal) Collected:  01/15/20 2008    Specimen:  Blood Updated:  01/15/20 2009     Glucose 156 mg/dL     POC Glucose Once [[id number]]  (Normal) Collected:  01/15/20 1625    Specimen:  Blood Updated:  01/15/20 1628     Glucose 83 mg/dL         Imaging Results (Last 72 Hours)     Procedure Component Value Units Date/Time    FL Esophagram Complete [[id number]] Collected:  01/18/20 1357     Updated:  01/18/20 1409    Narrative:       FL ESOPHAGRAM COMPLETE SINGLE-CONTRAST-     INDICATIONS: Coughing, odynophagia     TECHNIQUE: Esophagram     COMPARISON: None available     FINDINGS:     A  image was obtained revealing no focal pulmonary consolidation or  mass.     The patient ingested barium contrast material under intermittent  fluoroscopic observation, with spot views obtained.     The swallowing mechanism was unremarkable with no penetration of the  laryngeal folds or aspiration during swallowing.     Esophageal peristalsis was unremarkable. Several small rounded defects  in the mucosal coating of the  esophagus appear to persist, potentially  polyps or may be persistent adherent air bubbles, suggest endoscopic  correlation. The mucosa of the esophagus otherwise appears unremarkable.  No tight stenosis is demonstrated.     No hiatal hernia was noted. Mild spontaneous gastroesophageal reflux  occurred.     Fluoroscopy time: 53 seconds, 33 fluoroscopic images       Impression:          No tight stenoses. Several small rounded defects in the mucosal coating  of the esophagus appear to persist, potentially polyps or may be  persistent adherent air bubbles, suggest endoscopic correlation.     This report was finalized on 1/18/2020 2:06 PM by Dr. Anton Bower M.D.           Medical record review  Pulmonary notes reviewed  They are still evaluating metabolic encephalopathy  Dysphagia work-up is still underway  ERIBERTO resolved    Medication Review:       Current Facility-Administered Medications:   •  dextrose (D50W) 25 g/ 50mL Intravenous Solution 12.5 g, 12.5 g, Intravenous, PRN, Tacos Arzola MD, 12.5 g at 01/13/20 2240  •  dextrose (D50W) 25 g/ 50mL Intravenous Solution 25 g, 25 g, Intravenous, Q15 Min PRN, Shandra Delcid MD  •  dextrose (D50W) 25 g/ 50mL Intravenous Solution 25 g, 25 g, Intravenous, Q15 Min PRN, Cameron Morgan MD  •  dextrose (GLUTOSE) oral gel 15 g, 15 g, Oral, Q15 Min PRN, Shandra Delcid MD  •  dextrose (GLUTOSE) oral gel 15 g, 15 g, Oral, Q15 Min PRN, Cameron Morgan MD  •  enoxaparin (LOVENOX) syringe 40 mg, 40 mg, Subcutaneous, Q24H, Cameron Morgan MD, 40 mg at 01/18/20 1206  •  glucagon (human recombinant) (GLUCAGEN DIAGNOSTIC) injection 1 mg, 1 mg, Subcutaneous, Q15 Min PRN, Shandra Delcid MD  •  glucagon (human recombinant) (GLUCAGEN DIAGNOSTIC) injection 1 mg, 1 mg, Subcutaneous, Q15 Min PRN, Cameron Morgan MD  •  insulin glargine (LANTUS) injection 18 Units, 18 Units, Subcutaneous, Q12H, Andre Morin MD  •  insulin lispro (humaLOG) injection 0-9 Units, 0-9 Units,  Subcutaneous, 4x Daily With Meals & Nightly, Lexie Doe MD, 4 Units at 01/18/20 1205  •  insulin lispro (humaLOG) injection 5 Units, 5 Units, Subcutaneous, TID With Meals, Lexie Doe MD, 5 Units at 01/18/20 1206  •  magnesium sulfate 4 gram infusion - Mg less than or equal to 1mg/dL, 4 g, Intravenous, PRN **OR** magnesium sulfate 3 gram infusion (1gm x 3) - Mg 1.1 - 1.5 mg/dL, 1 g, Intravenous, PRN **OR** Magnesium Sulfate 2 gram infusion- Mg 1.6 - 1.9 mg/dL, 2 g, Intravenous, PRN, Cameron Morgan MD  •  ondansetron (ZOFRAN) tablet 4 mg, 4 mg, Oral, Q6H PRN **OR** ondansetron (ZOFRAN) injection 4 mg, 4 mg, Intravenous, Q6H PRN, Cameron Morgan MD  •  potassium & sodium phosphates (PHOS-NAK) 280-160-250 MG packet - for Phosphorus less than 1.25 mg/dL, 2 packet, Oral, Q6H PRN **OR** potassium & sodium phosphates (PHOS-NAK) 280-160-250 MG packet - for Phosphorus 1.25 - 2.5 mg/dL, 2 packet, Oral, Q6H PRN, Cameron Morgan MD, 2 packet at 01/16/20 1157  •  potassium chloride (MICRO-K) CR capsule 40 mEq, 40 mEq, Oral, PRN **OR** potassium chloride (KLOR-CON) packet 40 mEq, 40 mEq, Oral, PRN **OR** potassium chloride 10 mEq in 100 mL IVPB, 10 mEq, Intravenous, Q1H PRN, Cameron Morgan MD  •  potassium phosphate 45 mmol in sodium chloride 0.9 % 500 mL infusion, 45 mmol, Intravenous, PRN **OR** potassium phosphate 30 mmol in sodium chloride 0.9 % 250 mL infusion, 30 mmol, Intravenous, PRN **OR** potassium phosphate 15 mmol in sodium chloride 0.9 % 100 mL infusion, 15 mmol, Intravenous, PRN, 15 mmol at 01/15/20 1355 **OR** sodium phosphates 40 mmol in sodium chloride 0.9 % 500 mL IVPB, 40 mmol, Intravenous, PRN **OR** sodium phosphates 20 mmol in sodium chloride 0.9 % 250 mL IVPB, 20 mmol, Intravenous, PRN, Cameron Morgan MD, Stopped at 01/14/20 6594  •  senna-docusate sodium (SENOKOT-S) 8.6-50 MG tablet 2 tablet, 2 tablet, Oral, Nightly, Cameron Morgan MD, 2 tablet at 01/17/20 2043  •  [COMPLETED] Insert  "peripheral IV, , , Once **AND** sodium chloride 0.9 % flush 10 mL, 10 mL, Intravenous, PRN, Marlon Shabazz MD  •  sodium chloride 0.9 % flush 10 mL, 10 mL, Intravenous, Once PRN, Tacos Arzola MD  •  sodium chloride 0.9 % flush 10 mL, 10 mL, Intravenous, Q12H, Cameron Morgan MD, 10 mL at 01/18/20 0825  •  sodium chloride 0.9 % flush 10 mL, 10 mL, Intravenous, PRN, Cameron Morgan MD    Assessment/Plan     Patient Active Problem List   Diagnosis   • Diabetes mellitus, new onset (CMS/HCC)   • Diabetic ketoacidosis without coma associated with type 2 diabetes mellitus (CMS/HCC)   Uncontrolled  diabetes mellitus  Diabetic ketoacidosis resolved    Patient's fasting blood sugars have been in the 200 range for the past 2 days  He is currently receiving Lantus 28 units in the morning  I will change Lantus to 18 units twice daily  He is receiving Humalog 5 units before each meal along with a correction scale  Dysphagia work-up is still ongoing  DKA resolved    Discussed with patient and his family at the bedside  Andre Morin MD FACE.  01/18/20  4:17 PM      [facility] Dragon / transcription disclaimer:     \"Dictated utilizing Dragon dictation\".   "

## 2020-01-19 ENCOUNTER — APPOINTMENT (OUTPATIENT)
Dept: MRI IMAGING | Facility: HOSPITAL | Age: 50
End: 2020-01-19

## 2020-01-19 LAB
GLUCOSE BLDC GLUCOMTR-MCNC: 111 MG/DL (ref 70–130)
GLUCOSE BLDC GLUCOMTR-MCNC: 153 MG/DL (ref 70–130)
GLUCOSE BLDC GLUCOMTR-MCNC: 182 MG/DL (ref 70–130)
GLUCOSE BLDC GLUCOMTR-MCNC: 200 MG/DL (ref 70–130)

## 2020-01-19 PROCEDURE — 82962 GLUCOSE BLOOD TEST: CPT

## 2020-01-19 PROCEDURE — 63710000001 INSULIN GLARGINE PER 5 UNITS: Performed by: INTERNAL MEDICINE

## 2020-01-19 PROCEDURE — 63710000001 INSULIN LISPRO (HUMAN) PER 5 UNITS: Performed by: INTERNAL MEDICINE

## 2020-01-19 PROCEDURE — 0 GADOBENATE DIMEGLUMINE 529 MG/ML SOLUTION: Performed by: INTERNAL MEDICINE

## 2020-01-19 PROCEDURE — 99253 IP/OBS CNSLTJ NEW/EST LOW 45: CPT | Performed by: INTERNAL MEDICINE

## 2020-01-19 PROCEDURE — A9577 INJ MULTIHANCE: HCPCS | Performed by: INTERNAL MEDICINE

## 2020-01-19 PROCEDURE — 99233 SBSQ HOSP IP/OBS HIGH 50: CPT | Performed by: INTERNAL MEDICINE

## 2020-01-19 PROCEDURE — 70553 MRI BRAIN STEM W/O & W/DYE: CPT

## 2020-01-19 PROCEDURE — 99232 SBSQ HOSP IP/OBS MODERATE 35: CPT | Performed by: PSYCHIATRY & NEUROLOGY

## 2020-01-19 PROCEDURE — 25010000002 ENOXAPARIN PER 10 MG: Performed by: INTERNAL MEDICINE

## 2020-01-19 RX ORDER — INSULIN GLARGINE 100 [IU]/ML
15 INJECTION, SOLUTION SUBCUTANEOUS EVERY 12 HOURS SCHEDULED
Status: DISCONTINUED | OUTPATIENT
Start: 2020-01-19 | End: 2020-01-23

## 2020-01-19 RX ORDER — PANTOPRAZOLE SODIUM 40 MG/1
40 TABLET, DELAYED RELEASE ORAL EVERY MORNING
Status: DISCONTINUED | OUTPATIENT
Start: 2020-01-20 | End: 2020-01-22

## 2020-01-19 RX ORDER — CEPHALEXIN 500 MG/1
500 CAPSULE ORAL EVERY 6 HOURS SCHEDULED
Status: DISCONTINUED | OUTPATIENT
Start: 2020-01-19 | End: 2020-01-23 | Stop reason: HOSPADM

## 2020-01-19 RX ADMIN — SENNOSIDES AND DOCUSATE SODIUM 2 TABLET: 8.6; 5 TABLET ORAL at 20:59

## 2020-01-19 RX ADMIN — INSULIN LISPRO 5 UNITS: 100 INJECTION, SOLUTION INTRAVENOUS; SUBCUTANEOUS at 17:05

## 2020-01-19 RX ADMIN — CEPHALEXIN 500 MG: 500 CAPSULE ORAL at 17:06

## 2020-01-19 RX ADMIN — SODIUM CHLORIDE, PRESERVATIVE FREE 10 ML: 5 INJECTION INTRAVENOUS at 08:16

## 2020-01-19 RX ADMIN — INSULIN GLARGINE 15 UNITS: 100 INJECTION, SOLUTION SUBCUTANEOUS at 21:59

## 2020-01-19 RX ADMIN — INSULIN LISPRO 5 UNITS: 100 INJECTION, SOLUTION INTRAVENOUS; SUBCUTANEOUS at 11:59

## 2020-01-19 RX ADMIN — INSULIN LISPRO 2 UNITS: 100 INJECTION, SOLUTION INTRAVENOUS; SUBCUTANEOUS at 21:59

## 2020-01-19 RX ADMIN — ENOXAPARIN SODIUM 40 MG: 40 INJECTION SUBCUTANEOUS at 11:59

## 2020-01-19 RX ADMIN — GADOBENATE DIMEGLUMINE 13 ML: 529 INJECTION, SOLUTION INTRAVENOUS at 14:18

## 2020-01-19 RX ADMIN — INSULIN LISPRO 5 UNITS: 100 INJECTION, SOLUTION INTRAVENOUS; SUBCUTANEOUS at 08:14

## 2020-01-19 RX ADMIN — SODIUM CHLORIDE, PRESERVATIVE FREE 10 ML: 5 INJECTION INTRAVENOUS at 20:59

## 2020-01-19 RX ADMIN — INSULIN LISPRO 4 UNITS: 100 INJECTION, SOLUTION INTRAVENOUS; SUBCUTANEOUS at 11:58

## 2020-01-19 RX ADMIN — INSULIN GLARGINE 18 UNITS: 100 INJECTION, SOLUTION SUBCUTANEOUS at 08:15

## 2020-01-19 RX ADMIN — INSULIN LISPRO 2 UNITS: 100 INJECTION, SOLUTION INTRAVENOUS; SUBCUTANEOUS at 17:05

## 2020-01-19 NOTE — H&P (VIEW-ONLY)
Patient Care Team:  Provider, No Known as PCP - General    CHIEF COMPLAINT: Dyshagia/ Abmormal UGI    HISTORY OF PRESENT ILLNESS: Covering for BGA    48 yo with new onset DM had described mild dysphagia w/o aspiration nor meat impaction but feels he is 15+ pounds down from his baseline and denies anysymptoms beyond the last week. His UGi was reviewed no reflux esohagitis/stricture nor thrush read only air bubbles?!? Reviewed fims and agree no stricture nor dysmotility nor aspiration.  No history of GERD in the past and no previous EGD. IS in house now for DM education since he will be D/C on insulin.   sper Dr Morgan...49-year-old -American male who presents with confusion and weakness.  He is a very poor historian because he is drowsy but he is arousable and he does answer a few questions.  Apparently he does not have a primary care physician and is not on any medications.  Recently went to urgent care and received clindamycin for swollen, painful and possibly infected right third finger, possible paronychia.  He took 1 dose only and then became too confused to continue his prescriptions.  He has had severe weakness over the past 24 hours, increased thirst and urination.  Has had no appetite.    Past Medical History:   Diagnosis Date   • Diabetes (CMS/HCC)    • Finger infection     right middle finger     Past Surgical History:   Procedure Laterality Date   • NO PAST SURGERIES       History reviewed. No pertinent family history.  Social History     Tobacco Use   • Smoking status: Light Tobacco Smoker     Types: Cigars   • Tobacco comment: 2nd hand smoke from parents; cigars when drinking   Substance Use Topics   • Alcohol use: Yes     Frequency: Monthly or less     Drinks per session: 1 or 2     Binge frequency: Never   • Drug use: Never     Medications Prior to Admission   Medication Sig Dispense Refill Last Dose   • [] clindamycin (CLEOCIN) 300 MG capsule Take 300 mg by mouth 3 (Three) Times a Day.    "     Allergies:  Patient has no known allergies.    REVIEW OF SYSTEMS:  Please see the above history of present illness for pertinent positives and negatives.  The remainder of the patient's systems have been reviewed and are negative.     Vital Signs  Temp:  [97.5 °F (36.4 °C)-98.7 °F (37.1 °C)] 97.5 °F (36.4 °C)  Heart Rate:  [98] 98  Resp:  [16] 16  BP: ()/(55-73) 115/73    Flowsheet Rows      First Filed Value   Admission Height  182.9 cm (72\") Documented at 01/13/2020 0628   Admission Weight  66.8 kg (147 lb 3.2 oz) Documented at 01/13/2020 0637           Physical Exam:  Physical Exam   Constitutional: Patient appears well-developed and well-nourished and in no acute distress   HEENT:   Head: Normocephalic and atraumatic.   Eyes:  Pupils are equal, round, and reactive to light. EOM are intact. Sclerae are anicteric and non-injected.  Mouth and Throat: Patient has moist mucous membranes. Oropharynx is clear of any erythema or exudate.     Neck: Neck supple. No JVD present. No thyromegaly present. No lymphadenopathy present.  Cardiovascular: Regular rate, regular rhythm, S1 normal and S2 normal.  Exam reveals no gallop and no friction rub.  No murmur heard.  Pulmonary/Chest: Lungs are clear to auscultation bilaterally. No respiratory distress. No wheezes. No rhonchi. No rales.   Abdominal: Soft. Bowel sounds are normal. No distension and no mass. There is no hepatosplenomegaly. There is no tenderness.   Musculoskeletal: Normal Muscle tone  Extremities: No edema. Pulses are palpable in all 4 extremities.  Neurological: Patient is alert and oriented to person, place, and time. Cranial nerves II-XII are grossly intact with no focal deficits.  Skin: Skin is warm. No rash noted. Nails show no clubbing.  No cyanosis or erythema.    Debilities/Disabilities Identified: None  Emotional Behavior: Appropriate     Results Review:    I reviewed the patient's new clinical results.  Lab Results (most recent)     Procedure " Component Value Units Date/Time    POC Glucose Once [[id number]]  (Abnormal) Collected:  01/19/20 1548    Specimen:  Blood Updated:  01/19/20 1550     Glucose 153 mg/dL     POC Glucose Once [[id number]]  (Abnormal) Collected:  01/19/20 1035    Specimen:  Blood Updated:  01/19/20 1037     Glucose 200 mg/dL     Blood Culture - Blood, Arm, Right [[id number]] Collected:  01/13/20 1545    Specimen:  Blood from Arm, Right Updated:  01/18/20 1615     Blood Culture No growth at 5 days    Blood Culture - Blood, Arm, Left [[id number]] Collected:  01/13/20 1545    Specimen:  Blood from Arm, Left Updated:  01/18/20 1615     Blood Culture No growth at 5 days    C-Peptide [[id number]] Collected:  01/17/20 0440    Specimen:  Blood from Arm, Right Updated:  01/18/20 1508     C-Peptide 2.5 ng/mL      Comment: C-Peptide reference interval is for fasting patients.       Narrative:       Performed at:  [address] [place] [address]  [id number]  : Padilla Joshi PhD, Phone:  [phone]    TSH [[id number]]  (Normal) Collected:  01/17/20 0440    Specimen:  Blood from Arm, Right Updated:  01/17/20 0602     TSH 1.320 uIU/mL     T4, Free [[id number]]  (Normal) Collected:  01/17/20 0440    Specimen:  Blood from Arm, Right Updated:  01/17/20 0602     Free T4 1.02 ng/dL     Narrative:       Results may be falsely increased if patient taking Biotin.      Glutamic Acid Decarboxylase [[id number]] Collected:  01/17/20 0440    Specimen:  Blood from Arm, Right Updated:  01/17/20 0527    Basic Metabolic Panel [[id number]]  (Abnormal) Collected:  01/16/20 0535    Specimen:  Blood Updated:  01/16/20 0711     Glucose 139 mg/dL      BUN 12 mg/dL      Creatinine 0.86 mg/dL      Sodium 141 mmol/L      Potassium 3.5 mmol/L      Chloride 104 mmol/L      CO2 22.8 mmol/L      Calcium 8.7 mg/dL      eGFR  African Amer 116 mL/min/1.73      BUN/Creatinine Ratio 14.0     Anion Gap 14.2 mmol/L     Narrative:       GFR Normal >60  Chronic  Kidney Disease <60  Kidney Failure <15      Phosphorus [[id number]]  (Abnormal) Collected:  01/16/20 0535    Specimen:  Blood Updated:  01/16/20 0708     Phosphorus 2.0 mg/dL     CBC & Differential [[id number]] Collected:  01/16/20 0535    Specimen:  Blood Updated:  01/16/20 0634    Narrative:       The following orders were created for panel order CBC & Differential.  Procedure                               Abnormality         Status                     ---------                               -----------         ------                     CBC Auto Differential[[id number]]        Abnormal            Final result                 Please view results for these tests on the individual orders.    CBC Auto Differential [[id number]]  (Abnormal) Collected:  01/16/20 0535    Specimen:  Blood Updated:  01/16/20 0634     WBC 9.86 10*3/mm3      RBC 4.96 10*6/mm3      Hemoglobin 14.0 g/dL      Hematocrit 40.6 %      MCV 81.9 fL      MCH 28.2 pg      MCHC 34.5 g/dL      RDW 13.4 %      RDW-SD 39.6 fl      MPV 10.6 fL      Platelets 286 10*3/mm3      Neutrophil % 62.8 %      Lymphocyte % 21.3 %      Monocyte % 14.0 %      Eosinophil % 1.0 %      Basophil % 0.2 %      Immature Grans % 0.7 %      Neutrophils, Absolute 6.19 10*3/mm3      Lymphocytes, Absolute 2.10 10*3/mm3      Monocytes, Absolute 1.38 10*3/mm3      Eosinophils, Absolute 0.10 10*3/mm3      Basophils, Absolute 0.02 10*3/mm3      Immature Grans, Absolute 0.07 10*3/mm3      nRBC 0.0 /100 WBC     Procalcitonin [[id number]]  (Normal) Collected:  01/15/20 0449    Specimen:  Blood Updated:  01/15/20 0612     Procalcitonin 0.23 ng/mL     Narrative:       As a Marker for Sepsis (Non-Neonates):   1. <0.5 ng/mL represents a low risk of severe sepsis and/or septic shock.  1. >2 ng/mL represents a high risk of severe sepsis and/or septic shock.    As a Marker for Lower Respiratory Tract Infections that require antibiotic therapy:  PCT on Admission     Antibiotic Therapy              "6-12 Hrs later  > 0.5                Strongly Recommended            >0.25 - <0.5         Recommended  0.1 - 0.25           Discouraged                   Remeasure/reassess PCT  <0.1                 Strongly Discouraged          Remeasure/reassess PCT      As 28 day mortality risk marker: \"Change in Procalcitonin Result\" (> 80 % or <=80 %) if Day 0 (or Day 1) and Day 4 values are available. Refer to http://www.[employer] [email or web address] [place]cal[facility]/   Change in PCT <=80 %   A decrease of PCT levels below or equal to 80 % defines a positive change in PCT test result representing a higher risk for 28-day all-cause mortality of patients diagnosed with severe sepsis or septic shock.  Change in PCT > 80 %   A decrease of PCT levels of more than 80 % defines a negative change in PCT result representing a lower risk for 28-day all-cause mortality of patients diagnosed with severe sepsis or septic shock.                Results may be falsely decreased if patient taking Biotin.     Basic Metabolic Panel [[id number]]  (Abnormal) Collected:  01/15/20 0449    Specimen:  Blood Updated:  01/15/20 0602     Glucose 153 mg/dL      BUN 25 mg/dL      Creatinine 1.04 mg/dL      Sodium 149 mmol/L      Potassium 4.3 mmol/L      Chloride 114 mmol/L      CO2 19.8 mmol/L      Calcium 8.5 mg/dL      eGFR  African Amer 93 mL/min/1.73      BUN/Creatinine Ratio 24.0     Anion Gap 15.2 mmol/L     Narrative:       GFR Normal >60  Chronic Kidney Disease <60  Kidney Failure <15      Magnesium [[id number]]  (Normal) Collected:  01/15/20 0449    Specimen:  Blood Updated:  01/15/20 0602     Magnesium 2.6 mg/dL     Phosphorus [[id number]]  (Abnormal) Collected:  01/15/20 0449    Specimen:  Blood Updated:  01/15/20 0602     Phosphorus 2.3 mg/dL     Platelet Count [[id number]]  (Normal) Collected:  01/15/20 0449    Specimen:  Blood Updated:  01/15/20 0540     Platelets 289 10*3/mm3     Magnesium [[id number]]  (Abnormal) Collected:  01/14/20 1358    Specimen:  Blood " Updated:  01/14/20 1448     Magnesium 3.2 mg/dL     Urine Drug Screen - Urine, Clean Catch [[id number]]  (Normal) Collected:  01/14/20 0028    Specimen:  Urine, Clean Catch Updated:  01/14/20 0140     Amphet/Methamphet, Screen Negative     Barbiturates Screen, Urine Negative     Benzodiazepine Screen, Urine Negative     Cocaine Screen, Urine Negative     Opiate Screen Negative     THC, Screen, Urine Negative     Methadone Screen, Urine Negative     Oxycodone Screen, Urine Negative    Narrative:       Negative Thresholds For Drugs Screened:     Amphetamines               500 ng/ml   Barbiturates               200 ng/ml   Benzodiazepines            100 ng/ml   Cocaine                    300 ng/ml   Methadone                  300 ng/ml   Opiates                    300 ng/ml   Oxycodone                  100 ng/ml   THC                        50 ng/ml    The Normal Value for all drugs tested is negative. This report includes final unconfirmed screening results to be used for medical treatment purposes only. Unconfirmed results must not be used for non-medical purposes such as employment or legal testing. Clinical consideration should be applied to any drug of abuse test, particulary when unconfirmed results are used.    Potassium [[id number]]  (Normal) Collected:  01/13/20 2235    Specimen:  Blood Updated:  01/13/20 2309     Potassium 4.1 mmol/L     Procalcitonin [[id number]]  (Abnormal) Collected:  01/13/20 1545    Specimen:  Blood from Arm, Right Updated:  01/13/20 1743     Procalcitonin 0.37 ng/mL     Narrative:       As a Marker for Sepsis (Non-Neonates):   1. <0.5 ng/mL represents a low risk of severe sepsis and/or septic shock.  1. >2 ng/mL represents a high risk of severe sepsis and/or septic shock.    As a Marker for Lower Respiratory Tract Infections that require antibiotic therapy:  PCT on Admission     Antibiotic Therapy             6-12 Hrs later  > 0.5                Strongly Recommended            >0.25 - <0.5  "        Recommended  0.1 - 0.25           Discouraged                   Remeasure/reassess PCT  <0.1                 Strongly Discouraged          Remeasure/reassess PCT      As 28 day mortality risk marker: \"Change in Procalcitonin Result\" (> 80 % or <=80 %) if Day 0 (or Day 1) and Day 4 values are available. Refer to http://www.[employer]ct-calculator.com/   Change in PCT <=80 %   A decrease of PCT levels below or equal to 80 % defines a positive change in PCT test result representing a higher risk for 28-day all-cause mortality of patients diagnosed with severe sepsis or septic shock.  Change in PCT > 80 %   A decrease of PCT levels of more than 80 % defines a negative change in PCT result representing a lower risk for 28-day all-cause mortality of patients diagnosed with severe sepsis or septic shock.                Results may be falsely decreased if patient taking Biotin.     Comprehensive Metabolic Panel [[id number]]  (Abnormal) Collected:  01/13/20 0930    Specimen:  Blood Updated:  01/13/20 1008     Glucose 714 mg/dL      BUN 92 mg/dL      Creatinine 2.25 mg/dL      Sodium 132 mmol/L      Potassium 6.1 mmol/L      Comment: Ok to release per Dr. Arzola        Chloride 93 mmol/L      CO2 10.9 mmol/L      Calcium 8.6 mg/dL      Total Protein 7.6 g/dL      Albumin 3.70 g/dL      ALT (SGPT) 17 U/L      Comment: Specimen hemolyzed.  Results may be affected.        AST (SGOT) 35 U/L      Comment: Specimen hemolyzed.  Results may be affected.        Alkaline Phosphatase 84 U/L      Total Bilirubin 0.6 mg/dL      eGFR  African Amer 38 mL/min/1.73      Globulin 3.9 gm/dL      A/G Ratio 0.9 g/dL      BUN/Creatinine Ratio 40.9     Anion Gap 28.1 mmol/L     Narrative:       GFR Normal >60  Chronic Kidney Disease <60  Kidney Failure <15      Comprehensive Metabolic Panel [[id number]]  (Abnormal) Collected:  01/13/20 0736    Specimen:  Blood Updated:  01/13/20 0922     Glucose 823 mg/dL      BUN 96 mg/dL      Creatinine 2.46 " mg/dL      Sodium 126 mmol/L      Potassium 5.6 mmol/L      Chloride 83 mmol/L      CO2 13.4 mmol/L      Calcium 8.7 mg/dL      Total Protein 7.5 g/dL      Albumin 3.70 g/dL      ALT (SGPT) 13 U/L      AST (SGOT) 31 U/L      Alkaline Phosphatase 84 U/L      Total Bilirubin 0.7 mg/dL      eGFR  African Amer 34 mL/min/1.73      Globulin 3.8 gm/dL      A/G Ratio 1.0 g/dL      BUN/Creatinine Ratio 39.0     Anion Gap 29.6 mmol/L     Narrative:       GFR Normal >60  Chronic Kidney Disease <60  Kidney Failure <15      Osmolality, Serum [[id number]]  (Abnormal) Collected:  01/13/20 0736    Specimen:  Blood Updated:  01/13/20 0911     Osmolality 379 mOsm/kg     Hemoglobin A1c [[id number]]  (Abnormal) Collected:  01/13/20 0633    Specimen:  Blood Updated:  01/13/20 0850     Hemoglobin A1C 14.40 %     Narrative:       Hemoglobin A1C Ranges:    Increased Risk for Diabetes  5.7% to 6.4%  Diabetes                     >= 6.5%  Diabetic Goal                < 7.0%    Troponin [[id number]]  (Normal) Collected:  01/13/20 0633    Specimen:  Blood Updated:  01/13/20 0727     Troponin T <0.010 ng/mL      Comment: Specimen hemolyzed.  Results may be affected. Release per Dr. Arzola       Narrative:       Troponin T Reference Range:  <= 0.03 ng/mL-   Negative for AMI  >0.03 ng/mL-     Abnormal for myocardial necrosis.  Clinicians would have to utilize clinical acumen, EKG, Troponin and serial changes to determine if it is an Acute Myocardial Infarction or myocardial injury due to an underlying chronic condition.       Results may be falsely decreased if patient taking Biotin.      Urinalysis, Microscopic Only - Urine, Catheter [[id number]] Collected:  01/13/20 0635    Specimen:  Urine, Catheter Updated:  01/13/20 0710     RBC, UA None Seen /HPF      WBC, UA None Seen /HPF      Bacteria, UA None Seen /HPF      Squamous Epithelial Cells, UA 0-2 /HPF      Hyaline Casts, UA None Seen /LPF      Methodology Manual Light Microscopy    Blood Gas,  Arterial [[id number]]  (Abnormal) Collected:  01/13/20 0654    Specimen:  Arterial Blood Updated:  01/13/20 0657     Site Arterial: right radial     Rigoberto's Test Positive     pH, Arterial 7.321 pH units      pCO2, Arterial 24.2 mm Hg      pO2, Arterial 139.7 mm Hg      HCO3, Arterial 12.5 mmol/L      Base Excess, Arterial -11.4 mmol/L      O2 Saturation Calculated 99.0 %      Barometric Pressure for Blood Gas 758.1 mmHg      Modality Room Air     Rate 18 Breaths/minute     Urinalysis With Microscopic If Indicated (No Culture) - Urine, Catheter [[id number]]  (Abnormal) Collected:  01/13/20 0635    Specimen:  Urine, Catheter Updated:  01/13/20 0650     Color, UA Yellow     Appearance, UA Clear     pH, UA <=5.0     Specific Gravity, UA 1.028     Glucose, UA >=1000 mg/dL (3+)     Ketones, UA 15 mg/dL (1+)     Bilirubin, UA Negative     Blood, UA Trace     Protein, UA Negative     Leuk Esterase, UA Negative     Nitrite, UA Negative     Urobilinogen, UA 0.2 E.U./dL    CBC & Differential [[id number]] Collected:  01/13/20 0633    Specimen:  Blood Updated:  01/13/20 0649    Narrative:       The following orders were created for panel order CBC & Differential.  Procedure                               Abnormality         Status                     ---------                               -----------         ------                     CBC Auto Differential[[id number]]        Abnormal            Final result                 Please view results for these tests on the individual orders.    CBC Auto Differential [[id number]]  (Abnormal) Collected:  01/13/20 0633    Specimen:  Blood Updated:  01/13/20 0649     WBC 14.50 10*3/mm3      RBC 6.62 10*6/mm3      Hemoglobin 18.7 g/dL      Hematocrit 56.6 %      MCV 85.5 fL      MCH 28.2 pg      MCHC 33.0 g/dL      RDW 12.3 %      RDW-SD 36.7 fl      MPV 11.1 fL      Platelets 425 10*3/mm3      Neutrophil % 85.7 %      Lymphocyte % 4.9 %      Monocyte % 8.4 %      Eosinophil % 0.0 %      Basophil  % 0.2 %      Immature Grans % 0.8 %      Neutrophils, Absolute 12.42 10*3/mm3      Lymphocytes, Absolute 0.71 10*3/mm3      Monocytes, Absolute 1.22 10*3/mm3      Eosinophils, Absolute 0.00 10*3/mm3      Basophils, Absolute 0.03 10*3/mm3      Immature Grans, Absolute 0.12 10*3/mm3      nRBC 0.0 /100 WBC           Imaging Results (Most Recent)     Procedure Component Value Units Date/Time    MRI Brain With & Without Contrast [[id number]] Collected:  01/19/20 1434     Updated:  01/19/20 1444    Narrative:       MRI BRAIN W WO CONTRAST-     CLINICAL HISTORY: Confusion. Diffuse weakness.     TECHNIQUE: Multiple axial T1, T2, diffusion and gradient echo images  were acquired. Axial and coronal T1-weighted images were also obtained  after intravenous injection of MultiHance contrast.      COMPARISON: CT scan of the head dated 01/13/2020     FINDINGS: The brain and ventricular system appear structurally normal.  No foci of abnormal signal intensity or enhancement are identified in  the brain or brainstem. There are no foci of restricted diffusion. There  is no evidence of recent or old infarct or hemorrhage. Normal flow voids  are observed in the major vascular structures. There are no masses.  There is mild mucosal thickening in the floor the right maxillary sinus.  The paranasal sinuses are otherwise well aerated.       Impression:       Mild mucosal thickening in the floor the right maxillary  sinus. Otherwise unremarkable MRI of the brain.     This report was finalized on 1/19/2020 2:41 PM by Dr. Marcos Sy M.D.       FL Esophagram Complete [[id number]] Collected:  01/18/20 1357     Updated:  01/18/20 1409    Narrative:       FL ESOPHAGRAM COMPLETE SINGLE-CONTRAST-     INDICATIONS: Coughing, odynophagia     TECHNIQUE: Esophagram     COMPARISON: None available     FINDINGS:     A  image was obtained revealing no focal pulmonary consolidation or  mass.     The patient ingested barium contrast material under  intermittent  fluoroscopic observation, with spot views obtained.     The swallowing mechanism was unremarkable with no penetration of the  laryngeal folds or aspiration during swallowing.     Esophageal peristalsis was unremarkable. Several small rounded defects  in the mucosal coating of the esophagus appear to persist, potentially  polyps or may be persistent adherent air bubbles, suggest endoscopic  correlation. The mucosa of the esophagus otherwise appears unremarkable.  No tight stenosis is demonstrated.     No hiatal hernia was noted. Mild spontaneous gastroesophageal reflux  occurred.     Fluoroscopy time: 53 seconds, 33 fluoroscopic images       Impression:          No tight stenoses. Several small rounded defects in the mucosal coating  of the esophagus appear to persist, potentially polyps or may be  persistent adherent air bubbles, suggest endoscopic correlation.     This report was finalized on 1/18/2020 2:06 PM by Dr. Anton Bower M.D.       CT Head Without Contrast [[id number]] Collected:  01/13/20 0712     Updated:  01/13/20 0723    Narrative:       CT HEAD     HISTORY:Altered mental status     TECHNIQUE: CT scan of the head was obtained with 3 mm axial images  without intravenous contrast.  Radiation dose reduction techniques were  utilized, including automated exposure control and exposure modulation  based on body size.     COMPARISON:None     FINDINGS:  There is no finding of acute infarct, hemorrhage, contusion or abnormal  extra-axial collection. No hydrocephalus is present.       Impression:       1.  No findings of acute intracranial abnormality.        This report was finalized on 1/13/2020 7:20 AM by Dr. Bobby Clemons M.D.       XR Chest 1 View [[id number]] Collected:  01/13/20 0640     Updated:  01/13/20 0644    Narrative:       XR CHEST 1 VW-  01/13/2020     HISTORY: Altered mental status.     The heart size is within normal limits. Lungs appear free of acute  infiltrates. Bones  and soft tissues are unremarkable. There are no  previous studies available for comparison.       Impression:       1. No acute process.     This report was finalized on 1/13/2020 6:41 AM by Dr. Reddy Lizama M.D.           reviewed    ECG/EMG Results (most recent)     Procedure Component Value Units Date/Time    ECG 12 Lead [[id number]] Collected:  01/13/20 0633     Updated:  01/13/20 1758    Narrative:       HEART RATE= 82  bpm  RR Interval= 736  ms  AZ Interval= 147  ms  P Horizontal Axis= 20  deg  P Front Axis= 83  deg  QRSD Interval= 106  ms  QT Interval= 443  ms  QRS Axis= 76  deg  T Wave Axis= 28  deg  - ABNORMAL ECG -  Sinus rhythm  ST elev, probable normal early repol pattern  Prolonged QT interval  NO PRIOR TRACING AVAILABLE FOR COMPARISON  Electronically Signed By: Janak Rosario ([facility]) 13-Jan-2020 17:58:07  Date and Time of Study: 2020-01-13 06:33:48        reviewed    Assessment/Plan     New DM I  Mild Dysphagia-resolved  WEight loss secondary to DM (presumably)     Would be in favor of a daily PPI and follow up in 2 months to review how he is doing with his BS controll as well as his Vague GI symptoms. Will have BGA follow    I discussed the patients findings and my recommendations with patient and Mom in room.     Derek Michael MD  01/19/20  4:46 PM    Time: NOt recorded.

## 2020-01-19 NOTE — PLAN OF CARE
Problem: Patient Care Overview  Goal: Plan of Care Review  Outcome: Ongoing (interventions implemented as appropriate)  Flowsheets (Taken 1/19/2020 0502)  Progress: no change  Plan of Care Reviewed With: patient  Outcome Summary: Pt is alert and oriented x4.Pt up ad saeed. Pt denies any pain or discomfort. Pt educated about insulin, sliding scale and insulin administration.More teachings needed. Will continue to monitor.     Problem: Patient Care Overview  Goal: Discharge Needs Assessment  Outcome: Ongoing (interventions implemented as appropriate)     Problem: Diabetes, Type 1 (Adult)  Goal: Signs and Symptoms of Listed Potential Problems Will be Absent, Minimized or Managed (Diabetes, Type 1)  Outcome: Ongoing (interventions implemented as appropriate)     Problem: Diabetes, Type 1 (Adult)  Goal: Signs and Symptoms of Listed Potential Problems Will be Absent, Minimized or Managed (Diabetes, Type 1)  Outcome: Ongoing (interventions implemented as appropriate)     Problem: Fall Risk (Adult)  Goal: Absence of Fall  Outcome: Ongoing (interventions implemented as appropriate)

## 2020-01-19 NOTE — PROGRESS NOTES
49 y.o.   LOS: 6 days   Patient Care Team:  Provider, No Known as PCP - General    Chief Complaint: Hyperglycemia    Chief Complaint   Patient presents with   • Altered Mental Status       Subjective     HPI  Patient is eating solid food even though he reports dysphagia  He apparently had esophagogram yesterday  Reports not clear yet to the patient  Patient's blood sugars are in the  range  He is currently receiving Lantus 20 units twice daily    Interval History:      Review of Systems:      Review of Systems   Constitutional: Positive for fatigue.   HENT: Positive for trouble swallowing.    Respiratory: Negative.    Cardiovascular: Negative.    Gastrointestinal: Negative.    All other systems reviewed and are negative.    Objective     Vital Signs   Temp:  [97.5 °F (36.4 °C)-98.7 °F (37.1 °C)] 97.5 °F (36.4 °C)  Heart Rate:  [98] 98  Resp:  [16] 16  BP: ()/(55-73) 115/73    Physical Exam:  Physical Exam   Constitutional: He is oriented to person, place, and time.   Eyes: Pupils are equal, round, and reactive to light. EOM are normal.   Neck: Normal range of motion. Neck supple. No thyromegaly present.   Cardiovascular: Normal rate, regular rhythm, normal heart sounds and intact distal pulses.   Pulmonary/Chest: Effort normal and breath sounds normal.   Abdominal: Soft. Bowel sounds are normal. He exhibits no distension. There is no tenderness.   Musculoskeletal: Normal range of motion. He exhibits no edema.   Neurological: He is alert and oriented to person, place, and time.   Skin: Skin is warm and dry.   Negative for acanthosis and vitiligo or purple striae   Psychiatric: He has a normal mood and affect. His behavior is normal.   Nursing note and vitals reviewed.  Results Review:     I reviewed the patient's new clinical results.      No results found for: GLUCOSE  Lab Results (last 72 hours)     Procedure Component Value Units Date/Time    POC Glucose Once [[id number]]  (Abnormal) Collected:   01/19/20 1035    Specimen:  Blood Updated:  01/19/20 1037     Glucose 200 mg/dL     POC Glucose Once [[id number]]  (Normal) Collected:  01/19/20 0612    Specimen:  Blood Updated:  01/19/20 0614     Glucose 111 mg/dL     POC Glucose Once [[id number]]  (Abnormal) Collected:  01/18/20 2025    Specimen:  Blood Updated:  01/18/20 2026     Glucose 133 mg/dL     Blood Culture - Blood, Arm, Right [[id number]] Collected:  01/13/20 1545    Specimen:  Blood from Arm, Right Updated:  01/18/20 1615     Blood Culture No growth at 5 days    Blood Culture - Blood, Arm, Left [[id number]] Collected:  01/13/20 1545    Specimen:  Blood from Arm, Left Updated:  01/18/20 1615     Blood Culture No growth at 5 days    POC Glucose Once [[id number]]  (Normal) Collected:  01/18/20 1601    Specimen:  Blood Updated:  01/18/20 1603     Glucose 93 mg/dL     C-Peptide [[id number]] Collected:  01/17/20 0440    Specimen:  Blood from Arm, Right Updated:  01/18/20 1508     C-Peptide 2.5 ng/mL      Comment: C-Peptide reference interval is for fasting patients.       Narrative:       Performed at:  [address] [place] [address]  [id number]  : Padilla Joshi PhD, Phone:  [phone]    POC Glucose Once [[id number]]  (Abnormal) Collected:  01/18/20 1109    Specimen:  Blood Updated:  01/18/20 1111     Glucose 236 mg/dL     POC Glucose Once [[id number]]  (Abnormal) Collected:  01/18/20 0607    Specimen:  Blood Updated:  01/18/20 0608     Glucose 201 mg/dL     POC Glucose Once [[id number]]  (Abnormal) Collected:  01/17/20 2054    Specimen:  Blood Updated:  01/17/20 2056     Glucose 182 mg/dL     POC Glucose Once [[id number]]  (Normal) Collected:  01/17/20 1639    Specimen:  Blood Updated:  01/17/20 1645     Glucose 108 mg/dL     POC Glucose Once [[id number]]  (Abnormal) Collected:  01/17/20 1148    Specimen:  Blood Updated:  01/17/20 1204     Glucose 157 mg/dL     POC Glucose Once [[id number]]  (Abnormal) Collected:  01/17/20 0619     Specimen:  Blood Updated:  01/17/20 0620     Glucose 248 mg/dL     TSH [[id number]]  (Normal) Collected:  01/17/20 0440    Specimen:  Blood from Arm, Right Updated:  01/17/20 0602     TSH 1.320 uIU/mL     T4, Free [[id number]]  (Normal) Collected:  01/17/20 0440    Specimen:  Blood from Arm, Right Updated:  01/17/20 0602     Free T4 1.02 ng/dL     Narrative:       Results may be falsely increased if patient taking Biotin.      Glutamic Acid Decarboxylase [[id number]] Collected:  01/17/20 0440    Specimen:  Blood from Arm, Right Updated:  01/17/20 0527    POC Glucose Once [[id number]]  (Abnormal) Collected:  01/16/20 2047    Specimen:  Blood Updated:  01/16/20 2049     Glucose 142 mg/dL     POC Glucose Once [[id number]]  (Abnormal) Collected:  01/16/20 1609    Specimen:  Blood Updated:  01/16/20 1611     Glucose 171 mg/dL         Imaging Results (Last 72 Hours)     Procedure Component Value Units Date/Time    MRI Brain With & Without Contrast [[id number]] Resulted:  01/19/20 1356     Updated:  01/19/20 1356    FL Esophagram Complete [[id number]] Collected:  01/18/20 1357     Updated:  01/18/20 1409    Narrative:       FL ESOPHAGRAM COMPLETE SINGLE-CONTRAST-     INDICATIONS: Coughing, odynophagia     TECHNIQUE: Esophagram     COMPARISON: None available     FINDINGS:     A  image was obtained revealing no focal pulmonary consolidation or  mass.     The patient ingested barium contrast material under intermittent  fluoroscopic observation, with spot views obtained.     The swallowing mechanism was unremarkable with no penetration of the  laryngeal folds or aspiration during swallowing.     Esophageal peristalsis was unremarkable. Several small rounded defects  in the mucosal coating of the esophagus appear to persist, potentially  polyps or may be persistent adherent air bubbles, suggest endoscopic  correlation. The mucosa of the esophagus otherwise appears unremarkable.  No tight stenosis is demonstrated.     No hiatal  hernia was noted. Mild spontaneous gastroesophageal reflux  occurred.     Fluoroscopy time: 53 seconds, 33 fluoroscopic images       Impression:          No tight stenoses. Several small rounded defects in the mucosal coating  of the esophagus appear to persist, potentially polyps or may be  persistent adherent air bubbles, suggest endoscopic correlation.     This report was finalized on 1/18/2020 2:06 PM by Dr. Anton Bower M.D.           Medical record review  Summary  Neurology evaluation is pending  Metabolic encephalopathy still being evaluated  Hyponatremia resolved    Medication Review:       Current Facility-Administered Medications:   •  dextrose (D50W) 25 g/ 50mL Intravenous Solution 12.5 g, 12.5 g, Intravenous, PRN, Tacos Arzola MD, 12.5 g at 01/13/20 2240  •  dextrose (D50W) 25 g/ 50mL Intravenous Solution 25 g, 25 g, Intravenous, Q15 Min PRN, Shandra Delcid MD  •  dextrose (D50W) 25 g/ 50mL Intravenous Solution 25 g, 25 g, Intravenous, Q15 Min PRN, Cameron Morgan MD  •  dextrose (GLUTOSE) oral gel 15 g, 15 g, Oral, Q15 Min PRN, Shandra Delcid MD  •  dextrose (GLUTOSE) oral gel 15 g, 15 g, Oral, Q15 Min PRN, Cameron Morgan MD  •  enoxaparin (LOVENOX) syringe 40 mg, 40 mg, Subcutaneous, Q24H, Cameron Morgan MD, 40 mg at 01/19/20 1159  •  gadobenate dimeglumine (MULTIHANCE) injection 13 mL, 13 mL, Intravenous, Once in imaging, Nick Rowan MD  •  glucagon (human recombinant) (GLUCAGEN DIAGNOSTIC) injection 1 mg, 1 mg, Subcutaneous, Q15 Min PRN, Shandra Delcid MD  •  glucagon (human recombinant) (GLUCAGEN DIAGNOSTIC) injection 1 mg, 1 mg, Subcutaneous, Q15 Min PRN, Cameron Morgan MD  •  insulin glargine (LANTUS) injection 15 Units, 15 Units, Subcutaneous, Q12H, Andre Morin MD  •  insulin lispro (humaLOG) injection 0-9 Units, 0-9 Units, Subcutaneous, 4x Daily With Meals & Nightly, Lexie Doe MD, 4 Units at 01/19/20 1158  •  insulin lispro (humaLOG) injection 5  Units, 5 Units, Subcutaneous, TID With Meals, Lexie Doe MD, 5 Units at 01/19/20 1159  •  magnesium sulfate 4 gram infusion - Mg less than or equal to 1mg/dL, 4 g, Intravenous, PRN **OR** magnesium sulfate 3 gram infusion (1gm x 3) - Mg 1.1 - 1.5 mg/dL, 1 g, Intravenous, PRN **OR** Magnesium Sulfate 2 gram infusion- Mg 1.6 - 1.9 mg/dL, 2 g, Intravenous, PRN, Cameron Morgan MD  •  ondansetron (ZOFRAN) tablet 4 mg, 4 mg, Oral, Q6H PRN **OR** ondansetron (ZOFRAN) injection 4 mg, 4 mg, Intravenous, Q6H PRN, Cameron Morgan MD  •  potassium & sodium phosphates (PHOS-NAK) 280-160-250 MG packet - for Phosphorus less than 1.25 mg/dL, 2 packet, Oral, Q6H PRN **OR** potassium & sodium phosphates (PHOS-NAK) 280-160-250 MG packet - for Phosphorus 1.25 - 2.5 mg/dL, 2 packet, Oral, Q6H PRN, Cameron Morgan MD, 2 packet at 01/16/20 1157  •  potassium chloride (MICRO-K) CR capsule 40 mEq, 40 mEq, Oral, PRN **OR** potassium chloride (KLOR-CON) packet 40 mEq, 40 mEq, Oral, PRN **OR** potassium chloride 10 mEq in 100 mL IVPB, 10 mEq, Intravenous, Q1H PRN, Cameron Morgan MD  •  potassium phosphate 45 mmol in sodium chloride 0.9 % 500 mL infusion, 45 mmol, Intravenous, PRN **OR** potassium phosphate 30 mmol in sodium chloride 0.9 % 250 mL infusion, 30 mmol, Intravenous, PRN **OR** potassium phosphate 15 mmol in sodium chloride 0.9 % 100 mL infusion, 15 mmol, Intravenous, PRN, 15 mmol at 01/15/20 1355 **OR** sodium phosphates 40 mmol in sodium chloride 0.9 % 500 mL IVPB, 40 mmol, Intravenous, PRN **OR** sodium phosphates 20 mmol in sodium chloride 0.9 % 250 mL IVPB, 20 mmol, Intravenous, PRN, Cameron Morgan MD, Stopped at 01/14/20 0746  •  senna-docusate sodium (SENOKOT-S) 8.6-50 MG tablet 2 tablet, 2 tablet, Oral, Nightly, Cameron Morgan MD, 2 tablet at 01/18/20 7297  •  [COMPLETED] Insert peripheral IV, , , Once **AND** sodium chloride 0.9 % flush 10 mL, 10 mL, Intravenous, PRN, Marlon Shabazz MD  •  sodium chloride 0.9  "% flush 10 mL, 10 mL, Intravenous, Once PRN, Tacos Arzola MD  •  sodium chloride 0.9 % flush 10 mL, 10 mL, Intravenous, Q12H, Cameron Morgan MD, 10 mL at 01/19/20 0816  •  sodium chloride 0.9 % flush 10 mL, 10 mL, Intravenous, PRN, Cameron Morgan MD    Assessment/Plan     Patient Active Problem List   Diagnosis   • Diabetes mellitus, new onset (CMS/HCC)   • Diabetic ketoacidosis without coma associated with type 2 diabetes mellitus (CMS/HCC)   Uncontrolled type 2 diabetes mellitus  Diabetic ketoacidosis resolved  Dysphagia currently under evaluation    Patient's blood sugars are trending down  I will decrease the Lantus 15 units twice daily  He is currently receiving Humalog 5 units before each meal with a correction scale  We will continue to monitor the Accu-Cheks and then adjust insulin as needed  Patient C-peptide level is however 2.5  ERIN antibodies are pending    D/w Dr Rowan regarding dysphagia  Andre Morin MD FACE.  01/19/20  2:04 PM      [facility] Dragon / transcription disclaimer:     \"Dictated utilizing Dragon dictation\".   "

## 2020-01-19 NOTE — CONSULTS
Patient Care Team:  Provider, No Known as PCP - General    CHIEF COMPLAINT: Dyshagia/ Abmormal UGI    HISTORY OF PRESENT ILLNESS: Covering for BGA    48 yo with new onset DM had described mild dysphagia w/o aspiration nor meat impaction but feels he is 15+ pounds down from his baseline and denies anysymptoms beyond the last week. His UGi was reviewed no reflux esohagitis/stricture nor thrush read only air bubbles?!? Reviewed fims and agree no stricture nor dysmotility nor aspiration.  No history of GERD in the past and no previous EGD. IS in house now for DM education since he will be D/C on insulin.   sper Dr Morgan...49-year-old -American male who presents with confusion and weakness.  He is a very poor historian because he is drowsy but he is arousable and he does answer a few questions.  Apparently he does not have a primary care physician and is not on any medications.  Recently went to urgent care and received clindamycin for swollen, painful and possibly infected right third finger, possible paronychia.  He took 1 dose only and then became too confused to continue his prescriptions.  He has had severe weakness over the past 24 hours, increased thirst and urination.  Has had no appetite.    Past Medical History:   Diagnosis Date   • Diabetes (CMS/HCC)    • Finger infection     right middle finger     Past Surgical History:   Procedure Laterality Date   • NO PAST SURGERIES       History reviewed. No pertinent family history.  Social History     Tobacco Use   • Smoking status: Light Tobacco Smoker     Types: Cigars   • Tobacco comment: 2nd hand smoke from parents; cigars when drinking   Substance Use Topics   • Alcohol use: Yes     Frequency: Monthly or less     Drinks per session: 1 or 2     Binge frequency: Never   • Drug use: Never     Medications Prior to Admission   Medication Sig Dispense Refill Last Dose   • [] clindamycin (CLEOCIN) 300 MG capsule Take 300 mg by mouth 3 (Three) Times a Day.    "     Allergies:  Patient has no known allergies.    REVIEW OF SYSTEMS:  Please see the above history of present illness for pertinent positives and negatives.  The remainder of the patient's systems have been reviewed and are negative.     Vital Signs  Temp:  [97.5 °F (36.4 °C)-98.7 °F (37.1 °C)] 97.5 °F (36.4 °C)  Heart Rate:  [98] 98  Resp:  [16] 16  BP: ()/(55-73) 115/73    Flowsheet Rows      First Filed Value   Admission Height  182.9 cm (72\") Documented at 01/13/2020 0628   Admission Weight  66.8 kg (147 lb 3.2 oz) Documented at 01/13/2020 0637           Physical Exam:  Physical Exam   Constitutional: Patient appears well-developed and well-nourished and in no acute distress   HEENT:   Head: Normocephalic and atraumatic.   Eyes:  Pupils are equal, round, and reactive to light. EOM are intact. Sclerae are anicteric and non-injected.  Mouth and Throat: Patient has moist mucous membranes. Oropharynx is clear of any erythema or exudate.     Neck: Neck supple. No JVD present. No thyromegaly present. No lymphadenopathy present.  Cardiovascular: Regular rate, regular rhythm, S1 normal and S2 normal.  Exam reveals no gallop and no friction rub.  No murmur heard.  Pulmonary/Chest: Lungs are clear to auscultation bilaterally. No respiratory distress. No wheezes. No rhonchi. No rales.   Abdominal: Soft. Bowel sounds are normal. No distension and no mass. There is no hepatosplenomegaly. There is no tenderness.   Musculoskeletal: Normal Muscle tone  Extremities: No edema. Pulses are palpable in all 4 extremities.  Neurological: Patient is alert and oriented to person, place, and time. Cranial nerves II-XII are grossly intact with no focal deficits.  Skin: Skin is warm. No rash noted. Nails show no clubbing.  No cyanosis or erythema.    Debilities/Disabilities Identified: None  Emotional Behavior: Appropriate     Results Review:    I reviewed the patient's new clinical results.  Lab Results (most recent)     Procedure " Component Value Units Date/Time    POC Glucose Once [[id number]]  (Abnormal) Collected:  01/19/20 1548    Specimen:  Blood Updated:  01/19/20 1550     Glucose 153 mg/dL     POC Glucose Once [[id number]]  (Abnormal) Collected:  01/19/20 1035    Specimen:  Blood Updated:  01/19/20 1037     Glucose 200 mg/dL     Blood Culture - Blood, Arm, Right [[id number]] Collected:  01/13/20 1545    Specimen:  Blood from Arm, Right Updated:  01/18/20 1615     Blood Culture No growth at 5 days    Blood Culture - Blood, Arm, Left [[id number]] Collected:  01/13/20 1545    Specimen:  Blood from Arm, Left Updated:  01/18/20 1615     Blood Culture No growth at 5 days    C-Peptide [[id number]] Collected:  01/17/20 0440    Specimen:  Blood from Arm, Right Updated:  01/18/20 1508     C-Peptide 2.5 ng/mL      Comment: C-Peptide reference interval is for fasting patients.       Narrative:       Performed at:  [address] [place] [address]  [id number]  : Padilla Joshi PhD, Phone:  [phone]    TSH [[id number]]  (Normal) Collected:  01/17/20 0440    Specimen:  Blood from Arm, Right Updated:  01/17/20 0602     TSH 1.320 uIU/mL     T4, Free [[id number]]  (Normal) Collected:  01/17/20 0440    Specimen:  Blood from Arm, Right Updated:  01/17/20 0602     Free T4 1.02 ng/dL     Narrative:       Results may be falsely increased if patient taking Biotin.      Glutamic Acid Decarboxylase [[id number]] Collected:  01/17/20 0440    Specimen:  Blood from Arm, Right Updated:  01/17/20 0527    Basic Metabolic Panel [[id number]]  (Abnormal) Collected:  01/16/20 0535    Specimen:  Blood Updated:  01/16/20 0711     Glucose 139 mg/dL      BUN 12 mg/dL      Creatinine 0.86 mg/dL      Sodium 141 mmol/L      Potassium 3.5 mmol/L      Chloride 104 mmol/L      CO2 22.8 mmol/L      Calcium 8.7 mg/dL      eGFR  African Amer 116 mL/min/1.73      BUN/Creatinine Ratio 14.0     Anion Gap 14.2 mmol/L     Narrative:       GFR Normal >60  Chronic  Kidney Disease <60  Kidney Failure <15      Phosphorus [[id number]]  (Abnormal) Collected:  01/16/20 0535    Specimen:  Blood Updated:  01/16/20 0708     Phosphorus 2.0 mg/dL     CBC & Differential [[id number]] Collected:  01/16/20 0535    Specimen:  Blood Updated:  01/16/20 0634    Narrative:       The following orders were created for panel order CBC & Differential.  Procedure                               Abnormality         Status                     ---------                               -----------         ------                     CBC Auto Differential[[id number]]        Abnormal            Final result                 Please view results for these tests on the individual orders.    CBC Auto Differential [[id number]]  (Abnormal) Collected:  01/16/20 0535    Specimen:  Blood Updated:  01/16/20 0634     WBC 9.86 10*3/mm3      RBC 4.96 10*6/mm3      Hemoglobin 14.0 g/dL      Hematocrit 40.6 %      MCV 81.9 fL      MCH 28.2 pg      MCHC 34.5 g/dL      RDW 13.4 %      RDW-SD 39.6 fl      MPV 10.6 fL      Platelets 286 10*3/mm3      Neutrophil % 62.8 %      Lymphocyte % 21.3 %      Monocyte % 14.0 %      Eosinophil % 1.0 %      Basophil % 0.2 %      Immature Grans % 0.7 %      Neutrophils, Absolute 6.19 10*3/mm3      Lymphocytes, Absolute 2.10 10*3/mm3      Monocytes, Absolute 1.38 10*3/mm3      Eosinophils, Absolute 0.10 10*3/mm3      Basophils, Absolute 0.02 10*3/mm3      Immature Grans, Absolute 0.07 10*3/mm3      nRBC 0.0 /100 WBC     Procalcitonin [[id number]]  (Normal) Collected:  01/15/20 0449    Specimen:  Blood Updated:  01/15/20 0612     Procalcitonin 0.23 ng/mL     Narrative:       As a Marker for Sepsis (Non-Neonates):   1. <0.5 ng/mL represents a low risk of severe sepsis and/or septic shock.  1. >2 ng/mL represents a high risk of severe sepsis and/or septic shock.    As a Marker for Lower Respiratory Tract Infections that require antibiotic therapy:  PCT on Admission     Antibiotic Therapy              "6-12 Hrs later  > 0.5                Strongly Recommended            >0.25 - <0.5         Recommended  0.1 - 0.25           Discouraged                   Remeasure/reassess PCT  <0.1                 Strongly Discouraged          Remeasure/reassess PCT      As 28 day mortality risk marker: \"Change in Procalcitonin Result\" (> 80 % or <=80 %) if Day 0 (or Day 1) and Day 4 values are available. Refer to [email or web address]-pct-calculator.com/   Change in PCT <=80 %   A decrease of PCT levels below or equal to 80 % defines a positive change in PCT test result representing a higher risk for 28-day all-cause mortality of patients diagnosed with severe sepsis or septic shock.  Change in PCT > 80 %   A decrease of PCT levels of more than 80 % defines a negative change in PCT result representing a lower risk for 28-day all-cause mortality of patients diagnosed with severe sepsis or septic shock.                Results may be falsely decreased if patient taking Biotin.     Basic Metabolic Panel [[id number]]  (Abnormal) Collected:  01/15/20 0449    Specimen:  Blood Updated:  01/15/20 0602     Glucose 153 mg/dL      BUN 25 mg/dL      Creatinine 1.04 mg/dL      Sodium 149 mmol/L      Potassium 4.3 mmol/L      Chloride 114 mmol/L      CO2 19.8 mmol/L      Calcium 8.5 mg/dL      eGFR  African Amer 93 mL/min/1.73      BUN/Creatinine Ratio 24.0     Anion Gap 15.2 mmol/L     Narrative:       GFR Normal >60  Chronic Kidney Disease <60  Kidney Failure <15      Magnesium [[id number]]  (Normal) Collected:  01/15/20 0449    Specimen:  Blood Updated:  01/15/20 0602     Magnesium 2.6 mg/dL     Phosphorus [[id number]]  (Abnormal) Collected:  01/15/20 0449    Specimen:  Blood Updated:  01/15/20 0602     Phosphorus 2.3 mg/dL     Platelet Count [[id number]]  (Normal) Collected:  01/15/20 0449    Specimen:  Blood Updated:  01/15/20 0540     Platelets 289 10*3/mm3     Magnesium [[id number]]  (Abnormal) Collected:  01/14/20 1358    Specimen:  Blood " Updated:  01/14/20 1448     Magnesium 3.2 mg/dL     Urine Drug Screen - Urine, Clean Catch [[id number]]  (Normal) Collected:  01/14/20 0028    Specimen:  Urine, Clean Catch Updated:  01/14/20 0140     Amphet/Methamphet, Screen Negative     Barbiturates Screen, Urine Negative     Benzodiazepine Screen, Urine Negative     Cocaine Screen, Urine Negative     Opiate Screen Negative     THC, Screen, Urine Negative     Methadone Screen, Urine Negative     Oxycodone Screen, Urine Negative    Narrative:       Negative Thresholds For Drugs Screened:     Amphetamines               500 ng/ml   Barbiturates               200 ng/ml   Benzodiazepines            100 ng/ml   Cocaine                    300 ng/ml   Methadone                  300 ng/ml   Opiates                    300 ng/ml   Oxycodone                  100 ng/ml   THC                        50 ng/ml    The Normal Value for all drugs tested is negative. This report includes final unconfirmed screening results to be used for medical treatment purposes only. Unconfirmed results must not be used for non-medical purposes such as employment or legal testing. Clinical consideration should be applied to any drug of abuse test, particulary when unconfirmed results are used.    Potassium [[id number]]  (Normal) Collected:  01/13/20 2235    Specimen:  Blood Updated:  01/13/20 2309     Potassium 4.1 mmol/L     Procalcitonin [[id number]]  (Abnormal) Collected:  01/13/20 1545    Specimen:  Blood from Arm, Right Updated:  01/13/20 1743     Procalcitonin 0.37 ng/mL     Narrative:       As a Marker for Sepsis (Non-Neonates):   1. <0.5 ng/mL represents a low risk of severe sepsis and/or septic shock.  1. >2 ng/mL represents a high risk of severe sepsis and/or septic shock.    As a Marker for Lower Respiratory Tract Infections that require antibiotic therapy:  PCT on Admission     Antibiotic Therapy             6-12 Hrs later  > 0.5                Strongly Recommended            >0.25 - <0.5  "        Recommended  0.1 - 0.25           Discouraged                   Remeasure/reassess PCT  <0.1                 Strongly Discouraged          Remeasure/reassess PCT      As 28 day mortality risk marker: \"Change in Procalcitonin Result\" (> 80 % or <=80 %) if Day 0 (or Day 1) and Day 4 values are available. Refer to http://www.[facility].com/   Change in PCT <=80 %   A decrease of PCT levels below or equal to 80 % defines a positive change in PCT test result representing a higher risk for 28-day all-cause mortality of patients diagnosed with severe sepsis or septic shock.  Change in PCT > 80 %   A decrease of PCT levels of more than 80 % defines a negative change in PCT result representing a lower risk for 28-day all-cause mortality of patients diagnosed with severe sepsis or septic shock.                Results may be falsely decreased if patient taking Biotin.     Comprehensive Metabolic Panel [[id number]]  (Abnormal) Collected:  01/13/20 0930    Specimen:  Blood Updated:  01/13/20 1008     Glucose 714 mg/dL      BUN 92 mg/dL      Creatinine 2.25 mg/dL      Sodium 132 mmol/L      Potassium 6.1 mmol/L      Comment: Ok to release per Dr. Arzola        Chloride 93 mmol/L      CO2 10.9 mmol/L      Calcium 8.6 mg/dL      Total Protein 7.6 g/dL      Albumin 3.70 g/dL      ALT (SGPT) 17 U/L      Comment: Specimen hemolyzed.  Results may be affected.        AST (SGOT) 35 U/L      Comment: Specimen hemolyzed.  Results may be affected.        Alkaline Phosphatase 84 U/L      Total Bilirubin 0.6 mg/dL      eGFR  African Amer 38 mL/min/1.73      Globulin 3.9 gm/dL      A/G Ratio 0.9 g/dL      BUN/Creatinine Ratio 40.9     Anion Gap 28.1 mmol/L     Narrative:       GFR Normal >60  Chronic Kidney Disease <60  Kidney Failure <15      Comprehensive Metabolic Panel [[id number]]  (Abnormal) Collected:  01/13/20 0736    Specimen:  Blood Updated:  01/13/20 0922     Glucose 823 mg/dL      BUN 96 mg/dL      Creatinine 2.46 " mg/dL      Sodium 126 mmol/L      Potassium 5.6 mmol/L      Chloride 83 mmol/L      CO2 13.4 mmol/L      Calcium 8.7 mg/dL      Total Protein 7.5 g/dL      Albumin 3.70 g/dL      ALT (SGPT) 13 U/L      AST (SGOT) 31 U/L      Alkaline Phosphatase 84 U/L      Total Bilirubin 0.7 mg/dL      eGFR  African Amer 34 mL/min/1.73      Globulin 3.8 gm/dL      A/G Ratio 1.0 g/dL      BUN/Creatinine Ratio 39.0     Anion Gap 29.6 mmol/L     Narrative:       GFR Normal >60  Chronic Kidney Disease <60  Kidney Failure <15      Osmolality, Serum [[id number]]  (Abnormal) Collected:  01/13/20 0736    Specimen:  Blood Updated:  01/13/20 0911     Osmolality 379 mOsm/kg     Hemoglobin A1c [[id number]]  (Abnormal) Collected:  01/13/20 0633    Specimen:  Blood Updated:  01/13/20 0850     Hemoglobin A1C 14.40 %     Narrative:       Hemoglobin A1C Ranges:    Increased Risk for Diabetes  5.7% to 6.4%  Diabetes                     >= 6.5%  Diabetic Goal                < 7.0%    Troponin [[id number]]  (Normal) Collected:  01/13/20 0633    Specimen:  Blood Updated:  01/13/20 0727     Troponin T <0.010 ng/mL      Comment: Specimen hemolyzed.  Results may be affected. Release per Dr. Arzola       Narrative:       Troponin T Reference Range:  <= 0.03 ng/mL-   Negative for AMI  >0.03 ng/mL-     Abnormal for myocardial necrosis.  Clinicians would have to utilize clinical acumen, EKG, Troponin and serial changes to determine if it is an Acute Myocardial Infarction or myocardial injury due to an underlying chronic condition.       Results may be falsely decreased if patient taking Biotin.      Urinalysis, Microscopic Only - Urine, Catheter [[id number]] Collected:  01/13/20 0635    Specimen:  Urine, Catheter Updated:  01/13/20 0710     RBC, UA None Seen /HPF      WBC, UA None Seen /HPF      Bacteria, UA None Seen /HPF      Squamous Epithelial Cells, UA 0-2 /HPF      Hyaline Casts, UA None Seen /LPF      Methodology Manual Light Microscopy    Blood Gas,  Arterial [[id number]]  (Abnormal) Collected:  01/13/20 0654    Specimen:  Arterial Blood Updated:  01/13/20 0657     Site Arterial: right radial     Rigoberto's Test Positive     pH, Arterial 7.321 pH units      pCO2, Arterial 24.2 mm Hg      pO2, Arterial 139.7 mm Hg      HCO3, Arterial 12.5 mmol/L      Base Excess, Arterial -11.4 mmol/L      O2 Saturation Calculated 99.0 %      Barometric Pressure for Blood Gas 758.1 mmHg      Modality Room Air     Rate 18 Breaths/minute     Urinalysis With Microscopic If Indicated (No Culture) - Urine, Catheter [[id number]]  (Abnormal) Collected:  01/13/20 0635    Specimen:  Urine, Catheter Updated:  01/13/20 0650     Color, UA Yellow     Appearance, UA Clear     pH, UA <=5.0     Specific Gravity, UA 1.028     Glucose, UA >=1000 mg/dL (3+)     Ketones, UA 15 mg/dL (1+)     Bilirubin, UA Negative     Blood, UA Trace     Protein, UA Negative     Leuk Esterase, UA Negative     Nitrite, UA Negative     Urobilinogen, UA 0.2 E.U./dL    CBC & Differential [[id number]] Collected:  01/13/20 0633    Specimen:  Blood Updated:  01/13/20 0649    Narrative:       The following orders were created for panel order CBC & Differential.  Procedure                               Abnormality         Status                     ---------                               -----------         ------                     CBC Auto Differential[[id number]]        Abnormal            Final result                 Please view results for these tests on the individual orders.    CBC Auto Differential [[id number]]  (Abnormal) Collected:  01/13/20 0633    Specimen:  Blood Updated:  01/13/20 0649     WBC 14.50 10*3/mm3      RBC 6.62 10*6/mm3      Hemoglobin 18.7 g/dL      Hematocrit 56.6 %      MCV 85.5 fL      MCH 28.2 pg      MCHC 33.0 g/dL      RDW 12.3 %      RDW-SD 36.7 fl      MPV 11.1 fL      Platelets 425 10*3/mm3      Neutrophil % 85.7 %      Lymphocyte % 4.9 %      Monocyte % 8.4 %      Eosinophil % 0.0 %      Basophil  % 0.2 %      Immature Grans % 0.8 %      Neutrophils, Absolute 12.42 10*3/mm3      Lymphocytes, Absolute 0.71 10*3/mm3      Monocytes, Absolute 1.22 10*3/mm3      Eosinophils, Absolute 0.00 10*3/mm3      Basophils, Absolute 0.03 10*3/mm3      Immature Grans, Absolute 0.12 10*3/mm3      nRBC 0.0 /100 WBC           Imaging Results (Most Recent)     Procedure Component Value Units Date/Time    MRI Brain With & Without Contrast [[id number]] Collected:  01/19/20 1434     Updated:  01/19/20 1444    Narrative:       MRI BRAIN W WO CONTRAST-     CLINICAL HISTORY: Confusion. Diffuse weakness.     TECHNIQUE: Multiple axial T1, T2, diffusion and gradient echo images  were acquired. Axial and coronal T1-weighted images were also obtained  after intravenous injection of MultiHance contrast.      COMPARISON: CT scan of the head dated 01/13/2020     FINDINGS: The brain and ventricular system appear structurally normal.  No foci of abnormal signal intensity or enhancement are identified in  the brain or brainstem. There are no foci of restricted diffusion. There  is no evidence of recent or old infarct or hemorrhage. Normal flow voids  are observed in the major vascular structures. There are no masses.  There is mild mucosal thickening in the floor the right maxillary sinus.  The paranasal sinuses are otherwise well aerated.       Impression:       Mild mucosal thickening in the floor the right maxillary  sinus. Otherwise unremarkable MRI of the brain.     This report was finalized on 1/19/2020 2:41 PM by Dr. Marcos Sy M.D.       FL Esophagram Complete [[id number]] Collected:  01/18/20 1357     Updated:  01/18/20 1409    Narrative:       FL ESOPHAGRAM COMPLETE SINGLE-CONTRAST-     INDICATIONS: Coughing, odynophagia     TECHNIQUE: Esophagram     COMPARISON: None available     FINDINGS:     A  image was obtained revealing no focal pulmonary consolidation or  mass.     The patient ingested barium contrast material under  intermittent  fluoroscopic observation, with spot views obtained.     The swallowing mechanism was unremarkable with no penetration of the  laryngeal folds or aspiration during swallowing.     Esophageal peristalsis was unremarkable. Several small rounded defects  in the mucosal coating of the esophagus appear to persist, potentially  polyps or may be persistent adherent air bubbles, suggest endoscopic  correlation. The mucosa of the esophagus otherwise appears unremarkable.  No tight stenosis is demonstrated.     No hiatal hernia was noted. Mild spontaneous gastroesophageal reflux  occurred.     Fluoroscopy time: 53 seconds, 33 fluoroscopic images       Impression:          No tight stenoses. Several small rounded defects in the mucosal coating  of the esophagus appear to persist, potentially polyps or may be  persistent adherent air bubbles, suggest endoscopic correlation.     This report was finalized on 1/18/2020 2:06 PM by Dr. Anton Bower M.D.       CT Head Without Contrast [[id number]] Collected:  01/13/20 0712     Updated:  01/13/20 0723    Narrative:       CT HEAD     HISTORY:Altered mental status     TECHNIQUE: CT scan of the head was obtained with 3 mm axial images  without intravenous contrast.  Radiation dose reduction techniques were  utilized, including automated exposure control and exposure modulation  based on body size.     COMPARISON:None     FINDINGS:  There is no finding of acute infarct, hemorrhage, contusion or abnormal  extra-axial collection. No hydrocephalus is present.       Impression:       1.  No findings of acute intracranial abnormality.        This report was finalized on 1/13/2020 7:20 AM by Dr. Bobby Clemons M.D.       XR Chest 1 View [[id number]] Collected:  01/13/20 0640     Updated:  01/13/20 0644    Narrative:       XR CHEST 1 VW-  01/13/2020     HISTORY: Altered mental status.     The heart size is within normal limits. Lungs appear free of acute  infiltrates. Bones  and soft tissues are unremarkable. There are no  previous studies available for comparison.       Impression:       1. No acute process.     This report was finalized on 1/13/2020 6:41 AM by Dr. Reddy Lizama M.D.           reviewed    ECG/EMG Results (most recent)     Procedure Component Value Units Date/Time    ECG 12 Lead [[id number]] Collected:  01/13/20 0633     Updated:  01/13/20 1758    Narrative:       HEART RATE= 82  bpm  RR Interval= 736  ms  FL Interval= 147  ms  P Horizontal Axis= 20  deg  P Front Axis= 83  deg  QRSD Interval= 106  ms  QT Interval= 443  ms  QRS Axis= 76  deg  T Wave Axis= 28  deg  - ABNORMAL ECG -  Sinus rhythm  ST elev, probable normal early repol pattern  Prolonged QT interval  NO PRIOR TRACING AVAILABLE FOR COMPARISON  Electronically Signed By: Janak Rosario ([facility]) 13-Jan-2020 17:58:07  Date and Time of Study: 2020-01-13 06:33:48        reviewed    Assessment/Plan     New DM I  Mild Dysphagia-resolved  WEight loss secondary to DM (presumably)     Would be in favor of a daily PPI and follow up in 2 months to review how he is doing with his BS controll as well as his Vague GI symptoms. Will have BGA follow    I discussed the patients findings and my recommendations with patient and Mom in room.     Derek Michael MD  01/19/20  4:46 PM    Time: NOt recorded.

## 2020-01-19 NOTE — PROGRESS NOTES
LOS: 6 days   Patient Care Team:  Provider, No Known as PCP - General    Subjective   Patient doing well no real changes from yesterday he does not have any pain anywhere.  He says he still trying to get the hang of this insulin and fingersticks and talked with several people and clear that he has a ways to go      Review of Systems:          Objective     Vital Signs  Vital Sign Min/Max for last 24 hours  Temp  Min: 97.5 °F (36.4 °C)  Max: 98.7 °F (37.1 °C)   BP  Min: 93/55  Max: 115/73   Pulse  Min: 98  Max: 98   Resp  Min: 16  Max: 16   No data recorded   No data recorded   Weight  Min: 65.5 kg (144 lb 6.4 oz)  Max: 65.5 kg (144 lb 6.4 oz)        Ventilator/Non-Invasive Ventilation Settings (From admission, onward)    None                       Body mass index is 19.58 kg/m².  I/O last 3 completed shifts:  In: 360 [P.O.:360]  Out: 1300 [Urine:1300]  I/O this shift:  In: 360 [P.O.:360]  Out: 500 [Urine:500]        Physical Exam:  General Appearance: Well-developed black male he is resting in bed he is in no acute distress.  Eyes: Conjunctiva are clear and anicteric pupils are equal  ENT: His membranes are moist no erythema no exudates Mallampati type I airway nasal septum midline  Neck: No adenopathy or thyromegaly no jugular venous tension trachea is midline  Lungs: Clear nonlabored symmetric expansion no wheezes rales or rhonchi  Cardiac: Regular rate rhythm no murmur no gallop  Abdomen: Soft nontender no palpable hepatosplenomegaly or masses  : Not examined  Musculoskeletal: I really cannot tell without knowing that it was right index finger that he had any infection there is no erythema no swelling no warmth  Skin: No jaundice no petechiae skin is warm and dry  Neuro: Patient was aware he was in a hospital but he again currently identified this is [facility] he did that earlier according to notes and his mother.  He does know the year and month and his name.  He can tell me what his insulin  dose was reduced to.  He is moving all extremities to command  Extremities/P Vascular: No clubbing no cyanosis no edema palpable radial and dorsalis pedis pulses.  I can hardly tell where his paronychia was now so much resolved  MSE: He seems to be in pretty good spirits       Labs:  Results from last 7 days   Lab Units 01/16/20  0535 01/15/20  0449 01/14/20  1824 01/14/20  1358 01/14/20  0248 01/13/20  2235 01/13/20  1545 01/13/20  1352 01/13/20  0930 01/13/20  0736 01/13/20  0633   GLUCOSE mg/dL 139* 153* 139* 136* 355* 96 196* 341* 714* 823* 909*   SODIUM mmol/L 141 149* 150* 152* 143 143 141 140 132* 126* 118*   POTASSIUM mmol/L 3.5 4.3 3.8 4.0 6.1* 4.1  4.1 3.7 3.8 6.1* 5.6* 7.2*   MAGNESIUM mg/dL  --  2.6  --  3.2* 3.4* 3.5* 3.8* 3.8*  --  4.2* 4.5*   CO2 mmol/L 22.8 19.8* 19.1* 21.0* 15.1* 22.9 23.0 21.0* 10.9* 13.4* 13.1*   CHLORIDE mmol/L 104 114* 117* 120* 107 107 101 100 93* 83* 75*   ANION GAP mmol/L 14.2 15.2* 13.9 11.0 20.9* 13.1 17.0* 19.0* 28.1* 29.6* 29.9*   CREATININE mg/dL 0.86 1.04 1.26 1.35* 1.32* 1.45* 1.98* 2.06* 2.25* 2.46* 2.73*   BUN mg/dL 12 25* 38* 50* 62* 68* 84* 86* 92* 96* 99*   BUN / CREAT RATIO  14.0 24.0 30.2* 37.0* 47.0* 46.9* 42.4* 41.7* 40.9* 39.0* 36.3*   CALCIUM mg/dL 8.7 8.5* 8.0* 8.6 8.8 9.0 9.2 9.0 8.6 8.7 9.1   ALK PHOS U/L  --   --   --   --   --   --   --   --  84 84 89   TOTAL PROTEIN g/dL  --   --   --   --   --   --   --   --  7.6 7.5 8.3   ALT (SGPT) U/L  --   --   --   --   --   --   --   --  17 13 15   AST (SGOT) U/L  --   --   --   --   --   --   --   --  35 31 38   BILIRUBIN mg/dL  --   --   --   --   --   --   --   --  0.6 0.7 0.8   ALBUMIN g/dL  --   --   --   --   --   --   --   --  3.70 3.70 4.00   GLOBULIN gm/dL  --   --   --   --   --   --   --   --  3.9 3.8 4.3     Estimated Creatinine Clearance: 98.4 mL/min (by C-G formula based on SCr of 0.86 mg/dL).      Results from last 7 days   Lab Units 01/16/20  0535 01/15/20  0449 01/13/20  0633   WBC 10*3/mm3  9.86  --  14.50*   RBC 10*6/mm3 4.96  --  6.62*   HEMOGLOBIN g/dL 14.0  --  18.7*   HEMATOCRIT % 40.6  --  56.6*   MCV fL 81.9  --  85.5   MCH pg 28.2  --  28.2   MCHC g/dL 34.5  --  33.0   RDW % 13.4  --  12.3   RDW-SD fl 39.6  --  36.7*   MPV fL 10.6  --  11.1   PLATELETS 10*3/mm3 286 289 425   NEUTROPHIL % % 62.8  --  85.7*   LYMPHOCYTE % % 21.3  --  4.9*   MONOCYTES % % 14.0*  --  8.4   EOSINOPHIL % % 1.0  --  0.0*   BASOPHIL % % 0.2  --  0.2   IMM GRAN % % 0.7*  --  0.8*   NEUTROS ABS 10*3/mm3 6.19  --  12.42*   LYMPHS ABS 10*3/mm3 2.10  --  0.71   MONOS ABS 10*3/mm3 1.38*  --  1.22*   EOS ABS 10*3/mm3 0.10  --  0.00   BASOS ABS 10*3/mm3 0.02  --  0.03   IMMATURE GRANS (ABS) 10*3/mm3 0.07*  --  0.12*   NRBC /100 WBC 0.0  --  0.0     Results from last 7 days   Lab Units 01/13/20  0654   PH, ARTERIAL pH units 7.321*   PO2 ART mm Hg 139.7*   PCO2, ARTERIAL mm Hg 24.2*   HCO3 ART mmol/L 12.5*     Results from last 7 days   Lab Units 01/13/20  0633   TROPONIN T ng/mL <0.010         Results from last 7 days   Lab Units 01/17/20  0440   TSH uIU/mL 1.320   FREE T4 ng/dL 1.02     Results from last 7 days   Lab Units 01/15/20  0449 01/13/20  1545   PROCALCITONIN ng/mL 0.23 0.37*         Microbiology Results (last 10 days)     Procedure Component Value - Date/Time    Blood Culture - Blood, Arm, Right [[id number]] Collected:  01/13/20 1545    Lab Status:  Final result Specimen:  Blood from Arm, Right Updated:  01/18/20 1615     Blood Culture No growth at 5 days    Blood Culture - Blood, Arm, Left [[id number]] Collected:  01/13/20 8356    Lab Status:  Final result Specimen:  Blood from Arm, Left Updated:  01/18/20 1615     Blood Culture No growth at 5 days                enoxaparin 40 mg Subcutaneous Q24H   insulin glargine 15 Units Subcutaneous Q12H   insulin lispro 0-9 Units Subcutaneous 4x Daily With Meals & Nightly   insulin lispro 5 Units Subcutaneous TID With Meals   senna-docusate sodium 2 tablet Oral Nightly   sodium  chloride 10 mL Intravenous Q12H          Diagnostics:  Ct Head Without Contrast    Result Date: 1/13/2020  CT HEAD  HISTORY:Altered mental status  TECHNIQUE: CT scan of the head was obtained with 3 mm axial images without intravenous contrast.  Radiation dose reduction techniques were utilized, including automated exposure control and exposure modulation based on body size.  COMPARISON:None  FINDINGS: There is no finding of acute infarct, hemorrhage, contusion or abnormal extra-axial collection. No hydrocephalus is present.      1.  No findings of acute intracranial abnormality.   This report was finalized on 1/13/2020 7:20 AM by Dr. Bobby Clemons M.D.      Fl Esophagram Complete    Result Date: 1/18/2020  FL ESOPHAGRAM COMPLETE SINGLE-CONTRAST-  INDICATIONS: Coughing, odynophagia  TECHNIQUE: Esophagram  COMPARISON: None available  FINDINGS:  A  image was obtained revealing no focal pulmonary consolidation or mass.  The patient ingested barium contrast material under intermittent fluoroscopic observation, with spot views obtained.  The swallowing mechanism was unremarkable with no penetration of the laryngeal folds or aspiration during swallowing.  Esophageal peristalsis was unremarkable. Several small rounded defects in the mucosal coating of the esophagus appear to persist, potentially polyps or may be persistent adherent air bubbles, suggest endoscopic correlation. The mucosa of the esophagus otherwise appears unremarkable. No tight stenosis is demonstrated.  No hiatal hernia was noted. Mild spontaneous gastroesophageal reflux occurred.  Fluoroscopy time: 53 seconds, 33 fluoroscopic images       No tight stenoses. Several small rounded defects in the mucosal coating of the esophagus appear to persist, potentially polyps or may be persistent adherent air bubbles, suggest endoscopic correlation.  This report was finalized on 1/18/2020 2:06 PM by Dr. Anton Bower M.D.      Xr Chest 1 View    Result Date:  1/13/2020  XR CHEST 1 VW-  01/13/2020  HISTORY: Altered mental status.  The heart size is within normal limits. Lungs appear free of acute infiltrates. Bones and soft tissues are unremarkable. There are no previous studies available for comparison.      1. No acute process.  This report was finalized on 1/13/2020 6:41 AM by Dr. Reddy Lizama M.D.               Active Hospital Problems    Diagnosis  POA   • **Diabetic ketoacidosis without coma associated with type 2 diabetes mellitus (CMS/HCC) [E11.10]  Yes   • Diabetes mellitus, new onset (CMS/HCC) [E11.9]  Yes      Resolved Hospital Problems   No resolved problems to display.         Assessment/Plan     1. DKA resolved  2. Metabolic encephalopathy very slow to clear, MRI of the brain reportedly did not show anything acute.  Neurology's help appreciated  3. Acute kidney injury resolved  4. Dysphasia work-up underway  5. Paronychia patient got ceftriaxone for this, his finger looks much better looks like the ceftriaxone was stopped after 3 days a little concerned if that was sufficient duration given his diabetes and how sick it made him., give him some oral antibiotics for a little longer course.  6. Hyponatremia resolved  7. Esophageal mucosal abnormalities question polyps question air bubbles on esophagram GI evaluation recommended    Plan for disposition: We will see what GI thinks about the esophageal mucosal abnormalities but the real determinant of his discharge is going to be when he can do his own insulin and blood sugars.  He really does not have anyone to help him.  The least he is going to need home health.  I am going to ask for the diabetic educator to come by tomorrow and assess how he is progressing with his learning.    Nick Rowan MD  01/19/20  3:49 PM    Time:

## 2020-01-19 NOTE — CONSULTS
Patient Identification:  NAME:  Joby Kulkarni  Age:  49 y.o.   Sex:  male   :  1970   MRN:  [id number]       Chief complaint: I have got diabetes, reason for consult confusion cognitive dysfunction    History of present illness: This patient is a 49-year-old right-handed white male with a unremarkable medical history who states he is just been diagnosed with diabetes had some finger infection or swelling in the right middle finger over the last few weeks.  He comes in now with elevated blood glucose that has been corrected he still little confused duration is been at least a couple of days if not longer his mom is here and she cannot tell me much of anything locations not pertinent duration is noted quality some confusion.  See below, he thinks he is at [facility] did not know [facility].  Modifying factors insulin sodium is normal white count normal and he has nothing to suggest a stroke no double vision paresthesias or paralysis at this point nothing focal at all and he denies headache.  He also denies confusion    Past medical history:  Past Medical History:   Diagnosis Date   • Diabetes (CMS/[facility])    • Finger infection     right middle finger       Past surgical history:  Past Surgical History:   Procedure Laterality Date   • NO PAST SURGERIES         Allergies:  Patient has no known allergies.    Home medications:  Medications Prior to Admission   Medication Sig Dispense Refill Last Dose   • [] clindamycin (CLEOCIN) 300 MG capsule Take 300 mg by mouth 3 (Three) Times a Day.           Hospital medications:    enoxaparin 40 mg Subcutaneous Q24H   insulin glargine 15 Units Subcutaneous Q12H   insulin lispro 0-9 Units Subcutaneous 4x Daily With Meals & Nightly   insulin lispro 5 Units Subcutaneous TID With Meals   senna-docusate sodium 2 tablet Oral Nightly   sodium chloride 10 mL Intravenous Q12H        •  dextrose  •  dextrose  •  dextrose  •  dextrose  •  dextrose  •  glucagon (human  recombinant)  •  glucagon (human recombinant)  •  magnesium sulfate **OR** magnesium sulfate in D5W 1g/100mL (PREMIX) **OR** magnesium sulfate  •  ondansetron **OR** ondansetron  •  potassium & sodium phosphates **OR** potassium & sodium phosphates  •  potassium chloride **OR** potassium chloride **OR** potassium chloride  •  potassium phosphate infusion greater than 15 mMoles **OR** potassium phosphate infusion greater than 15 mMoles **OR** potassium phosphate **OR** sodium phosphate IVPB **OR** sodium phosphate IVPB  •  [COMPLETED] Insert peripheral IV **AND** sodium chloride  •  sodium chloride  •  sodium chloride    Family history:  History reviewed. No pertinent family history.    Social history:  Social History     Tobacco Use   • Smoking status: Light Tobacco Smoker     Types: Cigars   • Tobacco comment: 2nd hand smoke from parents; cigars when drinking   Substance Use Topics   • Alcohol use: Yes     Frequency: Monthly or less     Drinks per session: 1 or 2     Binge frequency: Never   • Drug use: Never       Review of systems:    He did not know he had diabetes till now he states he has had a right middle finger infection but denies confusion or hallucinations.  His mom is with him and thinks he has been not entirely thinking clearly in the hospital but I cannot get her to tell me anything more.  Basically neither 1 of them will give me an adequate review of systems so I reviewed the chart fully    Objective:  Vitals Ranges:   Temp:  [97.8 °F (36.6 °C)-98.7 °F (37.1 °C)] 98.1 °F (36.7 °C)  Heart Rate:  [98] 98  Resp:  [16] 16  BP: ()/(55-70) 102/66      Physical Exam:  Awake alert and oriented times [facility] he otherwise is oriented x2 fund of knowledge poor attention span concentration fair recent remote memory fair language function normal looks older than his stated age in no distress pupils 2 constricting to one 1/2 normal disc retinas visual fields extraocular movements full without  nystagmus nasolabial folds palate tongue symmetrical normal hearing facial sensation head turning shoulder shrug motor probably 5- out of 5 x4 not the best effort no pronator drift atrophy or fasciculations reflexes absent throughout toes downgoing bilaterally sensation normal light touch face both arms and both legs coordination Station and gait he would not really follow he is a little confused.  Visual acuity normal at 3 feet heart regular without murmur neck supple without bruits extremities no clubbing cyanosis or significant edema visual acuity normal at 3 feet    Results review:   I reviewed the patient's new clinical results.    Data review:  Lab Results (last 24 hours)     Procedure Component Value Units Date/Time    POC Glucose Once [[id number]]  (Abnormal) Collected:  01/19/20 1035    Specimen:  Blood Updated:  01/19/20 1037     Glucose 200 mg/dL     POC Glucose Once [[id number]]  (Normal) Collected:  01/19/20 0612    Specimen:  Blood Updated:  01/19/20 0614     Glucose 111 mg/dL     POC Glucose Once [[id number]]  (Abnormal) Collected:  01/18/20 2025    Specimen:  Blood Updated:  01/18/20 2026     Glucose 133 mg/dL     Blood Culture - Blood, Arm, Right [[id number]] Collected:  01/13/20 1545    Specimen:  Blood from Arm, Right Updated:  01/18/20 1615     Blood Culture No growth at 5 days    Blood Culture - Blood, Arm, Left [[id number]] Collected:  01/13/20 1545    Specimen:  Blood from Arm, Left Updated:  01/18/20 1615     Blood Culture No growth at 5 days    POC Glucose Once [[id number]]  (Normal) Collected:  01/18/20 1601    Specimen:  Blood Updated:  01/18/20 1603     Glucose 93 mg/dL     C-Peptide [[id number]] Collected:  01/17/20 0440    Specimen:  Blood from Arm, Right Updated:  01/18/20 1508     C-Peptide 2.5 ng/mL      Comment: C-Peptide reference interval is for fasting patients.       Narrative:       Performed at:  [address] [place] [address]  [id number]  : Padilla  Adi PhD, Phone:  [phone]           Imaging:  Imaging Results (Last 24 Hours)     Procedure Component Value Units Date/Time    FL Esophagram Complete [[id number]] Collected:  01/18/20 1357     Updated:  01/18/20 1409    Narrative:       FL ESOPHAGRAM COMPLETE SINGLE-CONTRAST-     INDICATIONS: Coughing, odynophagia     TECHNIQUE: Esophagram     COMPARISON: None available     FINDINGS:     A  image was obtained revealing no focal pulmonary consolidation or  mass.     The patient ingested barium contrast material under intermittent  fluoroscopic observation, with spot views obtained.     The swallowing mechanism was unremarkable with no penetration of the  laryngeal folds or aspiration during swallowing.     Esophageal peristalsis was unremarkable. Several small rounded defects  in the mucosal coating of the esophagus appear to persist, potentially  polyps or may be persistent adherent air bubbles, suggest endoscopic  correlation. The mucosa of the esophagus otherwise appears unremarkable.  No tight stenosis is demonstrated.     No hiatal hernia was noted. Mild spontaneous gastroesophageal reflux  occurred.     Fluoroscopy time: 53 seconds, 33 fluoroscopic images       Impression:          No tight stenoses. Several small rounded defects in the mucosal coating  of the esophagus appear to persist, potentially polyps or may be  persistent adherent air bubbles, suggest endoscopic correlation.     This report was finalized on 1/18/2020 2:06 PM by Dr. Anton Bower M.D.                Assessment and Plan:       Diabetic ketoacidosis without coma associated with type 2 diabetes mellitus (CMS/HCC)    Diabetes mellitus, new onset (CMS/HCC)  Patient does not seem to have normal cognitive function.  He has some metabolic encephalopathy but I would like to make sure that he does not have any evidence of an acute stroke.  I will order additional diagnostic testing in the form of an MRI of the brain with and  without contrast.  Thank you      Bryan Capps MD  01/19/20  11:52 AM

## 2020-01-20 PROBLEM — R93.3 ABNORMAL ESOPHAGRAM: Status: ACTIVE | Noted: 2020-01-13

## 2020-01-20 LAB
FOLATE SERPL-MCNC: 6.23 NG/ML (ref 4.78–24.2)
GLUCOSE BLDC GLUCOMTR-MCNC: 126 MG/DL (ref 70–130)
GLUCOSE BLDC GLUCOMTR-MCNC: 136 MG/DL (ref 70–130)
GLUCOSE BLDC GLUCOMTR-MCNC: 258 MG/DL (ref 70–130)
PLATELET # BLD AUTO: 412 10*3/MM3 (ref 140–450)
VIT B12 BLD-MCNC: 638 PG/ML (ref 211–946)

## 2020-01-20 PROCEDURE — 99232 SBSQ HOSP IP/OBS MODERATE 35: CPT | Performed by: INTERNAL MEDICINE

## 2020-01-20 PROCEDURE — 97110 THERAPEUTIC EXERCISES: CPT

## 2020-01-20 PROCEDURE — 63710000001 INSULIN LISPRO (HUMAN) PER 5 UNITS: Performed by: INTERNAL MEDICINE

## 2020-01-20 PROCEDURE — 82962 GLUCOSE BLOOD TEST: CPT

## 2020-01-20 PROCEDURE — 82607 VITAMIN B-12: CPT | Performed by: PSYCHIATRY & NEUROLOGY

## 2020-01-20 PROCEDURE — 63710000001 INSULIN GLARGINE PER 5 UNITS: Performed by: INTERNAL MEDICINE

## 2020-01-20 PROCEDURE — 99231 SBSQ HOSP IP/OBS SF/LOW 25: CPT | Performed by: PSYCHIATRY & NEUROLOGY

## 2020-01-20 PROCEDURE — 85049 AUTOMATED PLATELET COUNT: CPT | Performed by: INTERNAL MEDICINE

## 2020-01-20 PROCEDURE — 82746 ASSAY OF FOLIC ACID SERUM: CPT | Performed by: PSYCHIATRY & NEUROLOGY

## 2020-01-20 PROCEDURE — 25010000002 ENOXAPARIN PER 10 MG: Performed by: INTERNAL MEDICINE

## 2020-01-20 RX ADMIN — CEPHALEXIN 500 MG: 500 CAPSULE ORAL at 12:04

## 2020-01-20 RX ADMIN — CEPHALEXIN 500 MG: 500 CAPSULE ORAL at 06:14

## 2020-01-20 RX ADMIN — CEPHALEXIN 500 MG: 500 CAPSULE ORAL at 00:17

## 2020-01-20 RX ADMIN — CEPHALEXIN 500 MG: 500 CAPSULE ORAL at 18:07

## 2020-01-20 RX ADMIN — INSULIN GLARGINE 15 UNITS: 100 INJECTION, SOLUTION SUBCUTANEOUS at 23:59

## 2020-01-20 RX ADMIN — PANTOPRAZOLE SODIUM 40 MG: 40 TABLET, DELAYED RELEASE ORAL at 06:14

## 2020-01-20 RX ADMIN — INSULIN LISPRO 5 UNITS: 100 INJECTION, SOLUTION INTRAVENOUS; SUBCUTANEOUS at 18:07

## 2020-01-20 RX ADMIN — INSULIN GLARGINE 15 UNITS: 100 INJECTION, SOLUTION SUBCUTANEOUS at 10:00

## 2020-01-20 RX ADMIN — ENOXAPARIN SODIUM 40 MG: 40 INJECTION SUBCUTANEOUS at 10:01

## 2020-01-20 RX ADMIN — SODIUM CHLORIDE, PRESERVATIVE FREE 10 ML: 5 INJECTION INTRAVENOUS at 10:03

## 2020-01-20 RX ADMIN — INSULIN LISPRO 5 UNITS: 100 INJECTION, SOLUTION INTRAVENOUS; SUBCUTANEOUS at 12:04

## 2020-01-20 RX ADMIN — INSULIN LISPRO 6 UNITS: 100 INJECTION, SOLUTION INTRAVENOUS; SUBCUTANEOUS at 12:04

## 2020-01-20 RX ADMIN — INSULIN LISPRO 5 UNITS: 100 INJECTION, SOLUTION INTRAVENOUS; SUBCUTANEOUS at 10:00

## 2020-01-20 RX ADMIN — CEPHALEXIN 500 MG: 500 CAPSULE ORAL at 23:59

## 2020-01-20 NOTE — PLAN OF CARE
Problem: Patient Care Overview  Goal: Plan of Care Review  Outcome: Ongoing (interventions implemented as appropriate)  Flowsheets (Taken 1/20/2020 0500)  Progress: improving  Plan of Care Reviewed With: patient; mother  Outcome Summary: A&O, no complaints of pain, up ad saeed, educated on insulin administration, verbalized and demonstrated understanding, diabetic educator to see prior to dc, VSS, will continue to monitor.

## 2020-01-20 NOTE — PROGRESS NOTES
Patient Identification:  NAME:  Joby Kulkarni  Age:  49 y.o.   Sex:  male   :  1970   MRN:  [id number]       Chief complaint metabolic encephalopathy secondary to diabetic ketoacidosis    History of present illness: Awake alert and better oriented at this time talking on the phone MRI of the brain by my independent eyeball review is completely normal      Past medical history:  Past Medical History:   Diagnosis Date   • Diabetes (CMS/HCC)    • Finger infection     right middle finger       Allergies:  Patient has no known allergies.    Home medications:  Medications Prior to Admission   Medication Sig Dispense Refill Last Dose   • [] clindamycin (CLEOCIN) 300 MG capsule Take 300 mg by mouth 3 (Three) Times a Day.           Hospital medications:    cephalexin 500 mg Oral Q6H   enoxaparin 40 mg Subcutaneous Q24H   insulin glargine 15 Units Subcutaneous Q12H   insulin lispro 0-9 Units Subcutaneous 4x Daily With Meals & Nightly   insulin lispro 5 Units Subcutaneous TID With Meals   pantoprazole 40 mg Oral QAM   senna-docusate sodium 2 tablet Oral Nightly   sodium chloride 10 mL Intravenous Q12H        dextrose  •  dextrose  •  dextrose  •  dextrose  •  dextrose  •  glucagon (human recombinant)  •  glucagon (human recombinant)  •  magnesium sulfate **OR** magnesium sulfate in D5W 1g/100mL (PREMIX) **OR** magnesium sulfate  •  ondansetron **OR** ondansetron  •  potassium & sodium phosphates **OR** potassium & sodium phosphates  •  potassium chloride **OR** potassium chloride **OR** potassium chloride  •  potassium phosphate infusion greater than 15 mMoles **OR** potassium phosphate infusion greater than 15 mMoles **OR** potassium phosphate **OR** sodium phosphate IVPB **OR** sodium phosphate IVPB  •  [COMPLETED] Insert peripheral IV **AND** sodium chloride  •  sodium chloride  •  sodium chloride      Objective:  Vitals Ranges:   Temp:  [97.5 °F (36.4 °C)-99.1 °F (37.3 °C)] 98.8 °F (37.1 °C)  Heart Rate:   [81-98] 81  Resp:  [16] 16  BP: (105-121)/(62-86) 107/62      Physical Exam:  Awake alert and better oriented today he does not get off the phone while I am talking to him even though his mother asks him to get off the phone.  Normal cranial nerves II through VII good  strength no asterixis or myoclonus reflexes trace throughout symmetrical toes downgoing bilaterally    Results review:   I reviewed the patient's new clinical results.    Data review:  Lab Results (last 24 hours)     Procedure Component Value Units Date/Time    POC Glucose Once [[id number]]  (Abnormal) Collected:  01/20/20 0625    Specimen:  Blood Updated:  01/20/20 0628     Glucose 136 mg/dL     Platelet Count [[id number]]  (Normal) Collected:  01/20/20 0441    Specimen:  Blood from Arm, Right Updated:  01/20/20 0511     Platelets 412 10*3/mm3     POC Glucose Once [[id number]]  (Abnormal) Collected:  01/19/20 2104    Specimen:  Blood Updated:  01/19/20 2106     Glucose 182 mg/dL     POC Glucose Once [[id number]]  (Abnormal) Collected:  01/19/20 1548    Specimen:  Blood Updated:  01/19/20 1550     Glucose 153 mg/dL     POC Glucose Once [[id number]]  (Abnormal) Collected:  01/19/20 1035    Specimen:  Blood Updated:  01/19/20 1037     Glucose 200 mg/dL            Imaging:  Imaging Results (Last 24 Hours)     Procedure Component Value Units Date/Time    MRI Brain With & Without Contrast [[id number]] Collected:  01/19/20 1434     Updated:  01/19/20 1444    Narrative:       MRI BRAIN W WO CONTRAST-     CLINICAL HISTORY: Confusion. Diffuse weakness.     TECHNIQUE: Multiple axial T1, T2, diffusion and gradient echo images  were acquired. Axial and coronal T1-weighted images were also obtained  after intravenous injection of MultiHance contrast.      COMPARISON: CT scan of the head dated 01/13/2020     FINDINGS: The brain and ventricular system appear structurally normal.  No foci of abnormal signal intensity or enhancement are identified in  the brain or  brainstem. There are no foci of restricted diffusion. There  is no evidence of recent or old infarct or hemorrhage. Normal flow voids  are observed in the major vascular structures. There are no masses.  There is mild mucosal thickening in the floor the right maxillary sinus.  The paranasal sinuses are otherwise well aerated.       Impression:       Mild mucosal thickening in the floor the right maxillary  sinus. Otherwise unremarkable MRI of the brain.     This report was finalized on 1/19/2020 2:41 PM by Dr. Marcos Sy M.D.                Assessment and Plan:       Diabetic ketoacidosis without coma associated with type 2 diabetes mellitus (CMS/HCC)    Diabetes mellitus, new onset (CMS/HCC)    Abnormal esophagram    He is awake alert and has no neurologic complaints.  The MRI of his brain was normal and at this point I would not recommend further neurologic work-up.  Metabolic encephalopathy secondary to diabetic ketoacidosis has improved.  I will check B12 and folate levels his thyroid profile appears to be normal  Will sign off and follow-up PRN reconsult      Bryan Capps MD  01/20/20  10:34 AM

## 2020-01-20 NOTE — DISCHARGE INSTR - OTHER ORDERS
"You have a new Primary Care Doctor appointment on Friday 1/24/2020 AT 2:30PM with Lizz Anton LEODAN, at that appointment please request an order for her to send to [facility] Diabetes office for \"Diabetes Self Training class\". Once they receive the order the Diabetes office will call you to schedule an appointment.    "

## 2020-01-20 NOTE — PLAN OF CARE
Problem: Patient Care Overview  Goal: Plan of Care Review  Note:   SLP consult received due to stroke protocol and altered mental status. Stroke has been ruled out. Swallow screen passed, patient is tolerating diet well per MD notes.  Esophagram abnormal but without laryngeal penetration per radiologist. SLP to sign off at this time, please re-consult if we can be of any assistance.

## 2020-01-20 NOTE — PROGRESS NOTES
"[facility] PULMONARY CARE         Dr Feng Sayied   LOS: 7 days   Patient Care Team:  Provider, No Known as PCP - General    Chief Complaint: DKA with metabolic encephalopathy now resolved.  Esophageal abnormalities noted with question of polyps versus air bubbles.    Interval History: Events noted chart reviewed.  Resting comfortably.  Still not fully ready in giving himself insulin injections.  Tolerating p.o. diet well    REVIEW OF SYSTEMS:   CARDIOVASCULAR: No chest pain, chest pressure or chest discomfort. No palpitations or edema.   RESPIRATORY: No shortness of breath, cough or sputum.   GASTROINTESTINAL: No anorexia, nausea, vomiting or diarrhea. No abdominal pain or blood.   HEMATOLOGIC: No bleeding or bruising.     Ventilator/Non-Invasive Ventilation Settings (From admission, onward)    None            Vital Signs  Temp:  [97.5 °F (36.4 °C)-99.1 °F (37.3 °C)] 98.8 °F (37.1 °C)  Heart Rate:  [81-98] 81  Resp:  [16] 16  BP: (105-121)/(62-86) 107/62    Intake/Output Summary (Last 24 hours) at 1/20/2020 1004  Last data filed at 1/20/2020 0913  Gross per 24 hour   Intake 480 ml   Output --   Net 480 ml     Flowsheet Rows      First Filed Value   Admission Height  182.9 cm (72\") Documented at 01/13/2020 0628   Admission Weight  66.8 kg (147 lb 3.2 oz) Documented at 01/13/2020 0637          Physical Exam:   General Appearance:    Alert, cooperative, in no acute distress   Lungs:     Clear to auscultation,respirations regular, even and                  unlabored    Heart:    Regular rhythm and normal rate, normal S1 and S2, no            murmur, no gallop, no rub, no click   Chest Wall:    No abnormalities observed   Abdomen:     Normal bowel sounds, no masses, no organomegaly, soft        non-tender, non-distended, no guarding, no rebound                tenderness   Extremities:   Moves all extremities well, no edema, no cyanosis, no             redness     Results Review:        Results from last 7 days "   Lab Units 01/16/20  0535 01/15/20  0449 01/14/20  1824   SODIUM mmol/L 141 149* 150*   POTASSIUM mmol/L 3.5 4.3 3.8   CHLORIDE mmol/L 104 114* 117*   CO2 mmol/L 22.8 19.8* 19.1*   BUN mg/dL 12 25* 38*   CREATININE mg/dL 0.86 1.04 1.26   GLUCOSE mg/dL 139* 153* 139*   CALCIUM mg/dL 8.7 8.5* 8.0*         Results from last 7 days   Lab Units 01/20/20  0441 01/16/20  0535 01/15/20  0449   WBC 10*3/mm3  --  9.86  --    HEMOGLOBIN g/dL  --  14.0  --    HEMATOCRIT %  --  40.6  --    PLATELETS 10*3/mm3 412 286 289             Results from last 7 days   Lab Units 01/15/20  0449   MAGNESIUM mg/dL 2.6               I reviewed the patient's new clinical results.  I personally viewed and interpreted the patient's CXR        Medication Review:     cephalexin 500 mg Oral Q6H   enoxaparin 40 mg Subcutaneous Q24H   insulin glargine 15 Units Subcutaneous Q12H   insulin lispro 0-9 Units Subcutaneous 4x Daily With Meals & Nightly   insulin lispro 5 Units Subcutaneous TID With Meals   pantoprazole 40 mg Oral QAM   senna-docusate sodium 2 tablet Oral Nightly   sodium chloride 10 mL Intravenous Q12H            ASSESSMENT:   DKA resolved  Metabolic encephalopathy  Acute kidney injury  Dysphagia  Esophageal mucosal abnormalities  Paronychia  Hyponatremia      PLAN:  Still not comfortable giving himself insulin injections.  Continue working on giving subcu insulin injections  Discussed with GI Dr. Earl plans for EGD in the morning  Hyponatremia resolved  Creatinine stable  Completes course of antibiotics  Discussed with patient and his mother at bedside in detail  Possibly discharge after EGD in the morning    Ping Caro MD  01/20/20  10:04 AM

## 2020-01-20 NOTE — DISCHARGE PLACEMENT REQUEST
"Johnson Kulkarni (49 y.o. Male)     Date of Birth Social Security Number Address Home Phone MRN    [id number]  [id number] [address] [phone] [id number]    [employer] Mar[employer] Status          None Unknown       Admission Date Admission Type Admitting Provider Attending Provider Department, Room/Bed    1/13/20 Emergency Parrish Petersen MD Haller, Harold Dale, MD [address], [address]    Discharge Date Discharge Disposition Discharge Destination                       Attending Provider:  Nick Rowan MD    Allergies:  No Known Allergies    Isolation:  None   Infection:  None   Code Status:  CPR    Ht:  182.9 cm (72\")   Wt:  69.9 kg (154 lb)    Admission Cmt:  None   Principal Problem:  Diabetic ketoacidosis without coma associated with type 2 diabetes mellitus (CMS/HCC) [E11.10]                 Active Insurance as of 1/13/2020     Primary Coverage     Payor Plan Insurance Group Employer/Plan Group    [employer] [facility] [employer] [facility] [id number]     Payor Plan Address Payor Plan Phone Number Payor Plan Fax Number Effective Dates    [address] [phone]  1/1/2018 - None Entered    [facility] [id number]       Subscriber Name Subscriber Birth Date Member ID       JOHNSON KULKARNI 1970 [id number]                 Emergency Contacts      (Rel.) Home Phone Work Phone Mobile Phone    CARMELITA KULKARNI (Mother) [phone] -- [phone]    Daksha Churchill (Sister) -- -- [phone]    Tere Markham (Daughter) -- -- --              "

## 2020-01-20 NOTE — PLAN OF CARE
Problem: Patient Care Overview  Goal: Plan of Care Review  Outcome: Ongoing (interventions implemented as appropriate)  Flowsheets (Taken 1/20/2020 1813)  Progress: improving  Plan of Care Reviewed With: patient  Note:   Patient alert and oriented with report of painful left hand finger where antibiotics given per MD order.  Diabetes education given with each insulin injection and more teaching needed.  EGD scheduled for tomorrow and consent in the chart and NPO at midnight.  VSS.  Will continue to monitor.     Problem: Skin Injury Risk (Adult)  Goal: Skin Health and Integrity  Outcome: Ongoing (interventions implemented as appropriate)  Flowsheets (Taken 1/20/2020 1813)  Skin Health and Integrity: making progress toward outcome     Problem: Fall Risk (Adult)  Goal: Absence of Fall  Outcome: Ongoing (interventions implemented as appropriate)  Flowsheets (Taken 1/20/2020 1813)  Absence of Fall: making progress toward outcome     Problem: Pain, Acute (Adult)  Goal: Acceptable Pain Control/Comfort Level  Outcome: Ongoing (interventions implemented as appropriate)  Flowsheets (Taken 1/20/2020 1813)  Acceptable Pain Control/Comfort Level: making progress toward outcome     Problem: Diabetes, Type 1 (Adult)  Goal: Signs and Symptoms of Listed Potential Problems Will be Absent, Minimized or Managed (Diabetes, Type 1)  Outcome: Ongoing (interventions implemented as appropriate)  Flowsheets (Taken 1/20/2020 1813)  Problems Assessed (Type 1 Diabetes): all  Problems Present (Type 1 Diabetes): situational response     Problem: Confusion, Acute (Adult)  Goal: Safety  Outcome: Ongoing (interventions implemented as appropriate)  Flowsheets (Taken 1/20/2020 1813)  Safety: making progress toward outcome

## 2020-01-20 NOTE — PROGRESS NOTES
49 y.o.   LOS: 7 days   Patient Care Team:  Provider, No Known as PCP - General    Chief Complaint: Elevated blood sugars    Chief Complaint   Patient presents with   • Altered Mental Status       Subjective   Patient is eating okay.  Still has issues with insulin administration.  C-peptide levels are positive.  Pending john 65 antibodies.    Interval History:    Review of Systems:   Constitutional: Positive for appetite change and fatigue.   Eyes: Negative for visual disturbance.   Respiratory: no shortness of breath.    Cardiovascular: no leg swelling.   Gastrointestinal: Negative for abdominal pain.   Endocrine: Negative for polydipsia and polyuria.   Musculoskeletal: Positive for arthralgias and myalgias.   Skin: Negative for pallor.   Neurological: Positive for weakness and numbness.   Psychiatric/Behavioral: Positive for sleep disturbance.    Review of Systems  Objective     Vital Signs   Temp:  [97.5 °F (36.4 °C)-99.1 °F (37.3 °C)] 98.8 °F (37.1 °C)  Heart Rate:  [81-98] 81  Resp:  [16] 16  BP: (105-121)/(62-86) 107/62    Physical Exam:  General exam-no distress, appears stated age.  Eyes-PERRL, anicteric sclera  ENT-external ears/nose normal.  Neck-no adenopathy, midline trachea.  Lungs-normal chest exam on inspection, diminished bilateral breath sounds on auscultation.  Cardiovascular -normal S1-S2, positive murmur.  ABD-nontender, nondistended, bowel sounds heard.  Extremities-no edema, cyanosis.  Hammertoes noted.    Physical ExamResults Review:     I reviewed the patient's new clinical results and summarized them in subjective and in plan.      No results found for: GLUCOSE  Lab Results (last 24 hours)     Procedure Component Value Units Date/Time    POC Glucose Once [[id number]]  (Abnormal) Collected:  01/20/20 0625    Specimen:  Blood Updated:  01/20/20 0628     Glucose 136 mg/dL     Platelet Count [[id number]]  (Normal) Collected:  01/20/20 0441    Specimen:  Blood from Arm, Right Updated:  01/20/20 0511      Platelets 412 10*3/mm3     POC Glucose Once [[id number]]  (Abnormal) Collected:  01/19/20 2104    Specimen:  Blood Updated:  01/19/20 2106     Glucose 182 mg/dL     POC Glucose Once [[id number]]  (Abnormal) Collected:  01/19/20 1548    Specimen:  Blood Updated:  01/19/20 1550     Glucose 153 mg/dL     POC Glucose Once [[id number]]  (Abnormal) Collected:  01/19/20 1035    Specimen:  Blood Updated:  01/19/20 1037     Glucose 200 mg/dL         Imaging Results (Last 24 Hours)     Procedure Component Value Units Date/Time    MRI Brain With & Without Contrast [[id number]] Collected:  01/19/20 1434     Updated:  01/19/20 1444    Narrative:       MRI BRAIN W WO CONTRAST-     CLINICAL HISTORY: Confusion. Diffuse weakness.     TECHNIQUE: Multiple axial T1, T2, diffusion and gradient echo images  were acquired. Axial and coronal T1-weighted images were also obtained  after intravenous injection of MultiHance contrast.      COMPARISON: CT scan of the head dated 01/13/2020     FINDINGS: The brain and ventricular system appear structurally normal.  No foci of abnormal signal intensity or enhancement are identified in  the brain or brainstem. There are no foci of restricted diffusion. There  is no evidence of recent or old infarct or hemorrhage. Normal flow voids  are observed in the major vascular structures. There are no masses.  There is mild mucosal thickening in the floor the right maxillary sinus.  The paranasal sinuses are otherwise well aerated.       Impression:       Mild mucosal thickening in the floor the right maxillary  sinus. Otherwise unremarkable MRI of the brain.     This report was finalized on 1/19/2020 2:41 PM by Dr. Marcos Sy M.D.             Medication Review: done      Current Facility-Administered Medications:   •  cephalexin (KEFLEX) capsule 500 mg, 500 mg, Oral, Q6H, Nick Rowan MD, 500 mg at 01/20/20 0614  •  dextrose (D50W) 25 g/ 50mL Intravenous Solution 12.5 g, 12.5 g, Intravenous,  PRN, Tacos Arzola MD, 12.5 g at 01/13/20 2240  •  dextrose (D50W) 25 g/ 50mL Intravenous Solution 25 g, 25 g, Intravenous, Q15 Min PRN, Shandra Delcid MD  •  dextrose (D50W) 25 g/ 50mL Intravenous Solution 25 g, 25 g, Intravenous, Q15 Min PRN, Cameron Morgan MD  •  dextrose (GLUTOSE) oral gel 15 g, 15 g, Oral, Q15 Min PRN, Shandra Delcid MD  •  dextrose (GLUTOSE) oral gel 15 g, 15 g, Oral, Q15 Min PRN, Cameron Morgan MD  •  enoxaparin (LOVENOX) syringe 40 mg, 40 mg, Subcutaneous, Q24H, Cameron Morgan MD, 40 mg at 01/20/20 1001  •  glucagon (human recombinant) (GLUCAGEN DIAGNOSTIC) injection 1 mg, 1 mg, Subcutaneous, Q15 Min PRN, Shandra Delcid MD  •  glucagon (human recombinant) (GLUCAGEN DIAGNOSTIC) injection 1 mg, 1 mg, Subcutaneous, Q15 Min PRN, Cameron Morgan MD  •  insulin glargine (LANTUS) injection 15 Units, 15 Units, Subcutaneous, Q12H, Andre Morin MD, 15 Units at 01/20/20 1000  •  insulin lispro (humaLOG) injection 0-9 Units, 0-9 Units, Subcutaneous, 4x Daily With Meals & Nightly, Lexie Doe MD, 2 Units at 01/19/20 2159  •  insulin lispro (humaLOG) injection 5 Units, 5 Units, Subcutaneous, TID With Meals, Lexie Doe MD, 5 Units at 01/20/20 1000  •  magnesium sulfate 4 gram infusion - Mg less than or equal to 1mg/dL, 4 g, Intravenous, PRN **OR** magnesium sulfate 3 gram infusion (1gm x 3) - Mg 1.1 - 1.5 mg/dL, 1 g, Intravenous, PRN **OR** Magnesium Sulfate 2 gram infusion- Mg 1.6 - 1.9 mg/dL, 2 g, Intravenous, PRN, Cameron Morgan MD  •  ondansetron (ZOFRAN) tablet 4 mg, 4 mg, Oral, Q6H PRN **OR** ondansetron (ZOFRAN) injection 4 mg, 4 mg, Intravenous, Q6H PRN, Cameron Morgan MD  •  pantoprazole (PROTONIX) EC tablet 40 mg, 40 mg, Oral, QAM, Fernanda, Derek Ríos MD, 40 mg at 01/20/20 0614  •  potassium & sodium phosphates (PHOS-NAK) 280-160-250 MG packet - for Phosphorus less than 1.25 mg/dL, 2 packet, Oral, Q6H PRN **OR** potassium & sodium phosphates  (PHOS-NAK) 280-160-250 MG packet - for Phosphorus 1.25 - 2.5 mg/dL, 2 packet, Oral, Q6H PRN, Cameron Morgan MD, 2 packet at 01/16/20 1157  •  potassium chloride (MICRO-K) CR capsule 40 mEq, 40 mEq, Oral, PRN **OR** potassium chloride (KLOR-CON) packet 40 mEq, 40 mEq, Oral, PRN **OR** potassium chloride 10 mEq in 100 mL IVPB, 10 mEq, Intravenous, Q1H PRN, Cameron Morgan MD  •  potassium phosphate 45 mmol in sodium chloride 0.9 % 500 mL infusion, 45 mmol, Intravenous, PRN **OR** potassium phosphate 30 mmol in sodium chloride 0.9 % 250 mL infusion, 30 mmol, Intravenous, PRN **OR** potassium phosphate 15 mmol in sodium chloride 0.9 % 100 mL infusion, 15 mmol, Intravenous, PRN, 15 mmol at 01/15/20 1355 **OR** sodium phosphates 40 mmol in sodium chloride 0.9 % 500 mL IVPB, 40 mmol, Intravenous, PRN **OR** sodium phosphates 20 mmol in sodium chloride 0.9 % 250 mL IVPB, 20 mmol, Intravenous, PRN, Cameron Morgan MD, Stopped at 01/14/20 0746  •  senna-docusate sodium (SENOKOT-S) 8.6-50 MG tablet 2 tablet, 2 tablet, Oral, Nightly, Cameron Morgan MD, 2 tablet at 01/19/20 2059  •  [COMPLETED] Insert peripheral IV, , , Once **AND** sodium chloride 0.9 % flush 10 mL, 10 mL, Intravenous, PRN, Marlon Shabazz MD  •  sodium chloride 0.9 % flush 10 mL, 10 mL, Intravenous, Once PRN, Tacos Arzola MD  •  sodium chloride 0.9 % flush 10 mL, 10 mL, Intravenous, Q12H, Cameron Morgan MD, 10 mL at 01/20/20 1003  •  sodium chloride 0.9 % flush 10 mL, 10 mL, Intravenous, PRN, Cameron Morgan MD    Assessment/Plan     Active Hospital Problems    Diagnosis  POA   • **Diabetic ketoacidosis without coma associated with type 2 diabetes mellitus (CMS/HCC) [E11.10]  Yes   • Diabetes mellitus, new onset (CMS/HCC) [E11.9]  Yes   • Abnormal esophagram [R93.3]  Unknown      Resolved Hospital Problems   No resolved problems to display.     Diabetes mellitus-significantly uncontrolled  It is unclear if patient is a type I or type II  Continue  "Lantus 15 units twice daily  Continue Humalog 5 units with each meal  Continue Humalog sliding scale 3 times daily before meals and at bedtime.    C-peptide levels positive.  Pending john 65 antibodies.       Discussed with the nurse extensively about patient's situation. Diabetic educator has Educated the patient again    Discussed plan with Nurse.     Lexie Doe MD.  01/20/20  12:17 PM      EMR Dragon / transcription disclaimer:    \"Dictated utilizing Dragon dictation\".   "

## 2020-01-20 NOTE — PLAN OF CARE
"  Problem: Patient Care Overview  Goal: Plan of Care Review  Outcome: Ongoing (interventions implemented as appropriate)  Flowsheets (Taken 1/20/2020 1500)  Plan of Care Reviewed With: patient  Outcome Summary: F/u with 50 y/o at bedside to reinforce insulin teaching. pt remembers \"long\" acting and \"short\" acting but does not recall Lantus, or Humalog names without reading notes or white board. Pt would benefit from perhaps a simpler insulin routine i.e 70/30 or an insulin that can be rx'd bid for dc (rather than qid.) Review drawing up insulin from a vial. Pt then expressed concern re potential co-pays for insulin(s.) Speak with CCP.     "

## 2020-01-20 NOTE — PAYOR COMM NOTE
"Johnson Kulkarni (49 y.o. Male)     PLEASE SEE ATTACHED CLINICAL REVIEW.     REF#[id number]    PLEASE CALL   OR  [phone] WITH INPT CONTINUED STAY AUTH AND DAYS APPROVED.     THANK YOU    BETH MCKEON LPN CCP    Date of Birth Social Security Number Address Home Phone MRN    1970  [id number] [address] [phone] [id number]    Confucianism Marital Status          None Unknown       Admission Date Admission Type Admitting Provider Attending Provider Department, Room/Bed    1/13/20 Emergency Parrish Petersen MD Haller, Harold Dale, MD [address], [address]    Discharge Date Discharge Disposition Discharge Destination                       Attending Provider:  Nick Rowan MD    Allergies:  No Known Allergies    Isolation:  None   Infection:  None   Code Status:  CPR    Ht:  182.9 cm (72\")   Wt:  69.9 kg (154 lb)    Admission Cmt:  None   Principal Problem:  Diabetic ketoacidosis without coma associated with type 2 diabetes mellitus (CMS/[facility]) [E11.10]                 Active Insurance as of 1/13/2020     Primary Coverage     Payor Plan Insurance Group Employer/Plan Group    [employer] BLUE [employer]OSS [facility] [employer] BLUE [facility] PPO [id number]     Payor Plan Address Payor Plan Phone Number Payor Plan Fax Number Effective Dates    [address] [phone]  1/1/2018 - None Entered    [place] [address]       Subscriber Name Subscriber Birth Date Member ID       JOHNSON KULKARNI 1970 [id number]                 Emergency Contacts      (Rel.) Home Phone Work Phone Mobile Phone    CARMELITA KULKARNI (Mother) [phone] -- [phone]    Daksha Churchill (Sister) -- -- [phone]    ShahrzadElizabeth xiee (Daughter) -- -- --            Oxygen Therapy (last 2 days)     Date/Time   SpO2   Device (Oxygen Therapy)   Flow (L/min)   Oxygen Concentration (%)   ETCO2 (mmHg)    01/20/20 0721   --   room air   --   --   --    01/20/20 " 0000   --   room air   --   --   --    01/19/20 2341   --   room air   --   --   --    01/19/20 1938   --   room air   --   --   --    01/19/20 1930   --   room air   --   --   --    01/19/20 0809   --   room air   --   --   --    01/18/20 2300   --   room air   --   --   --    01/18/20 2114   --   room air   --   --   --    01/18/20 1900   --   room air   --   --   --    01/18/20 1354   --   room air   --   --   --    01/18/20 0821   --   room air   --   --   --            Intake & Output (last 2 days)       01/18 0701 - 01/19 0700 01/19 0701 - 01/20 0700 01/20 0701 - 01/21 0700    P.O. 360 600 240    Total Intake(mL/kg) 360 (5.5) 600 (8.6) 240 (3.4)    Urine (mL/kg/hr) 700 (0.4) 500 (0.3)     Total Output 700 500     Net -340 +100 +240           Urine Unmeasured Occurrence 2 x 2 x         Lines, Drains & Airways    Active LDAs     Name:   Placement date:   Placement time:   Site:   Days:    Peripheral IV 01/14/20 1223 Anterior;Distal;Left Hand   01/14/20    1223    Hand   5                  Orders (last 48 hrs)      Start     Ordered    01/19/20 1601  Inpatient Diabetes Educator Consult  Once     Comments:  I just would like you to follow-up on Monday and see what she think on whether the patient is comprehending his blood glucose monitoring and insulin administration enough that he can be discharged home we will ask home health to come check on him   Provider:  (Not yet assigned)    01/19/20 1600    01/19/20 1553  Please make sure the diabetic educator comes by and sees the patient tomorrow.  We have to know if he is going to be able to do his insulin at home.  Physician Communication Order  Once     Comments:  Please make sure the diabetic educator comes by and sees the patient tomorrow.  We have to know if he is going to be able to do his insulin at home.    01/19/20 1553    01/19/20 1133  MRI Brain With & Without Contrast  1 Time Imaging      01/19/20 1133    01/18/20 2100  insulin glargine (LANTUS) injection  18 Units  Every 12 Hours Scheduled,   Status:  Discontinued      01/18/20 1131    01/18/20 1536  Inpatient Neurology Consult General  Once     Specialty:  Neurology  Provider:  Bryan Capps MD    01/18/20 1536    01/18/20 1525  SLP Consult: Eval & Treat Swallow Disorder; Consistent Carbohydrate  Once      01/18/20 1524    01/18/20 1524  Inpatient Gastroenterology Consult  Once     Specialty:  Gastroenterology  Provider:  Antonio Burnham MD    01/18/20 1523                 Physician Progress Notes (last 48 hours) (Notes from 01/18/20 0937 through 01/20/20 0937)      Nick Rowan MD at 01/19/20 1549                   LOS: 6 days   Patient Care Team:  Provider, No Known as PCP - General    Subjective   Patient doing well no real changes from yesterday he does not have any pain anywhere.  He says he still trying to get the hang of this insulin and fingersticks and talked with several people and clear that he has a ways to go      Review of Systems:          Objective     Vital Signs  Vital Sign Min/Max for last 24 hours  Temp  Min: 97.5 °F (36.4 °C)  Max: 98.7 °F (37.1 °C)   BP  Min: 93/55  Max: 115/73   Pulse  Min: 98  Max: 98   Resp  Min: 16  Max: 16   No data recorded   No data recorded   Weight  Min: 65.5 kg (144 lb 6.4 oz)  Max: 65.5 kg (144 lb 6.4 oz)        Ventilator/Non-Invasive Ventilation Settings (From admission, onward)    None                       Body mass index is 19.58 kg/m².  I/O last 3 completed shifts:  In: 360 [P.O.:360]  Out: 1300 [Urine:1300]  I/O this shift:  In: 360 [P.O.:360]  Out: 500 [Urine:500]        Physical Exam:  General Appearance: Well-developed black male he is resting in bed he is in no acute distress.  Eyes: Conjunctiva are clear and anicteric pupils are equal  ENT: His membranes are moist no erythema no exudates Mallampati type I airway nasal septum midline  Neck: No adenopathy or thyromegaly no jugular venous tension trachea is midline  Lungs: Clear nonlabored symmetric  expansion no wheezes rales or rhonchi  Cardiac: Regular rate rhythm no murmur no gallop  Abdomen: Soft nontender no palpable hepatosplenomegaly or masses  : Not examined  Musculoskeletal: I really cannot tell without knowing that it was right index finger that he had any infection there is no erythema no swelling no warmth  Skin: No jaundice no petechiae skin is warm and dry  Neuro: Patient was aware he was in a hospital but he again currently identified this is [facility] he did that earlier according to notes and his mother.  He does know the year and month and his name.  He can tell me what his insulin dose was reduced to.  He is moving all extremities to command  Extremities/P Vascular: No clubbing no cyanosis no edema palpable radial and dorsalis pedis pulses.  I can hardly tell where his paronychia was now so much resolved  MSE: He seems to be in pretty good spirits       Labs:  Results from last 7 days   Lab Units 01/16/20  0535 01/15/20  0449 01/14/20  1824 01/14/20  1358 01/14/20  0248 01/13/20  2235 01/13/20  1545 01/13/20  1352 01/13/20  0930 01/13/20  0736 01/13/20  0633   GLUCOSE mg/dL 139* 153* 139* 136* 355* 96 196* 341* 714* 823* 909*   SODIUM mmol/L 141 149* 150* 152* 143 143 141 140 132* 126* 118*   POTASSIUM mmol/L 3.5 4.3 3.8 4.0 6.1* 4.1  4.1 3.7 3.8 6.1* 5.6* 7.2*   MAGNESIUM mg/dL  --  2.6  --  3.2* 3.4* 3.5* 3.8* 3.8*  --  4.2* 4.5*   CO2 mmol/L 22.8 19.8* 19.1* 21.0* 15.1* 22.9 23.0 21.0* 10.9* 13.4* 13.1*   CHLORIDE mmol/L 104 114* 117* 120* 107 107 101 100 93* 83* 75*   ANION GAP mmol/L 14.2 15.2* 13.9 11.0 20.9* 13.1 17.0* 19.0* 28.1* 29.6* 29.9*   CREATININE mg/dL 0.86 1.04 1.26 1.35* 1.32* 1.45* 1.98* 2.06* 2.25* 2.46* 2.73*   BUN mg/dL 12 25* 38* 50* 62* 68* 84* 86* 92* 96* 99*   BUN / CREAT RATIO  14.0 24.0 30.2* 37.0* 47.0* 46.9* 42.4* 41.7* 40.9* 39.0* 36.3*   CALCIUM mg/dL 8.7 8.5* 8.0* 8.6 8.8 9.0 9.2 9.0 8.6 8.7 9.1   ALK PHOS U/L  --   --   --   --   --   --   --   --   84 84 89   TOTAL PROTEIN g/dL  --   --   --   --   --   --   --   --  7.6 7.5 8.3   ALT (SGPT) U/L  --   --   --   --   --   --   --   --  17 13 15   AST (SGOT) U/L  --   --   --   --   --   --   --   --  35 31 38   BILIRUBIN mg/dL  --   --   --   --   --   --   --   --  0.6 0.7 0.8   ALBUMIN g/dL  --   --   --   --   --   --   --   --  3.70 3.70 4.00   GLOBULIN gm/dL  --   --   --   --   --   --   --   --  3.9 3.8 4.3     Estimated Creatinine Clearance: 98.4 mL/min (by C-G formula based on SCr of 0.86 mg/dL).      Results from last 7 days   Lab Units 01/16/20  0535 01/15/20  0449 01/13/20  0633   WBC 10*3/mm3 9.86  --  14.50*   RBC 10*6/mm3 4.96  --  6.62*   HEMOGLOBIN g/dL 14.0  --  18.7*   HEMATOCRIT % 40.6  --  56.6*   MCV fL 81.9  --  85.5   MCH pg 28.2  --  28.2   MCHC g/dL 34.5  --  33.0   RDW % 13.4  --  12.3   RDW-SD fl 39.6  --  36.7*   MPV fL 10.6  --  11.1   PLATELETS 10*3/mm3 286 289 425   NEUTROPHIL % % 62.8  --  85.7*   LYMPHOCYTE % % 21.3  --  4.9*   MONOCYTES % % 14.0*  --  8.4   EOSINOPHIL % % 1.0  --  0.0*   BASOPHIL % % 0.2  --  0.2   IMM GRAN % % 0.7*  --  0.8*   NEUTROS ABS 10*3/mm3 6.19  --  12.42*   LYMPHS ABS 10*3/mm3 2.10  --  0.71   MONOS ABS 10*3/mm3 1.38*  --  1.22*   EOS ABS 10*3/mm3 0.10  --  0.00   BASOS ABS 10*3/mm3 0.02  --  0.03   IMMATURE GRANS (ABS) 10*3/mm3 0.07*  --  0.12*   NRBC /100 WBC 0.0  --  0.0     Results from last 7 days   Lab Units 01/13/20  0654   PH, ARTERIAL pH units 7.321*   PO2 ART mm Hg 139.7*   PCO2, ARTERIAL mm Hg 24.2*   HCO3 ART mmol/L 12.5*     Results from last 7 days   Lab Units 01/13/20  0633   TROPONIN T ng/mL <0.010         Results from last 7 days   Lab Units 01/17/20  0440   TSH uIU/mL 1.320   FREE T4 ng/dL 1.02     Results from last 7 days   Lab Units 01/15/20  0449 01/13/20  1545   PROCALCITONIN ng/mL 0.23 0.37*           enoxaparin 40 mg Subcutaneous Q24H   insulin glargine 15 Units Subcutaneous Q12H   insulin lispro 0-9 Units Subcutaneous 4x  Daily With Meals & Nightly   insulin lispro 5 Units Subcutaneous TID With Meals   senna-docusate sodium 2 tablet Oral Nightly   sodium chloride 10 mL Intravenous Q12H          Diagnostics:  Ct Head Without Contrast    Result Date: 1/13/2020  CT HEAD  HISTORY:Altered mental status  TECHNIQUE: CT scan of the head was obtained with 3 mm axial images without intravenous contrast.  Radiation dose reduction techniques were utilized, including automated exposure control and exposure modulation based on body size.  COMPARISON:None  FINDINGS: There is no finding of acute infarct, hemorrhage, contusion or abnormal extra-axial collection. No hydrocephalus is present.      1.  No findings of acute intracranial abnormality.   This report was finalized on 1/13/2020 7:20 AM by Dr. Bobby Clemons M.D.      Fl Esophagram Complete    Result Date: 1/18/2020  FL ESOPHAGRAM COMPLETE SINGLE-CONTRAST-  INDICATIONS: Coughing, odynophagia  TECHNIQUE: Esophagram  COMPARISON: None available  FINDINGS:  A  image was obtained revealing no focal pulmonary consolidation or mass.  The patient ingested barium contrast material under intermittent fluoroscopic observation, with spot views obtained.  The swallowing mechanism was unremarkable with no penetration of the laryngeal folds or aspiration during swallowing.  Esophageal peristalsis was unremarkable. Several small rounded defects in the mucosal coating of the esophagus appear to persist, potentially polyps or may be persistent adherent air bubbles, suggest endoscopic correlation. The mucosa of the esophagus otherwise appears unremarkable. No tight stenosis is demonstrated.  No hiatal hernia was noted. Mild spontaneous gastroesophageal reflux occurred.  Fluoroscopy time: 53 seconds, 33 fluoroscopic images       No tight stenoses. Several small rounded defects in the mucosal coating of the esophagus appear to persist, potentially polyps or may be persistent adherent air bubbles, suggest  endoscopic correlation.  This report was finalized on 1/18/2020 2:06 PM by Dr. Anton Bower M.D.      Xr Chest 1 View    Result Date: 1/13/2020  XR CHEST 1 VW-  01/13/2020  HISTORY: Altered mental status.  The heart size is within normal limits. Lungs appear free of acute infiltrates. Bones and soft tissues are unremarkable. There are no previous studies available for comparison.      1. No acute process.  This report was finalized on 1/13/2020 6:41 AM by Dr. Reddy Lizama M.D.               Active Hospital Problems    Diagnosis  POA   • **Diabetic ketoacidosis without coma associated with type 2 diabetes mellitus (CMS/HCC) [E11.10]  Yes   • Diabetes mellitus, new onset (CMS/HCC) [E11.9]  Yes      Resolved Hospital Problems   No resolved problems to display.         Assessment/Plan     1. DKA resolved  2. Metabolic encephalopathy very slow to clear, MRI of the brain reportedly did not show anything acute.  Neurology's help appreciated  3. Acute kidney injury resolved  4. Dysphasia work-up underway  5. Paronychia patient got ceftriaxone for this, his finger looks much better looks like the ceftriaxone was stopped after 3 days a little concerned if that was sufficient duration given his diabetes and how sick it made him., give him some oral antibiotics for a little longer course.  6. Hyponatremia resolved  7. Esophageal mucosal abnormalities question polyps question air bubbles on esophagram GI evaluation recommended    Plan for disposition: We will see what GI thinks about the esophageal mucosal abnormalities but the real determinant of his discharge is going to be when he can do his own insulin and blood sugars.  He really does not have anyone to help him.  The least he is going to need home health.  I am going to ask for the diabetic educator to come by tomorrow and assess how he is progressing with his learning.    Nick Rowan MD  01/19/20  3:49 PM    Time:       Electronically signed by Anum  Nick Mann MD at 01/19/20 1601     Andre Morin MD at 01/19/20 1404          49 y.o.   LOS: 6 days   Patient Care Team:  Provider, No Known as PCP - General    Chief Complaint: Hyperglycemia    Chief Complaint   Patient presents with   • Altered Mental Status       Subjective     HPI  Patient is eating solid food even though he reports dysphagia  He apparently had esophagogram yesterday  Reports not clear yet to the patient  Patient's blood sugars are in the  range  He is currently receiving Lantus 20 units twice daily    Interval History:      Review of Systems:      Review of Systems   Constitutional: Positive for fatigue.   HENT: Positive for trouble swallowing.    Respiratory: Negative.    Cardiovascular: Negative.    Gastrointestinal: Negative.    All other systems reviewed and are negative.    Objective     Vital Signs   Temp:  [97.5 °F (36.4 °C)-98.7 °F (37.1 °C)] 97.5 °F (36.4 °C)  Heart Rate:  [98] 98  Resp:  [16] 16  BP: ()/(55-73) 115/73    Physical Exam:  Physical Exam   Constitutional: He is oriented to person, place, and time.   Eyes: Pupils are equal, round, and reactive to light. EOM are normal.   Neck: Normal range of motion. Neck supple. No thyromegaly present.   Cardiovascular: Normal rate, regular rhythm, normal heart sounds and intact distal pulses.   Pulmonary/Chest: Effort normal and breath sounds normal.   Abdominal: Soft. Bowel sounds are normal. He exhibits no distension. There is no tenderness.   Musculoskeletal: Normal range of motion. He exhibits no edema.   Neurological: He is alert and oriented to person, place, and time.   Skin: Skin is warm and dry.   Negative for acanthosis and vitiligo or purple striae   Psychiatric: He has a normal mood and affect. His behavior is normal.   Nursing note and vitals reviewed.  Results Review:     I reviewed the patient's new clinical results.           Medical record review  Summary  Neurology evaluation is  pending  Metabolic encephalopathy still being evaluated  Hyponatremia resolved    Medication Review:       Current Facility-Administered Medications:   •  dextrose (D50W) 25 g/ 50mL Intravenous Solution 12.5 g, 12.5 g, Intravenous, PRN, Tacos Arzola MD, 12.5 g at 01/13/20 2240  •  dextrose (D50W) 25 g/ 50mL Intravenous Solution 25 g, 25 g, Intravenous, Q15 Min PRN, Shandra Delcid MD  •  dextrose (D50W) 25 g/ 50mL Intravenous Solution 25 g, 25 g, Intravenous, Q15 Min PRN, Cameron Morgan MD  •  dextrose (GLUTOSE) oral gel 15 g, 15 g, Oral, Q15 Min PRN, Shandra Delcid MD  •  dextrose (GLUTOSE) oral gel 15 g, 15 g, Oral, Q15 Min PRN, Cameron Morgan MD  •  enoxaparin (LOVENOX) syringe 40 mg, 40 mg, Subcutaneous, Q24H, Cameron Morgan MD, 40 mg at 01/19/20 1159  •  gadobenate dimeglumine (MULTIHANCE) injection 13 mL, 13 mL, Intravenous, Once in imaging, Nick Rowan MD  •  glucagon (human recombinant) (GLUCAGEN DIAGNOSTIC) injection 1 mg, 1 mg, Subcutaneous, Q15 Min PRN, Shandra Delcid MD  •  glucagon (human recombinant) (GLUCAGEN DIAGNOSTIC) injection 1 mg, 1 mg, Subcutaneous, Q15 Min PRN, Cameron Morgan MD  •  insulin glargine (LANTUS) injection 15 Units, 15 Units, Subcutaneous, Q12H, Andre Morin MD  •  insulin lispro (humaLOG) injection 0-9 Units, 0-9 Units, Subcutaneous, 4x Daily With Meals & Nightly, Lexie Doe MD, 4 Units at 01/19/20 1158  •  insulin lispro (humaLOG) injection 5 Units, 5 Units, Subcutaneous, TID With Meals, Lexie Doe MD, 5 Units at 01/19/20 1159  •  magnesium sulfate 4 gram infusion - Mg less than or equal to 1mg/dL, 4 g, Intravenous, PRN **OR** magnesium sulfate 3 gram infusion (1gm x 3) - Mg 1.1 - 1.5 mg/dL, 1 g, Intravenous, PRN **OR** Magnesium Sulfate 2 gram infusion- Mg 1.6 - 1.9 mg/dL, 2 g, Intravenous, PRN, Cameron Morgan MD  •  ondansetron (ZOFRAN) tablet 4 mg, 4 mg, Oral, Q6H PRN **OR** ondansetron (ZOFRAN) injection 4 mg, 4 mg, Intravenous,  Q6H PRN, Cameron Morgan MD  •  potassium & sodium phosphates (PHOS-NAK) 280-160-250 MG packet - for Phosphorus less than 1.25 mg/dL, 2 packet, Oral, Q6H PRN **OR** potassium & sodium phosphates (PHOS-NAK) 280-160-250 MG packet - for Phosphorus 1.25 - 2.5 mg/dL, 2 packet, Oral, Q6H PRN, Cameron Morgan MD, 2 packet at 01/16/20 1157  •  potassium chloride (MICRO-K) CR capsule 40 mEq, 40 mEq, Oral, PRN **OR** potassium chloride (KLOR-CON) packet 40 mEq, 40 mEq, Oral, PRN **OR** potassium chloride 10 mEq in 100 mL IVPB, 10 mEq, Intravenous, Q1H PRN, Cameron Morgan MD  •  potassium phosphate 45 mmol in sodium chloride 0.9 % 500 mL infusion, 45 mmol, Intravenous, PRN **OR** potassium phosphate 30 mmol in sodium chloride 0.9 % 250 mL infusion, 30 mmol, Intravenous, PRN **OR** potassium phosphate 15 mmol in sodium chloride 0.9 % 100 mL infusion, 15 mmol, Intravenous, PRN, 15 mmol at 01/15/20 1355 **OR** sodium phosphates 40 mmol in sodium chloride 0.9 % 500 mL IVPB, 40 mmol, Intravenous, PRN **OR** sodium phosphates 20 mmol in sodium chloride 0.9 % 250 mL IVPB, 20 mmol, Intravenous, PRN, Cameron Morgan MD, Stopped at 01/14/20 0746  •  senna-docusate sodium (SENOKOT-S) 8.6-50 MG tablet 2 tablet, 2 tablet, Oral, Nightly, Cameron Morgan MD, 2 tablet at 01/18/20 2117  •  [COMPLETED] Insert peripheral IV, , , Once **AND** sodium chloride 0.9 % flush 10 mL, 10 mL, Intravenous, PRN, Marlon Shabazz MD  •  sodium chloride 0.9 % flush 10 mL, 10 mL, Intravenous, Once PRN, Tacos Arzola MD  •  sodium chloride 0.9 % flush 10 mL, 10 mL, Intravenous, Q12H, Cameron Morgan MD, 10 mL at 01/19/20 0816  •  sodium chloride 0.9 % flush 10 mL, 10 mL, Intravenous, PRN, Cameron Morgan MD    Assessment/Plan     Patient Active Problem List   Diagnosis   • Diabetes mellitus, new onset (CMS/HCC)   • Diabetic ketoacidosis without coma associated with type 2 diabetes mellitus (CMS/HCC)   Uncontrolled type 2 diabetes mellitus  Diabetic  "ketoacidosis resolved  Dysphagia currently under evaluation    Patient's blood sugars are trending down  I will decrease the Lantus 15 units twice daily  He is currently receiving Humalog 5 units before each meal with a correction scale  We will continue to monitor the Accu-Cheks and then adjust insulin as needed  Patient C-peptide level is however 2.5  ERIN antibodies are pending      Andre RIVERA. MD Patience FACE.  01/19/20  2:04 PM      EMR Dragon / transcription disclaimer:     \"Dictated utilizing Dragon dictation\".     Electronically signed by Andre Morin MD at 01/19/20 1424     Andre Morin MD at 01/18/20 1617          49 y.o.   LOS: 5 days   Patient Care Team:  Provider, No Known as PCP - General    Chief Complaint: Elevated blood sugars    Chief Complaint   Patient presents with   • Altered Mental Status       Subjective     HPI  6 this morning  He is currently receiving Lantus in the morning and night is receiving tube feeding  He still appears confused at times    Interval History:      Review of Systems:      Review of Systems   Constitutional: Positive for fatigue.   HENT: Positive for trouble swallowing.    Respiratory: Negative.    Cardiovascular: Negative.    Gastrointestinal: Negative.    All other systems reviewed and are negative.    Objective     Vital Signs   Temp:  [97.8 °F (36.6 °C)-98.9 °F (37.2 °C)] 97.8 °F (36.6 °C)  Heart Rate:  [88-99] 99  Resp:  [16-18] 16  BP: (102-124)/(68-87) 111/70    Physical Exam:  Physical Exam   Constitutional: He is oriented to person, place, and time.   Eyes: Pupils are equal, round, and reactive to light. EOM are normal.   Neck: Normal range of motion. Neck supple. No thyromegaly present.   Cardiovascular: Normal rate, regular rhythm, normal heart sounds and intact distal pulses.   Pulmonary/Chest: Effort normal and breath sounds normal.   Abdominal: Soft. Bowel sounds are normal. He exhibits no distension. There is no tenderness. "   Musculoskeletal: Normal range of motion. He exhibits no edema.   Neurological: He is alert and oriented to person, place, and time.   Skin: Skin is warm and dry.   Negative for acanthosis and vitiligo or purple striae   Psychiatric: He has a normal mood and affect. His behavior is normal.   Nursing note and vitals reviewed.  Results Review:     I reviewed the patient's new clinical results.       Imaging Results (Last 72 Hours)     Procedure Component Value Units Date/Time    FL Esophagram Complete [[id number]] Collected:  01/18/20 1357     Updated:  01/18/20 1409    Narrative:       FL ESOPHAGRAM COMPLETE SINGLE-CONTRAST-     INDICATIONS: Coughing, odynophagia     TECHNIQUE: Esophagram     COMPARISON: None available     FINDINGS:     A  image was obtained revealing no focal pulmonary consolidation or  mass.     The patient ingested barium contrast material under intermittent  fluoroscopic observation, with spot views obtained.     The swallowing mechanism was unremarkable with no penetration of the  laryngeal folds or aspiration during swallowing.     Esophageal peristalsis was unremarkable. Several small rounded defects  in the mucosal coating of the esophagus appear to persist, potentially  polyps or may be persistent adherent air bubbles, suggest endoscopic  correlation. The mucosa of the esophagus otherwise appears unremarkable.  No tight stenosis is demonstrated.     No hiatal hernia was noted. Mild spontaneous gastroesophageal reflux  occurred.     Fluoroscopy time: 53 seconds, 33 fluoroscopic images       Impression:          No tight stenoses. Several small rounded defects in the mucosal coating  of the esophagus appear to persist, potentially polyps or may be  persistent adherent air bubbles, suggest endoscopic correlation.     This report was finalized on 1/18/2020 2:06 PM by Dr. Anton Bower M.D.           Medical record review  Pulmonary notes reviewed  They are still evaluating metabolic  encephalopathy  Dysphagia work-up is still underway  ERIBERTO resolved    Medication Review:       Current Facility-Administered Medications:   •  dextrose (D50W) 25 g/ 50mL Intravenous Solution 12.5 g, 12.5 g, Intravenous, PRN, Tacos Arzola MD, 12.5 g at 01/13/20 2240  •  dextrose (D50W) 25 g/ 50mL Intravenous Solution 25 g, 25 g, Intravenous, Q15 Min PRN, Shandra Delcid MD  •  dextrose (D50W) 25 g/ 50mL Intravenous Solution 25 g, 25 g, Intravenous, Q15 Min PRN, Cameron Morgan MD  •  dextrose (GLUTOSE) oral gel 15 g, 15 g, Oral, Q15 Min PRN, Shandra Delcid MD  •  dextrose (GLUTOSE) oral gel 15 g, 15 g, Oral, Q15 Min PRN, Cameron Morgan MD  •  enoxaparin (LOVENOX) syringe 40 mg, 40 mg, Subcutaneous, Q24H, Cameron Morgan MD, 40 mg at 01/18/20 1206  •  glucagon (human recombinant) (GLUCAGEN DIAGNOSTIC) injection 1 mg, 1 mg, Subcutaneous, Q15 Min PRN, Shandra Delcid MD  •  glucagon (human recombinant) (GLUCAGEN DIAGNOSTIC) injection 1 mg, 1 mg, Subcutaneous, Q15 Min PRN, Cameron Morgan MD  •  insulin glargine (LANTUS) injection 18 Units, 18 Units, Subcutaneous, Q12H, Andre Morin MD  •  insulin lispro (humaLOG) injection 0-9 Units, 0-9 Units, Subcutaneous, 4x Daily With Meals & Nightly, Lexie Doe MD, 4 Units at 01/18/20 1205  •  insulin lispro (humaLOG) injection 5 Units, 5 Units, Subcutaneous, TID With Meals, Lexie Doe MD, 5 Units at 01/18/20 1206  •  magnesium sulfate 4 gram infusion - Mg less than or equal to 1mg/dL, 4 g, Intravenous, PRN **OR** magnesium sulfate 3 gram infusion (1gm x 3) - Mg 1.1 - 1.5 mg/dL, 1 g, Intravenous, PRN **OR** Magnesium Sulfate 2 gram infusion- Mg 1.6 - 1.9 mg/dL, 2 g, Intravenous, PRN, Cameron Morgan MD  •  ondansetron (ZOFRAN) tablet 4 mg, 4 mg, Oral, Q6H PRN **OR** ondansetron (ZOFRAN) injection 4 mg, 4 mg, Intravenous, Q6H PRN, Cameron Morgan MD  •  potassium & sodium phosphates (PHOS-NAK) 280-160-250 MG packet - for Phosphorus less than 1.25  mg/dL, 2 packet, Oral, Q6H PRN **OR** potassium & sodium phosphates (PHOS-NAK) 280-160-250 MG packet - for Phosphorus 1.25 - 2.5 mg/dL, 2 packet, Oral, Q6H PRN, Cameron Morgan MD, 2 packet at 01/16/20 1157  •  potassium chloride (MICRO-K) CR capsule 40 mEq, 40 mEq, Oral, PRN **OR** potassium chloride (KLOR-CON) packet 40 mEq, 40 mEq, Oral, PRN **OR** potassium chloride 10 mEq in 100 mL IVPB, 10 mEq, Intravenous, Q1H PRN, Cameron Morgan MD  •  potassium phosphate 45 mmol in sodium chloride 0.9 % 500 mL infusion, 45 mmol, Intravenous, PRN **OR** potassium phosphate 30 mmol in sodium chloride 0.9 % 250 mL infusion, 30 mmol, Intravenous, PRN **OR** potassium phosphate 15 mmol in sodium chloride 0.9 % 100 mL infusion, 15 mmol, Intravenous, PRN, 15 mmol at 01/15/20 1355 **OR** sodium phosphates 40 mmol in sodium chloride 0.9 % 500 mL IVPB, 40 mmol, Intravenous, PRN **OR** sodium phosphates 20 mmol in sodium chloride 0.9 % 250 mL IVPB, 20 mmol, Intravenous, PRN, Cameron Morgan MD, Stopped at 01/14/20 0746  •  senna-docusate sodium (SENOKOT-S) 8.6-50 MG tablet 2 tablet, 2 tablet, Oral, Nightly, Cameron Morgan MD, 2 tablet at 01/17/20 2043  •  [COMPLETED] Insert peripheral IV, , , Once **AND** sodium chloride 0.9 % flush 10 mL, 10 mL, Intravenous, PRN, Marlon Shabazz MD  •  sodium chloride 0.9 % flush 10 mL, 10 mL, Intravenous, Once PRN, Tacos Arzola MD  •  sodium chloride 0.9 % flush 10 mL, 10 mL, Intravenous, Q12H, Cameron Morgan MD, 10 mL at 01/18/20 0825  •  sodium chloride 0.9 % flush 10 mL, 10 mL, Intravenous, PRN, Cameron Morgan MD    Assessment/Plan     Patient Active Problem List   Diagnosis   • Diabetes mellitus, new onset (CMS/HCC)   • Diabetic ketoacidosis without coma associated with type 2 diabetes mellitus (CMS/HCC)   Uncontrolled  diabetes mellitus  Diabetic ketoacidosis resolved    Patient's fasting blood sugars have been in the 200 range for the past 2 days  He is currently receiving Lantus 28  "units in the morning  I will change Lantus to 18 units twice daily  He is receiving Humalog 5 units before each meal along with a correction scale  Dysphagia work-up is still ongoing  DKA resolved    Discussed with patient and his family at the bedside  Andre Morin MD FACE.  01/18/20  4:17 PM      EMR Dragon / transcription disclaimer:     \"Dictated utilizing Dragon dictation\".     Electronically signed by Andre Morin MD at 01/18/20 1386     Nick Rowan MD at 01/18/20 1512                   LOS: 5 days   Patient Care Team:  Provider, No Known as PCP - General    Subjective     Patient says he is doing pretty well asking about his swallowing problem and he says psych when you go to swallow it is one big motion and it is like a headache but it is not its right here in your throat area risk to other liquids or solids are worse than others but has trouble remembering exactly what happened in fact he is having trouble remembering and he is cognizant of that.    Review of Systems:          Objective     Vital Signs  Vital Sign Min/Max for last 24 hours  Temp  Min: 97.8 °F (36.6 °C)  Max: 98.9 °F (37.2 °C)   BP  Min: 102/76  Max: 124/87   Pulse  Min: 88  Max: 99   Resp  Min: 16  Max: 18   SpO2  Min: 95 %  Max: 95 %   No data recorded   Weight  Min: 67.2 kg (148 lb 1.6 oz)  Max: 67.2 kg (148 lb 1.6 oz)        Ventilator/Non-Invasive Ventilation Settings (From admission, onward)    None                       Body mass index is 20.09 kg/m².  I/O last 3 completed shifts:  In: 720 [P.O.:720]  Out: 2350 [Urine:2350]  I/O this shift:  In: 240 [P.O.:240]  Out: -         Physical Exam:  General Appearance: Well-developed black male he is resting in bed talking on the phone does not appear in any acute distress  Eyes: Wilmer are clear and anicteric pupils are equal  ENT: His membranes are moist no erythema no exudates Mallampati type I airway nasal septum midline  Neck: No adenopathy or thyromegaly " no jugular venous tension trachea is midline  Lungs: Clear nonlabored symmetric expansion no wheezes rales or rhonchi  Cardiac: Regular rate rhythm no murmur no gallop  Abdomen: Soft nontender no palpable hepatosplenomegaly or masses  : Not examined  Musculoskeletal: Grossly normal  Skin: No jaundice no petechiae skin is warm and dry  Neuro: Patient is alert he knows roughly where he is at he knows he is in the hospital he has extremely difficult time with memory he almost every other word is I cannot think what I am trying to say.  He is moving all extremities well to command  Extremities/P Vascular: No clubbing no cyanosis no edema palpable radial and dorsalis pedis pulses.  I can hardly tell where his paronychia was now so much resolved  MSE: Hard to tell         enoxaparin 40 mg Subcutaneous Q24H   insulin glargine 18 Units Subcutaneous Q12H   insulin lispro 0-9 Units Subcutaneous 4x Daily With Meals & Nightly   insulin lispro 5 Units Subcutaneous TID With Meals   senna-docusate sodium 2 tablet Oral Nightly   sodium chloride 10 mL Intravenous Q12H          Diagnostics:  Ct Head Without Contrast    Result Date: 1/13/2020  CT HEAD  HISTORY:Altered mental status  TECHNIQUE: CT scan of the head was obtained with 3 mm axial images without intravenous contrast.  Radiation dose reduction techniques were utilized, including automated exposure control and exposure modulation based on body size.  COMPARISON:None  FINDINGS: There is no finding of acute infarct, hemorrhage, contusion or abnormal extra-axial collection. No hydrocephalus is present.      1.  No findings of acute intracranial abnormality.   This report was finalized on 1/13/2020 7:20 AM by Dr. Bobby Clemons M.D.      Fl Esophagram Complete    Result Date: 1/18/2020  FL ESOPHAGRAM COMPLETE SINGLE-CONTRAST-  INDICATIONS: Coughing, odynophagia  TECHNIQUE: Esophagram  COMPARISON: None available  FINDINGS:  A  image was obtained revealing no focal pulmonary  consolidation or mass.  The patient ingested barium contrast material under intermittent fluoroscopic observation, with spot views obtained.  The swallowing mechanism was unremarkable with no penetration of the laryngeal folds or aspiration during swallowing.  Esophageal peristalsis was unremarkable. Several small rounded defects in the mucosal coating of the esophagus appear to persist, potentially polyps or may be persistent adherent air bubbles, suggest endoscopic correlation. The mucosa of the esophagus otherwise appears unremarkable. No tight stenosis is demonstrated.  No hiatal hernia was noted. Mild spontaneous gastroesophageal reflux occurred.  Fluoroscopy time: 53 seconds, 33 fluoroscopic images       No tight stenoses. Several small rounded defects in the mucosal coating of the esophagus appear to persist, potentially polyps or may be persistent adherent air bubbles, suggest endoscopic correlation.  This report was finalized on 1/18/2020 2:06 PM by Dr. Anton Bower M.D.      Xr Chest 1 View    Result Date: 1/13/2020  XR CHEST 1 VW-  01/13/2020  HISTORY: Altered mental status.  The heart size is within normal limits. Lungs appear free of acute infiltrates. Bones and soft tissues are unremarkable. There are no previous studies available for comparison.      1. No acute process.  This report was finalized on 1/13/2020 6:41 AM by Dr. Reddy Lizama M.D.               Active Hospital Problems    Diagnosis  POA   • **Diabetic ketoacidosis without coma associated with type 2 diabetes mellitus (CMS/HCC) [E11.10]  Yes   • Diabetes mellitus, new onset (CMS/HCC) [E11.9]  Yes      Resolved Hospital Problems   No resolved problems to display.         Assessment/Plan     8. DKA resolved  9. 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 Acute kidney injury resolved  11. Dysphasia work-up underway  12. Paronychia patient is on ceftriaxone for this  13. Hyponatremia resolved  14. Esophageal mucosal abnormalities question polyps question air bubbles on esophagram GI evaluation recommended    Plan for disposition:    Nick Rowan MD  01/18/20  3:16 PM    Time:       Electronically signed by Nick Rowan MD at 01/18/20 1537          Consult Notes (last 48 hours) (Notes from 01/18/20 0937 through 01/20/20 0937)      Fernanda, Derek Ríos MD at 01/19/20 1646      Consult Orders    1. Inpatient Gastroenterology Consult [[id number]] ordered by Nick Rowan MD at 01/18/20 1523                Patient Care Team:  Provider, No Known as PCP - General    CHIEF COMPLAINT: Dyshagia/ Abmormal UGI    HISTORY OF PRESENT ILLNESS: Covering for BGA    50 yo with new onset DM had described mild dysphagia w/o aspiration nor meat impaction but feels he is 15+ pounds down from his baseline and denies anysymptoms beyond the last week. His UGi was reviewed no reflux esohagitis/stricture nor thrush read only air bubbles?!? Reviewed fims and agree no stricture nor dysmotility nor aspiration.  No history of GERD in the past and no previous EGD. IS in house now for DM education since he will be D/C on insulin.   sper Dr Morgan...49-year-old -American male who presents with confusion and weakness.  He is a very poor historian because he is drowsy but he is arousable and he does answer a few questions.  Apparently he does not have a primary care physician and is not on any medications.  Recently went to urgent care and received clindamycin for swollen, painful and possibly infected right third finger, possible paronychia.  He took 1 dose only and then became too confused to continue his prescriptions.  He has  "had severe weakness over the past 24 hours, increased thirst and urination.  Has had no appetite.    Past Medical History:   Diagnosis Date   • Diabetes (CMS/HCC)    • Finger infection     right middle finger     Past Surgical History:   Procedure Laterality Date   • NO PAST SURGERIES       History reviewed. No pertinent family history.  Social History     Tobacco Use   • Smoking status: Light Tobacco Smoker     Types: Cigars   • Tobacco comment: 2nd hand smoke from parents; cigars when drinking   Substance Use Topics   • Alcohol use: Yes     Frequency: Monthly or less     Drinks per session: 1 or 2     Binge frequency: Never   • Drug use: Never     Medications Prior to Admission   Medication Sig Dispense Refill Last Dose   • [] clindamycin (CLEOCIN) 300 MG capsule Take 300 mg by mouth 3 (Three) Times a Day.        Allergies:  Patient has no known allergies.    REVIEW OF SYSTEMS:  Please see the above history of present illness for pertinent positives and negatives.  The remainder of the patient's systems have been reviewed and are negative.     Vital Signs  Temp:  [97.5 °F (36.4 °C)-98.7 °F (37.1 °C)] 97.5 °F (36.4 °C)  Heart Rate:  [98] 98  Resp:  [16] 16  BP: ()/(55-73) 115/73    Flowsheet Rows      First Filed Value   Admission Height  182.9 cm (72\") Documented at 2020 0628   Admission Weight  66.8 kg (147 lb 3.2 oz) Documented at 2020 0637           Physical Exam:  Physical Exam   Constitutional: Patient appears well-developed and well-nourished and in no acute distress   HEENT:   Head: Normocephalic and atraumatic.   Eyes:  Pupils are equal, round, and reactive to light. EOM are intact. Sclerae are anicteric and non-injected.  Mouth and Throat: Patient has moist mucous membranes. Oropharynx is clear of any erythema or exudate.     Neck: Neck supple. No JVD present. No thyromegaly present. No lymphadenopathy present.  Cardiovascular: Regular rate, regular rhythm, S1 normal and S2 " normal.  Exam reveals no gallop and no friction rub.  No murmur heard.  Pulmonary/Chest: Lungs are clear to auscultation bilaterally. No respiratory distress. No wheezes. No rhonchi. No rales.   Abdominal: Soft. Bowel sounds are normal. No distension and no mass. There is no hepatosplenomegaly. There is no tenderness.   Musculoskeletal: Normal Muscle tone  Extremities: No edema. Pulses are palpable in all 4 extremities.  Neurological: Patient is alert and oriented to person, place, and time. Cranial nerves II-XII are grossly intact with no focal deficits.  Skin: Skin is warm. No rash noted. Nails show no clubbing.  No cyanosis or erythema.    Debilities/Disabilities Identified: None  Emotional Behavior: Appropriate          reviewed    Assessment/Plan     New DM I  Mild Dysphagia-resolved  WEight loss secondary to DM (presumably)     Would be in favor of a daily PPI and follow up in 2 months to review how he is doing with his BS controll as well as his Vague GI symptoms. Will have BGA follow    I discussed the patients findings and my recommendations with patient and Mom in room.     Derek Michael MD  20  4:46 PM    Time: NOt recorded.      Electronically signed by Derek Michael MD at 20 5554     Bryan Capps MD at 20 1151      Consult Orders    1. Inpatient Neurology Consult General [[id number]] ordered by Nick Rowan MD at 20 1536                  Patient Identification:  NAME:  Joby Kulkarni  Age:  49 y.o.   Sex:  male   :  1970   MRN:  [id number]       Chief complaint: I have got diabetes, reason for consult confusion cognitive dysfunction    History of present illness: This patient is a 49-year-old right-handed white male with a unremarkable medical history who states he is just been diagnosed with diabetes had some finger infection or swelling in the right middle finger over the last few weeks.  He comes in now with elevated blood glucose  that has been corrected he still little confused duration is been at least a couple of days if not longer his mom is here and she cannot tell me much of anything locations not pertinent duration is noted quality some confusion.  See below, he thinks he is at [facility] did not know [employer].  Modifying factors insulin sodium is normal white count normal and he has nothing to suggest a stroke no double vision paresthesias or paralysis at this point nothing focal at all and he denies headache.  He also denies confusion    Past medical history:  Past Medical History:   Diagnosis Date   • Diabetes (CMS/HCC)    • Finger infection     right middle finger       Past surgical history:  Past Surgical History:   Procedure Laterality Date   • NO PAST SURGERIES         Allergies:  Patient has no known allergies.    Home medications:  Medications Prior to Admission   Medication Sig Dispense Refill Last Dose   • [] clindamycin (CLEOCIN) 300 MG capsule Take 300 mg by mouth 3 (Three) Times a Day.           Hospital medications:    enoxaparin 40 mg Subcutaneous Q24H   insulin glargine 15 Units Subcutaneous Q12H   insulin lispro 0-9 Units Subcutaneous 4x Daily With Meals & Nightly   insulin lispro 5 Units Subcutaneous TID With Meals   senna-docusate sodium 2 tablet Oral Nightly   sodium chloride 10 mL Intravenous Q12H        •  dextrose  •  dextrose  •  dextrose  •  dextrose  •  dextrose  •  glucagon (human recombinant)  •  glucagon (human recombinant)  •  magnesium sulfate **OR** magnesium sulfate in D5W 1g/100mL (PREMIX) **OR** magnesium sulfate  •  ondansetron **OR** ondansetron  •  potassium & sodium phosphates **OR** potassium & sodium phosphates  •  potassium chloride **OR** potassium chloride **OR** potassium chloride  •  potassium phosphate infusion greater than 15 mMoles **OR** potassium phosphate infusion greater than 15 mMoles **OR** potassium phosphate **OR** sodium phosphate IVPB **OR** sodium phosphate  IVPB  •  [COMPLETED] Insert peripheral IV **AND** sodium chloride  •  sodium chloride  •  sodium chloride    Family history:  History reviewed. No pertinent family history.    Social history:  Social History     Tobacco Use   • Smoking status: Light Tobacco Smoker     Types: Cigars   • Tobacco comment: 2nd hand smoke from parents; cigars when drinking   Substance Use Topics   • Alcohol use: Yes     Frequency: Monthly or less     Drinks per session: 1 or 2     Binge frequency: Never   • Drug use: Never       Review of systems:    He did not know he had diabetes till now he states he has had a right middle finger infection but denies confusion or hallucinations.  His mom is with him and thinks he has been not entirely thinking clearly in the hospital but I cannot get her to tell me anything more.  Basically neither 1 of them will give me an adequate review of systems so I reviewed the chart fully    Objective:  Vitals Ranges:   Temp:  [97.8 °F (36.6 °C)-98.7 °F (37.1 °C)] 98.1 °F (36.7 °C)  Heart Rate:  [98] 98  Resp:  [16] 16  BP: ()/(55-70) 102/66      Physical Exam:  Awake alert and oriented times [facility] he otherwise is oriented x2 fund of knowledge poor attention span concentration fair recent remote memory fair language function normal looks older than his stated age in no distress pupils 2 constricting to one 1/2 normal disc retinas visual fields extraocular movements full without nystagmus nasolabial folds palate tongue symmetrical normal hearing facial sensation head turning shoulder shrug motor probably 5- out of 5 x4 not the best effort no pronator drift atrophy or fasciculations reflexes absent throughout toes downgoing bilaterally sensation normal light touch face both arms and both legs coordination Station and gait he would not really follow he is a little confused.  Visual acuity normal at 3 feet heart regular without murmur neck supple without bruits extremities no clubbing cyanosis or  significant edema visual acuity normal at 3 feet    Results review:   I reviewed the patient's new clinical results.         Assessment and Plan:       Diabetic ketoacidosis without coma associated with type 2 diabetes mellitus (CMS/HCC)    Diabetes mellitus, new onset (CMS/HCC)  Patient does not seem to have normal cognitive function.  He has some metabolic encephalopathy but I would like to make sure that he does not have any evidence of an acute stroke.  I will order additional diagnostic testing in the form of an MRI of the brain with and without contrast.  Thank you      Bryan Capps MD  01/19/20  11:52 AM    Electronically signed by Bryan Capps MD at 01/19/20 115         Radiology Results (last 21 days)     Procedure Component Value Units Date/Time   MRI Brain With & Without Contrast [[id number]] Marlon as Reviewed   Order Status: Completed Collected: 01/19/20 1434    Updated: 01/19/20 1444   Narrative:     MRI BRAIN W WO CONTRAST-     CLINICAL HISTORY: Confusion. Diffuse weakness.     TECHNIQUE: Multiple axial T1, T2, diffusion and gradient echo images  were acquired. Axial and coronal T1-weighted images were also obtained  after intravenous injection of MultiHance contrast.      COMPARISON: CT scan of the head dated 01/13/2020     FINDINGS: The brain and ventricular system appear structurally normal.  No foci of abnormal signal intensity or enhancement are identified in  the brain or brainstem. There are no foci of restricted diffusion. There  is no evidence of recent or old infarct or hemorrhage. Normal flow voids  are observed in the major vascular structures. There are no masses.  There is mild mucosal thickening in the floor the right maxillary sinus.  The paranasal sinuses are otherwise well aerated.      Impression:     Mild mucosal thickening in the floor the right maxillary  sinus. Otherwise unremarkable MRI of the brain.     This report was finalized on 1/19/2020 2:41 PM by Dr. Rodríguez  RICYK Sy       All medication doses during the admission are shown, including meds that are no longer on order.   Scheduled Meds Sorted by Name   for Joby Kulkarni as of 1/18/20 through 1/20/20     1 Day 3 Days 7 Days 10 Days < Today >    Legend:                          Inactive     Active     Other Encounter    Linked               Medications 01/18/20 01/19/20 01/20/20   albuterol (PROVENTIL) nebulizer solution 0.5% 2.5 mg/0.5mL   Dose: 10 mg  Freq: Once Route: NEBULIZATION  Start: 01/13/20 0723 End: 01/13/20 0744         barium sulfate (E-Z-PAQUE) 96 % oral suspension reconstituted suspension 183 mL   Dose: 183 mL  Freq: Once in Imaging Route: PO  Start: 01/18/20 1430 End: 01/18/20 1315    Admin Instructions:    Mix with water according to package insert. Shake well before administration.    1315              barium sulfate oral suspension 135 mL   Dose: 135 mL  Freq: Once in Imaging Route: PO  Start: 01/18/20 1430 End: 01/18/20 1315    Admin Instructions:    Mix with water according to package insert. Shake well before administration.    1315              calcium gluconate 1 g/100 mL (10 mg/mL) NS IVPB (VTB)   Dose: 1 g  Freq: Once Route: IV  Last Dose: Stopped (01/13/20 1110)  Start: 01/13/20 0841 End: 01/13/20 1110         calcium gluconate 1 g/100 mL (10 mg/mL) NS IVPB (VTB)   Dose: 1 g  Freq: Once Route: IV  Last Dose: Stopped (01/13/20 0915)  Start: 01/13/20 0723 End: 01/13/20 0915         cefTRIAXone (ROCEPHIN) IVPB 1 g   Dose: 1 g  Freq: Every 24 Hours Route: IV  Indications of Use: SKIN AND SOFT TISSUE INFECTION  Last Dose: 1 g (01/16/20 1704)  Start: 01/14/20 1430 End: 01/16/20 1734    Admin Instructions:   Caution: Look alike/sound alike drug alert. Refrigerate         cephalexin (KEFLEX) capsule 500 mg   Dose: 500 mg  Freq: Every 6 Hours Scheduled Route: PO  Indications of Use: SKIN AND SOFT TISSUE INFECTION  Start: 01/19/20 1800 End: 01/26/20 6827    Admin Instructions:   Take with food if  GI upset occurs.     1706          0017   0614   1200     1800            enoxaparin (LOVENOX) syringe 30 mg   Dose: 30 mg  Freq: Every 24 Hours Route: SC  Indications of Use: PROPHYLAXIS OF VENOUS THROMBOEMBOLISM  Start: 01/13/20 2000 End: 01/15/20 1021    Admin Instructions:   Give subcutaneous in abdomen only. Do not massage site after injection.         enoxaparin (LOVENOX) syringe 40 mg   Dose: 40 mg  Freq: Every 24 Hours Route: SC  Indications of Use: PROPHYLAXIS OF VENOUS THROMBOEMBOLISM  Start: 01/15/20 1100    Admin Instructions:   **Renal Adjustment based on indication and CrCl >30 mL/min per Medical Executive and P&T committees approval**  Give subcutaneous in abdomen only. Do not massage site after injection.    1206          1159          1100            furosemide (LASIX) injection 40 mg   Dose: 40 mg  Freq: Once Route: IV  Start: 01/14/20 0500 End: 01/14/20 0419         furosemide (LASIX) injection 40 mg   Dose: 40 mg  Freq: Once Route: IV  Start: 01/13/20 0723 End: 01/13/20 0759         gadobenate dimeglumine (MULTIHANCE) injection 13 mL   Dose: 13 mL  Freq: Once in Imaging Route: IV  Start: 01/19/20 1445 End: 01/19/20 1418    Admin Instructions:   Vesicant; admin as rapid bolus; flush with 5 mL NS after admin or 20 mL for renal or aortoiliofemoral vasculature     1418 [C]             haloperidol lactate (HALDOL) injection 5 mg   Dose: 5 mg  Freq: Once Route: IV  Start: 01/14/20 0100 End: 01/14/20 0024    Admin Instructions:   For IV Push, administer no faster than 5 mg / minute.         influenza vac split quad (FLUZONE,FLUARIX,AFLURIA) injection 0.5 mL   Dose: 0.5 mL  Freq: Once Route: IM  Start: 01/14/20 1200 End: 01/14/20 1201    Admin Instructions:   **Do Not Administer if Temperature Greater Than 102F & Notify Pharmacy** Pneumococcal & Influenza Vaccines May Be Given at the Same Time in SEPARATE Injections.  If unable to administer at this scheduled time, please notify Pharmacy and reschedule  the dose.         insulin glargine (LANTUS) injection 15 Units   Dose: 15 Units  Freq: Every 12 Hours Scheduled Route: SC  Start: 01/19/20 2100    Admin Instructions:        2159 0900 2100          insulin glargine (LANTUS) injection 18 Units   Dose: 18 Units  Freq: Every 12 Hours Scheduled Route: SC  Start: 01/18/20 2100 End: 01/19/20 1011    Admin Instructions:       2116          0815   1011-D/C'd         insulin glargine (LANTUS) injection 25 Units   Dose: 25 Units  Freq: Daily Route: SC  Start: 01/14/20 1600 End: 01/17/20 1217    Admin Instructions:            insulin glargine (LANTUS) injection 28 Units   Dose: 28 Units  Freq: Daily Route: SC  Start: 01/18/20 0900 End: 01/18/20 1131    Admin Instructions:       0825   1131-D/C'd          insulin lispro (humaLOG) injection 0-7 Units   Dose: 0-7 Units  Freq: 4 Times Daily With Meals & Nightly Route: SC  Start: 01/14/20 0800 End: 01/14/20 0752    Admin Instructions:   Correction - Low Dose.  Less than 40 units/day total insulin dose or lean, elderly, renal patients    Blood glucose 150-199 mg/dL - 2 units  Blood glucose 200-249 mg/dL - 3 units  Blood glucose 250-299 mg/dL - 4 units  Blood glucose 300-349 mg/dL - 5 units  Blood glucose 350-400 mg/dL - 6 units  Blood glucose greater than 400 mg/dL - 7 units and call provider   Caution: Look alike/sound alike drug alert         insulin lispro (humaLOG) injection 0-9 Units   Dose: 0-9 Units  Freq: 4 Times Daily With Meals & Nightly Route: SC  Start: 01/16/20 2100    Admin Instructions:   Correction - Moderate Dose.  40-60 units/day total insulin dose or average weight, on oral agents    Blood glucose 150-199 mg/dL - 2 units  Blood glucose 200-249 mg/dL - 4 units  Blood glucose 250-299 mg/dL - 6 units  Blood glucose 300-349 mg/dL - 7 units  Blood glucose 350-400 mg/dL - 8 units  Blood glucose greater than 400 mg/dL - 9 units and call provider   Caution: Look alike/sound alike drug alert    [id number]    (1805)     (2050)          (0816)   1158   1705     2159          0800   1200   1800     2100            insulin lispro (humaLOG) injection 0-9 Units   Dose: 0-9 Units  Freq: Every 4 Hours Route: SC  Start: 01/14/20 1600 End: 01/16/20 1730    Admin Instructions:   Correction - Moderate Dose.  40-60 units/day total insulin dose or average weight, on oral agents    Blood glucose 150-199 mg/dL - 2 units  Blood glucose 200-249 mg/dL - 4 units  Blood glucose 250-299 mg/dL - 6 units  Blood glucose 300-349 mg/dL - 7 units  Blood glucose 350-400 mg/dL - 8 units  Blood glucose greater than 400 mg/dL - 9 units and call provider   Caution: Look alike/sound alike drug alert         insulin lispro (humaLOG) injection 5 Units   Dose: 5 Units  Freq: 3 Times Daily With Meals Route: SC  Start: 01/14/20 1800    Admin Instructions:   In addition to sliding scale; Hold for BG less than 120.   Caution: Look alike/sound alike drug alert    0831   1206   1805      0814   1159   1705      0800   1200   1800        insulin regular (humuLIN R,novoLIN R) injection 10 Units   Dose: 10 Units  Freq: Once Route: IV  Start: 01/14/20 0845 End: 01/14/20 0759    Admin Instructions:    Caution: Look alike/sound alike drug alert         insulin regular (humuLIN R,novoLIN R) injection 10 Units   Dose: 10 Units  Freq: Once Route: IV  Start: 01/13/20 0723 End: 01/13/20 0756    Admin Instructions:    Caution: Look alike/sound alike drug alert         pantoprazole (PROTONIX) EC tablet 40 mg   Dose: 40 mg  Freq: Every Morning Route: PO  Start: 01/20/20 0700    Admin Instructions:   Swallow whole; do not crush, split, or chew.      0614            senna-docusate sodium (SENOKOT-S) 8.6-50 MG tablet 2 tablet   Dose: 2 tablet  Freq: Nightly Route: PO  Start: 01/16/20 2100    2117 2059 2100            sod bicarb-citric acid-simethicone (GAS-X II) 2.21-1.53-0.04 g tablet pack 1 tablet   Dose: 1 tablet  Freq: Once in Imaging Route: PO  Start:  01/18/20 1430 End: 01/18/20 1315    1315              sodium chloride 0.9 % bolus 1,000 mL   Dose: 1,000 mL  Freq: Once Route: IV  Last Dose: 1,000 mL (01/14/20 0419)  Start: 01/14/20 0500 End: 01/14/20 0619         sodium chloride 0.9 % bolus 1,000 mL   Dose: 1,000 mL  Freq: Once Route: IV  Last Dose: Stopped (01/13/20 0915)  Start: 01/13/20 0723 End: 01/13/20 0915         sodium chloride 0.9 % bolus 1,000 mL   Dose: 1,000 mL  Freq: Once Route: IV  Last Dose: Stopped (01/13/20 0736)  Start: 01/13/20 0626 End: 01/13/20 0736         sodium chloride 0.9 % flush 10 mL   Dose: 10 mL  Freq: Every 12 Hours Scheduled Route: IV  Start: 01/13/20 2100    0825   2117        0816   2059        0900   2100          Medications 01/18/20 01/19/20 01/20/20       Continuous Meds Sorted by Name   for Joby Kulkarni as of 1/18/20 through 1/20/20    Legend:                          Inactive     Active     Other Encounter    Linked               Medications 01/18/20 01/19/20 01/20/20   dexmedetomidine (PRECEDEX) 400 mcg/100mL (4 mcg/mL) NS infusion kit   Rate: 3.18-23.81 mL/hr Dose: 0.2-1.5 mcg/kg/hr  Weight Dosing Info: 63.5 kg  Freq: Titrated Route: IV  Last Dose: Stopped (01/15/20 0715)  Start: 01/14/20 2345 End: 01/15/20 1202    Admin Instructions:   Begin infusion at 0.2 mcg/kg/hr and titrate by 0.1 mcg/kg/hr every 15 minutes to maintain RASS goal.  Maximum rate 1.5 mcg/kg/hr.  Notify MD if not at RASS goal and requiring 1.5 mcg/kg/hr or heart rate is less than 50 beats per minute or MAP is less than 60 mmHg.    Order specific questions:   RASS Goal: 0 TO -1         dextrose 5 % and sodium chloride 0.45 % infusion   Rate: 250 mL/hr Dose: 250 mL/hr  Freq: Continuous PRN Route: IV  PRN Comment: For Sodium >/= 135 and K > 5.5 and Glucose </= 200 for DKA  or Glucose </= 300 for HHS  Last Dose: Stopped (01/14/20 1646)  Start: 01/13/20 0722 End: 01/14/20 1507         dextrose 5 % and sodium chloride 0.45 % with KCl 20 mEq/L infusion    Rate: 250 mL/hr Dose: 250 mL/hr  Freq: Continuous PRN Route: IV  PRN Comment: For Sodium >/=135 and K 3.3-5.5 and Glucose </= 200 for DKA or Glucose </= 300 for HHS  Last Dose: Stopped (01/13/20 2300)  Start: 01/13/20 0722 End: 01/14/20 1507         dextrose 5 % and sodium chloride 0.45 % with KCl 40 mEq/L infusion   Rate: 250 mL/hr Dose: 250 mL/hr  Freq: Continuous PRN Route: IV  PRN Comment: For Sodium >/=135 and K <3.3 and Glucose </= 200 for DKA or Glucose </= 300 for HHS  Start: 01/13/20 0722 End: 01/14/20 1507         insulin regular (HumuLIN R,NovoLIN R) 100 Units in sodium chloride 0.9 % 100 mL (1 Units/mL) infusion   Rate: 6 mL/hr Dose: 6 Units/hr  Freq: Titrated Route: IV  Last Dose: Stopped (01/14/20 1601)  Start: 01/14/20 1600 End: 01/14/20 1707    Admin Instructions:   Stop 1hr after lantus administration.    Prior to starting Intravenous Insulin Infusion: Notify provider if K < 3.3 for extra potassium orders, if indicated. Do not start insulin drip if K < 3.3.  FIRST STEP: Fixed Rate Intravenous Insulin Infusion. Refer to table in protocol to verify rate for insulin is correct for patient weight. If not, adjust accordingly.  If BG is not falling by at least 50 mg/dL per hour, then increase insulin 1 unit/hr to a maximum of 15 units/hr.      SECOND STEP: Variable Rate Intravenous Insulin Infusion. When BG is less than or equal to 200 (for DKA) or less than or equal to 300 (for HHS), DECREASE INTRAVENOUS INSULIN INFUSION DOSE BY HALF and adjust rate as follows:  BG < 100: Decrease dose by 1 unit/hr and give 25mL D50W intravenously.  -160: Decrease dose by 1 unit/hr  -220: No change in dose  -280: Increase dose by 1 unit/hr  BG > 280: Increase dose by 2 units/hr.    Once DKA or HHS resolves, call provider for transition orders from intravenous to SQ insulin. DO NOT DISCONTINUE INSULIN INFUSION FOR 1 HOUR AFTER FIRST DOSE OF LONG-ACTING SQ DOSE.  Prime the line with the drip then waste  20 mL prior to beginning the infusion         insulin regular (HumuLIN R,NovoLIN R) 100 Units in sodium chloride 0.9 % 100 mL (1 Units/mL) infusion   Rate: 6 mL/hr Dose: 6 Units/hr  Freq: Titrated Route: IV  Last Dose: 5 Units/hr (01/14/20 1453)  Start: 01/13/20 0728 End: 01/14/20 1507    Admin Instructions:   Prior to starting Intravenous Insulin Infusion: Notify provider if K < 3.3 for extra potassium orders, if indicated. Do not start insulin drip if K < 3.3.  FIRST STEP: Fixed Rate Intravenous Insulin Infusion. Refer to table in protocol to verify rate for insulin is correct for patient weight. If not, adjust accordingly.  If BG is not falling by at least 50 mg/dL per hour, then increase insulin 1 unit/hr to a maximum of 15 units/hr.      SECOND STEP: Variable Rate Intravenous Insulin Infusion. When BG is less than or equal to 200 (for DKA) or less than or equal to 300 (for HHS), DECREASE INTRAVENOUS INSULIN INFUSION DOSE BY HALF and adjust rate as follows:  BG < 100: Decrease dose by 1 unit/hr and give 25mL D50W intravenously.  -160: Decrease dose by 1 unit/hr  -220: No change in dose  -280: Increase dose by 1 unit/hr  BG > 280: Increase dose by 2 units/hr.    Once DKA or HHS resolves, call provider for transition orders from intravenous to SQ insulin. DO NOT DISCONTINUE INSULIN INFUSION FOR 1 HOUR AFTER FIRST DOSE OF LONG-ACTING SQ DOSE.  Prime the line with the drip then waste 20 mL prior to beginning the infusion         sodium chloride 0.45 % 1,000 mL with potassium chloride 40 mEq infusion   Rate: 250 mL/hr Dose: 250 mL/hr  Freq: Continuous PRN Route: IV  PRN Comment: For Sodium >/=135 and K <3.3 and Glucose >200 for DKA or Glucose >300 for HHS  Start: 01/13/20 0722 End: 01/14/20 1507         sodium chloride 0.45 % infusion   Rate: 250 mL/hr Dose: 250 mL/hr  Freq: Continuous PRN Route: IV  PRN Comment: For Sodium >/= 135 and K >5.5 and Glucose >200 for DKA or Glucose >300 for HHS  Last  Dose: Stopped (01/14/20 1421)  Start: 01/13/20 0722 End: 01/14/20 1507         sodium chloride 0.45 % with KCl 20 mEq/L infusion   Rate: 250 mL/hr Dose: 250 mL/hr  Freq: Continuous PRN Route: IV  PRN Comment: For Sodium >/= 135 and K 3.3-5.5 and Glucose >200 for DKA or Glucose >300 for HHS  Last Dose: 250 mL/hr (01/13/20 1950)  Start: 01/13/20 0722 End: 01/14/20 1507         sodium chloride 0.9 % infusion   Rate: 500 mL/hr Dose: 500 mL/hr  Freq: Continuous Route: IV  Last Dose: 500 mL/hr (01/14/20 1417)  Start: 01/14/20 1230 End: 01/14/20 1429         sodium chloride 0.9 % infusion   Rate: 250 mL/hr Dose: 250 mL/hr  Freq: Continuous Route: IV  Last Dose: Stopped (01/13/20 1449)  Start: 01/13/20 1430 End: 01/13/20 1724         sodium chloride 0.9 % infusion   Rate: 10 mL/hr Dose: 10 mL/hr  Freq: Continuous PRN Route: IV  PRN Comment: if insulin infusion rate is insufficient to maintain IV patency.  Start: 01/13/20 0722 End: 01/14/20 1507         sodium chloride 0.9 % infusion   Rate: 250 mL/hr Dose: 250 mL/hr  Freq: Continuous Route: IV  Last Dose: 250 mL/hr (01/13/20 1331)  Start: 01/13/20 0626 End: 01/13/20 1315         sodium chloride 0.9 % with KCl 20 mEq/L infusion   Rate: 250 mL/hr Dose: 250 mL/hr  Freq: Continuous PRN Route: IV  PRN Comment: For Sodium <135 and K 3.3-5.5 and Glucose >200 for DKA or Glucose >300 for HHS  Start: 01/13/20 0722 End: 01/14/20 1507         sodium chloride 0.9 % with KCl 40 mEq/L infusion   Rate: 250 mL/hr Dose: 250 mL/hr  Freq: Continuous PRN Route: IV  PRN Comment: For Sodium <135 and K <3.3 and Glucose >200 for DKA or Glucose >300 for HHS  Start: 01/13/20 0722 End: 01/14/20 1507         Medications 01/18/20 01/19/20 01/20/20       PRN Meds Sorted by Name   for Joby Kulkarni as of 1/18/20 through 1/20/20    Legend:                          Inactive     Active     Other Encounter    Linked               Medications 01/18/20 01/19/20 01/20/20   dextrose (D50W) 25 g/ 50mL  Intravenous Solution 12.5 g   Dose: 12.5 g  Freq: As Needed Route: IV  PRN Reason: Low Blood Sugar  PRN Comment: for blood glucose < 100 mg/dL  Start: 01/13/20 0722         dextrose (D50W) 25 g/ 50mL Intravenous Solution 25 g   Dose: 25 g  Freq: Every 15 Minutes PRN Route: IV  PRN Reason: Low Blood Sugar  PRN Comment: Blood Sugar Less Than 70  Start: 01/14/20 1504    Admin Instructions:   Blood sugar less than 70; patient has IV access - Unresponsive, NPO or Unable To Safely Swallow         dextrose (D50W) 25 g/ 50mL Intravenous Solution 25 g   Dose: 25 g  Freq: Every 15 Minutes PRN Route: IV  PRN Reason: Low Blood Sugar  PRN Comment: Blood Sugar Less Than 70  Start: 01/14/20 0141    Admin Instructions:   Blood sugar less than 70; patient has IV access - Unresponsive, NPO or Unable To Safely Swallow         dextrose (GLUTOSE) oral gel 15 g   Dose: 15 g  Freq: Every 15 Minutes PRN Route: PO  PRN Reason: Low Blood Sugar  PRN Comment: Blood sugar less than 70  Start: 01/14/20 1504    Admin Instructions:   BS<70, Patient Alert, Is not NPO, Can safely swallow.         dextrose (GLUTOSE) oral gel 15 g   Dose: 15 g  Freq: Every 15 Minutes PRN Route: PO  PRN Reason: Low Blood Sugar  PRN Comment: Blood sugar less than 70  Start: 01/14/20 0141    Admin Instructions:   BS<70, Patient Alert, Is not NPO, Can safely swallow.         dextrose 5 % and sodium chloride 0.45 % infusion   Rate: 250 mL/hr Dose: 250 mL/hr  Freq: Continuous PRN Route: IV  PRN Comment: For Sodium >/= 135 and K > 5.5 and Glucose </= 200 for DKA  or Glucose </= 300 for HHS  Start: 01/13/20 0722 End: 01/14/20 1507         dextrose 5 % and sodium chloride 0.45 % with KCl 20 mEq/L infusion   Rate: 250 mL/hr Dose: 250 mL/hr  Freq: Continuous PRN Route: IV  PRN Comment: For Sodium >/=135 and K 3.3-5.5 and Glucose </= 200 for DKA or Glucose </= 300 for HHS  Start: 01/13/20 0722 End: 01/14/20 1507         dextrose 5 % and sodium chloride 0.45 % with KCl 40 mEq/L  infusion   Rate: 250 mL/hr Dose: 250 mL/hr  Freq: Continuous PRN Route: IV  PRN Comment: For Sodium >/=135 and K <3.3 and Glucose </= 200 for DKA or Glucose </= 300 for HHS  Start: 01/13/20 0722 End: 01/14/20 1507         glucagon (human recombinant) (GLUCAGEN DIAGNOSTIC) injection 1 mg   Dose: 1 mg  Freq: Every 15 Minutes PRN Route: SC  PRN Reason: Low Blood Sugar  PRN Comment: Blood Glucose Less Than 70  Start: 01/14/20 1504    Admin Instructions:   Blood Glucose Less Than 70 - Patient Without IV Access - Unresponsive, NPO or Unable To Safely Swallow         glucagon (human recombinant) (GLUCAGEN DIAGNOSTIC) injection 1 mg   Dose: 1 mg  Freq: Every 15 Minutes PRN Route: SC  PRN Reason: Low Blood Sugar  PRN Comment: Blood Glucose Less Than 70  Start: 01/14/20 0141    Admin Instructions:   Blood Glucose Less Than 70 - Patient Without IV Access - Unresponsive, NPO or Unable To Safely Swallow         haloperidol lactate (HALDOL) injection 2 mg   Dose: 2 mg  Freq: Every 6 Hours PRN Route: IV  PRN Reason: Agitation  Start: 01/14/20 2242 End: 01/17/20 1500    Admin Instructions:   For IV Push, administer no faster than 5 mg / minute.         magnesium sulfate 4 gram infusion - Mg less than or equal to 1mg/dL   Dose: 4 g  Freq: As Needed Route: IV  PRN Comment: Mg less than or equal to 1mg/dL  Start: 01/13/20 1559    Admin Instructions:   Recheck Mg level in the AM.         Or  magnesium sulfate 3 gram infusion (1gm x 3) - Mg 1.1 - 1.5 mg/dL   Dose: 1 g  Freq: As Needed Route: IV  PRN Comment: Mg 1.1 - 1.5 mg/dL  Start: 01/13/20 1559    Admin Instructions:   Infuse 1 gram over 1 hour for 3 doses (total Mg dose of 3 grams). Recheck Mg level in the AM.         Or  Magnesium Sulfate 2 gram infusion- Mg 1.6 - 1.9 mg/dL   Dose: 2 g  Freq: As Needed Route: IV  PRN Comment: Mg 1.6 - 1.9 mg/dL  Start: 01/13/20 9969    Admin Instructions:   Recheck Mg level in the AM.         morphine injection 4 mg   Dose: 4 mg  Freq: Every 4  Hours PRN Route: IV  PRN Reasons: Moderate Pain ,Severe Pain   PRN Comment: agiation  Start: 01/14/20 0751 End: 01/15/20 1309    Admin Instructions:   If given for pain, use the following pain scale:  Mild Pain = Pain Score of 1-3, CPOT 1-2  Moderate Pain = Pain Score of 4-6, CPOT 3-4  Severe Pain = Pain Score of 7-10, CPOT 5-8         ondansetron (ZOFRAN) tablet 4 mg   Dose: 4 mg  Freq: Every 6 Hours PRN Route: PO  PRN Reasons: Nausea,Vomiting  Start: 01/13/20 1521    Admin Instructions:   If BOTH ondansetron (ZOFRAN) & promethazine (PHENERGAN) Ordered, Use ondansetron First & THEN promethazine IF ondansetron Ineffective.         Or  ondansetron (ZOFRAN) injection 4 mg   Dose: 4 mg  Freq: Every 6 Hours PRN Route: IV  PRN Reasons: Nausea,Vomiting  Start: 01/13/20 1521    Admin Instructions:   If BOTH ondansetron (ZOFRAN) & promethazine (PHENERGAN) Ordered, Use ondansetron First & THEN promethazine IF ondansetron Ineffective.         potassium & sodium phosphates (PHOS-NAK) 280-160-250 MG packet - for Phosphorus less than 1.25 mg/dL   Dose: 2 packet  Freq: Every 6 Hours PRN Route: PO  PRN Comment: Phosphorus less than 1.25 mg/dL  Start: 01/15/20 1242    Admin Instructions:   Phosphorus replacement:     If Phos    < 1.25 mg/dL   : Give Neutraphos 2 packets by mouth every 6 hours x 2 doses. Recheck Phosphorus level 6 hours after replacement complete.         Or  potassium & sodium phosphates (PHOS-NAK) 280-160-250 MG packet - for Phosphorus 1.25 - 2.5 mg/dL   Dose: 2 packet  Freq: Every 6 Hours PRN Route: PO  PRN Comment: Phosphorus 1.25 - 2.5 mg/dL  Start: 01/15/20 1242    Admin Instructions:   Phosphorus replacement:       If Phos 1.25 - 2.5  mg/dL: Give Neutraphos 2 packets by mouth every 6 hours x 1 dose. Recheck Phosphorus level 6 hours after replacement complete.         potassium chloride (MICRO-K) CR capsule 40 mEq   Dose: 40 mEq  Freq: As Needed Route: PO  PRN Comment: Potassium Replacement.  See Admin  Instructions  Start: 01/13/20 1559    Admin Instructions:   Potassium 3.1 or Less Give KCl 40 mEq q4h x3 Doses   Potassium 3.2 - 3.6 Give KCl 40 mEq q4h x2 Doses     Check Potassium 4 Hours After Last Dose Given   Check Magnesium if Potassium Level Remains Low After Replacement   DO NOT GIVE if CrCl is Less Than 30 mL/minute or Urine Output Less Than 30 mL/hr         Or  potassium chloride (KLOR-CON) packet 40 mEq   Dose: 40 mEq  Freq: As Needed Route: PO  PRN Comment: potassium replacement, see admin instructions  Start: 01/13/20 1559    Admin Instructions:   Potassium 3.1 or Less Give KCl 40 mEq q4h x3 Doses   Potassium 3.2 - 3.6 Give KCl 40 mEq q4h x2 Doses     Check Potassium 4 Hours After Last Dose Given   Check Magnesium if Potassium Level Remains Low After Replacement   DO NOT GIVE if CrCl is Less Than 30 mL/minute or Urine Output Less Than 30 mL/hr         Or  potassium chloride 10 mEq in 100 mL IVPB   Dose: 10 mEq  Freq: Every 1 Hour PRN Route: IV  PRN Comment: Potassium Replacement - See Admin Instructions  Start: 01/13/20 1559    Admin Instructions:   Peripheral or Central IV  Potassium 3.1 or Less Give KCl 10 mEq/100 mL NS IV q1h x6 Doses  Potassium 3.2 - 3.6 Give KCl 10 mEq/100 mL NS q1h x4 Doses    Check Potassium 4 Hours After Last Dose Given  Check Magnesium if Potassium Remains Low After Replacement  DO NOT GIVE if CrCl is Less Than 30 mL/minute or Urine Output Less Than 30 mL/hr.     Rates Greater Than 10 mEq/hr Require ECG Monitoring.  OUTPATIENT/NON-MONITORED UNITS: Potassium Chloride standard bolus infusion rate is a maximum of 10 mEq/hr on unmonitored patients    MONITORED UNITS: Potassium Chloride standard bolus infusion rate is a maximum of 20 mEq/hr on ECG monitored patients ONLY         potassium phosphate 45 mmol in sodium chloride 0.9 % 500 mL infusion   Dose: 45 mmol  Freq: As Needed Route: IV  PRN Comment: Peripheral IV - Phosphorus Less Than 1.3 mg/dL  Start: 01/13/20 1559    Admin  Instructions:   Recheck Phosphorus level 6 hours after replacement complete.  Refrigerate         Or  potassium phosphate 30 mmol in sodium chloride 0.9 % 250 mL infusion   Dose: 30 mmol  Freq: As Needed Route: IV  PRN Comment: Peripheral IV - Phosphorus 1.3 - 1.7 mg/dL  Start: 01/13/20 1559    Admin Instructions:   Recheck Phosphorus level 6 hours after replacement complete.  Refrigerate         Or  potassium phosphate 15 mmol in sodium chloride 0.9 % 100 mL infusion   Dose: 15 mmol  Freq: As Needed Route: IV  PRN Comment: Peripheral IV - Phosphorus 1.8 - 2.5 mg/dL  Start: 01/13/20 1559    Admin Instructions:   Recheck Phosphorus level 6 hours after replacement complete.  Refrigerate         Or  sodium phosphates 40 mmol in sodium chloride 0.9 % 500 mL IVPB   Dose: 40 mmol  Freq: As Needed Route: IV  PRN Comment: Peripheral IV - Phosphorus Less Than 1.3 mg/dL & Potassium Greater Than 4  Start: 01/13/20 1559    Admin Instructions:   Recheck Phosphorus level 6 hours after replacement.         Or  sodium phosphates 20 mmol in sodium chloride 0.9 % 250 mL IVPB   Dose: 20 mmol  Freq: As Needed Route: IV  PRN Comment: Peripheral IV - Phosphorus 1.3 - 2.5 & Potassium Greater Than 4  Start: 01/13/20 1559    Admin Instructions:   Recheck Phosphorus level 6 hours after replacement complete         sodium chloride 0.45 % 1,000 mL with potassium chloride 40 mEq infusion   Rate: 250 mL/hr Dose: 250 mL/hr  Freq: Continuous PRN Route: IV  PRN Comment: For Sodium >/=135 and K <3.3 and Glucose >200 for DKA or Glucose >300 for HHS  Start: 01/13/20 0722 End: 01/14/20 1507         sodium chloride 0.45 % infusion   Rate: 250 mL/hr Dose: 250 mL/hr  Freq: Continuous PRN Route: IV  PRN Comment: For Sodium >/= 135 and K >5.5 and Glucose >200 for DKA or Glucose >300 for HHS  Start: 01/13/20 0722 End: 01/14/20 1507         sodium chloride 0.45 % with KCl 20 mEq/L infusion   Rate: 250 mL/hr Dose: 250 mL/hr  Freq: Continuous PRN Route: IV  PRN  Comment: For Sodium >/= 135 and K 3.3-5.5 and Glucose >200 for DKA or Glucose >300 for HHS  Start: 01/13/20 0722 End: 01/14/20 1507         sodium chloride 0.9 % flush 10 mL   Dose: 10 mL  Freq: As Needed Route: IV  PRN Reason: Line Care  Start: 01/13/20 1521         sodium chloride 0.9 % flush 10 mL   Dose: 10 mL  Freq: Once As Needed Route: IV  PRN Reason: Line Care  Start: 01/13/20 0722    Admin Instructions:   Flush approximately 10 mL through line prior to first use of tubing.         sodium chloride 0.9 % flush 10 mL   Dose: 10 mL  Freq: As Needed Route: IV  PRN Reason: Line Care  Start: 01/13/20 0623         sodium chloride 0.9 % infusion   Rate: 10 mL/hr Dose: 10 mL/hr  Freq: Continuous PRN Route: IV  PRN Comment: if insulin infusion rate is insufficient to maintain IV patency.  Start: 01/13/20 0722 End: 01/14/20 1507         sodium chloride 0.9 % with KCl 20 mEq/L infusion   Rate: 250 mL/hr Dose: 250 mL/hr  Freq: Continuous PRN Route: IV  PRN Comment: For Sodium <135 and K 3.3-5.5 and Glucose >200 for DKA or Glucose >300 for HHS  Start: 01/13/20 0722 End: 01/14/20 1507         sodium chloride 0.9 % with KCl 40 mEq/L infusion   Rate: 250 mL/hr Dose: 250 mL/hr  Freq: Continuous PRN Route: IV  PRN Comment: For Sodium <135 and K <3.3 and Glucose >200 for DKA or Glucose >300 for HHS  Start: 01/13/20 0722 End: 01/14/20 1507         Medications 01/18/20 01/19/20 01/20/20

## 2020-01-20 NOTE — PLAN OF CARE
Problem: Patient Care Overview  Goal: Plan of Care Review  Outcome: Ongoing (interventions implemented as appropriate)  Flowsheets (Taken 1/20/2020 1420)  Progress: improving  Plan of Care Reviewed With: patient  Outcome Summary: Pt tolerated treatment with no complaints. Pt is progressing well with mobility and strength. Pt is independent with bed mobility and STS transfers. Pt ambulated 300 feet without AD, SBA. No LOB or veering noted. Pt had no complaints of calf muscles cramping. Encouraged pt to keep ambulating throughout the day with nsg. Pt has met PT goals and will d/c pt from PT at this time.

## 2020-01-20 NOTE — THERAPY TREATMENT NOTE
Patient Name: Joby Kulkarni  : 1970    MRN: [id number]                              Today's Date: 2020       Admit Date: 2020    Visit Dx:     ICD-10-CM ICD-9-CM   1. Abnormal esophagram R93.3 793.4   2. Diabetes mellitus, new onset (CMS/HCC) E11.9 250.00   3. Somnolence R40.0 780.09   4. Diabetic ketoacidosis without coma associated with other specified diabetes mellitus (CMS/HCC) E13.10 250.12   5. Acute renal failure, unspecified acute renal failure type (CMS/HCC) N17.9 584.9   6. Hyperkalemia E87.5 276.7   7. Hypermagnesemia E83.41 275.2   8. Hyperphosphatemia E83.39 275.3   9. Dehydration E86.0 276.51   10. Encephalopathy G93.40 348.30     Patient Active Problem List   Diagnosis   • Diabetes mellitus, new onset (CMS/HCC)   • Diabetic ketoacidosis without coma associated with type 2 diabetes mellitus (CMS/HCC)   • Abnormal esophagram     Past Medical History:   Diagnosis Date   • Diabetes (CMS/HCC)    • Finger infection     right middle finger     Past Surgical History:   Procedure Laterality Date   • NO PAST SURGERIES       General Information     Row Name 20 1418          PT Evaluation Time/Intention    Document Type  therapy note (daily note)  -     Mode of Treatment  physical therapy;individual therapy  -     Row Name 20 1418          General Information    Existing Precautions/Restrictions  fall  -     Row Name 20 1418          Cognitive Assessment/Intervention- PT/OT    Orientation Status (Cognition)  oriented x 4  -       User Key  (r) = Recorded By, (t) = Taken By, (c) = Cosigned By    Initials Name Provider Type     Tyra Franz PTA Physical Therapy Assistant        Mobility     Row Name 20 1418          Bed Mobility Assessment/Treatment    Supine-Sit [place] (Bed Mobility)  independent  -     Sit-Supine [place] (Bed Mobility)  independent  -     Row Name 20 1418          Sit-Stand Transfer    Sit-Stand [place] (Transfers)   independent  -     Assistive Device (Sit-Stand Transfers)  -- No AD  -[facility] Name 01/20/20 1418          Gait/Stairs Assessment/Training    [place] Level (Gait)  stand by assist  -     Assistive Device (Gait)  -- No AD  -     Distance in Feet (Gait)  300  -     Pattern (Gait)  step-through  -     Deviations/Abnormal Patterns (Gait)  [place] decreased;stride length decreased  -       User Key  (r) = Recorded By, (t) = Taken By, (c) = Cosigned By    Initials Name Provider Type     Tyra Franz PTA Physical Therapy Assistant        Obj/Interventions     Row Name 01/20/20 1419          Static Sitting Balance    Level of [place] (Unsupported Sitting, Static Balance)  independent  -     Sitting Position (Unsupported Sitting, Static Balance)  sitting on edge of bed  -[facility] Name 01/20/20 1419          Static Standing Balance    Level of [place] (Supported Standing, Static Balance)  independent  -       User Key  (r) = Recorded By, (t) = Taken By, (c) = Cosigned By    Initials Name Provider Type     Tyra Franz PTA Physical Therapy Assistant        Goals/Plan    No documentation.       Clinical Impression     [facility] Name 01/20/20 1420          Pain Scale: Numbers Pre/Post-Treatment    Pain Scale: Numbers, Pretreatment  0/10 - no pain  -     Pre/Post Treatment Pain Comment  no c/o pain or cramping of calf muscles with ambulation today.   -[facility] Name 01/20/20 1420          Plan of Care Review    Plan of Care Reviewed With  patient  -     Progress  improving  -     Outcome Summary  Pt tolerated treatment with no complaints. Pt is progressing well with mobility and strength. Pt is independent with bed mobility and STS transfers. Pt ambulated 300 feet without AD, SBA. No LOB or veering noted. Pt had no complaints of calf muscles cramping. Encouraged pt to keep ambulating throughout the day with nsg. Pt has met PT goals and will d/c pt from PT at this time.   -[facility]  Name 01/20/20 1420          Positioning and Restraints    Pre-Treatment Position  in bed no bed alarm on upon entry.   -EH     Post Treatment Position  bed  -EH     In Bed  sitting EOB;call light within reach;encouraged to call for assist;with family/caregiver  -       User Key  (r) = Recorded By, (t) = Taken By, (c) = Cosigned By    Initials Name Provider Type    Tyra Wayne PTA Physical Therapy Assistant        Outcome Measures     Row Name 01/20/20 1423          How much help from another person do you currently need...    Turning from your back to your side while in flat bed without using bedrails?  4  -EH     Moving from lying on back to sitting on the side of a flat bed without bedrails?  4  -EH     Moving to and from a bed to a chair (including a wheelchair)?  3  -EH     Standing up from a chair using your arms (e.g., wheelchair, bedside chair)?  4  -EH     Climbing 3-5 steps with a railing?  3  -EH     To walk in hospital room?  3  -EH     AM-PAC 6 Clicks Score (PT)  21  -       User Key  (r) = Recorded By, (t) = Taken By, (c) = Cosigned By    Initials Name Provider Type     Tyra Franz PTA Physical Therapy Assistant          PT Recommendation and Plan     Outcome Summary/Treatment Plan (PT)  Anticipated Discharge Disposition (PT): home, home with assist, home with home health  Plan of Care Reviewed With: patient  Progress: improving  Outcome Summary: Pt tolerated treatment with no complaints. Pt is progressing well with mobility and strength. Pt is independent with bed mobility and STS transfers. Pt ambulated 300 feet without AD, SBA. No LOB or veering noted. Pt had no complaints of calf muscles cramping. Encouraged pt to keep ambulating throughout the day with nsg. Pt has met PT goals and will d/c pt from PT at this time.      Time Calculation:   PT Charges     Row Name 01/20/20 1417             Time Calculation    Start Time  1405  -      Stop Time  1417  -      Time Calculation (min)   12 min  -      PT Received On  01/20/20  -      PT - Next Appointment  01/22/20  -         Time Calculation- PT    Total Timed Code Minutes- PT  12 minute(s)  -        User Key  (r) = Recorded By, (t) = Taken By, (c) = Cosigned By    Initials Name Provider Type     Tyra Franz PTA Physical Therapy Assistant        Therapy Charges for Today     Code Description Service Date Service Provider Modifiers Qty    90140869455 HC PT THER PROC EA 15 MIN 1/20/2020 Tyra Franz PTA GP 1          PT G-Codes  Outcome Measure Options: AM-PAC 6 Clicks Basic Mobility (PT)  AM-PAC 6 Clicks Score (PT): 21  AM-PAC 6 Clicks Score (OT): 19    Tyra Franz PTA  1/20/2020

## 2020-01-20 NOTE — SIGNIFICANT NOTE
01/20/20 1418   PT Discharge Summary   Anticipated Discharge Disposition (PT) home;home with assist;home with home health   Reason for Discharge All goals achieved   Outcomes Achieved Able to achieve all goals within established timeline   Discharge Destination Home;Home with home health;Home with assist

## 2020-01-20 NOTE — PROGRESS NOTES
"Continued Stay Note  [facility]     Patient Name: Joby Kulkarni  MRN: [id number]  Today's Date: 1/20/2020    Admit Date: 1/13/2020    Discharge Plan     Row Name 01/20/20 1628       Plan    Plan  Home with HH and New PCP    Patient/Family in Agreement with Plan  yes    Plan Comments  Spoke with pt and Mom [place] at bedside, pt states he is walking much better and plan is Home. CCP discussed concern from DM educators of understanding DM and its management, asked if any other family or friend to come to hospital to be taught to assist with pt. He declined and states \"he doesnt feel comfortable' having anyone else. Pt states he wants the Insulin Pen due to his finger wound on right hand. CCP explained would ask MD for both presriptions for vial and pen to make sure pt can afford cost. Pt verbalized understanding. CCP offered to call [facility] desk for PCP setup. Pt verbalized agreement and request near [place] area. CCP made new pt appointment for Friday 1/24/2020 AT 1:30PM with Ban FU  Added to AVS. CCP called DM educator and left vm updating he did NOT want anyone else assisting in learning. Pt was agreeable to have HH service, provided HH list and can provide Medicare.gov compare list, pt declined and chose [facility].  Shiva ARMENTA/CRISTINE        Discharge Codes    No documentation.             Sheyla Treadwell, RN    "

## 2020-01-20 NOTE — PROGRESS NOTES
[facility]  Inpatient Progress Note    Reason for Follow Up:  Dysphagia, abnormal esophagram    Subjective     Interval History:   Tolerating diet, no dysphasia complaints.  Discussed esophagram results.  Will likely go home tomorrow after he has shown that he can adequately give himself insulin.    Current Facility-Administered Medications:   •  cephalexin (KEFLEX) capsule 500 mg, 500 mg, Oral, Q6H, Nick Rowan MD, 500 mg at 01/20/20 0614  •  dextrose (D50W) 25 g/ 50mL Intravenous Solution 12.5 g, 12.5 g, Intravenous, PRN, Tacos Arzola MD, 12.5 g at 01/13/20 2240  •  dextrose (D50W) 25 g/ 50mL Intravenous Solution 25 g, 25 g, Intravenous, Q15 Min PRN, Shandra Delcid MD  •  dextrose (D50W) 25 g/ 50mL Intravenous Solution 25 g, 25 g, Intravenous, Q15 Min PRN, Cameron Morgan MD  •  dextrose (GLUTOSE) oral gel 15 g, 15 g, Oral, Q15 Min PRN, Shandra Delcid MD  •  dextrose (GLUTOSE) oral gel 15 g, 15 g, Oral, Q15 Min PRN, Cameron Morgan MD  •  enoxaparin (LOVENOX) syringe 40 mg, 40 mg, Subcutaneous, Q24H, Cameron Morgan MD, 40 mg at 01/19/20 1159  •  glucagon (human recombinant) (GLUCAGEN DIAGNOSTIC) injection 1 mg, 1 mg, Subcutaneous, Q15 Min PRN, Shandra Delcid MD  •  glucagon (human recombinant) (GLUCAGEN DIAGNOSTIC) injection 1 mg, 1 mg, Subcutaneous, Q15 Min PRN, Cameron Morgan MD  •  insulin glargine (LANTUS) injection 15 Units, 15 Units, Subcutaneous, Q12H, Andre Morin MD, 15 Units at 01/19/20 2159  •  insulin lispro (humaLOG) injection 0-9 Units, 0-9 Units, Subcutaneous, 4x Daily With Meals & Nightly, Lexie Doe MD, 2 Units at 01/19/20 2159  •  insulin lispro (humaLOG) injection 5 Units, 5 Units, Subcutaneous, TID With Meals, Lexie Doe MD, 5 Units at 01/19/20 1705  •  magnesium sulfate 4 gram infusion - Mg less than or equal to 1mg/dL, 4 g, Intravenous, PRN **OR** magnesium sulfate 3 gram infusion (1gm x 3) - Mg 1.1 - 1.5 mg/dL, 1 g,  Intravenous, PRN **OR** Magnesium Sulfate 2 gram infusion- Mg 1.6 - 1.9 mg/dL, 2 g, Intravenous, PRN, Cameron Morgan MD  •  ondansetron (ZOFRAN) tablet 4 mg, 4 mg, Oral, Q6H PRN **OR** ondansetron (ZOFRAN) injection 4 mg, 4 mg, Intravenous, Q6H PRN, Cameron Morgan MD  •  pantoprazole (PROTONIX) EC tablet 40 mg, 40 mg, Oral, QAM, Fernanda, Derek Ríos MD, 40 mg at 01/20/20 0614  •  potassium & sodium phosphates (PHOS-NAK) 280-160-250 MG packet - for Phosphorus less than 1.25 mg/dL, 2 packet, Oral, Q6H PRN **OR** potassium & sodium phosphates (PHOS-NAK) 280-160-250 MG packet - for Phosphorus 1.25 - 2.5 mg/dL, 2 packet, Oral, Q6H PRN, Cameron Morgan MD, 2 packet at 01/16/20 1157  •  potassium chloride (MICRO-K) CR capsule 40 mEq, 40 mEq, Oral, PRN **OR** potassium chloride (KLOR-CON) packet 40 mEq, 40 mEq, Oral, PRN **OR** potassium chloride 10 mEq in 100 mL IVPB, 10 mEq, Intravenous, Q1H PRN, Cameron Morgan MD  •  potassium phosphate 45 mmol in sodium chloride 0.9 % 500 mL infusion, 45 mmol, Intravenous, PRN **OR** potassium phosphate 30 mmol in sodium chloride 0.9 % 250 mL infusion, 30 mmol, Intravenous, PRN **OR** potassium phosphate 15 mmol in sodium chloride 0.9 % 100 mL infusion, 15 mmol, Intravenous, PRN, 15 mmol at 01/15/20 1355 **OR** sodium phosphates 40 mmol in sodium chloride 0.9 % 500 mL IVPB, 40 mmol, Intravenous, PRN **OR** sodium phosphates 20 mmol in sodium chloride 0.9 % 250 mL IVPB, 20 mmol, Intravenous, PRN, Cameron Morgan MD, Stopped at 01/14/20 0746  •  senna-docusate sodium (SENOKOT-S) 8.6-50 MG tablet 2 tablet, 2 tablet, Oral, Nightly, Cameron Morgan MD, 2 tablet at 01/19/20 2059  •  [COMPLETED] Insert peripheral IV, , , Once **AND** sodium chloride 0.9 % flush 10 mL, 10 mL, Intravenous, PRN, Marlon Shabazz MD  •  sodium chloride 0.9 % flush 10 mL, 10 mL, Intravenous, Once PRN, Tacos Arzola MD  •  sodium chloride 0.9 % flush 10 mL, 10 mL, Intravenous, Q12H, Cameron Morgan,  MD, 10 mL at 01/19/20 2059  •  sodium chloride 0.9 % flush 10 mL, 10 mL, Intravenous, PRN, Cameron Morgan MD  Review of Systems:   The following systems were reviewed and negative: Constitution, pulmonary, cardiac, gastrointestinal, genitourinary        Objective     Vital Signs  Temp:  [97.5 °F (36.4 °C)-99.1 °F (37.3 °C)] 98.8 °F (37.1 °C)  Heart Rate:  [81-98] 81  Resp:  [16] 16  BP: (105-121)/(62-86) 107/62  Body mass index is 20.89 kg/m².    Intake/Output Summary (Last 24 hours) at 1/20/2020 0830  Last data filed at 1/19/2020 1750  Gross per 24 hour   Intake 600 ml   Output 500 ml   Net 100 ml     No intake/output data recorded.     Physical Exam:   General: patient awake, alert and cooperative   Eyes: Normal lids and lashes, no scleral icterus   Neck: supple, normal ROM   Skin: warm and dry, not jaundiced   Cardiovascular: regular rhythm and rate, no murmurs auscultated   Pulm: clear to auscultation bilaterally, regular and unlabored   Abdomen: soft, nontender, nondistended; normal bowel sounds   Rectal: deferred   Extremities: no rash or edema   Psychiatric: Normal mood and behavior; memory intact     Results Review:     I reviewed the patient's new clinical results.    Results from last 7 days   Lab Units 01/20/20  0441 01/16/20  0535 01/15/20  0449   WBC 10*3/mm3  --  9.86  --    HEMOGLOBIN g/dL  --  14.0  --    HEMATOCRIT %  --  40.6  --    PLATELETS 10*3/mm3 412 954 289     Results from last 7 days   Lab Units 01/16/20  0535 01/15/20  0449 01/14/20  1824  01/13/20  0930   SODIUM mmol/L 141 149* 150*   < > 132*   POTASSIUM mmol/L 3.5 4.3 3.8   < > 6.1*   CHLORIDE mmol/L 104 114* 117*   < > 93*   CO2 mmol/L 22.8 19.8* 19.1*   < > 10.9*   BUN mg/dL 12 25* 38*   < > 92*   CREATININE mg/dL 0.86 1.04 1.26   < > 2.25*   CALCIUM mg/dL 8.7 8.5* 8.0*   < > 8.6   BILIRUBIN mg/dL  --   --   --   --  0.6   ALK PHOS U/L  --   --   --   --  84   ALT (SGPT) U/L  --   --   --   --  17   AST (SGOT) U/L  --   --   --   --   35   GLUCOSE mg/dL 139* 153* 139*   < > 714*    < > = values in this interval not displayed.         No results found for: LIPASE    Radiology:  MRI Brain With & Without Contrast   Final Result   Mild mucosal thickening in the floor the right maxillary   sinus. Otherwise unremarkable MRI of the brain.       This report was finalized on 1/19/2020 2:41 PM by Dr. Marcos Sy M.D.          FL Esophagram Complete   Final Result       No tight stenoses. Several small rounded defects in the mucosal coating   of the esophagus appear to persist, potentially polyps or may be   persistent adherent air bubbles, suggest endoscopic correlation.       This report was finalized on 1/18/2020 2:06 PM by Dr. Anton Bower M.D.          CT Head Without Contrast   Final Result   1.  No findings of acute intracranial abnormality.           This report was finalized on 1/13/2020 7:20 AM by Dr. Bobby Clemons M.D.          XR Chest 1 View   Final Result   1. No acute process.       This report was finalized on 1/13/2020 6:41 AM by Dr. Reddy Lizama M.D.              Assessment/Plan     Patient Active Problem List   Diagnosis   • Diabetes mellitus, new onset (CMS/HCC)   • Diabetic ketoacidosis without coma associated with type 2 diabetes mellitus (CMS/HCC)       Impression  1. Dysphagia: No further symptoms, no obstruction on imaging    2. Abnormal esophagram: Unclear if there are bubbles or nodules in the esophagus    3. Metabolic encephalopathy    4. DKA    Plan  Continue diet as tolerated.  Discussed option of pursuing EGD to completely rule out any abnormality of the esophagus.  Since he is staying another day, he is agreeable to doing this.  N.p.o. at midnight, EGD tomorrow.  Discussed with the patient and his mother.    I discussed the patients findings and my recommendations with patient, family and consulting provider.    Ruby Albarran MD

## 2020-01-21 ENCOUNTER — ANESTHESIA EVENT (OUTPATIENT)
Dept: GASTROENTEROLOGY | Facility: HOSPITAL | Age: 50
End: 2020-01-21

## 2020-01-21 ENCOUNTER — ANESTHESIA (OUTPATIENT)
Dept: GASTROENTEROLOGY | Facility: HOSPITAL | Age: 50
End: 2020-01-21

## 2020-01-21 LAB
GAD65 AB SER-ACNC: <5 U/ML (ref 0–5)
GLUCOSE BLDC GLUCOMTR-MCNC: 136 MG/DL (ref 70–130)
GLUCOSE BLDC GLUCOMTR-MCNC: 152 MG/DL (ref 70–130)
GLUCOSE BLDC GLUCOMTR-MCNC: 202 MG/DL (ref 70–130)
GLUCOSE BLDC GLUCOMTR-MCNC: 206 MG/DL (ref 70–130)
GLUCOSE BLDC GLUCOMTR-MCNC: 218 MG/DL (ref 70–130)

## 2020-01-21 PROCEDURE — 82962 GLUCOSE BLOOD TEST: CPT

## 2020-01-21 PROCEDURE — 0DB28ZX EXCISION OF MIDDLE ESOPHAGUS, VIA NATURAL OR ARTIFICIAL OPENING ENDOSCOPIC, DIAGNOSTIC: ICD-10-PCS | Performed by: INTERNAL MEDICINE

## 2020-01-21 PROCEDURE — 0DB78ZX EXCISION OF STOMACH, PYLORUS, VIA NATURAL OR ARTIFICIAL OPENING ENDOSCOPIC, DIAGNOSTIC: ICD-10-PCS | Performed by: INTERNAL MEDICINE

## 2020-01-21 PROCEDURE — 63710000001 INSULIN GLARGINE PER 5 UNITS: Performed by: INTERNAL MEDICINE

## 2020-01-21 PROCEDURE — 25010000002 PROPOFOL 10 MG/ML EMULSION: Performed by: NURSE ANESTHETIST, CERTIFIED REGISTERED

## 2020-01-21 PROCEDURE — 88342 IMHCHEM/IMCYTCHM 1ST ANTB: CPT | Performed by: INTERNAL MEDICINE

## 2020-01-21 PROCEDURE — 63710000001 INSULIN LISPRO (HUMAN) PER 5 UNITS: Performed by: INTERNAL MEDICINE

## 2020-01-21 PROCEDURE — 0DB18ZX EXCISION OF UPPER ESOPHAGUS, VIA NATURAL OR ARTIFICIAL OPENING ENDOSCOPIC, DIAGNOSTIC: ICD-10-PCS | Performed by: INTERNAL MEDICINE

## 2020-01-21 PROCEDURE — 0DB98ZX EXCISION OF DUODENUM, VIA NATURAL OR ARTIFICIAL OPENING ENDOSCOPIC, DIAGNOSTIC: ICD-10-PCS | Performed by: INTERNAL MEDICINE

## 2020-01-21 PROCEDURE — 88312 SPECIAL STAINS GROUP 1: CPT | Performed by: INTERNAL MEDICINE

## 2020-01-21 PROCEDURE — 43239 EGD BIOPSY SINGLE/MULTIPLE: CPT | Performed by: INTERNAL MEDICINE

## 2020-01-21 PROCEDURE — 99232 SBSQ HOSP IP/OBS MODERATE 35: CPT | Performed by: INTERNAL MEDICINE

## 2020-01-21 PROCEDURE — 88305 TISSUE EXAM BY PATHOLOGIST: CPT | Performed by: INTERNAL MEDICINE

## 2020-01-21 PROCEDURE — 25010000002 PHENYLEPHRINE PER 1 ML: Performed by: NURSE ANESTHETIST, CERTIFIED REGISTERED

## 2020-01-21 PROCEDURE — 25010000002 ENOXAPARIN PER 10 MG: Performed by: INTERNAL MEDICINE

## 2020-01-21 RX ORDER — SODIUM CHLORIDE 9 MG/ML
1000 INJECTION, SOLUTION INTRAVENOUS CONTINUOUS
Status: ACTIVE | OUTPATIENT
Start: 2020-01-21 | End: 2020-01-23

## 2020-01-21 RX ORDER — SODIUM CHLORIDE, SODIUM LACTATE, POTASSIUM CHLORIDE, CALCIUM CHLORIDE 600; 310; 30; 20 MG/100ML; MG/100ML; MG/100ML; MG/100ML
INJECTION, SOLUTION INTRAVENOUS CONTINUOUS PRN
Status: DISCONTINUED | OUTPATIENT
Start: 2020-01-21 | End: 2020-01-21 | Stop reason: SURG

## 2020-01-21 RX ORDER — SYRINGE-NEEDLE,INSULIN,0.5 ML 27GX1/2"
SYRINGE, EMPTY DISPOSABLE MISCELLANEOUS
Qty: 100 EACH | Refills: 3 | Status: SHIPPED | OUTPATIENT
Start: 2020-01-21 | End: 2020-01-23 | Stop reason: HOSPADM

## 2020-01-21 RX ORDER — PROPOFOL 10 MG/ML
VIAL (ML) INTRAVENOUS AS NEEDED
Status: DISCONTINUED | OUTPATIENT
Start: 2020-01-21 | End: 2020-01-21 | Stop reason: SURG

## 2020-01-21 RX ORDER — INSULIN ASPART 100 [IU]/ML
18 INJECTION, SUSPENSION SUBCUTANEOUS 2 TIMES DAILY WITH MEALS
Qty: 10 ML | Refills: 2 | Status: SHIPPED | OUTPATIENT
Start: 2020-01-21 | End: 2020-01-23 | Stop reason: HOSPADM

## 2020-01-21 RX ORDER — SODIUM CHLORIDE, SODIUM LACTATE, POTASSIUM CHLORIDE, CALCIUM CHLORIDE 600; 310; 30; 20 MG/100ML; MG/100ML; MG/100ML; MG/100ML
30 INJECTION, SOLUTION INTRAVENOUS CONTINUOUS
Status: DISCONTINUED | OUTPATIENT
Start: 2020-01-21 | End: 2020-01-23 | Stop reason: HOSPADM

## 2020-01-21 RX ORDER — PROPOFOL 10 MG/ML
VIAL (ML) INTRAVENOUS CONTINUOUS PRN
Status: DISCONTINUED | OUTPATIENT
Start: 2020-01-21 | End: 2020-01-21 | Stop reason: SURG

## 2020-01-21 RX ADMIN — PROPOFOL 140 MCG/KG/MIN: 10 INJECTION, EMULSION INTRAVENOUS at 12:37

## 2020-01-21 RX ADMIN — ENOXAPARIN SODIUM 40 MG: 40 INJECTION SUBCUTANEOUS at 14:18

## 2020-01-21 RX ADMIN — EPHEDRINE SULFATE 10 MG: 50 INJECTION INTRAMUSCULAR; INTRAVENOUS; SUBCUTANEOUS at 12:45

## 2020-01-21 RX ADMIN — PANTOPRAZOLE SODIUM 40 MG: 40 TABLET, DELAYED RELEASE ORAL at 06:58

## 2020-01-21 RX ADMIN — INSULIN GLARGINE 15 UNITS: 100 INJECTION, SOLUTION SUBCUTANEOUS at 22:02

## 2020-01-21 RX ADMIN — EPHEDRINE SULFATE 10 MG: 50 INJECTION INTRAMUSCULAR; INTRAVENOUS; SUBCUTANEOUS at 13:01

## 2020-01-21 RX ADMIN — SODIUM CHLORIDE 1000 ML: 9 INJECTION, SOLUTION INTRAVENOUS at 11:29

## 2020-01-21 RX ADMIN — SODIUM CHLORIDE, POTASSIUM CHLORIDE, SODIUM LACTATE AND CALCIUM CHLORIDE: 600; 310; 30; 20 INJECTION, SOLUTION INTRAVENOUS at 12:31

## 2020-01-21 RX ADMIN — CEPHALEXIN 500 MG: 500 CAPSULE ORAL at 14:12

## 2020-01-21 RX ADMIN — INSULIN LISPRO 4 UNITS: 100 INJECTION, SOLUTION INTRAVENOUS; SUBCUTANEOUS at 22:02

## 2020-01-21 RX ADMIN — PROPOFOL 100 MG: 10 INJECTION, EMULSION INTRAVENOUS at 12:37

## 2020-01-21 RX ADMIN — SODIUM CHLORIDE, PRESERVATIVE FREE 10 ML: 5 INJECTION INTRAVENOUS at 20:16

## 2020-01-21 RX ADMIN — CEPHALEXIN 500 MG: 500 CAPSULE ORAL at 17:49

## 2020-01-21 RX ADMIN — INSULIN LISPRO 2 UNITS: 100 INJECTION, SOLUTION INTRAVENOUS; SUBCUTANEOUS at 17:49

## 2020-01-21 RX ADMIN — INSULIN GLARGINE 15 UNITS: 100 INJECTION, SOLUTION SUBCUTANEOUS at 08:28

## 2020-01-21 RX ADMIN — SODIUM CHLORIDE, POTASSIUM CHLORIDE, SODIUM LACTATE AND CALCIUM CHLORIDE 30 ML/HR: 600; 310; 30; 20 INJECTION, SOLUTION INTRAVENOUS at 14:18

## 2020-01-21 RX ADMIN — INSULIN LISPRO 5 UNITS: 100 INJECTION, SOLUTION INTRAVENOUS; SUBCUTANEOUS at 17:49

## 2020-01-21 RX ADMIN — PHENYLEPHRINE HYDROCHLORIDE 100 MCG: 10 INJECTION INTRAVENOUS at 13:01

## 2020-01-21 RX ADMIN — PHENYLEPHRINE HYDROCHLORIDE 100 MCG: 10 INJECTION INTRAVENOUS at 12:42

## 2020-01-21 RX ADMIN — CEPHALEXIN 500 MG: 500 CAPSULE ORAL at 06:58

## 2020-01-21 RX ADMIN — SODIUM CHLORIDE, PRESERVATIVE FREE 10 ML: 5 INJECTION INTRAVENOUS at 14:19

## 2020-01-21 NOTE — PROGRESS NOTES
49 y.o.   LOS: 8 days   Patient Care Team:  Provider, No Known as PCP - General    Chief Complaint: Elevated blood sugars    Chief Complaint   Patient presents with   • Altered Mental Status       Subjective   Patient is n.p.o. this morning.  Understand the concerns of the diabetic educators in terms of patient's administration of insulin.    Interval History:    Review of Systems:   Constitutional: Positive for appetite change and fatigue.   Eyes: Negative for visual disturbance.   Respiratory: no shortness of breath.    Cardiovascular: no leg swelling.   Gastrointestinal: Negative for abdominal pain.   Endocrine: Negative for polydipsia and polyuria.   Musculoskeletal: Positive for arthralgias and myalgias.   Skin: Negative for pallor.   Neurological: Positive for weakness and numbness.   Psychiatric/Behavioral: Positive for sleep disturbance.    Review of Systems  Objective     Vital Signs   Temp:  [98.1 °F (36.7 °C)-98.6 °F (37 °C)] 98.6 °F (37 °C)  Heart Rate:  [] 76  Resp:  [13-18] 13  BP: (107-112)/(68-72) 112/68    Physical Exam:  General exam-no distress, appears stated age.  Eyes-PERRL, anicteric sclera  ENT-external ears/nose normal.  Neck-no adenopathy, midline trachea.  Lungs-normal chest exam on inspection, diminished bilateral breath sounds on auscultation.  Cardiovascular -normal S1-S2, positive murmur.  ABD-nontender, nondistended, bowel sounds heard.  Extremities-no edema, cyanosis.  Hammertoes noted.            Physical ExamResults Review:     I reviewed the patient's new clinical results and summarized them in subjective and in plan.      No results found for: GLUCOSE  Lab Results (last 24 hours)     Procedure Component Value Units Date/Time    POC Glucose Once [[id number]]  (Abnormal) Collected:  01/21/20 1128    Specimen:  Blood Updated:  01/21/20 1133     Glucose 136 mg/dL     POC Glucose Once [[id number]]  (Abnormal) Collected:  01/21/20 0626    Specimen:  Blood Updated:  01/21/20 0631      Glucose 152 mg/dL     POC Glucose Once [[id number]]  (Abnormal) Collected:  01/20/20 2359    Specimen:  Blood Updated:  01/21/20 0000     Glucose 218 mg/dL     POC Glucose Once [[id number]]  (Normal) Collected:  01/20/20 1612    Specimen:  Blood Updated:  01/20/20 1616     Glucose 126 mg/dL         Imaging Results (Last 24 Hours)     ** No results found for the last 24 hours. **          Medication Review: done      Current Facility-Administered Medications:   •  cephalexin (KEFLEX) capsule 500 mg, 500 mg, Oral, Q6H, Nick Rowan MD, 500 mg at 01/21/20 0658  •  dextrose (D50W) 25 g/ 50mL Intravenous Solution 12.5 g, 12.5 g, Intravenous, PRN, Tacos Arzola MD, 12.5 g at 01/13/20 2240  •  dextrose (D50W) 25 g/ 50mL Intravenous Solution 25 g, 25 g, Intravenous, Q15 Min PRN, Shandra Delcid MD  •  dextrose (D50W) 25 g/ 50mL Intravenous Solution 25 g, 25 g, Intravenous, Q15 Min PRN, Cameron Morgan MD  •  dextrose (GLUTOSE) oral gel 15 g, 15 g, Oral, Q15 Min PRN, Shandra Delcid MD  •  dextrose (GLUTOSE) oral gel 15 g, 15 g, Oral, Q15 Min PRN, Cameron Morgan MD  •  enoxaparin (LOVENOX) syringe 40 mg, 40 mg, Subcutaneous, Q24H, Cameron Morgan MD, 40 mg at 01/20/20 1001  •  glucagon (human recombinant) (GLUCAGEN DIAGNOSTIC) injection 1 mg, 1 mg, Subcutaneous, Q15 Min PRN, Shandra Delcid MD  •  glucagon (human recombinant) (GLUCAGEN DIAGNOSTIC) injection 1 mg, 1 mg, Subcutaneous, Q15 Min PRN, Cameron Morgan MD  •  insulin glargine (LANTUS) injection 15 Units, 15 Units, Subcutaneous, Q12H, Andre Morin MD, 15 Units at 01/21/20 0828  •  insulin lispro (humaLOG) injection 0-9 Units, 0-9 Units, Subcutaneous, 4x Daily With Meals & Nightly, Lexie Doe MD, 6 Units at 01/20/20 1204  •  insulin lispro (humaLOG) injection 5 Units, 5 Units, Subcutaneous, TID With Meals, Lexie Doe MD, 5 Units at 01/20/20 1807  •  magnesium sulfate 4 gram infusion - Mg less than or equal to 1mg/dL, 4 g,  Intravenous, PRN **OR** magnesium sulfate 3 gram infusion (1gm x 3) - Mg 1.1 - 1.5 mg/dL, 1 g, Intravenous, PRN **OR** Magnesium Sulfate 2 gram infusion- Mg 1.6 - 1.9 mg/dL, 2 g, Intravenous, PRN, Cameron Morgan MD  •  ondansetron (ZOFRAN) tablet 4 mg, 4 mg, Oral, Q6H PRN **OR** ondansetron (ZOFRAN) injection 4 mg, 4 mg, Intravenous, Q6H PRN, Cameron Morgan MD  •  pantoprazole (PROTONIX) EC tablet 40 mg, 40 mg, Oral, [facility], Fernanda, Derek Ríos MD, 40 mg at 01/21/20 0658  •  potassium & sodium phosphates (PHOS-NAK) 280-160-250 MG packet - for Phosphorus less than 1.25 mg/dL, 2 packet, Oral, Q6H PRN **OR** potassium & sodium phosphates (PHOS-NAK) 280-160-250 MG packet - for Phosphorus 1.25 - 2.5 mg/dL, 2 packet, Oral, Q6H PRN, Cameron Morgan MD, 2 packet at 01/16/20 1157  •  potassium chloride (MICRO-K) CR capsule 40 mEq, 40 mEq, Oral, PRN **OR** potassium chloride (KLOR-CON) packet 40 mEq, 40 mEq, Oral, PRN **OR** potassium chloride 10 mEq in 100 mL IVPB, 10 mEq, Intravenous, Q1H PRN, Cameron Morgan MD  •  potassium phosphate 45 mmol in sodium chloride 0.9 % 500 mL infusion, 45 mmol, Intravenous, PRN **OR** potassium phosphate 30 mmol in sodium chloride 0.9 % 250 mL infusion, 30 mmol, Intravenous, PRN **OR** potassium phosphate 15 mmol in sodium chloride 0.9 % 100 mL infusion, 15 mmol, Intravenous, PRN, 15 mmol at 01/15/20 1355 **OR** sodium phosphates 40 mmol in sodium chloride 0.9 % 500 mL IVPB, 40 mmol, Intravenous, PRN **OR** sodium phosphates 20 mmol in sodium chloride 0.9 % 250 mL IVPB, 20 mmol, Intravenous, PRN, Cameron Morgan MD, Stopped at 01/14/20 0746  •  senna-docusate sodium (SENOKOT-S) 8.6-50 MG tablet 2 tablet, 2 tablet, Oral, Nightly, Cameron Morgan MD, 2 tablet at 01/19/20 2059  •  [COMPLETED] Insert peripheral IV, , , Once **AND** sodium chloride 0.9 % flush 10 mL, 10 mL, Intravenous, PRN, Marlon Shabazz MD  •  sodium chloride 0.9 % flush 10 mL, 10 mL, Intravenous, Once PRN, Dariusz,  "Tacos MUNOZ MD  •  sodium chloride 0.9 % flush 10 mL, 10 mL, Intravenous, Q12H, Cameron Morgan MD, 10 mL at 01/20/20 1003  •  sodium chloride 0.9 % flush 10 mL, 10 mL, Intravenous, PRN, Cameron Morgan MD  •  sodium chloride 0.9 % infusion 1,000 mL, 1,000 mL, Intravenous, Continuous, Ruby Albarran MD, Last Rate: 25 mL/hr at 01/21/20 1129, 1,000 mL at 01/21/20 1129    Assessment/Plan     Active Hospital Problems    Diagnosis  POA   • **Diabetic ketoacidosis without coma associated with type 2 diabetes mellitus (CMS/HCC) [E11.10]  Yes   • Diabetes mellitus, new onset (CMS/HCC) [E11.9]  Yes   • Abnormal esophagram [R93.3]  Unknown      Resolved Hospital Problems   No resolved problems to display.     Diabetes mellitus-significantly uncontrolled  It is unclear if patient is a type I or type II  Continue Lantus 15 units twice daily  Continue Humalog 5 units with each meal  Continue Humalog sliding scale 3 times daily before meals and at bedtime.    C-peptide levels positive.  Pending john 65 antibodies.  Discussed with the patient if the john 65 antibodies are negative we should be able to do oral hypoglycemic agents.  However for some reasons the john 65 antibody blood work-up is still pending, will reorder this blood work-up.    If patient is being discharged okay to consider 70/30 insulin regimen.  Consider 70/30 - 18 units twice daily with breakfast and dinner.        Discussed plan with Nurse.     Lexie Doe MD.  01/21/20  12:17 PM      EMR Dragon / transcription disclaimer:    \"Dictated utilizing Dragon dictation\".   "

## 2020-01-21 NOTE — ANESTHESIA POSTPROCEDURE EVALUATION
"Patient: Joby Kulkarni    Procedure Summary     Date:  01/21/20 Room / Location:  [facility] ENDOSCOPY 8 /  [facility]    Anesthesia Start:  1230 Anesthesia Stop:  1303    Procedure:  ESOPHAGOGASTRODUODENOSCOPY with cold bx (N/A Esophagus) Diagnosis:       Abnormal esophagram      (Abnormal esophagram [R93.3])    Surgeon:  Ruby Albarran MD Provider:  Kenton Keating MD    Anesthesia Type:  MAC ASA Status:  2          Anesthesia Type: MAC    Vitals  Vitals Value Taken Time   /61 1/21/2020  1:13 PM   Temp     Pulse 95 1/21/2020  1:13 PM   Resp 16 1/21/2020  1:13 PM   SpO2 98 % 1/21/2020  1:13 PM           Post Anesthesia Care and Evaluation    Patient location during evaluation: bedside  Patient participation: complete - patient participated  Level of consciousness: awake and alert  Pain management: adequate  Airway patency: patent  Anesthetic complications: No anesthetic complications  PONV Status: none  Cardiovascular status: acceptable  Respiratory status: acceptable  Hydration status: acceptable    Comments: /61 (BP Location: Left arm, Patient Position: Lying)   Pulse 95   Temp 37 °C (98.6 °F) (Oral)   Resp 16   Ht 182.9 cm (72\")   Wt 65.4 kg (144 lb 3.2 oz)   SpO2 98%   BMI 19.56 kg/m²         "

## 2020-01-21 NOTE — NURSING NOTE
CWOCN consult. Patient had a blister on his right 3rd finger tip and finger pad on admission. That has now ruptured. There was tan drainage and loose skin present today. Patient going to Endo but before he left, I removed some of the loose skin, cleansed the finger with betadine and placed a clean dry gauze. I don't believe there is much of a wound at this time. Mostly the skin that was beneath the blister was closed but macerated. With the skin removed over top, any additional drainage can drain, and the skin will dry up. I advised patient that if another blister starts or if a wound opens up, he should follow up at the wound clinic. He verbalized understanding.

## 2020-01-21 NOTE — PROGRESS NOTES
"[facility] PULMONARY CARE         Dr Feng Sayied   LOS: 8 days   Patient Care Team:  Provider, No Known as PCP - General    Chief Complaint: DKA with metabolic encephalopathy now resolved.  Esophageal abnormalities noted with question of polyps versus air bubbles.    Interval History:   Resting comfortably.  Currently n.p.o. awaiting EGD per GI.    REVIEW OF SYSTEMS:   CARDIOVASCULAR: No chest pain, chest pressure or chest discomfort. No palpitations or edema.   RESPIRATORY: No shortness of breath, cough or sputum.   GASTROINTESTINAL: No anorexia, nausea, vomiting or diarrhea. No abdominal pain or blood.   HEMATOLOGIC: No bleeding or bruising.     Ventilator/Non-Invasive Ventilation Settings (From admission, onward)    None            Vital Signs  Temp:  [98.1 °F (36.7 °C)-98.5 °F (36.9 °C)] 98.1 °F (36.7 °C)  Heart Rate:  [] 90  Resp:  [18] 18  BP: (107-112)/(68-72) 112/68    Intake/Output Summary (Last 24 hours) at 1/21/2020 0918  Last data filed at 1/21/2020 0751  Gross per 24 hour   Intake 360 ml   Output --   Net 360 ml     Flowsheet Rows      First Filed Value   Admission Height  182.9 cm (72\") Documented at 01/13/2020 0628   Admission Weight  66.8 kg (147 lb 3.2 oz) Documented at 01/13/2020 0637          Physical Exam:   General Appearance:    Alert, cooperative, in no acute distress   Lungs:     Clear to auscultation,respirations regular, even and                  unlabored    Heart:    Regular rhythm and normal rate, normal S1 and S2, no            murmur, no gallop, no rub, no click   Chest Wall:    No abnormalities observed   Abdomen:     Normal bowel sounds, no masses, no organomegaly, soft        non-tender, non-distended, no guarding, no rebound                tenderness   Extremities:   Moves all extremities well, no edema, no cyanosis, no             redness     Results Review:        Results from last 7 days   Lab Units 01/16/20  0535 01/15/20  0449 01/14/20  1824   SODIUM mmol/L 141 " 149* 150*   POTASSIUM mmol/L 3.5 4.3 3.8   CHLORIDE mmol/L 104 114* 117*   CO2 mmol/L 22.8 19.8* 19.1*   BUN mg/dL 12 25* 38*   CREATININE mg/dL 0.86 1.04 1.26   GLUCOSE mg/dL 139* 153* 139*   CALCIUM mg/dL 8.7 8.5* 8.0*         Results from last 7 days   Lab Units 01/20/20  0441 01/16/20  0535 01/15/20  0449   WBC 10*3/mm3  --  9.86  --    HEMOGLOBIN g/dL  --  14.0  --    HEMATOCRIT %  --  40.6  --    PLATELETS 10*3/mm3 412 286 289             Results from last 7 days   Lab Units 01/15/20  0449   MAGNESIUM mg/dL 2.6               I reviewed the patient's new clinical results.  I personally viewed and interpreted the patient's CXR        Medication Review:     cephalexin 500 mg Oral Q6H   enoxaparin 40 mg Subcutaneous Q24H   insulin glargine 15 Units Subcutaneous Q12H   insulin lispro 0-9 Units Subcutaneous 4x Daily With Meals & Nightly   insulin lispro 5 Units Subcutaneous TID With Meals   pantoprazole 40 mg Oral QAM   senna-docusate sodium 2 tablet Oral Nightly   sodium chloride 10 mL Intravenous Q12H            ASSESSMENT:   DKA resolved  Metabolic encephalopathy  Acute kidney injury  Dysphagia  Esophageal mucosal abnormalities  Paronychia  Hyponatremia      PLAN:  Plans for EGD this morning per GI.  We will follow-up on results  Continue with insulin regimen as per endocrinology.  Blood glucose appears to be stable.  Patient is learning to give himself injections subcu insulin.  Hyponatremia resolved  Creatinine stable  Completes course of antibiotics  Discussed with patient and his mother at bedside in detail  Possibly discharge in a.m. if no additional issues with EGD etc.    Ping Caro MD  01/21/20  9:18 AM

## 2020-01-21 NOTE — CONSULTS
Diabetes Education    Patient Name:  Joby Kulkarni  YOB: 1970  MRN: [id number]  Admit Date:  1/13/2020    Pt declined return demonstration with vial/syringe stating that he wants to use insulin pens and checking on pricing.     Pt was able to give return demonstration with demo insulin pen with very little coaching.  Pt given written materials including steps.  Encouraged to have papers out as reference when time to give self injections and follow step by step.  Note Home Health to follow.      Report given to staff RN and CCP.      Electronically signed by:  Columba Teixeira RN  01/21/20 10:51 AM

## 2020-01-21 NOTE — PAYOR COMM NOTE
"Johnson Kulkarni (49 y.o. Male)       PLEASE SEE ATTACHED CLINICAL REVIEW.     REF#[id number]    PLEASE CALL  OR  [phone] WITH INPT CONTINUED STAY AUTH AND DAYS.    THANK YOU    BETH MCKEON LPN CCP        Date of Birth Social Security Number Address Home Phone MRN    1970  [id number] [address] [phone] [id number]    [employer] Marital Status          None Unknown       Admission Date Admission Type Admitting Provider Attending Provider Department, Room/Bed    1/13/20 Emergency Parrish Petersen MD Haller, Harold Dale, MD [address], [address]    Discharge Date Discharge Disposition Discharge Destination                       Attending Provider:  Nick Rowan MD    Allergies:  No Known Allergies    Isolation:  None   Infection:  None   Code Status:  CPR    Ht:  182.9 cm (72\")   Wt:  65.4 kg (144 lb 3.2 oz)    Admission Cmt:  None   Principal Problem:  Diabetic ketoacidosis without coma associated with type 2 diabetes mellitus (CMS/HCC) [E11.10]                 Active Insurance as of 1/13/2020     Primary Coverage     Payor Plan Insurance Group Employer/Plan Group    [employer]UE [employer]OSS [email or web address] CROSS BLUE [employer]IELD PPO [id number]     Payor Plan Address Payor Plan Phone Number Payor Plan Fax Number Effective Dates    [address] [phone]  1/1/2018 - None Entered    [place] [address]       Subscriber Name Subscriber Birth Date Member ID       JOHNSON KULKARNI 1970 [id number]                 Emergency Contacts      (Rel.) Home Phone Work Phone Mobile Phone    CARMELITA KULKARNI (Mother) [phone] -- [phone]    Daksha Churchill (Sister) -- -- [phone]    Tere Markham (Daughter) -- -- --            Oxygen Therapy (last day)     Date/Time   SpO2   Device (Oxygen Therapy)   Flow (L/min)   Oxygen Concentration (%)   ETCO2 (mmHg)    01/21/20 1313   98   room air   --   --   --    01/21/20 " 1304   97   room air   --   --   --    01/21/20 1258   93   room air   --   --   --    01/21/20 1253   95   room air   --   --   --    01/21/20 1117   97   room air   --   --   --    01/21/20 0830   --   room air   --   --   --    01/21/20 0727   100   room air   --   --   --    01/20/20 2005   --   room air   --   --   --    01/20/20 1900   --   room air   --   --   --    01/20/20 1524   --   room air   --   --   --    01/20/20 1358   --   room air   --   --   --    01/20/20 0832   --   room air   --   --   --    01/20/20 0721   --   room air   --   --   --    01/20/20 0000   --   room air   --   --   --              Intake & Output (last day)       01/20 0701 - 01/21 0700 01/21 0701 - 01/22 0700    P.O. 600 0    I.V. (mL/kg)  1850 (28.3)    Total Intake(mL/kg) 600 (9.2) 1850 (28.3)    Urine (mL/kg/hr)      Total Output      Net +600 +1850          Urine Unmeasured Occurrence 2 x 1 x        Lines, Drains & Airways    Active LDAs     Name:   Placement date:   Placement time:   Site:   Days:    Peripheral IV 01/14/20 1223 Anterior;Distal;Left Hand   01/14/20    1223    Hand   7    Peripheral IV 01/20/20 1619 Left Forearm   01/20/20    1619    Forearm   less than 1                Blood Administration Record (From admission, onward)    None          Lab Results (last 24 hours)     Procedure Component Value Units Date/Time    POC Glucose Once [[id number]]  (Abnormal) Collected:  01/21/20 1128    Specimen:  Blood Updated:  01/21/20 1133     Glucose 136 mg/dL     POC Glucose Once [[id number]]  (Abnormal) Collected:  01/21/20 0626    Specimen:  Blood Updated:  01/21/20 0631     Glucose 152 mg/dL     POC Glucose Once [[id number]]  (Abnormal) Collected:  01/20/20 2359    Specimen:  Blood Updated:  01/21/20 0000     Glucose 218 mg/dL     POC Glucose Once [[id number]]  (Normal) Collected:  01/20/20 1612    Specimen:  Blood Updated:  01/20/20 1616     Glucose 126 mg/dL           Orders (last 24 hrs)      Start     Ordered     "01/22/20 0600  Glutamic Acid Decarboxylase  Morning Draw      01/21/20 1216    01/21/20 1330  lactated ringers infusion  Continuous      01/21/20 1328    01/21/20 1329  Wound Care  Daily     Comments:  Paint with betadine, cover with dry gauze    01/21/20 1328    01/21/20 1302  Diet Regular; Consistent Carbohydrate  Diet Effective Now      01/21/20 1301    01/21/20 1246  Tissue Pathology Exam     Comments:  Specimen D: Fungal stain      01/21/20 1246    01/21/20 1134  POC Glucose Once  Once      01/21/20 1128    01/21/20 1113  sodium chloride 0.9 % infusion 1,000 mL  Continuous      01/21/20 1111    01/21/20 1112  Implement Anesthesia Orders Day of Procedure  Once,   Status:  Canceled      01/21/20 1111    01/21/20 1112  Obtain Informed Consent  Once,   Status:  Canceled      01/21/20 1111    01/21/20 0743  Wound Ostomy Eval & Treat  Once     Comments:  Patient is diabetic and has wound that ruptured.    01/21/20 0744    01/21/20 0632  POC Glucose Once  Once      01/21/20 0626    01/21/20 0001  NPO Diet  Diet Effective Midnight,   Status:  Canceled      01/20/20 1017    01/21/20 0001  POC Glucose Once  Once      01/20/20 2359    01/21/20 0000  ONE TOUCH LANCETS misc      01/21/20 1222    01/21/20 0000  glucose blood (ONETOUCH VERIO) test strip      01/21/20 1222    01/21/20 0000  insulin aspart prot-insulin aspart (novoLOG 70/30) (70-30) 100 UNIT/ML injection  2 Times Daily With Meals      01/21/20 1222    01/21/20 0000  Insulin Syringe 28G X 1/2\" 1 ML misc      01/21/20 1222    01/21/20 0000  Needle, Disp, 30G X 1\" misc      01/21/20 1222                   Operative/Procedure Notes (last 24 hours) (Notes from 01/20/20 1410 through 01/21/20 1410)      Procedures signed by Ruby Albarran MD at 01/21/20 1259       Procedure Orders    1. UPPER GI ENDOSCOPY [[id number]] ordered by Ruby Albarran MD at 01/21/20 1232           Scan on 1/21/2020 1232 by Ruby Albarran MD          Electronically signed by Ruby Albarran " MD CARLEE at 01/21/20 1259                Physician Progress Notes (last 24 hours) (Notes from 01/20/20 1410 through 01/21/20 1410)      Lexie Doe MD at 01/21/20 1218          49 y.o.   LOS: 8 days   Patient Care Team:  Provider, No Known as PCP - General    Chief Complaint: Elevated blood sugars    Chief Complaint   Patient presents with   • Altered Mental Status       Subjective   Patient is n.p.o. this morning.  Understand the concerns of the diabetic educators in terms of patient's administration of insulin.    Interval History:    Review of Systems:   Constitutional: Positive for appetite change and fatigue.   Eyes: Negative for visual disturbance.   Respiratory: no shortness of breath.    Cardiovascular: no leg swelling.   Gastrointestinal: Negative for abdominal pain.   Endocrine: Negative for polydipsia and polyuria.   Musculoskeletal: Positive for arthralgias and myalgias.   Skin: Negative for pallor.   Neurological: Positive for weakness and numbness.   Psychiatric/Behavioral: Positive for sleep disturbance.    Review of Systems  Objective     Vital Signs   Temp:  [98.1 °F (36.7 °C)-98.6 °F (37 °C)] 98.6 °F (37 °C)  Heart Rate:  [] 76  Resp:  [13-18] 13  BP: (107-112)/(68-72) 112/68    Physical Exam:  General exam-no distress, appears stated age.  Eyes-PERRL, anicteric sclera  ENT-external ears/nose normal.  Neck-no adenopathy, midline trachea.  Lungs-normal chest exam on inspection, diminished bilateral breath sounds on auscultation.  Cardiovascular -normal S1-S2, positive murmur.  ABD-nontender, nondistended, bowel sounds heard.  Extremities-no edema, cyanosis.  Hammertoes noted.            Physical ExamResults Review:     I reviewed the patient's new clinical results and summarized them in subjective and in plan.              Medication Review: done      Current Facility-Administered Medications:   •  cephalexin (KEFLEX) capsule 500 mg, 500 mg, Oral, Q6H, Nick Rowan MD, 500 mg at  01/21/20 0658  •  dextrose (D50W) 25 g/ 50mL Intravenous Solution 12.5 g, 12.5 g, Intravenous, PRN, Tacos Arzola MD, 12.5 g at 01/13/20 2240  •  dextrose (D50W) 25 g/ 50mL Intravenous Solution 25 g, 25 g, Intravenous, Q15 Min PRN, Shandra Delcid MD  •  dextrose (D50W) 25 g/ 50mL Intravenous Solution 25 g, 25 g, Intravenous, Q15 Min PRN, Cameron Morgan MD  •  dextrose (GLUTOSE) oral gel 15 g, 15 g, Oral, Q15 Min PRN, Shandra Delcid MD  •  dextrose (GLUTOSE) oral gel 15 g, 15 g, Oral, Q15 Min PRN, Cameron Morgan MD  •  enoxaparin (LOVENOX) syringe 40 mg, 40 mg, Subcutaneous, Q24H, Cameron Morgan MD, 40 mg at 01/20/20 1001  •  glucagon (human recombinant) (GLUCAGEN DIAGNOSTIC) injection 1 mg, 1 mg, Subcutaneous, Q15 Min PRN, Shandra Delcid MD  •  glucagon (human recombinant) (GLUCAGEN DIAGNOSTIC) injection 1 mg, 1 mg, Subcutaneous, Q15 Min PRN, Cameron Morgan MD  •  insulin glargine (LANTUS) injection 15 Units, 15 Units, Subcutaneous, Q12H, Andre Morin MD, 15 Units at 01/21/20 0828  •  insulin lispro (humaLOG) injection 0-9 Units, 0-9 Units, Subcutaneous, 4x Daily With Meals & Nightly, Lexie Doe MD, 6 Units at 01/20/20 1204  •  insulin lispro (humaLOG) injection 5 Units, 5 Units, Subcutaneous, TID With Meals, Lexie Doe MD, 5 Units at 01/20/20 1807  •  magnesium sulfate 4 gram infusion - Mg less than or equal to 1mg/dL, 4 g, Intravenous, PRN **OR** magnesium sulfate 3 gram infusion (1gm x 3) - Mg 1.1 - 1.5 mg/dL, 1 g, Intravenous, PRN **OR** Magnesium Sulfate 2 gram infusion- Mg 1.6 - 1.9 mg/dL, 2 g, Intravenous, PRN, Cameron Morgan MD  •  ondansetron (ZOFRAN) tablet 4 mg, 4 mg, Oral, Q6H PRN **OR** ondansetron (ZOFRAN) injection 4 mg, 4 mg, Intravenous, Q6H PRN, Cameron Morgan MD  •  pantoprazole (PROTONIX) EC tablet 40 mg, 40 mg, Oral, Fernanda LONGORIA Stephen Kaster, MD, 40 mg at 01/21/20 0658  •  potassium & sodium phosphates (PHOS-NAK) 280-160-250 MG packet - for  Phosphorus less than 1.25 mg/dL, 2 packet, Oral, Q6H PRN **OR** potassium & sodium phosphates (PHOS-NAK) 280-160-250 MG packet - for Phosphorus 1.25 - 2.5 mg/dL, 2 packet, Oral, Q6H PRN, Cameron Morgan MD, 2 packet at 01/16/20 1157  •  potassium chloride (MICRO-K) CR capsule 40 mEq, 40 mEq, Oral, PRN **OR** potassium chloride (KLOR-CON) packet 40 mEq, 40 mEq, Oral, PRN **OR** potassium chloride 10 mEq in 100 mL IVPB, 10 mEq, Intravenous, Q1H PRN, Cameron Morgan MD  •  potassium phosphate 45 mmol in sodium chloride 0.9 % 500 mL infusion, 45 mmol, Intravenous, PRN **OR** potassium phosphate 30 mmol in sodium chloride 0.9 % 250 mL infusion, 30 mmol, Intravenous, PRN **OR** potassium phosphate 15 mmol in sodium chloride 0.9 % 100 mL infusion, 15 mmol, Intravenous, PRN, 15 mmol at 01/15/20 1355 **OR** sodium phosphates 40 mmol in sodium chloride 0.9 % 500 mL IVPB, 40 mmol, Intravenous, PRN **OR** sodium phosphates 20 mmol in sodium chloride 0.9 % 250 mL IVPB, 20 mmol, Intravenous, PRN, Cameron Morgan MD, Stopped at 01/14/20 0746  •  senna-docusate sodium (SENOKOT-S) 8.6-50 MG tablet 2 tablet, 2 tablet, Oral, Nightly, Cameron Morgan MD, 2 tablet at 01/19/20 2059  •  [COMPLETED] Insert peripheral IV, , , Once **AND** sodium chloride 0.9 % flush 10 mL, 10 mL, Intravenous, PRN, Marlon Shabazz MD  •  sodium chloride 0.9 % flush 10 mL, 10 mL, Intravenous, Once PRN, Tacos Arzola MD  •  sodium chloride 0.9 % flush 10 mL, 10 mL, Intravenous, Q12H, Cameron Morgan MD, 10 mL at 01/20/20 1003  •  sodium chloride 0.9 % flush 10 mL, 10 mL, Intravenous, PRN, Cameron Morgan MD  •  sodium chloride 0.9 % infusion 1,000 mL, 1,000 mL, Intravenous, Continuous, Ruby Albarran MD, Last Rate: 25 mL/hr at 01/21/20 1129, 1,000 mL at 01/21/20 1129    Assessment/Plan     Active Hospital Problems    Diagnosis  POA   • **Diabetic ketoacidosis without coma associated with type 2 diabetes mellitus (CMS/HCC) [E11.10]  Yes   • Diabetes  "mellitus, new onset (CMS/[facility]) [E11.9]  Yes   • Abnormal esophagram [R93.3]  Unknown      Resolved Hospital Problems   No resolved problems to display.     Diabetes mellitus-significantly uncontrolled  It is unclear if patient is a type I or type II  Continue Lantus 15 units twice daily  Continue Humalog 5 units with each meal  Continue Humalog sliding scale 3 times daily before meals and at bedtime.    C-peptide levels positive.  Pending john 65 antibodies.  Discussed with the patient if the john 65 antibodies are negative we should be able to do oral hypoglycemic agents.  However for some reasons the john 65 antibody blood work-up is still pending, will reorder this blood work-up.    If patient is being discharged okay to consider 70/30 insulin regimen.  Consider 70/30 - 18 units twice daily with breakfast and dinner.        Discussed plan with Nurse.     Lexie Doe MD.  01/21/20  12:17 PM      EMR Dragon / transcription disclaimer:    \"Dictated utilizing Dragon dictation\".     Electronically signed by Lexie Doe MD at 01/21/20 1220     Ping Caro MD at 01/21/20 0918                [facility]         Dr Feng Saysharon   LOS: 8 days   Patient Care Team:  Provider, No Known as PCP - General    Chief Complaint: DKA with metabolic encephalopathy now resolved.  Esophageal abnormalities noted with question of polyps versus air bubbles.    Interval History:   Resting comfortably.  Currently n.p.o. awaiting EGD per GI.    REVIEW OF SYSTEMS:   CARDIOVASCULAR: No chest pain, chest pressure or chest discomfort. No palpitations or edema.   RESPIRATORY: No shortness of breath, cough or sputum.   GASTROINTESTINAL: No anorexia, nausea, vomiting or diarrhea. No abdominal pain or blood.   HEMATOLOGIC: No bleeding or bruising.     Ventilator/Non-Invasive Ventilation Settings (From admission, onward)    None            Vital Signs  Temp:  [98.1 °F (36.7 °C)-98.5 °F (36.9 °C)] 98.1 °F (36.7 °C)  Heart Rate:  " "[] 90  Resp:  [18] 18  BP: (107-112)/(68-72) 112/68    Intake/Output Summary (Last 24 hours) at 1/21/2020 0918  Last data filed at 1/21/2020 0751  Gross per 24 hour   Intake 360 ml   Output --   Net 360 ml     Flowsheet Rows      First Filed Value   Admission Height  182.9 cm (72\") Documented at 01/13/2020 0628   Admission Weight  66.8 kg (147 lb 3.2 oz) Documented at 01/13/2020 0637          Physical Exam:   General Appearance:    Alert, cooperative, in no acute distress   Lungs:     Clear to auscultation,respirations regular, even and                  unlabored    Heart:    Regular rhythm and normal rate, normal S1 and S2, no            murmur, no gallop, no rub, no click   Chest Wall:    No abnormalities observed   Abdomen:     Normal bowel sounds, no masses, no organomegaly, soft        non-tender, non-distended, no guarding, no rebound                tenderness   Extremities:   Moves all extremities well, no edema, no cyanosis, no             redness     Results Review:        Results from last 7 days   Lab Units 01/16/20  0535 01/15/20  0449 01/14/20  1824   SODIUM mmol/L 141 149* 150*   POTASSIUM mmol/L 3.5 4.3 3.8   CHLORIDE mmol/L 104 114* 117*   CO2 mmol/L 22.8 19.8* 19.1*   BUN mg/dL 12 25* 38*   CREATININE mg/dL 0.86 1.04 1.26   GLUCOSE mg/dL 139* 153* 139*   CALCIUM mg/dL 8.7 8.5* 8.0*         Results from last 7 days   Lab Units 01/20/20  0441 01/16/20  0535 01/15/20  0449   WBC 10*3/mm3  --  9.86  --    HEMOGLOBIN g/dL  --  14.0  --    HEMATOCRIT %  --  40.6  --    PLATELETS 10*3/mm3 412 286 289             Results from last 7 days   Lab Units 01/15/20  0449   MAGNESIUM mg/dL 2.6               I reviewed the patient's new clinical results.  I personally viewed and interpreted the patient's CXR        Medication Review:     cephalexin 500 mg Oral Q6H   enoxaparin 40 mg Subcutaneous Q24H   insulin glargine 15 Units Subcutaneous Q12H   insulin lispro 0-9 Units Subcutaneous 4x Daily With Meals & " Nightly   insulin lispro 5 Units Subcutaneous TID With Meals   pantoprazole 40 mg Oral QAM   senna-docusate sodium 2 tablet Oral Nightly   sodium chloride 10 mL Intravenous Q12H            ASSESSMENT:   DKA resolved  Metabolic encephalopathy  Acute kidney injury  Dysphagia  Esophageal mucosal abnormalities  Paronychia  Hyponatremia      PLAN:  Plans for EGD this morning per GI.  We will follow-up on results  Continue with insulin regimen as per endocrinology.  Blood glucose appears to be stable.  Patient is learning to give himself injections subcu insulin.  Hyponatremia resolved  Creatinine stable  Completes course of antibiotics  Discussed with patient and his mother at bedside in detail  Possibly discharge in a.m. if no additional issues with EGD etc.    Ping Caro MD  01/21/20  9:18 AM          Electronically signed by Ping Caro MD at 01/21/20 0920

## 2020-01-21 NOTE — PLAN OF CARE
Problem: Patient Care Overview  Goal: Plan of Care Review  Outcome: Ongoing (interventions implemented as appropriate)  Flowsheets (Taken 1/21/2020 1752)  Progress: improving  Plan of Care Reviewed With: patient; mother  Note:   Patient alert and oriented.  Seen by wound for left hand finger and new dressing applied.  Diabetes education continued today with patient administrating the filled insulin syringe. Mother at the bedside  VSS.  Will continue to monitor.     Problem: Skin Injury Risk (Adult)  Goal: Skin Health and Integrity  Outcome: Ongoing (interventions implemented as appropriate)  Flowsheets (Taken 1/21/2020 1752)  Skin Health and Integrity: making progress toward outcome     Problem: Fall Risk (Adult)  Goal: Absence of Fall  Outcome: Ongoing (interventions implemented as appropriate)  Flowsheets (Taken 1/21/2020 1752)  Absence of Fall: making progress toward outcome     Problem: Pain, Acute (Adult)  Goal: Acceptable Pain Control/Comfort Level  Outcome: Ongoing (interventions implemented as appropriate)  Flowsheets (Taken 1/21/2020 1752)  Acceptable Pain Control/Comfort Level: making progress toward outcome     Problem: Diabetes, Type 1 (Adult)  Goal: Signs and Symptoms of Listed Potential Problems Will be Absent, Minimized or Managed (Diabetes, Type 1)  Outcome: Ongoing (interventions implemented as appropriate)  Flowsheets (Taken 1/21/2020 1752)  Problems Assessed (Type 1 Diabetes): all  Problems Present (Type 1 Diabetes): situational response     Problem: Confusion, Acute (Adult)  Goal: Safety  Outcome: Ongoing (interventions implemented as appropriate)  Flowsheets (Taken 1/21/2020 1752)  Safety: making progress toward outcome

## 2020-01-21 NOTE — ANESTHESIA PREPROCEDURE EVALUATION
Anesthesia Evaluation                  Airway   Mallampati: II  Dental      Pulmonary - normal exam   (+) a smoker Current Abstained day of surgery,   Cardiovascular - normal exam        Neuro/Psych  GI/Hepatic/Renal/Endo    (+)   diabetes mellitus,     Musculoskeletal     Abdominal    Substance History      OB/GYN          Other                        Anesthesia Plan    ASA 2     MAC       Anesthetic plan, all risks, benefits, and alternatives have been provided, discussed and informed consent has been obtained with: patient.

## 2020-01-22 LAB
GLUCOSE BLDC GLUCOMTR-MCNC: 159 MG/DL (ref 70–130)
GLUCOSE BLDC GLUCOMTR-MCNC: 175 MG/DL (ref 70–130)
GLUCOSE BLDC GLUCOMTR-MCNC: 177 MG/DL (ref 70–130)
GLUCOSE BLDC GLUCOMTR-MCNC: 193 MG/DL (ref 70–130)

## 2020-01-22 PROCEDURE — 86341 ISLET CELL ANTIBODY: CPT | Performed by: INTERNAL MEDICINE

## 2020-01-22 PROCEDURE — 82962 GLUCOSE BLOOD TEST: CPT

## 2020-01-22 PROCEDURE — 63710000001 INSULIN GLARGINE PER 5 UNITS: Performed by: INTERNAL MEDICINE

## 2020-01-22 PROCEDURE — 97535 SELF CARE MNGMENT TRAINING: CPT

## 2020-01-22 PROCEDURE — 63710000001 INSULIN LISPRO (HUMAN) PER 5 UNITS: Performed by: INTERNAL MEDICINE

## 2020-01-22 PROCEDURE — 99232 SBSQ HOSP IP/OBS MODERATE 35: CPT | Performed by: INTERNAL MEDICINE

## 2020-01-22 PROCEDURE — 25010000002 ENOXAPARIN PER 10 MG: Performed by: INTERNAL MEDICINE

## 2020-01-22 RX ORDER — PANTOPRAZOLE SODIUM 40 MG/1
40 TABLET, DELAYED RELEASE ORAL
Status: DISCONTINUED | OUTPATIENT
Start: 2020-01-22 | End: 2020-01-23 | Stop reason: HOSPADM

## 2020-01-22 RX ADMIN — CEPHALEXIN 500 MG: 500 CAPSULE ORAL at 23:35

## 2020-01-22 RX ADMIN — INSULIN LISPRO 5 UNITS: 100 INJECTION, SOLUTION INTRAVENOUS; SUBCUTANEOUS at 08:39

## 2020-01-22 RX ADMIN — CEPHALEXIN 500 MG: 500 CAPSULE ORAL at 17:22

## 2020-01-22 RX ADMIN — INSULIN LISPRO 2 UNITS: 100 INJECTION, SOLUTION INTRAVENOUS; SUBCUTANEOUS at 12:09

## 2020-01-22 RX ADMIN — INSULIN LISPRO 2 UNITS: 100 INJECTION, SOLUTION INTRAVENOUS; SUBCUTANEOUS at 08:39

## 2020-01-22 RX ADMIN — CEPHALEXIN 500 MG: 500 CAPSULE ORAL at 12:07

## 2020-01-22 RX ADMIN — SODIUM CHLORIDE, PRESERVATIVE FREE 10 ML: 5 INJECTION INTRAVENOUS at 21:19

## 2020-01-22 RX ADMIN — PANTOPRAZOLE SODIUM 40 MG: 40 TABLET, DELAYED RELEASE ORAL at 06:30

## 2020-01-22 RX ADMIN — INSULIN GLARGINE 15 UNITS: 100 INJECTION, SOLUTION SUBCUTANEOUS at 08:43

## 2020-01-22 RX ADMIN — INSULIN LISPRO 2 UNITS: 100 INJECTION, SOLUTION INTRAVENOUS; SUBCUTANEOUS at 21:19

## 2020-01-22 RX ADMIN — INSULIN LISPRO 5 UNITS: 100 INJECTION, SOLUTION INTRAVENOUS; SUBCUTANEOUS at 12:08

## 2020-01-22 RX ADMIN — PANTOPRAZOLE SODIUM 40 MG: 40 TABLET, DELAYED RELEASE ORAL at 17:30

## 2020-01-22 RX ADMIN — INSULIN LISPRO 5 UNITS: 100 INJECTION, SOLUTION INTRAVENOUS; SUBCUTANEOUS at 17:22

## 2020-01-22 RX ADMIN — CEPHALEXIN 500 MG: 500 CAPSULE ORAL at 06:30

## 2020-01-22 RX ADMIN — SODIUM CHLORIDE, PRESERVATIVE FREE 10 ML: 5 INJECTION INTRAVENOUS at 08:46

## 2020-01-22 RX ADMIN — CEPHALEXIN 500 MG: 500 CAPSULE ORAL at 00:27

## 2020-01-22 RX ADMIN — INSULIN LISPRO 2 UNITS: 100 INJECTION, SOLUTION INTRAVENOUS; SUBCUTANEOUS at 17:22

## 2020-01-22 RX ADMIN — INSULIN GLARGINE 15 UNITS: 100 INJECTION, SOLUTION SUBCUTANEOUS at 21:19

## 2020-01-22 RX ADMIN — ENOXAPARIN SODIUM 40 MG: 40 INJECTION SUBCUTANEOUS at 12:17

## 2020-01-22 NOTE — PLAN OF CARE
Problem: Patient Care Overview  Goal: Plan of Care Review  Flowsheets (Taken 1/22/2020 5658)  Plan of Care Reviewed With: patient  Outcome Summary: Pt alert and agrees to OT. Pt ambulated from bathroom to bed with SBA. Pt performs grooming task standing and sitting with SBA/CGA.  Discussed safety for adls. Continue OT

## 2020-01-22 NOTE — PLAN OF CARE
Problem: Patient Care Overview  Goal: Plan of Care Review  Outcome: Ongoing (interventions implemented as appropriate)  Flowsheets (Taken 1/22/2020 2963)  Progress: improving  Plan of Care Reviewed With: patient  Outcome Summary: HEAVENLY SHARPE SR. No complaints of pain. Up ad saeed. Educated on insulin administration. Wound on finger seen by wound nurse. Dressing changed per wound nurse instruction after shower. Possible d/c today. Will continue to monitor.

## 2020-01-22 NOTE — THERAPY TREATMENT NOTE
Acute Care - Occupational Therapy Progress Note  [facility]     Patient Name: Joby Kulkarni  : 1970  MRN: [id number]  Today's Date: 2020             Admit Date: 2020       ICD-10-CM ICD-9-CM   1. Abnormal esophagram R93.3 793.4   2. Diabetes mellitus, new onset (CMS/HCC) E11.9 250.00   3. Somnolence R40.0 780.09   4. Diabetic ketoacidosis without coma associated with other specified diabetes mellitus (CMS/HCC) E13.10 250.12   5. Acute renal failure, unspecified acute renal failure type (CMS/HCC) N17.9 584.9   6. Hyperkalemia E87.5 276.7   7. Hypermagnesemia E83.41 275.2   8. Hyperphosphatemia E83.39 275.3   9. Dehydration E86.0 276.51   10. Encephalopathy G93.40 348.30     Patient Active Problem List   Diagnosis   • Diabetes mellitus, new onset (CMS/HCC)   • Diabetic ketoacidosis without coma associated with type 2 diabetes mellitus (CMS/HCC)   • Abnormal esophagram     Past Medical History:   Diagnosis Date   • Diabetes (CMS/HCC)    • Finger infection     right middle finger     Past Surgical History:   Procedure Laterality Date   • ENDOSCOPY N/A 2020    Procedure: ESOPHAGOGASTRODUODENOSCOPY with cold bx;  Surgeon: Ruby Albarran MD;  Location: [facility] ENDOSCOPY;  Service: Gastroenterology   • NO PAST SURGERIES         Therapy Treatment    Rehabilitation Treatment Summary     Row Name 20 1343             Treatment Time/Intention    Document Type  therapy note (daily note)  -SG      Subjective Information  no complaints  -SG      Mode of Treatment  individual therapy;occupational therapy  -SG      Patient Effort  good  -SG      Recorded by [SG] Nola Jones, OTR 20 1346      Row Name 20 1343             Cognitive Assessment/Intervention- PT/OT    Orientation Status (Cognition)  oriented x 4  -SG      Follows Commands (Cognition)  follows one step commands  -SG      Recorded by [SG] Nola Jones OTR 20 1346      Row Name 20 1343             Bed  Mobility Assessment/Treatment    Sit-Supine [place] (Bed Mobility)  independent  -SG      Recorded by [SG] Nola Jones, OTR 01/22/20 1346      Row Name 01/22/20 1343             Functional Mobility    Functional Mobility- Ind. Level  supervision required  -SG      Functional Mobility- Comment  ambulated from bathroom to sink to bed   -SG      Recorded by [SG] Nola Jones, OTR 01/22/20 1346      Row Name 01/22/20 1343             Transfer Assessment/Treatment    Transfer Assessment/Treatment  sit-stand transfer;stand-sit transfer  -SG      Recorded by [SG] Nola Jones, OTR 01/22/20 1346      Row Name 01/22/20 1343             Sit-Stand Transfer    Sit-Stand [place] (Transfers)  supervision  -SG      Recorded by [SG] Nola Jones OTR 01/22/20 1346      Row Name 01/22/20 1343             Stand-Sit Transfer    Stand-Sit [place] (Transfers)  supervision  -SG      Recorded by [SG] Nola Jones, OTR 01/22/20 1346      Row Name 01/22/20 1343             Lower Body Dressing Assessment/Training    Lower Body Dressing [place] Level  lower body dressing skills;supervision  -SG      Lower Body Dressing Position  edge of bed sitting  -SG      Recorded by [SG] Nola Jones, OTR 01/22/20 1346      Row Name 01/22/20 1343             Grooming Assessment/Training    [place] Level (Grooming)  grooming skills;supervision  -SG      Grooming Position  edge of bed sitting;unsupported standing;sink side  -SG      Recorded by [SG] Nola Jones, OTR 01/22/20 1346      Row Name 01/22/20 1343             BADL Safety/Performance    Skilled BADL Treatment/Intervention  BADL process/adaptation training  -SG      Recorded by [SG] Nola Jones, OTR 01/22/20 1346      Row Name 01/22/20 1343             Static Sitting Balance    Level of [place] (Unsupported Sitting, Static Balance)  independent  -SG      Sitting Position (Unsupported Sitting, Static Balance)  sitting on edge of bed   -SG      Recorded by [SG] Nola Jones, OTR 01/22/20 1346      Row Name 01/22/20 1343             Dynamic Sitting Balance    Level of [place], Reaches Outside Midline (Sitting, Dynamic Balance)  supervision  -SG      Sitting Position, Reaches Outside Midline (Sitting, Dynamic Balance)  sitting on edge of bed  -SG      Recorded by [SG] Nola Jones OTR 01/22/20 1346      Row Name 01/22/20 1343             Static Standing Balance    Level of [place] (Supported Standing, Static Balance)  supervision  -SG      Recorded by [SG] Nola Jones OTR 01/22/20 1346      Row Name 01/22/20 1343             Dynamic Standing Balance    Level of [place], Reaches Outside Midline (Standing, Dynamic Balance)  contact guard assist performs functional task in standing  -SG      Recorded by [SG] Nola Jones OTDORETHA 01/22/20 1346      Row Name 01/22/20 1343             Positioning and Restraints    Pre-Treatment Position  bathroom  -SG      Post Treatment Position  bed  -SG      In Bed  supine;call light within reach;encouraged to call for assist;exit alarm on;with nsg  -SG      Recorded by [SG] Nola Jones OTR 01/22/20 1346      Row Name 01/22/20 1343             Pain Scale: Numbers Pre/Post-Treatment    Pain Scale: Numbers, Pretreatment  0/10 - no pain  -SG      Pain Scale: Numbers, Post-Treatment  0/10 - no pain  -SG      Recorded by [SG] Nola Jones, OTR 01/22/20 1346      Row Name                Wound 01/13/20 1145 Right finger    Wound - Properties Group Date first assessed: 01/13/20 [SHAY] Time first assessed: 1145 [SHAY] Present on Hospital Admission: Y [SHAY] Side: Right [SHAY] Location: finger [SHAY], RIGHT MIDDLE FINGER, nailbed  Recorded by:  [SHAY] Bertha Martinez RN 01/13/20 1311      User Key  (r) = Recorded By, (t) = Taken By, (c) = Cosigned By    Initials Name Effective Dates Discipline     Nola Jones OTDORETHA 06/08/18 -  OT    Bertha Griffith RN 06/16/16 -  Nurse         Wound 01/13/20 1145 Right finger (Active)   Dressing Appearance dry;intact 1/22/2020  1:33 PM   Closure YOLA 1/22/2020  1:33 PM   Base dressing in place, unable to visualize 1/22/2020  1:33 PM   Periwound edematous 1/22/2020  1:33 PM   Periwound Temperature warm 1/22/2020  1:33 PM   Periwound Skin Turgor soft 1/22/2020  1:33 PM   Drainage Amount none 1/22/2020  1:33 PM           OT Recommendation and Plan  Planned Therapy Interventions (OT Eval): BADL retraining, strengthening exercise, transfer/mobility retraining  Therapy Frequency (OT Eval): 5 times/wk  Plan of Care Review  Plan of Care Reviewed With: patient  Plan of Care Reviewed With: patient  Outcome Summary: Pt alert and agrees to OT. Pt ambulated from bathroom to bed with SBA. Pt performs grooming task standing and sitting with SBA/CGA.  Discussed safety for adls. Continue OT  Outcome Measures     Row Name 01/22/20 1348             How much help from another is currently needed...    Putting on and taking off regular lower body clothing?  3  -SG      Bathing (including washing, rinsing, and drying)  3  -SG      Toileting (which includes using toilet bed pan or urinal)  3  -SG      Putting on and taking off regular upper body clothing  3  -SG      Taking care of personal grooming (such as brushing teeth)  3  -SG      Eating meals  4  -SG      AM-PAC 6 Clicks Score (OT)  19  -SG        User Key  (r) = Recorded By, (t) = Taken By, (c) = Cosigned By    Initials Name Provider Type    Nola Chinchilla OTR Occupational Therapist           Time Calculation:   Time Calculation- OT     Row Name 01/22/20 1349             Time Calculation- OT    OT Start Time  1314  -SG      OT Stop Time  1337  -SG      OT Time Calculation (min)  23 min  -SG      Total Timed Code Minutes- OT  23 minute(s)  -SG      OT Received On  01/22/20  -SG        User Key  (r) = Recorded By, (t) = Taken By, (c) = Cosigned By    Initials Name Provider Type    Nola Chinchilla OTR  Occupational Therapist        Therapy Charges for Today     Code Description Service Date Service Provider Modifiers Qty    14057289735 HC OT SELF CARE/MGMT/TRAIN EA 15 MIN 1/22/2020 Nola Jones, OTR GO 2               Nola Jones, OTR  1/22/2020

## 2020-01-22 NOTE — CONSULTS
Diabetes Education    Patient Name:  Joby Kulkarni  YOB: 1970  MRN: [id number]  Admit Date:  1/13/2020    Pt educated on Novolog Mix 70/30 insulin 18 units BID.  Written materials provided.  Pt verbalized understanding.        Electronically signed by:  Columba Teixeira RN  01/22/20 1:10 PM

## 2020-01-22 NOTE — PROGRESS NOTES
Continued Stay Note  [facility]     Patient Name: Joby Kulkarni  MRN: [id number]  Today's Date: 1/22/2020    Admit Date: 1/13/2020    Discharge Plan     Row Name 01/22/20 1547       Plan    Plan Comments  Met with patient and mom at bedside on several occasions today.  Patient was able to provide prescription insurance information, spoke to our retail pharmacy; his scripts need to be filled at [facility] to get best pricing.  Removed meds to beds in [facility].  Gave patient appointment liaison number so he could get a new PCP, patient identified new PCP.  Diabetes educator following.  Plan remains to go home, family or friend to transport. Will continue to monitor for new or changing discharge needs        Discharge Codes    No documentation.             Mckenzie Boyce RN

## 2020-01-22 NOTE — PROGRESS NOTES
"      [facility]         Dr Feng Sayied   LOS: 9 days   Patient Care Team:  Provider, No Known as PCP - General    Chief Complaint: DKA with metabolic encephalopathy now resolved.  Esophageal abnormalities noted with question of polyps versus air bubbles.    Interval History:   He is concerned about his right middle finger not healing well.  Status post EGD results noted.    REVIEW OF SYSTEMS:   CARDIOVASCULAR: No chest pain, chest pressure or chest discomfort. No palpitations or edema.   RESPIRATORY: No shortness of breath, cough or sputum.   GASTROINTESTINAL: No anorexia, nausea, vomiting or diarrhea. No abdominal pain or blood.   HEMATOLOGIC: No bleeding or bruising.     Ventilator/Non-Invasive Ventilation Settings (From admission, onward)    None            Vital Signs  Temp:  [98 °F (36.7 °C)-98.7 °F (37.1 °C)] 98.7 °F (37.1 °C)  Heart Rate:  [] 103  Resp:  [18] 18  BP: (107-117)/(51-78) 107/65    Intake/Output Summary (Last 24 hours) at 1/22/2020 1527  Last data filed at 1/22/2020 1300  Gross per 24 hour   Intake 1836 ml   Output 575 ml   Net 1261 ml     Flowsheet Rows      First Filed Value   Admission Height  182.9 cm (72\") Documented at 01/13/2020 0628   Admission Weight  66.8 kg (147 lb 3.2 oz) Documented at 01/13/2020 0637          Physical Exam:   General Appearance:    Alert, cooperative, in no acute distress   Lungs:     Clear to auscultation,respirations regular, even and                  unlabored    Heart:    Regular rhythm and normal rate, normal S1 and S2, no            murmur, no gallop, no rub, no click   Chest Wall:    No abnormalities observed   Abdomen:     Normal bowel sounds, no masses, no organomegaly, soft        non-tender, non-distended, no guarding, no rebound                tenderness   Extremities:   Moves all extremities well, no edema, no cyanosis, no             redness  Right hand right middle finger appears to be tender with evidence of resolving infection "     Results Review:        Results from last 7 days   Lab Units 01/16/20  0535   SODIUM mmol/L 141   POTASSIUM mmol/L 3.5   CHLORIDE mmol/L 104   CO2 mmol/L 22.8   BUN mg/dL 12   CREATININE mg/dL 0.86   GLUCOSE mg/dL 139*   CALCIUM mg/dL 8.7         Results from last 7 days   Lab Units 01/20/20  0441 01/16/20  0535   WBC 10*3/mm3  --  9.86   HEMOGLOBIN g/dL  --  14.0   HEMATOCRIT %  --  40.6   PLATELETS 10*3/mm3 412 286                           I reviewed the patient's new clinical results.  I personally viewed and interpreted the patient's CXR        Medication Review:     cephalexin 500 mg Oral Q6H   enoxaparin 40 mg Subcutaneous Q24H   insulin glargine 15 Units Subcutaneous Q12H   insulin lispro 0-9 Units Subcutaneous 4x Daily With Meals & Nightly   insulin lispro 5 Units Subcutaneous TID With Meals   pantoprazole 40 mg Oral BID AC   senna-docusate sodium 2 tablet Oral Nightly   sodium chloride 10 mL Intravenous Q12H         lactated ringers 30 mL/hr Last Rate: 30 mL/hr (01/21/20 1418)   sodium chloride 1,000 mL Last Rate: Stopped (01/21/20 1308)       ASSESSMENT:   DKA resolved  Metabolic encephalopathy  Acute kidney injury  Dysphagia  Esophageal mucosal abnormalities esophageal plaques, esophagitis, gastritis and duodenitis  Paronychia  Hyponatremia      PLAN:  Status post EGD with results noted with esophageal plaques, esophagitis gastritis and duodenitis.  Protonix initiated by GI.  Biopsy results pending.  His right hand middle finger still has not healed despite antibiotics.  Earlier family was saying that there was some discharge from it.  I will ask hand surgery to evaluate further.  Could be a case of paronychia currently being treated with oral antibiotics  Continue with insulin regimen as per endocrinology.  Blood glucose appears to be stable.  Patient is learning to give himself injections subcu insulin.  He has been switched to 70/30 insulin and would be discharged on that  Hyponatremia  resolved  Creatinine stable  Completes course of antibiotics for paronychia  Discussed with patient and his mother at bedside in detail  Possibly discharge in a.m. if no additional issues     Ping Caro MD  01/22/20  3:27 PM

## 2020-01-22 NOTE — PROGRESS NOTES
[facility]  Inpatient Progress Note    Reason for Follow Up:  Dysphagia, abnormal esophagram    Subjective     Interval History:   EGD yesterday with esophagitis, plaques in the esophagus, gastritis, esophagitis.  Pathology pending.  Denies any heartburn symptoms.  No dysphagia.  Tolerating diet.    Current Facility-Administered Medications:   •  cephalexin (KEFLEX) capsule 500 mg, 500 mg, Oral, Q6H, Ruby Albarran MD, 500 mg at 01/22/20 1207  •  dextrose (D50W) 25 g/ 50mL Intravenous Solution 12.5 g, 12.5 g, Intravenous, PRN, Ruby Albarran MD, 12.5 g at 01/13/20 2240  •  dextrose (D50W) 25 g/ 50mL Intravenous Solution 25 g, 25 g, Intravenous, Q15 Min PRN, Ruby Albarran MD  •  dextrose (GLUTOSE) oral gel 15 g, 15 g, Oral, Q15 Min PRN, Ruby Albarran MD  •  enoxaparin (LOVENOX) syringe 40 mg, 40 mg, Subcutaneous, Q24H, Ruby Albarran MD, 40 mg at 01/22/20 1217  •  glucagon (human recombinant) (GLUCAGEN DIAGNOSTIC) injection 1 mg, 1 mg, Subcutaneous, Q15 Min PRN, Ruby Albarran MD  •  insulin glargine (LANTUS) injection 15 Units, 15 Units, Subcutaneous, Q12H, Ruby Albarran MD, 15 Units at 01/22/20 0843  •  insulin lispro (humaLOG) injection 0-9 Units, 0-9 Units, Subcutaneous, 4x Daily With Meals & Nightly, Ruby Albarran MD, 2 Units at 01/22/20 1209  •  insulin lispro (humaLOG) injection 5 Units, 5 Units, Subcutaneous, TID With Meals, Ruby Albarran MD, 5 Units at 01/22/20 1208  •  lactated ringers infusion, 30 mL/hr, Intravenous, Continuous, Ruby Albarran MD, Last Rate: 30 mL/hr at 01/21/20 1418, 30 mL/hr at 01/21/20 1418  •  magnesium sulfate 4 gram infusion - Mg less than or equal to 1mg/dL, 4 g, Intravenous, PRN **OR** magnesium sulfate 3 gram infusion (1gm x 3) - Mg 1.1 - 1.5 mg/dL, 1 g, Intravenous, PRN **OR** Magnesium Sulfate 2 gram infusion- Mg 1.6 - 1.9 mg/dL, 2 g, Intravenous, PRN, Ruby Albarran MD  •  ondansetron (ZOFRAN) tablet 4 mg, 4 mg, Oral, Q6H PRN  **OR** ondansetron (ZOFRAN) injection 4 mg, 4 mg, Intravenous, Q6H PRN, Ruby Albarran MD  •  pantoprazole (PROTONIX) EC tablet 40 mg, 40 mg, Oral, QAM, Ruby Albarran MD, 40 mg at 01/22/20 0630  •  potassium & sodium phosphates (PHOS-NAK) 280-160-250 MG packet - for Phosphorus less than 1.25 mg/dL, 2 packet, Oral, Q6H PRN **OR** potassium & sodium phosphates (PHOS-NAK) 280-160-250 MG packet - for Phosphorus 1.25 - 2.5 mg/dL, 2 packet, Oral, Q6H PRN, Ruby Albarran MD, 2 packet at 01/16/20 1157  •  potassium chloride (MICRO-K) CR capsule 40 mEq, 40 mEq, Oral, PRN **OR** potassium chloride (KLOR-CON) packet 40 mEq, 40 mEq, Oral, PRN **OR** potassium chloride 10 mEq in 100 mL IVPB, 10 mEq, Intravenous, Q1H PRN, Ruby Albarran MD  •  potassium phosphate 45 mmol in sodium chloride 0.9 % 500 mL infusion, 45 mmol, Intravenous, PRN **OR** potassium phosphate 30 mmol in sodium chloride 0.9 % 250 mL infusion, 30 mmol, Intravenous, PRN **OR** potassium phosphate 15 mmol in sodium chloride 0.9 % 100 mL infusion, 15 mmol, Intravenous, PRN, 15 mmol at 01/15/20 1355 **OR** sodium phosphates 40 mmol in sodium chloride 0.9 % 500 mL IVPB, 40 mmol, Intravenous, PRN **OR** sodium phosphates 20 mmol in sodium chloride 0.9 % 250 mL IVPB, 20 mmol, Intravenous, PRN, Ruby Albarran MD, Stopped at 01/14/20 0746  •  senna-docusate sodium (SENOKOT-S) 8.6-50 MG tablet 2 tablet, 2 tablet, Oral, Nightly, Ruby Albarran MD, 2 tablet at 01/19/20 2059  •  [COMPLETED] Insert peripheral IV, , , Once **AND** sodium chloride 0.9 % flush 10 mL, 10 mL, Intravenous, PRN, Ruby Albarran MD  •  sodium chloride 0.9 % flush 10 mL, 10 mL, Intravenous, Once PRN, Ruby Albarran MD  •  sodium chloride 0.9 % flush 10 mL, 10 mL, Intravenous, Q12H, Ruby Albarran MD, 10 mL at 01/22/20 0846  •  sodium chloride 0.9 % flush 10 mL, 10 mL, Intravenous, PRN, Ruby Albarran MD  •  sodium chloride 0.9 % infusion 1,000 mL, 1,000 mL, Intravenous,  Continuous, Ruby Albarran MD, Stopped at 01/21/20 1308  Review of Systems:   The following systems were reviewed and negative: Constitution, pulmonary, cardiac, gastrointestinal, genitourinary        Objective     Vital Signs  Temp:  [98 °F (36.7 °C)-98.3 °F (36.8 °C)] 98.3 °F (36.8 °C)  Heart Rate:  [] 85  Resp:  [16-18] 18  BP: ()/(51-78) 114/64  Body mass index is 19.56 kg/m².    Intake/Output Summary (Last 24 hours) at 1/22/2020 1329  Last data filed at 1/22/2020 0900  Gross per 24 hour   Intake 1476 ml   Output 575 ml   Net 901 ml     I/O this shift:  In: 360 [P.O.:360]  Out: -      Physical Exam:   General: patient awake, alert and cooperative   Eyes: Normal lids and lashes, no scleral icterus   Neck: supple, normal ROM   Skin: warm and dry, not jaundiced   Cardiovascular: regular rhythm and rate, no murmurs auscultated   Pulm: clear to auscultation bilaterally, regular and unlabored   Abdomen: soft, nontender, nondistended; normal bowel sounds   Rectal: deferred   Extremities: no rash or edema   Psychiatric: Normal mood and behavior; memory intact     Results Review:     I reviewed the patient's new clinical results.    Results from last 7 days   Lab Units 01/20/20  0441 01/16/20  0535   WBC 10*3/mm3  --  9.86   HEMOGLOBIN g/dL  --  14.0   HEMATOCRIT %  --  40.6   PLATELETS 10*3/mm3 412 286     Results from last 7 days   Lab Units 01/16/20  0535   SODIUM mmol/L 141   POTASSIUM mmol/L 3.5   CHLORIDE mmol/L 104   CO2 mmol/L 22.8   BUN mg/dL 12   CREATININE mg/dL 0.86   CALCIUM mg/dL 8.7   GLUCOSE mg/dL 139*         No results found for: LIPASE    Radiology:  MRI Brain With & Without Contrast   Final Result   Mild mucosal thickening in the floor the right maxillary   sinus. Otherwise unremarkable MRI of the brain.       This report was finalized on 1/19/2020 2:41 PM by Dr. Marcos Sy M.D.          FL Esophagram Complete   Final Result       No tight stenoses. Several small rounded defects in  the mucosal coating   of the esophagus appear to persist, potentially polyps or may be   persistent adherent air bubbles, suggest endoscopic correlation.       This report was finalized on 1/18/2020 2:06 PM by Dr. Anton Bower M.D.          CT Head Without Contrast   Final Result   1.  No findings of acute intracranial abnormality.           This report was finalized on 1/13/2020 7:20 AM by Dr. Bobby Clemons M.D.          XR Chest 1 View   Final Result   1. No acute process.       This report was finalized on 1/13/2020 6:41 AM by Dr. Rdedy Lizama M.D.              Assessment/Plan     Patient Active Problem List   Diagnosis   • Diabetes mellitus, new onset (CMS/HCC)   • Diabetic ketoacidosis without coma associated with type 2 diabetes mellitus (CMS/HCC)   • Abnormal esophagram       Impression  1. Dysphagia: improved, suspect related to esophagitis    2. Abnormal esophagram: pt with plaques, esophagitis    3. Metabolic encephalopathy: resolved    4. DKA    Plan  Await EGD biopsy path results    Daily ppi    Avoid NSAIDs    Diet as tolerated    We will arrange for outpatient follow-up; will need EGD in about 12 weeks to assess healing of the esophagitis.  Office will contact him to arrange this    Okay for home from GI standpoint    GI will sign off but remain available as needed in this patient's care.  Thank you.      I discussed the patients findings and my recommendations with patient, family and consulting provider.    Ruby Albarran MD

## 2020-01-23 ENCOUNTER — APPOINTMENT (OUTPATIENT)
Dept: GENERAL RADIOLOGY | Facility: HOSPITAL | Age: 50
End: 2020-01-23

## 2020-01-23 ENCOUNTER — TELEPHONE (OUTPATIENT)
Dept: GASTROENTEROLOGY | Facility: CLINIC | Age: 50
End: 2020-01-23

## 2020-01-23 VITALS
HEART RATE: 99 BPM | DIASTOLIC BLOOD PRESSURE: 86 MMHG | WEIGHT: 149.8 LBS | SYSTOLIC BLOOD PRESSURE: 132 MMHG | TEMPERATURE: 98.3 F | RESPIRATION RATE: 16 BRPM | OXYGEN SATURATION: 98 % | HEIGHT: 72 IN | BODY MASS INDEX: 20.29 KG/M2

## 2020-01-23 LAB
CYTO UR: NORMAL
GLUCOSE BLDC GLUCOMTR-MCNC: 113 MG/DL (ref 70–130)
GLUCOSE BLDC GLUCOMTR-MCNC: 158 MG/DL (ref 70–130)
LAB AP CASE REPORT: NORMAL
PATH REPORT.FINAL DX SPEC: NORMAL
PATH REPORT.GROSS SPEC: NORMAL

## 2020-01-23 PROCEDURE — 63710000001 INSULIN GLARGINE PER 5 UNITS: Performed by: INTERNAL MEDICINE

## 2020-01-23 PROCEDURE — 82962 GLUCOSE BLOOD TEST: CPT

## 2020-01-23 PROCEDURE — 25010000002 ENOXAPARIN PER 10 MG: Performed by: INTERNAL MEDICINE

## 2020-01-23 PROCEDURE — 73120 X-RAY EXAM OF HAND: CPT

## 2020-01-23 PROCEDURE — 63710000001 INSULIN LISPRO (HUMAN) PER 5 UNITS: Performed by: INTERNAL MEDICINE

## 2020-01-23 PROCEDURE — 99232 SBSQ HOSP IP/OBS MODERATE 35: CPT | Performed by: INTERNAL MEDICINE

## 2020-01-23 RX ORDER — PANTOPRAZOLE SODIUM 40 MG/1
40 TABLET, DELAYED RELEASE ORAL
Qty: 60 TABLET | Refills: 1 | Status: SHIPPED | OUTPATIENT
Start: 2020-01-23 | End: 2020-03-27 | Stop reason: SDUPTHER

## 2020-01-23 RX ORDER — FLUCONAZOLE 200 MG/1
200 TABLET ORAL EVERY 24 HOURS
Status: DISCONTINUED | OUTPATIENT
Start: 2020-01-23 | End: 2020-01-23 | Stop reason: HOSPADM

## 2020-01-23 RX ORDER — FLUCONAZOLE 200 MG/1
200 TABLET ORAL EVERY 24 HOURS
Qty: 14 TABLET | Refills: 0 | Status: SHIPPED | OUTPATIENT
Start: 2020-01-23 | End: 2020-02-06

## 2020-01-23 RX ORDER — GLIPIZIDE 5 MG/1
5 TABLET ORAL
Qty: 60 TABLET | Refills: 2 | Status: SHIPPED | OUTPATIENT
Start: 2020-01-23 | End: 2020-05-07 | Stop reason: SDUPTHER

## 2020-01-23 RX ORDER — CEPHALEXIN 500 MG/1
500 CAPSULE ORAL EVERY 6 HOURS SCHEDULED
Qty: 12 CAPSULE | Refills: 0 | Status: SHIPPED | OUTPATIENT
Start: 2020-01-23 | End: 2020-01-26

## 2020-01-23 RX ORDER — GLIPIZIDE 5 MG/1
5 TABLET ORAL
Status: DISCONTINUED | OUTPATIENT
Start: 2020-01-23 | End: 2020-01-23 | Stop reason: HOSPADM

## 2020-01-23 RX ADMIN — ENOXAPARIN SODIUM 40 MG: 40 INJECTION SUBCUTANEOUS at 13:20

## 2020-01-23 RX ADMIN — INSULIN GLARGINE 15 UNITS: 100 INJECTION, SOLUTION SUBCUTANEOUS at 09:17

## 2020-01-23 RX ADMIN — SODIUM CHLORIDE, PRESERVATIVE FREE 10 ML: 5 INJECTION INTRAVENOUS at 09:18

## 2020-01-23 RX ADMIN — INSULIN LISPRO 5 UNITS: 100 INJECTION, SOLUTION INTRAVENOUS; SUBCUTANEOUS at 09:18

## 2020-01-23 RX ADMIN — PANTOPRAZOLE SODIUM 40 MG: 40 TABLET, DELAYED RELEASE ORAL at 06:32

## 2020-01-23 RX ADMIN — CEPHALEXIN 500 MG: 500 CAPSULE ORAL at 13:20

## 2020-01-23 RX ADMIN — CEPHALEXIN 500 MG: 500 CAPSULE ORAL at 06:32

## 2020-01-23 RX ADMIN — INSULIN LISPRO 2 UNITS: 100 INJECTION, SOLUTION INTRAVENOUS; SUBCUTANEOUS at 13:19

## 2020-01-23 RX ADMIN — METFORMIN HYDROCHLORIDE 1000 MG: 1000 TABLET ORAL at 13:20

## 2020-01-23 NOTE — NURSING NOTE
Spoke with Dr. Doe with regard to pt's request to use 70/30 pen instead of vial. As per day shift nurse, pt states he would like a pen because he cannot draw up the insulin from a vial due to his infected finger that has a dsg applied. Dr. Doe stated that if he uses a pen it will be 4 injections/day and is more expensive than the vial. Pt is being followed by diabetes educator and this issue will be endorsed to the day shift nurse in the am. RN will also discuss with pt.

## 2020-01-23 NOTE — PROGRESS NOTES
Continued Stay Note  [facility]     Patient Name: Joby Kulkarni  MRN: [id number]  Today's Date: 1/23/2020    Admit Date: 1/13/2020    Discharge Plan     Row Name 01/23/20 1105       Plan    Plan  Home with [facility] HH  vs Outpt DM classes  and new PCP appointment tomorrow with [facility] office    Patient/Family in Agreement with Plan  yes    Plan Comments  CCP followed up with pt regarding PCP appointment, pt states he called and changed to go to [facility] in [facility] office. His appointment is tomorrow at 2:30pm. CCP discussed HH setup and address, pt states he is going to check with his friend regarding where he is going. CCP tried to clarify his residence and pt stated its complicated and he may go to a hotel. CCP asked if pt was homeless and living in a shelter and he stated no. He is unable to go to his mom's house. CCP explained would follow up with pt in a few hours to confirm dc location for HH vs outpt DM. CCP called Diabetes office and spoke with Kathy, she states pts can do outpt appointments with them, just need an MD order. MD order for Diabetes self training classes thru his Primary Care Provider. Then they would call him and setup appointment. CCP to follow up with pt regarding HH vs outpt DM education. Pt states he will take an uber home at [place] linda huang/[place]        Discharge Codes    No documentation.             Sheyla Treadwell RN

## 2020-01-23 NOTE — PROGRESS NOTES
49 y.o.   LOS: 10 days   Patient Care Team:  Provider, No Known as PCP - General    Chief Complaint: Elevated blood sugars    Chief Complaint   Patient presents with   • Altered Mental Status       Subjective   Patient's blood sugars are decently controlled on the current insulin regimen.  Called by the nurse with the concerns of patient not being able to use the vials and would rather want to be on the pens.    Interval History:    Review of Systems:   Constitutional: Positive for appetite change and fatigue.   Eyes: Negative for visual disturbance.   Respiratory: no shortness of breath.    Cardiovascular: no leg swelling.   Gastrointestinal: Negative for abdominal pain.   Endocrine: Negative for polydipsia and polyuria.   Musculoskeletal: Positive for arthralgias and myalgias.   Skin: Negative for pallor.   Neurological: Positive for weakness and numbness.   Psychiatric/Behavioral: Positive for sleep disturbance.         Review of Systems  Objective     Vital Signs   Temp:  [98 °F (36.7 °C)-98.7 °F (37.1 °C)] 98 °F (36.7 °C)  Heart Rate:  [] 72  Resp:  [16-18] 16  BP: ()/(62-72) 117/72    Physical Exam:  Gen exam       No distress, appears stated age  EYES:             PERRL, anicteric sclera  ENT:                External ears/nose normal  NECK:             No adenopathy, midline trachea  LUNGS:           Normal chest on inspection, palpation and diminished bilateral breath       sounds on auscultation  CV:                  Normal S1S2, without murmur  ABD:                Non tender, non distended, no hepatosplenomegaly, +BS  EXT:                No edema, cyanosis or clubbing          Physical ExamResults Review:     I reviewed the patient's new clinical results and summarized them in subjective and in plan.      No results found for: GLUCOSE  Lab Results (last 24 hours)     Procedure Component Value Units Date/Time    POC Glucose Once [[id number]]  (Normal) Collected:  01/23/20 0619    Specimen:  Blood  Updated:  01/23/20 0633     Glucose 113 mg/dL     POC Glucose Once [[id number]]  (Abnormal) Collected:  01/22/20 2106    Specimen:  Blood Updated:  01/22/20 2108     Glucose 193 mg/dL     POC Glucose Once [[id number]]  (Abnormal) Collected:  01/22/20 1653    Specimen:  Blood Updated:  01/22/20 1656     Glucose 159 mg/dL     POC Glucose Once [[id number]]  (Abnormal) Collected:  01/22/20 1121    Specimen:  Blood Updated:  01/22/20 1128     Glucose 175 mg/dL         Imaging Results (Last 24 Hours)     ** No results found for the last 24 hours. **          Medication Review: done      Current Facility-Administered Medications:   •  cephalexin (KEFLEX) capsule 500 mg, 500 mg, Oral, Q6H, Ruby Albarran MD, 500 mg at 01/23/20 0632  •  dextrose (D50W) 25 g/ 50mL Intravenous Solution 12.5 g, 12.5 g, Intravenous, PRN, Ruby Albarran MD, 12.5 g at 01/13/20 2240  •  dextrose (D50W) 25 g/ 50mL Intravenous Solution 25 g, 25 g, Intravenous, Q15 Min PRN, Ruby Albarran MD  •  dextrose (GLUTOSE) oral gel 15 g, 15 g, Oral, Q15 Min PRN, Ruby Albarran MD  •  enoxaparin (LOVENOX) syringe 40 mg, 40 mg, Subcutaneous, Q24H, Ruby Albarran MD, 40 mg at 01/22/20 1217  •  glucagon (human recombinant) (GLUCAGEN DIAGNOSTIC) injection 1 mg, 1 mg, Subcutaneous, Q15 Min PRN, Ruby Albarran MD  •  insulin glargine (LANTUS) injection 15 Units, 15 Units, Subcutaneous, Q12H, Ruby Albarran MD, 15 Units at 01/23/20 0917  •  insulin lispro (humaLOG) injection 0-9 Units, 0-9 Units, Subcutaneous, 4x Daily With Meals & Nightly, Ruby Albarran MD, 2 Units at 01/22/20 2119  •  insulin lispro (humaLOG) injection 5 Units, 5 Units, Subcutaneous, TID With Meals, Ruby Albarran MD, 5 Units at 01/23/20 0918  •  lactated ringers infusion, 30 mL/hr, Intravenous, Continuous, Ruby Albarran MD, Last Rate: 30 mL/hr at 01/21/20 1418, 30 mL/hr at 01/21/20 1418  •  magnesium sulfate 4 gram infusion - Mg less than or equal to 1mg/dL, 4 g, Intravenous, PRN  **OR** magnesium sulfate 3 gram infusion (1gm x 3) - Mg 1.1 - 1.5 mg/dL, 1 g, Intravenous, PRN **OR** Magnesium Sulfate 2 gram infusion- Mg 1.6 - 1.9 mg/dL, 2 g, Intravenous, PRN, Ruby Albarran MD  •  ondansetron (ZOFRAN) tablet 4 mg, 4 mg, Oral, Q6H PRN **OR** ondansetron (ZOFRAN) injection 4 mg, 4 mg, Intravenous, Q6H PRN, Ruby Albarran MD  •  pantoprazole (PROTONIX) EC tablet 40 mg, 40 mg, Oral, BID AC, Ruby Albarran MD, 40 mg at 01/23/20 0632  •  potassium & sodium phosphates (PHOS-NAK) 280-160-250 MG packet - for Phosphorus less than 1.25 mg/dL, 2 packet, Oral, Q6H PRN **OR** potassium & sodium phosphates (PHOS-NAK) 280-160-250 MG packet - for Phosphorus 1.25 - 2.5 mg/dL, 2 packet, Oral, Q6H PRN, Ruby Albarran MD, 2 packet at 01/16/20 1157  •  potassium chloride (MICRO-K) CR capsule 40 mEq, 40 mEq, Oral, PRN **OR** potassium chloride (KLOR-CON) packet 40 mEq, 40 mEq, Oral, PRN **OR** potassium chloride 10 mEq in 100 mL IVPB, 10 mEq, Intravenous, Q1H PRN, Ruby Albarran MD  •  potassium phosphate 45 mmol in sodium chloride 0.9 % 500 mL infusion, 45 mmol, Intravenous, PRN **OR** potassium phosphate 30 mmol in sodium chloride 0.9 % 250 mL infusion, 30 mmol, Intravenous, PRN **OR** potassium phosphate 15 mmol in sodium chloride 0.9 % 100 mL infusion, 15 mmol, Intravenous, PRN, 15 mmol at 01/15/20 1355 **OR** sodium phosphates 40 mmol in sodium chloride 0.9 % 500 mL IVPB, 40 mmol, Intravenous, PRN **OR** sodium phosphates 20 mmol in sodium chloride 0.9 % 250 mL IVPB, 20 mmol, Intravenous, PRN, Ruby Albarran MD, Stopped at 01/14/20 0746  •  senna-docusate sodium (SENOKOT-S) 8.6-50 MG tablet 2 tablet, 2 tablet, Oral, Nightly, Ruby Albarran MD, 2 tablet at 01/19/20 2059  •  [COMPLETED] Insert peripheral IV, , , Once **AND** sodium chloride 0.9 % flush 10 mL, 10 mL, Intravenous, PRN, Ruby Albarran MD  •  sodium chloride 0.9 % flush 10 mL, 10 mL, Intravenous, Once PRN, Ruby Albarran MD  •   "sodium chloride 0.9 % flush 10 mL, 10 mL, Intravenous, Q12H, Ruby Albarran MD, 10 mL at 01/23/20 0918  •  sodium chloride 0.9 % flush 10 mL, 10 mL, Intravenous, PRN, Ruby Albarran MD    Assessment/Plan     Active Hospital Problems    Diagnosis  POA   • **Diabetic ketoacidosis without coma associated with type 2 diabetes mellitus (CMS/HCC) [E11.10]  Yes   • Diabetes mellitus, new onset (CMS/HCC) [E11.9]  Yes   • Abnormal esophagram [R93.3]  Unknown      Resolved Hospital Problems   No resolved problems to display.     Diabetes mellitus-significantly uncontrolled  It is unclear if patient is a type I or type II  Continue Lantus 15 units twice daily  Continue Humalog 5 units with each meal  Continue Humalog sliding scale 3 times daily before meals and at bedtime.  No changes to the insulin regimen.    C-peptide levels positive.  Pending john 65 antibodies.  Discussed with the patient if the john 65 antibodies are negative we should be able to do oral hypoglycemic agents.  However for some reasons the john 65 antibody blood work-up is still pending, will reorder this blood work-up.     If patient is being discharged okay to consider 70/30 insulin regimen.  Consider 70/30 - 18 units twice daily with breakfast and dinner.  Explained to the nurse that we have been doing 70/30 insulin regimen because it is convenient for the patient given his challenges.  Pertaining to the pens or the vials it would be mainly dictated by the insurance as to which is cheaper for him. ( will have dm educator look into this )     Discussed plan with Nurse.     Lexie Doe MD.  01/23/20  10:01 AM      EMR Dragon / transcription disclaimer:    \"Dictated utilizing Dragon dictation\".   "

## 2020-01-23 NOTE — DISCHARGE SUMMARY
PHYSICIAN DISCHARGE SUMMARY                                                                        [facility]    Patient Identification:  Name: Joby Kulkarni  Age: 49 y.o.  Sex: male  :  1970  MRN: [id number]  Primary Care Physician: Provider, No Known    Admit date: 2020  Discharge date and time: No discharge date for patient encounter.   Discharged Condition: stable    Discharge Diagnoses:  Diabetic ketoacidosis without coma associated with type 2 diabetes mellitus (CMS/HCC)    Diabetes mellitus, new onset (CMS/HCC)    Abnormal esophagram   DKA resolved  Metabolic encephalopathy  Acute kidney injury  Dysphagia  Esophageal mucosal abnormalities esophageal plaques, esophagitis, gastritis and duodenitis  Paronychia/ischemic right middle fingertip  Hyponatremia    Hospital Course: Joby Kulkarni presented to [facility]    Patient admitted with DKA and altered mental status.  Please refer to history and physical.  49-year-old -American male who presents with confusion and weakness.  He is a very poor historian because he is drowsy but he is arousable and he does answer a few questions.  Apparently he does not have a primary care physician and is not on any medications.  Recently went to urgent care and received clindamycin for swollen, painful and possibly infected right third finger, possible paronychia.  He took 1 dose only and then became too confused to continue his prescriptions.  He has had severe weakness over the past 24 hours, increased thirst and urination.  Has had no appetite.  Admitted to CCU initiated on DKA protocol and patient improved clinically.  He did have infection of his right middle finger initial antibiotics was initiated orally.  Continues to complain of right middle finger pain and hand surgery was consulted recommendations that he may need a terminal amputation of the right middle finger due to loss of pulp.  Currently patient is on  70/30 insulin per endocrinology.  His blood glucose are doing well.  He also had GI to see because of esophageal abnormalities and had a scope showing esophageal plaques with esophagitis gastritis and duodenitis.  GI will follow up on the biopsy resultsAnd further management as outpatient..  Patient is currently stable for discharge and follow-up with endocrinology as per the recommendations.  Consults:   IP CONSULT TO NUTRITION SERVICES  IP CONSULT TO DIABETES EDUCATOR  IP CONSULT TO PULMONOLOGY  IP CONSULT TO DIABETES EDUCATOR  IP CONSULT TO NUTRITION SERVICES  IP CONSULT TO ENDOCRINOLOGY  IP CONSULT TO CASE MANAGEMENT   IP CONSULT TO GASTROENTEROLOGY  IP CONSULT TO NEUROLOGY  IP CONSULT TO DIABETES EDUCATOR  IP CONSULT TO HAND SURGERY    Significant Diagnostic Studies:   [unfilled]    Discharge Exam:  Alert and oriented x 4, in NAD  Supple neck, midline trach  RRR, no m/r/g, no edema  Clear bilaterally, no wheezing, nonlabored  No clubbing or cyanosis     Disposition:  Home    Patient Instructions:   [unfilled]  Follow-up Information     Provider, No Known .    Contact information:  [facility] [address] [phone]             Rajeev Mccarty MD Follow up in 1 week(s).    Specialties:  Plastic Surgery, Hand Surgery  Contact information:  [address] [address] [phone]                 [unfilled]        Discharge Medications      New Medications      Instructions Start Date   cephalexin 500 MG capsule  Commonly known as:  KEFLEX   500 mg, Oral, Every 6 Hours Scheduled      fluconazole 200 MG tablet  Commonly known as:  DIFLUCAN   200 mg, Oral, Every 24 Hours      glucose blood test strip  Commonly known as:  ONETOUCH VERIO   To check 1 -2 times a day      insulin aspart prot-insulin aspart (70-30) 100 UNIT/ML injection  Commonly known as:  novoLOG 70/30   18 Units, Subcutaneous, 2 Times Daily With Meals      Insulin Syringe 28G X  "1/2\" 1 ML misc   To inject 4 times a day      Needle (Disp) 30G X 1\" misc   To inject 4 times a day      ONE TOUCH LANCETS misc   To check 1 -2 times a day      pantoprazole 40 MG EC tablet  Commonly known as:  PROTONIX   40 mg, Oral, 2 Times Daily Before Meals         Stop These Medications    clindamycin 300 MG capsule  Commonly known as:  CLEOCIN            Total time spent discharging patient including evaluation, medication reconciliation, arranging follow up, and post hospitalization instructions and education total time exceeds 30 minutes.    Signed:  Ping Caro MD  1/23/2020  2:40 PM  "

## 2020-01-23 NOTE — PAYOR COMM NOTE
"Johnson Kulkarni (49 y.o. Male)     PLEASE SEE ATTACHED CLINICALS.    REF#[id number]    PLEASE CALL   OR  [phone] WITH INPT CONTINUED STAY AUTH.     THANK YOU    BETH MCKEON LPN CCP    Date of Birth Social Security Number Address Home Phone MRN    1970  [address] [address]  [place] [address] [phone] [id number]    Rastafari Marital Status          None Unknown       Admission Date Admission Type Admitting Provider Attending Provider Department, Room/Bed    1/13/20 Emergency Parrish Petersen MD Sayied, Tausif, MD [address], [address]    Discharge Date Discharge Disposition Discharge Destination                       Attending Provider:  Ping Caro MD    Allergies:  No Known Allergies    Isolation:  None   Infection:  None   Code Status:  CPR    Ht:  182.9 cm (72\")   Wt:  67.9 kg (149 lb 12.8 oz)    Admission Cmt:  None   Principal Problem:  Diabetic ketoacidosis without coma associated with type 2 diabetes mellitus (CMS/[facility]) [E11.10]                 Active Insurance as of 1/13/2020     Primary Coverage     Payor Plan Insurance Group Employer/Plan Group    [employer] BLUE CROSS [facility] [employer] BLUE [facility] PPO [id number]     Payor Plan Address Payor Plan Phone Number Payor Plan Fax Number Effective Dates    [address] [phone]  1/1/2018 - None Entered    [facility] [id number]       Subscriber Name Subscriber Birth Date Member ID       JOHNSON KULKARNI 1970 [id number]                 Emergency Contacts      (Rel.) Home Phone Work Phone Mobile Phone    CARMELITA KULKARNI (Mother) [phone] -- [phone]    Daksha Churchill (Sister) -- -- [phone]    Tere Markham (Daughter) -- -- --            Oxygen Therapy (last day)     Date/Time   SpO2   Device (Oxygen Therapy)   Flow (L/min)   Oxygen Concentration (%)   ETCO2 (mmHg)    01/23/20 0855   --   room air   --   --   --    01/23/20 0724   98   room air   --   --   --    01/22/20 2355 "   99   room air   --   --   --    01/22/20 2107   98   room air   --   --   --    01/22/20 1407   --   room air   --   --   --    01/22/20 1336   96   room air   --   --   --    01/22/20 0847   --   room air   --   --   --    01/22/20 0710   97   room air   --   --   --    01/22/20 0033   99   room air   --   --   --              Intake & Output (last day)       01/22 0701 - 01/23 0700 01/23 0701 - 01/24 0700    P.O. 720 360    I.V. (mL/kg)      Total Intake(mL/kg) 720 (10.6) 360 (5.3)    Urine (mL/kg/hr)      Total Output      Net +720 +360          Urine Unmeasured Occurrence 3 x         Lines, Drains & Airways    Active LDAs     Name:   Placement date:   Placement time:   Site:   Days:    Peripheral IV 01/22/20 0145 Left;Posterior Forearm   01/22/20    0145    Forearm   1                Blood Administration Record (From admission, onward)    None        Orders (last 24 hrs)      Start     Ordered    01/23/20 1730  glipizide (GLUCOTROL) tablet 5 mg  2 Times Daily Before Meals      01/23/20 1006    01/23/20 1200  metFORMIN (GLUCOPHAGE) tablet 1,000 mg  2 Times Daily With Meals      01/23/20 1006    01/23/20 0955  XR Hand 2 View Right  1 Time Imaging      01/23/20 0955    01/23/20 0634  POC Glucose Once  Once      01/23/20 0619    01/22/20 2109  POC Glucose Once  Once      01/22/20 2106    01/22/20 1730  pantoprazole (PROTONIX) EC tablet 40 mg  2 Times Daily Before Meals      01/22/20 1506    01/22/20 1657  POC Glucose Once  Once      01/22/20 1653    01/22/20 1626  Obtain and place a hand splint on the patient's right hand.  Elevate right hand higher than heart.  Nursing Communication  Once     Comments:  Obtain and place a hand splint on the patient's right hand.  Elevate right hand higher than heart.    01/22/20 1626    01/22/20 1527  Inpatient Hand Surgery Consult  Once     Specialty:  Hand Surgery  Provider:  Harsha Mendez MD    01/22/20 1527    01/22/20 1415  Inpatient Hand Surgery Consult  Once,   Status:   "Canceled     Specialty:  Hand Surgery  Provider:  Harsha Mendez MD    01/22/20 1415    01/21/20 1330  lactated ringers infusion  Continuous      01/21/20 1328    01/21/20 1113  sodium chloride 0.9 % infusion 1,000 mL  Continuous      01/21/20 1111    01/21/20 0000  ONE TOUCH LANCETS misc      01/21/20 1222    01/21/20 0000  glucose blood (ONETOUCH VERIO) test strip      01/21/20 1222    01/21/20 0000  insulin aspart prot-insulin aspart (novoLOG 70/30) (70-30) 100 UNIT/ML injection  2 Times Daily With Meals      01/21/20 1222    01/21/20 0000  Insulin Syringe 28G X 1/2\" 1 ML misc      01/21/20 1222    01/21/20 0000  Needle, Disp, 30G X 1\" misc      01/21/20 1222    01/20/20 0700  pantoprazole (PROTONIX) EC tablet 40 mg  Every Morning,   Status:  Discontinued      01/19/20 1654    01/19/20 2100  insulin glargine (LANTUS) injection 15 Units  Every 12 Hours Scheduled,   Status:  Discontinued      01/19/20 1011    01/19/20 1800  cephalexin (KEFLEX) capsule 500 mg  Every 6 Hours Scheduled      01/19/20 1557    01/16/20 2100  senna-docusate sodium (SENOKOT-S) 8.6-50 MG tablet 2 tablet  Nightly      01/16/20 1539    01/16/20 2100  insulin lispro (humaLOG) injection 0-9 Units  4 Times Daily With Meals & Nightly      01/16/20 1730    01/15/20 1242  potassium & sodium phosphates (PHOS-NAK) 280-160-250 MG packet - for Phosphorus less than 1.25 mg/dL  Every 6 Hours PRN      01/15/20 1242    01/15/20 1242  potassium & sodium phosphates (PHOS-NAK) 280-160-250 MG packet - for Phosphorus 1.25 - 2.5 mg/dL  Every 6 Hours PRN      01/15/20 1242    01/15/20 1100  enoxaparin (LOVENOX) syringe 40 mg  Every 24 Hours      01/15/20 1021    01/14/20 1800  insulin lispro (humaLOG) injection 5 Units  3 Times Daily With Meals,   Status:  Discontinued      01/14/20 1609    01/14/20 1600  POC Glucose Q4H  Every 4 Hours      01/14/20 1514    01/14/20 1504  dextrose (GLUTOSE) oral gel 15 g  Every 15 Minutes PRN      01/14/20 1507    01/14/20 1504  " dextrose (D50W) 25 g/ 50mL Intravenous Solution 25 g  Every 15 Minutes PRN      01/14/20 1507    01/14/20 1504  glucagon (human recombinant) (GLUCAGEN DIAGNOSTIC) injection 1 mg  Every 15 Minutes PRN      01/14/20 1507    01/14/20 0141  dextrose (GLUTOSE) oral gel 15 g  Every 15 Minutes PRN,   Status:  Discontinued      01/14/20 0143    01/14/20 0141  dextrose (D50W) 25 g/ 50mL Intravenous Solution 25 g  Every 15 Minutes PRN,   Status:  Discontinued      01/14/20 0143    01/14/20 0141  glucagon (human recombinant) (GLUCAGEN DIAGNOSTIC) injection 1 mg  Every 15 Minutes PRN,   Status:  Discontinued      01/14/20 0143    01/13/20 2100  sodium chloride 0.9 % flush 10 mL  Every 12 Hours Scheduled      01/13/20 1522    01/13/20 1559  potassium phosphate 45 mmol in sodium chloride 0.9 % 500 mL infusion  As Needed      01/13/20 1600    01/13/20 1559  potassium phosphate 30 mmol in sodium chloride 0.9 % 250 mL infusion  As Needed      01/13/20 1600    01/13/20 1559  potassium phosphate 15 mmol in sodium chloride 0.9 % 100 mL infusion  As Needed      01/13/20 1600    01/13/20 1559  sodium phosphates 40 mmol in sodium chloride 0.9 % 500 mL IVPB  As Needed      01/13/20 1600    01/13/20 1559  sodium phosphates 20 mmol in sodium chloride 0.9 % 250 mL IVPB  As Needed      01/13/20 1600    01/13/20 1559  magnesium sulfate 4 gram infusion - Mg less than or equal to 1mg/dL  As Needed      01/13/20 1600    01/13/20 1559  magnesium sulfate 3 gram infusion (1gm x 3) - Mg 1.1 - 1.5 mg/dL  As Needed      01/13/20 1600    01/13/20 1559  Magnesium Sulfate 2 gram infusion- Mg 1.6 - 1.9 mg/dL  As Needed      01/13/20 1600    01/13/20 1559  potassium chloride (MICRO-K) CR capsule 40 mEq  As Needed      01/13/20 1600    01/13/20 1559  potassium chloride (KLOR-CON) packet 40 mEq  As Needed      01/13/20 1600    01/13/20 1559  potassium chloride 10 mEq in 100 mL IVPB  Every 1 Hour PRN      01/13/20 1600    01/13/20 1521  sodium chloride 0.9 %  flush 10 mL  As Needed      01/13/20 1522    01/13/20 1521  ondansetron (ZOFRAN) tablet 4 mg  Every 6 Hours PRN      01/13/20 1522    01/13/20 1521  ondansetron (ZOFRAN) injection 4 mg  Every 6 Hours PRN      01/13/20 1522    01/13/20 0722  dextrose (D50W) 25 g/ 50mL Intravenous Solution 12.5 g  As Needed      01/13/20 0726    01/13/20 0722  sodium chloride 0.9 % flush 10 mL  Once As Needed      01/13/20 0726    01/13/20 0623  sodium chloride 0.9 % flush 10 mL  As Needed      01/13/20 0624    Unscheduled  ECG 12 Lead  As Needed     Comments:  For standing ICU/CCU Standing Orders.  Nurse to Release if Patient Experiences Acute Chest Pain or Dysrhythmias    01/13/20 1522    Unscheduled  Potassium  As Needed     Comments:  Sudden Ventricular Dysrhythmias. Notify Physician.      01/13/20 1522    Unscheduled  Magnesium  As Needed     Comments:  For ICU/CCU Standing Orders      01/13/20 1522    Unscheduled  Magnesium  As Needed      01/13/20 1600    Unscheduled  Potassium  As Needed      01/13/20 1600    --  UPPER GI ENDOSCOPY      01/21/20 1232    --  SCANNED - TELEMETRY        01/13/20 0000                Operative/Procedure Notes (last 24 hours) (Notes from 01/22/20 1130 through 01/23/20 1130)    No notes of this type exist for this encounter.            Physician Progress Notes (last 24 hours) (Notes from 01/22/20 1130 through 01/23/20 1130)      Ping Caro MD at 01/22/20 1527                [facility]         Dr Feng Saysharon   LOS: 9 days   Patient Care Team:  Provider, No Known as PCP - General    Chief Complaint: DKA with metabolic encephalopathy now resolved.  Esophageal abnormalities noted with question of polyps versus air bubbles.    Interval History:   He is concerned about his right middle finger not healing well.  Status post EGD results noted.    REVIEW OF SYSTEMS:   CARDIOVASCULAR: No chest pain, chest pressure or chest discomfort. No palpitations or edema.   RESPIRATORY: No shortness  "of breath, cough or sputum.   GASTROINTESTINAL: No anorexia, nausea, vomiting or diarrhea. No abdominal pain or blood.   HEMATOLOGIC: No bleeding or bruising.     Ventilator/Non-Invasive Ventilation Settings (From admission, onward)    None            Vital Signs  Temp:  [98 °F (36.7 °C)-98.7 °F (37.1 °C)] 98.7 °F (37.1 °C)  Heart Rate:  [] 103  Resp:  [18] 18  BP: (107-117)/(51-78) 107/65    Intake/Output Summary (Last 24 hours) at 1/22/2020 1527  Last data filed at 1/22/2020 1300  Gross per 24 hour   Intake 1836 ml   Output 575 ml   Net 1261 ml     Flowsheet Rows      First Filed Value   Admission Height  182.9 cm (72\") Documented at 01/13/2020 0628   Admission Weight  66.8 kg (147 lb 3.2 oz) Documented at 01/13/2020 0637          Physical Exam:   General Appearance:    Alert, cooperative, in no acute distress   Lungs:     Clear to auscultation,respirations regular, even and                  unlabored    Heart:    Regular rhythm and normal rate, normal S1 and S2, no            murmur, no gallop, no rub, no click   Chest Wall:    No abnormalities observed   Abdomen:     Normal bowel sounds, no masses, no organomegaly, soft        non-tender, non-distended, no guarding, no rebound                tenderness   Extremities:   Moves all extremities well, no edema, no cyanosis, no             redness  Right hand right middle finger appears to be tender with evidence of resolving infection     Results Review:        Results from last 7 days   Lab Units 01/16/20  0535   SODIUM mmol/L 141   POTASSIUM mmol/L 3.5   CHLORIDE mmol/L 104   CO2 mmol/L 22.8   BUN mg/dL 12   CREATININE mg/dL 0.86   GLUCOSE mg/dL 139*   CALCIUM mg/dL 8.7         Results from last 7 days   Lab Units 01/20/20  0441 01/16/20  0535   WBC 10*3/mm3  --  9.86   HEMOGLOBIN g/dL  --  14.0   HEMATOCRIT %  --  40.6   PLATELETS 10*3/mm3 412 286                           I reviewed the patient's new clinical results.  I personally viewed and interpreted " the patient's CXR        Medication Review:     cephalexin 500 mg Oral Q6H   enoxaparin 40 mg Subcutaneous Q24H   insulin glargine 15 Units Subcutaneous Q12H   insulin lispro 0-9 Units Subcutaneous 4x Daily With Meals & Nightly   insulin lispro 5 Units Subcutaneous TID With Meals   pantoprazole 40 mg Oral BID AC   senna-docusate sodium 2 tablet Oral Nightly   sodium chloride 10 mL Intravenous Q12H         lactated ringers 30 mL/hr Last Rate: 30 mL/hr (01/21/20 1418)   sodium chloride 1,000 mL Last Rate: Stopped (01/21/20 1308)       ASSESSMENT:   DKA resolved  Metabolic encephalopathy  Acute kidney injury  Dysphagia  Esophageal mucosal abnormalities esophageal plaques, esophagitis, gastritis and duodenitis  Paronychia  Hyponatremia      PLAN:  Status post EGD with results noted with esophageal plaques, esophagitis gastritis and duodenitis.  Protonix initiated by GI.  Biopsy results pending.  His right hand middle finger still has not healed despite antibiotics.  Earlier family was saying that there was some discharge from it.  I will ask hand surgery to evaluate further.  Could be a case of paronychia currently being treated with oral antibiotics  Continue with insulin regimen as per endocrinology.  Blood glucose appears to be stable.  Patient is learning to give himself injections subcu insulin.  He has been switched to 70/30 insulin and would be discharged on that  Hyponatremia resolved  Creatinine stable  Completes course of antibiotics for paronychia  Discussed with patient and his mother at bedside in detail  Possibly discharge in a.m. if no additional issues     Ping Caro MD  01/22/20  3:27 PM          Electronically signed by Ping Caro MD at 01/22/20 7710     Ruby Albarran MD at 01/22/20 1328          [facility]  Inpatient Progress Note    Reason for Follow Up:  Dysphagia, abnormal esophagram    Subjective     Interval History:   EGD yesterday with esophagitis, plaques  in the esophagus, gastritis, esophagitis.  Pathology pending.  Denies any heartburn symptoms.  No dysphagia.  Tolerating diet.    Current Facility-Administered Medications:   •  cephalexin (KEFLEX) capsule 500 mg, 500 mg, Oral, Q6H, Ruby Albarran MD, 500 mg at 01/22/20 1207  •  dextrose (D50W) 25 g/ 50mL Intravenous Solution 12.5 g, 12.5 g, Intravenous, PRN, Ruby Albarran MD, 12.5 g at 01/13/20 2240  •  dextrose (D50W) 25 g/ 50mL Intravenous Solution 25 g, 25 g, Intravenous, Q15 Min PRN, Ruby Albarran MD  •  dextrose (GLUTOSE) oral gel 15 g, 15 g, Oral, Q15 Min PRN, Ruby Albarran MD  •  enoxaparin (LOVENOX) syringe 40 mg, 40 mg, Subcutaneous, Q24H, Ruby Albarran MD, 40 mg at 01/22/20 1217  •  glucagon (human recombinant) (GLUCAGEN DIAGNOSTIC) injection 1 mg, 1 mg, Subcutaneous, Q15 Min PRN, Ruby Albarran MD  •  insulin glargine (LANTUS) injection 15 Units, 15 Units, Subcutaneous, Q12H, Ruby Albarran MD, 15 Units at 01/22/20 0843  •  insulin lispro (humaLOG) injection 0-9 Units, 0-9 Units, Subcutaneous, 4x Daily With Meals & Nightly, Ruby Albarran MD, 2 Units at 01/22/20 1209  •  insulin lispro (humaLOG) injection 5 Units, 5 Units, Subcutaneous, TID With Meals, Ruby Albarran MD, 5 Units at 01/22/20 1208  •  lactated ringers infusion, 30 mL/hr, Intravenous, Continuous, Ruby Albarran MD, Last Rate: 30 mL/hr at 01/21/20 1418, 30 mL/hr at 01/21/20 1418  •  magnesium sulfate 4 gram infusion - Mg less than or equal to 1mg/dL, 4 g, Intravenous, PRN **OR** magnesium sulfate 3 gram infusion (1gm x 3) - Mg 1.1 - 1.5 mg/dL, 1 g, Intravenous, PRN **OR** Magnesium Sulfate 2 gram infusion- Mg 1.6 - 1.9 mg/dL, 2 g, Intravenous, PRN, Ruby Albarran MD  •  ondansetron (ZOFRAN) tablet 4 mg, 4 mg, Oral, Q6H PRN **OR** ondansetron (ZOFRAN) injection 4 mg, 4 mg, Intravenous, Q6H PRN, Ruby Albarran MD  •  pantoprazole (PROTONIX) EC tablet 40 mg, 40 mg, Oral, M, Ruby Albarran MD, 40 mg at 01/22/20  0630  •  potassium & sodium phosphates (PHOS-NAK) 280-160-250 MG packet - for Phosphorus less than 1.25 mg/dL, 2 packet, Oral, Q6H PRN **OR** potassium & sodium phosphates (PHOS-NAK) 280-160-250 MG packet - for Phosphorus 1.25 - 2.5 mg/dL, 2 packet, Oral, Q6H PRN, Ruby Albarran MD, 2 packet at 01/16/20 1157  •  potassium chloride (MICRO-K) CR capsule 40 mEq, 40 mEq, Oral, PRN **OR** potassium chloride (KLOR-CON) packet 40 mEq, 40 mEq, Oral, PRN **OR** potassium chloride 10 mEq in 100 mL IVPB, 10 mEq, Intravenous, Q1H PRN, Ruby Albarran MD  •  potassium phosphate 45 mmol in sodium chloride 0.9 % 500 mL infusion, 45 mmol, Intravenous, PRN **OR** potassium phosphate 30 mmol in sodium chloride 0.9 % 250 mL infusion, 30 mmol, Intravenous, PRN **OR** potassium phosphate 15 mmol in sodium chloride 0.9 % 100 mL infusion, 15 mmol, Intravenous, PRN, 15 mmol at 01/15/20 1355 **OR** sodium phosphates 40 mmol in sodium chloride 0.9 % 500 mL IVPB, 40 mmol, Intravenous, PRN **OR** sodium phosphates 20 mmol in sodium chloride 0.9 % 250 mL IVPB, 20 mmol, Intravenous, PRN, Ruby Albarran MD, Stopped at 01/14/20 0746  •  senna-docusate sodium (SENOKOT-S) 8.6-50 MG tablet 2 tablet, 2 tablet, Oral, Nightly, Ruby Albarran MD, 2 tablet at 01/19/20 2059  •  [COMPLETED] Insert peripheral IV, , , Once **AND** sodium chloride 0.9 % flush 10 mL, 10 mL, Intravenous, PRN, Ruby Albarran MD  •  sodium chloride 0.9 % flush 10 mL, 10 mL, Intravenous, Once PRN, Ruby Albarran MD  •  sodium chloride 0.9 % flush 10 mL, 10 mL, Intravenous, Q12H, Ruby Albarran MD, 10 mL at 01/22/20 0846  •  sodium chloride 0.9 % flush 10 mL, 10 mL, Intravenous, PRN, Ruby Albarran MD  •  sodium chloride 0.9 % infusion 1,000 mL, 1,000 mL, Intravenous, Continuous, Ruby Albarran MD, Stopped at 01/21/20 1308  Review of Systems:   The following systems were reviewed and negative: Constitution, pulmonary, cardiac, gastrointestinal,  genitourinary        Objective     Vital Signs  Temp:  [98 °F (36.7 °C)-98.3 °F (36.8 °C)] 98.3 °F (36.8 °C)  Heart Rate:  [] 85  Resp:  [16-18] 18  BP: ()/(51-78) 114/64  Body mass index is 19.56 kg/m².    Intake/Output Summary (Last 24 hours) at 1/22/2020 1329  Last data filed at 1/22/2020 0900  Gross per 24 hour   Intake 1476 ml   Output 575 ml   Net 901 ml     I/O this shift:  In: 360 [P.O.:360]  Out: -      Physical Exam:   General: patient awake, alert and cooperative   Eyes: Normal lids and lashes, no scleral icterus   Neck: supple, normal ROM   Skin: warm and dry, not jaundiced   Cardiovascular: regular rhythm and rate, no murmurs auscultated   Pulm: clear to auscultation bilaterally, regular and unlabored   Abdomen: soft, nontender, nondistended; normal bowel sounds   Rectal: deferred   Extremities: no rash or edema   Psychiatric: Normal mood and behavior; memory intact     Results Review:     I reviewed the patient's new clinical results.    Results from last 7 days   Lab Units 01/20/20  0441 01/16/20  0535   WBC 10*3/mm3  --  9.86   HEMOGLOBIN g/dL  --  14.0   HEMATOCRIT %  --  40.6   PLATELETS 10*3/mm3 412 286     Results from last 7 days   Lab Units 01/16/20  0535   SODIUM mmol/L 141   POTASSIUM mmol/L 3.5   CHLORIDE mmol/L 104   CO2 mmol/L 22.8   BUN mg/dL 12   CREATININE mg/dL 0.86   CALCIUM mg/dL 8.7   GLUCOSE mg/dL 139*         No results found for: LIPASE    Radiology:  MRI Brain With & Without Contrast   Final Result   Mild mucosal thickening in the floor the right maxillary   sinus. Otherwise unremarkable MRI of the brain.       This report was finalized on 1/19/2020 2:41 PM by Dr. Marcos Sy M.D.          FL Esophagram Complete   Final Result       No tight stenoses. Several small rounded defects in the mucosal coating   of the esophagus appear to persist, potentially polyps or may be   persistent adherent air bubbles, suggest endoscopic correlation.       This report was  finalized on 1/18/2020 2:06 PM by Dr. Anton Bower M.D.          CT Head Without Contrast   Final Result   1.  No findings of acute intracranial abnormality.           This report was finalized on 1/13/2020 7:20 AM by Dr. Bobby Clemons M.D.          XR Chest 1 View   Final Result   1. No acute process.       This report was finalized on 1/13/2020 6:41 AM by Dr. Reddy Lizama M.D.              Assessment/Plan     Patient Active Problem List   Diagnosis   • Diabetes mellitus, new onset (CMS/HCC)   • Diabetic ketoacidosis without coma associated with type 2 diabetes mellitus (CMS/HCC)   • Abnormal esophagram       Impression  1. Dysphagia: improved, suspect related to esophagitis    2. Abnormal esophagram: pt with plaques, esophagitis    3. Metabolic encephalopathy: resolved    4. DKA    Plan  Await EGD biopsy path results    Daily ppi    Avoid NSAIDs    Diet as tolerated    We will arrange for outpatient follow-up; will need EGD in about 12 weeks to assess healing of the esophagitis.  Office will contact him to arrange this    Okay for home from GI standpoint    GI will sign off but remain available as needed in this patient's care.  Thank you.      I discussed the patients findings and my recommendations with patient, family and consulting provider.    Ruby Albarran MD    Electronically signed by Ruby Albarran MD at 01/22/20 1506          Consult Notes (last 24 hours) (Notes from 01/22/20 1130 through 01/23/20 1130)      Rajeev Mccarty MD at 01/23/20 1031        Patient and EMR examined.  I agree that the right middle finger has the appearance of a resolving infection but I am concerned that the loss of pulp and the dry hard skin on the volar surface of the tip probably represents some ischemic necrosis.  He continues to have pain in the finger despite the fact there is no erythema, fluid collection or discharge.  Most of the skin wrinkles are still present so I think the acute process  has largely resolved.  Nonetheless, I suspect he may need a distal amputation if the skin sloughs over the next few weeks.  With loss of pulp, reconstruction would not be possible short of amputation and  Terminalization.  Please organize a follow-up consultation with my office after he is discharged.  In the meantime if the finger remains dry with no ooze or discharge it can be left open.  Cancel the order for splinting.    Electronically signed by Rajeev Mccarty MD at 01/23/20 8184

## 2020-01-23 NOTE — CONSULTS
Patient and EMR examined.  I agree that the right middle finger has the appearance of a resolving infection but I am concerned that the loss of pulp and the dry hard skin on the volar surface of the tip probably represents some ischemic necrosis.  He continues to have pain in the finger despite the fact there is no erythema, fluid collection or discharge.  Most of the skin wrinkles are still present so I think the acute process has largely resolved.  Nonetheless, I suspect he may need a distal amputation if the skin sloughs over the next few weeks.  With loss of pulp, reconstruction would not be possible short of amputation and  Terminalization.  Please organize a follow-up consultation with my office after he is discharged.  In the meantime if the finger remains dry with no ooze or discharge it can be left open.  Cancel the order for splinting.

## 2020-01-23 NOTE — PLAN OF CARE
Problem: Patient Care Overview  Goal: Plan of Care Review  Outcome: Ongoing (interventions implemented as appropriate)  Flowsheets (Taken 1/23/2020 8267)  Progress: no change  Plan of Care Reviewed With: patient; mother  Outcome Summary: Informed pt that Dr. Doe stated that if pt wanted to use pen instead of needle and vial for insulin that he would have to have 4 injections/day. Pt will discuss with diabetic educator further for clarification. VSS. Afebrile. NAD. Slept throughout most of the shift. Mother at bedside.

## 2020-01-23 NOTE — PROGRESS NOTES
"[facility] PULMON[facility]         Dr Feng Sayied   LOS: 10 days   Patient Care Team:  Provider, No Known as PCP - General    Chief Complaint: DKA with metabolic encephalopathy now resolved.  Esophageal abnormalities noted with question of polyps versus air bubbles.    Interval History: He still complains of pain swelling and discharge from his right middle finger.  Blood glucose are much better controlled.    REVIEW OF SYSTEMS:   CARDIOVASCULAR: No chest pain, chest pressure or chest discomfort. No palpitations or edema.   RESPIRATORY: No shortness of breath, cough or sputum.   GASTROINTESTINAL: No anorexia, nausea, vomiting or diarrhea. No abdominal pain or blood.   HEMATOLOGIC: No bleeding or bruising.     Ventilator/Non-Invasive Ventilation Settings (From admission, onward)    None            Vital Signs  Temp:  [98 °F (36.7 °C)-98.7 °F (37.1 °C)] 98 °F (36.7 °C)  Heart Rate:  [] 72  Resp:  [16-18] 16  BP: ()/(62-72) 117/72    Intake/Output Summary (Last 24 hours) at 1/23/2020 1319  Last data filed at 1/23/2020 0900  Gross per 24 hour   Intake 360 ml   Output --   Net 360 ml     Flowsheet Rows      First Filed Value   Admission Height  182.9 cm (72\") Documented at 01/13/2020 0628   Admission Weight  66.8 kg (147 lb 3.2 oz) Documented at 01/13/2020 0637          Physical Exam:   General Appearance:    Alert, cooperative, in no acute distress   Lungs:     Clear to auscultation,respirations regular, even and                  unlabored    Heart:    Regular rhythm and normal rate, normal S1 and S2, no            murmur, no gallop, no rub, no click   Chest Wall:    No abnormalities observed   Abdomen:     Normal bowel sounds, no masses, no organomegaly, soft        non-tender, non-distended, no guarding, no rebound                tenderness   Extremities:   Moves all extremities well, no edema, no cyanosis, no             redness  Right hand right middle finger appears to be tender with evidence of " resolving infection     Results Review:                Results from last 7 days   Lab Units 01/20/20  0441   PLATELETS 10*3/mm3 412                           I reviewed the patient's new clinical results.  I personally viewed and interpreted the patient's CXR        Medication Review:     cephalexin 500 mg Oral Q6H   enoxaparin 40 mg Subcutaneous Q24H   glipizide 5 mg Oral BID AC   insulin lispro 0-9 Units Subcutaneous 4x Daily With Meals & Nightly   metFORMIN 1,000 mg Oral BID With Meals   pantoprazole 40 mg Oral BID AC   senna-docusate sodium 2 tablet Oral Nightly   sodium chloride 10 mL Intravenous Q12H         lactated ringers 30 mL/hr Last Rate: 30 mL/hr (01/21/20 1418)       ASSESSMENT:   DKA resolved  Metabolic encephalopathy  Acute kidney injury  Dysphagia  Esophageal mucosal abnormalities esophageal plaques, esophagitis, gastritis and duodenitis  Paronychia  Hyponatremia      PLAN:  Status post EGD with results noted with esophageal plaques, esophagitis gastritis and duodenitis.  Protonix initiated by GI.  Biopsy results pending.  His right hand middle finger still has not healed despite antibiotics.  Earlier family was saying that there was some discharge from it.  I will ask hand surgery to evaluate further.  Could be a case of paronychia currently being treated with oral antibiotics  Continue with insulin regimen as per endocrinology.  Blood glucose appears to be stable.  Patient is learning to give himself injections subcu insulin.  He has been switched to 70/30 insulin and would be discharged on that  Hyponatremia resolved  Creatinine stable  Completes course of antibiotics for paronychia  Discussed with patient and his mother at bedside in detail  Awaiting hand surgery to evaluate the patient prior to discharge.  I will get x-ray of the hands to evaluate further.    Ping Caro MD  01/23/20  1:19 PM

## 2020-01-23 NOTE — TELEPHONE ENCOUNTER
----- Message from Ruby Albarran MD sent at 1/23/2020  1:50 PM EST -----  EGD biopsies show inflammation of the stomach, small bowel, esophagitis and Candida esophagitis    I am ordering fluconazole-- 14 day course.    Office follow-up in 8 weeks, continue PPI    He will need repeat EGD in approximately 3 months      **Call to pt.  Advise of above.  Verb understanding.  Office appt scheduled for 3/23 @ 10:45 am with Dr Albarran.  Message to Dr Albarran.  Tangela Najera RN.

## 2020-01-23 NOTE — PROGRESS NOTES
49 y.o.   LOS: 10 days   Patient Care Team:  Provider, No Known as PCP - General    Chief Complaint: Elevated blood sugars.    Chief Complaint   Patient presents with   • Altered Mental Status       Subjective   No major overnight events.  C-peptide levels are positive  Roge 65 less than 5.  Patient is a type II diabetic.    Interval History:    Review of Systems:   Constitutional: Positive for appetite change and fatigue.   Eyes: Negative for visual disturbance.   Respiratory: no shortness of breath.    Cardiovascular: no leg swelling.   Gastrointestinal: Negative for abdominal pain.   Endocrine: Negative for polydipsia and polyuria.   Musculoskeletal: Positive for arthralgias and myalgias.   Skin: Negative for pallor.   Neurological: Positive for weakness and numbness.   Psychiatric/Behavioral: Positive for sleep disturbance.  Review of Systems  Objective     Vital Signs   Temp:  [98 °F (36.7 °C)-98.7 °F (37.1 °C)] 98 °F (36.7 °C)  Heart Rate:  [] 72  Resp:  [16-18] 16  BP: ()/(62-72) 117/72    Physical Exam:  General exam-no distress, appears stated age.  Eyes-PERRL, anicteric sclera  ENT-external ears/nose normal.  Neck-no adenopathy, midline trachea.  Lungs-normal chest exam on inspection, diminished bilateral breath sounds on auscultation.  Cardiovascular -normal S1-S2, positive murmur.  ABD-nontender, nondistended, bowel sounds heard.  Extremities-no edema, cyanosis.  Hammertoes noted.          Physical ExamResults Review:     I reviewed the patient's new clinical results and summarized them in subjective and in plan.      No results found for: GLUCOSE  Lab Results (last 24 hours)     Procedure Component Value Units Date/Time    POC Glucose Once [[id number]]  (Normal) Collected:  01/23/20 0619    Specimen:  Blood Updated:  01/23/20 0633     Glucose 113 mg/dL     POC Glucose Once [[id number]]  (Abnormal) Collected:  01/22/20 2106    Specimen:  Blood Updated:  01/22/20 2108     Glucose 193 mg/dL      POC Glucose Once [[id number]]  (Abnormal) Collected:  01/22/20 1653    Specimen:  Blood Updated:  01/22/20 1656     Glucose 159 mg/dL     POC Glucose Once [[id number]]  (Abnormal) Collected:  01/22/20 1121    Specimen:  Blood Updated:  01/22/20 1128     Glucose 175 mg/dL         Imaging Results (Last 24 Hours)     ** No results found for the last 24 hours. **          Medication Review: done      Current Facility-Administered Medications:   •  cephalexin (KEFLEX) capsule 500 mg, 500 mg, Oral, Q6H, Ruby Albarran MD, 500 mg at 01/23/20 0632  •  dextrose (D50W) 25 g/ 50mL Intravenous Solution 12.5 g, 12.5 g, Intravenous, PRN, Ruby Albarran MD, 12.5 g at 01/13/20 2240  •  dextrose (D50W) 25 g/ 50mL Intravenous Solution 25 g, 25 g, Intravenous, Q15 Min PRN, Ruby Albarran MD  •  dextrose (GLUTOSE) oral gel 15 g, 15 g, Oral, Q15 Min PRN, Ruby Albarran MD  •  enoxaparin (LOVENOX) syringe 40 mg, 40 mg, Subcutaneous, Q24H, Ruby Albarran MD, 40 mg at 01/22/20 1217  •  glucagon (human recombinant) (GLUCAGEN DIAGNOSTIC) injection 1 mg, 1 mg, Subcutaneous, Q15 Min PRN, Ruby Albarran MD  •  insulin glargine (LANTUS) injection 15 Units, 15 Units, Subcutaneous, Q12H, Ruby Albarran MD, 15 Units at 01/23/20 0917  •  insulin lispro (humaLOG) injection 0-9 Units, 0-9 Units, Subcutaneous, 4x Daily With Meals & Nightly, Ruby Albarran MD, 2 Units at 01/22/20 2119  •  insulin lispro (humaLOG) injection 5 Units, 5 Units, Subcutaneous, TID With Meals, Ruby Albarran MD, 5 Units at 01/23/20 0918  •  lactated ringers infusion, 30 mL/hr, Intravenous, Continuous, Ruby Albarran MD, Last Rate: 30 mL/hr at 01/21/20 1418, 30 mL/hr at 01/21/20 1418  •  magnesium sulfate 4 gram infusion - Mg less than or equal to 1mg/dL, 4 g, Intravenous, PRN **OR** magnesium sulfate 3 gram infusion (1gm x 3) - Mg 1.1 - 1.5 mg/dL, 1 g, Intravenous, PRN **OR** Magnesium Sulfate 2 gram infusion- Mg 1.6 - 1.9 mg/dL, 2 g, Intravenous, PRN,  Ruby Albarran MD  •  ondansetron (ZOFRAN) tablet 4 mg, 4 mg, Oral, Q6H PRN **OR** ondansetron (ZOFRAN) injection 4 mg, 4 mg, Intravenous, Q6H PRN, Ruby Albarran MD  •  pantoprazole (PROTONIX) EC tablet 40 mg, 40 mg, Oral, BID AC, Ruby Albarran MD, 40 mg at 01/23/20 0632  •  potassium & sodium phosphates (PHOS-NAK) 280-160-250 MG packet - for Phosphorus less than 1.25 mg/dL, 2 packet, Oral, Q6H PRN **OR** potassium & sodium phosphates (PHOS-NAK) 280-160-250 MG packet - for Phosphorus 1.25 - 2.5 mg/dL, 2 packet, Oral, Q6H PRN, Ruby Albarran MD, 2 packet at 01/16/20 1157  •  potassium chloride (MICRO-K) CR capsule 40 mEq, 40 mEq, Oral, PRN **OR** potassium chloride (KLOR-CON) packet 40 mEq, 40 mEq, Oral, PRN **OR** potassium chloride 10 mEq in 100 mL IVPB, 10 mEq, Intravenous, Q1H PRN, Ruby Albarran MD  •  potassium phosphate 45 mmol in sodium chloride 0.9 % 500 mL infusion, 45 mmol, Intravenous, PRN **OR** potassium phosphate 30 mmol in sodium chloride 0.9 % 250 mL infusion, 30 mmol, Intravenous, PRN **OR** potassium phosphate 15 mmol in sodium chloride 0.9 % 100 mL infusion, 15 mmol, Intravenous, PRN, 15 mmol at 01/15/20 1355 **OR** sodium phosphates 40 mmol in sodium chloride 0.9 % 500 mL IVPB, 40 mmol, Intravenous, PRN **OR** sodium phosphates 20 mmol in sodium chloride 0.9 % 250 mL IVPB, 20 mmol, Intravenous, PRN, Ruby Albarran MD, Stopped at 01/14/20 0746  •  senna-docusate sodium (SENOKOT-S) 8.6-50 MG tablet 2 tablet, 2 tablet, Oral, Nightly, Ruby Albarran MD, 2 tablet at 01/19/20 2059  •  [COMPLETED] Insert peripheral IV, , , Once **AND** sodium chloride 0.9 % flush 10 mL, 10 mL, Intravenous, PRN, Ruby Albarran MD  •  sodium chloride 0.9 % flush 10 mL, 10 mL, Intravenous, Once PRN, Ruby Albarran MD  •  sodium chloride 0.9 % flush 10 mL, 10 mL, Intravenous, Q12H, Ruby Albarran MD, 10 mL at 01/23/20 0918  •  sodium chloride 0.9 % flush 10 mL, 10 mL, Intravenous, PRN, Ruby Albarran,  "MD    Assessment/Plan     Active Hospital Problems    Diagnosis  POA   • **Diabetic ketoacidosis without coma associated with type 2 diabetes mellitus (CMS/HCC) [E11.10]  Yes   • Diabetes mellitus, new onset (CMS/HCC) [E11.9]  Yes   • Abnormal esophagram [R93.3]  Unknown      Resolved Hospital Problems   No resolved problems to display.     Type 2 diabetes mellitus-uncontrolled  Discontinue current insulin regimen  Start metformin thousand milligrams twice daily  Start glipizide 5 mg oral twice daily  Continue Humalog sliding scale.    Patient should be discharged on this regimen from the endocrine perspective.  Follow-up with primary care physician in 2 to 3 weeks after discharge.    Discussed plan with Nurse.     Lexie Doe MD.  01/23/20  10:04 AM      EMR Dragon / transcription disclaimer:    \"Dictated utilizing Dragon dictation\".   "

## 2020-01-23 NOTE — PROGRESS NOTES
Continued Stay Note  [facility]     Patient Name: Joby Kulkarni  MRN: [id number]  Today's Date: 1/23/2020    Admit Date: 1/13/2020    Discharge Plan     Row Name 01/23/20 1422       Plan    Plan  Home with outpt DM classes and New PCP appointment tomorrow    Patient/Family in Agreement with Plan  yes    Plan Comments  Spoke with DM educator and pt not discharging on Insulin but oral agents. CCP spoke with pt and he will not need HH, discussed outpt DM classses if he wanted them. Pt and mom understood, awaiting MD rounding. Pts prescriptions to be sent to [facility] per pt request. linda huang/ccp        Discharge Codes    No documentation.             Sheyla Treadwell, RN

## 2020-01-23 NOTE — PROGRESS NOTES
EGD biopsies show inflammation of the stomach, small bowel, esophagitis and Candida esophagitis    I am ordering fluconazole-- 14 day course.    Office follow-up in 8 weeks, continue PPI    He will need repeat EGD in approximately 3 months

## 2020-01-24 ENCOUNTER — READMISSION MANAGEMENT (OUTPATIENT)
Dept: CALL CENTER | Facility: HOSPITAL | Age: 50
End: 2020-01-24

## 2020-01-24 ENCOUNTER — OFFICE VISIT (OUTPATIENT)
Dept: FAMILY MEDICINE CLINIC | Facility: CLINIC | Age: 50
End: 2020-01-24

## 2020-01-24 VITALS
TEMPERATURE: 98.3 F | BODY MASS INDEX: 21.13 KG/M2 | HEIGHT: 72 IN | OXYGEN SATURATION: 99 % | DIASTOLIC BLOOD PRESSURE: 66 MMHG | WEIGHT: 156 LBS | HEART RATE: 99 BPM | RESPIRATION RATE: 15 BRPM | SYSTOLIC BLOOD PRESSURE: 100 MMHG

## 2020-01-24 DIAGNOSIS — I99.8 ISCHEMIA OF FINGER: ICD-10-CM

## 2020-01-24 DIAGNOSIS — R93.3 ABNORMAL ESOPHAGRAM: ICD-10-CM

## 2020-01-24 DIAGNOSIS — E11.9 DIABETES MELLITUS, NEW ONSET (HCC): ICD-10-CM

## 2020-01-24 DIAGNOSIS — L03.011 PARONYCHIA OF RIGHT MIDDLE FINGER: ICD-10-CM

## 2020-01-24 DIAGNOSIS — Z76.89 ENCOUNTER TO ESTABLISH CARE: Primary | ICD-10-CM

## 2020-01-24 DIAGNOSIS — B37.81 CANDIDIASIS, ESOPHAGEAL (HCC): ICD-10-CM

## 2020-01-24 DIAGNOSIS — E11.65 UNCONTROLLED TYPE 2 DIABETES MELLITUS WITH HYPERGLYCEMIA (HCC): ICD-10-CM

## 2020-01-24 DIAGNOSIS — S99.829A TOENAIL TORN AWAY: ICD-10-CM

## 2020-01-24 PROCEDURE — 99204 OFFICE O/P NEW MOD 45 MIN: CPT | Performed by: NURSE PRACTITIONER

## 2020-01-24 NOTE — OUTREACH NOTE
Prep Survey      Responses   Facility patient discharged from?  [place]   Is patient eligible?  Yes   Discharge diagnosis  Diabetic ketoacidosis without coma associated with type 2 diabetes mellitus    Does the patient have one of the following disease processes/diagnoses(primary or secondary)?  Other   Does the patient have Home health ordered?  No   Is there a DME ordered?  No   Prep survey completed?  Yes          Meryl Trevizo RN

## 2020-01-24 NOTE — PAYOR COMM NOTE
"Johnson Kulkarni (49 y.o. Male)     PLEASE SEE ATTACHED DC SUMMARY    REF#[id number]    PLEASE CALL   OR  [phone] WITH AUTH FOR 1/22/20.    THANK YOU    BETH MCKEON LPN CCP    Date of Birth Social Security Number Address Home Phone MRN    1970  [address] [address]  [facility] [address] [phone] [id number]    Confucianist Marital Status          None Unknown       Admission Date Admission Type Admitting Provider Attending Provider Department, Room/Bed    1/13/20 Emergency Parrish Petersen MD  [address], [address]    Discharge Date Discharge Disposition Discharge Destination        1/23/2020 Home or Self Care              Attending Provider:  (none)   Allergies:  No Known Allergies    Isolation:  None   Infection:  None   Code Status:  Prior    Ht:  182.9 cm (72\")   Wt:  67.9 kg (149 lb 12.8 oz)    Admission Cmt:  None   Principal Problem:  Diabetic ketoacidosis without coma associated with type 2 diabetes mellitus (CMS/[facility]) [E11.10]                 Active Insurance as of 1/13/2020     Primary Coverage     Payor Plan Insurance Group Employer/Plan Group    [employer] BLUE [employer]OSS [employer]UE [employer] BLUE [employer] PPO [id number]     Payor Plan Address Payor Plan Phone Number Payor Plan Fax Number Effective Dates    [address] [phone]  1/1/2018 - None Entered    [place] [id number]       Subscriber Name Subscriber Birth Date Member ID       JOHNSON KULKARNI 1970 [id number]                 Emergency Contacts      (Rel.) Home Phone Work Phone Mobile Phone    CARMELITA KULKARNI (Mother) [phone] -- [phone]    Daksha Churchill (Sister) -- -- [phone]    Tere Markham (Daughter) -- -- --            Intake & Output (last 2 days)       01/22 0701 - 01/23 0700 01/23 0701 - 01/24 0700 01/24 0701 - 01/25 0700    P.O. 720 720     I.V. (mL/kg)       Total Intake(mL/kg) 720 (10.6) 720 (10.6)     Urine (mL/kg/hr)       Total Output       Net +720 +720       " "     Urine Unmeasured Occurrence 3 x 1 x         Lines, Drains & Airways    Active LDAs     None                Operative/Procedure Notes (last 48 hours) (Notes from 01/22/20 0732 through 01/24/20 0732)    No notes of this type exist for this encounter.          Physician Progress Notes (last 48 hours) (Notes from 01/22/20 0732 through 01/24/20 0732)      Ruby Albarran MD at 01/23/20 1350        EGD biopsies show inflammation of the stomach, small bowel, esophagitis and Candida esophagitis    I am ordering fluconazole-- 14 day course.    Office follow-up in 8 weeks, continue PPI    He will need repeat EGD in approximately 3 months      Electronically signed by Ruby Albarran MD at 01/23/20 1350     Ping Caro MD at 01/23/20 1319                [facility]         Dr Ping Caro   LOS: 10 days   Patient Care Team:  Provider, No Known as PCP - General    Chief Complaint: DKA with metabolic encephalopathy now resolved.  Esophageal abnormalities noted with question of polyps versus air bubbles.    Interval History: He still complains of pain swelling and discharge from his right middle finger.  Blood glucose are much better controlled.    REVIEW OF SYSTEMS:   CARDIOVASCULAR: No chest pain, chest pressure or chest discomfort. No palpitations or edema.   RESPIRATORY: No shortness of breath, cough or sputum.   GASTROINTESTINAL: No anorexia, nausea, vomiting or diarrhea. No abdominal pain or blood.   HEMATOLOGIC: No bleeding or bruising.     Ventilator/Non-Invasive Ventilation Settings (From admission, onward)    None            Vital Signs  Temp:  [98 °F (36.7 °C)-98.7 °F (37.1 °C)] 98 °F (36.7 °C)  Heart Rate:  [] 72  Resp:  [16-18] 16  BP: ()/(62-72) 117/72    Intake/Output Summary (Last 24 hours) at 1/23/2020 1319  Last data filed at 1/23/2020 0900  Gross per 24 hour   Intake 360 ml   Output --   Net 360 ml     Flowsheet Rows      First Filed Value   Admission Height  182.9 cm (72\") " Documented at 01/13/2020 0628   Admission Weight  66.8 kg (147 lb 3.2 oz) Documented at 01/13/2020 0637          Physical Exam:   General Appearance:    Alert, cooperative, in no acute distress   Lungs:     Clear to auscultation,respirations regular, even and                  unlabored    Heart:    Regular rhythm and normal rate, normal S1 and S2, no            murmur, no gallop, no rub, no click   Chest Wall:    No abnormalities observed   Abdomen:     Normal bowel sounds, no masses, no organomegaly, soft        non-tender, non-distended, no guarding, no rebound                tenderness   Extremities:   Moves all extremities well, no edema, no cyanosis, no             redness  Right hand right middle finger appears to be tender with evidence of resolving infection     Results Review:                Results from last 7 days   Lab Units 01/20/20  0441   PLATELETS 10*3/mm3 412                           I reviewed the patient's new clinical results.  I personally viewed and interpreted the patient's CXR        Medication Review:     cephalexin 500 mg Oral Q6H   enoxaparin 40 mg Subcutaneous Q24H   glipizide 5 mg Oral BID AC   insulin lispro 0-9 Units Subcutaneous 4x Daily With Meals & Nightly   metFORMIN 1,000 mg Oral BID With Meals   pantoprazole 40 mg Oral BID AC   senna-docusate sodium 2 tablet Oral Nightly   sodium chloride 10 mL Intravenous Q12H         lactated ringers 30 mL/hr Last Rate: 30 mL/hr (01/21/20 1418)       ASSESSMENT:   DKA resolved  Metabolic encephalopathy  Acute kidney injury  Dysphagia  Esophageal mucosal abnormalities esophageal plaques, esophagitis, gastritis and duodenitis  Paronychia  Hyponatremia      PLAN:  Status post EGD with results noted with esophageal plaques, esophagitis gastritis and duodenitis.  Protonix initiated by GI.  Biopsy results pending.  His right hand middle finger still has not healed despite antibiotics.  Earlier family was saying that there was some discharge from it.   I will ask hand surgery to evaluate further.  Could be a case of paronychia currently being treated with oral antibiotics  Continue with insulin regimen as per endocrinology.  Blood glucose appears to be stable.  Patient is learning to give himself injections subcu insulin.  He has been switched to 70/30 insulin and would be discharged on that  Hyponatremia resolved  Creatinine stable  Completes course of antibiotics for paronychia  Discussed with patient and his mother at bedside in detail  Awaiting hand surgery to evaluate the patient prior to discharge.  I will get x-ray of the hands to evaluate further.    Ping Caro MD  01/23/20  1:19 PM          Electronically signed by Ping Caro MD at 01/23/20 1321     Lexie Doe MD at 01/23/20 1004          49 y.o.   LOS: 10 days   Patient Care Team:  Provider, No Known as PCP - General    Chief Complaint: Elevated blood sugars.    Chief Complaint   Patient presents with   • Altered Mental Status       Subjective   No major overnight events.  C-peptide levels are positive  Roge 65 less than 5.  Patient is a type II diabetic.    Interval History:    Review of Systems:   Constitutional: Positive for appetite change and fatigue.   Eyes: Negative for visual disturbance.   Respiratory: no shortness of breath.    Cardiovascular: no leg swelling.   Gastrointestinal: Negative for abdominal pain.   Endocrine: Negative for polydipsia and polyuria.   Musculoskeletal: Positive for arthralgias and myalgias.   Skin: Negative for pallor.   Neurological: Positive for weakness and numbness.   Psychiatric/Behavioral: Positive for sleep disturbance.  Review of Systems  Objective     Vital Signs   Temp:  [98 °F (36.7 °C)-98.7 °F (37.1 °C)] 98 °F (36.7 °C)  Heart Rate:  [] 72  Resp:  [16-18] 16  BP: ()/(62-72) 117/72    Physical Exam:  General exam-no distress, appears stated age.  Eyes-PERRL, anicteric sclera  ENT-external ears/nose normal.  Neck-no adenopathy,  midline trachea.  Lungs-normal chest exam on inspection, diminished bilateral breath sounds on auscultation.  Cardiovascular -normal S1-S2, positive murmur.  ABD-nontender, nondistended, bowel sounds heard.  Extremities-no edema, cyanosis.  Hammertoes noted.          Physical ExamResults Review:     I reviewed the patient's new clinical results and summarized them in subjective and in plan.      No results found for: GLUCOSE  Lab Results (last 24 hours)     Procedure Component Value Units Date/Time    POC Glucose Once [[id number]]  (Normal) Collected:  01/23/20 0619    Specimen:  Blood Updated:  01/23/20 0633     Glucose 113 mg/dL     POC Glucose Once [[id number]]  (Abnormal) Collected:  01/22/20 2106    Specimen:  Blood Updated:  01/22/20 2108     Glucose 193 mg/dL     POC Glucose Once [[id number]]  (Abnormal) Collected:  01/22/20 1653    Specimen:  Blood Updated:  01/22/20 1656     Glucose 159 mg/dL     POC Glucose Once [[id number]]  (Abnormal) Collected:  01/22/20 1121    Specimen:  Blood Updated:  01/22/20 1128     Glucose 175 mg/dL         Imaging Results (Last 24 Hours)     ** No results found for the last 24 hours. **          Medication Review: done      Current Facility-Administered Medications:   •  cephalexin (KEFLEX) capsule 500 mg, 500 mg, Oral, Q6H, Ruby Albarran MD, 500 mg at 01/23/20 0632  •  dextrose (D50W) 25 g/ 50mL Intravenous Solution 12.5 g, 12.5 g, Intravenous, PRN, Ruby Albarran MD, 12.5 g at 01/13/20 2240  •  dextrose (D50W) 25 g/ 50mL Intravenous Solution 25 g, 25 g, Intravenous, Q15 Min PRN, Ruby Albarran MD  •  dextrose (GLUTOSE) oral gel 15 g, 15 g, Oral, Q15 Min PRN, Ruby Albarran MD  •  enoxaparin (LOVENOX) syringe 40 mg, 40 mg, Subcutaneous, Q24H, Ruby Albarran MD, 40 mg at 01/22/20 1217  •  glucagon (human recombinant) (GLUCAGEN DIAGNOSTIC) injection 1 mg, 1 mg, Subcutaneous, Q15 Min PRN, Ruby Albarran, MD  •  insulin glargine (LANTUS) injection 15 Units, 15 Units,  Subcutaneous, Q12H, Ruby Albarran MD, 15 Units at 01/23/20 0917  •  insulin lispro (humaLOG) injection 0-9 Units, 0-9 Units, Subcutaneous, 4x Daily With Meals & Nightly, Ruby Albarran MD, 2 Units at 01/22/20 2119  •  insulin lispro (humaLOG) injection 5 Units, 5 Units, Subcutaneous, TID With Meals, Ruby Albarran MD, 5 Units at 01/23/20 0918  •  lactated ringers infusion, 30 mL/hr, Intravenous, Continuous, Ruby Albarran MD, Last Rate: 30 mL/hr at 01/21/20 1418, 30 mL/hr at 01/21/20 1418  •  magnesium sulfate 4 gram infusion - Mg less than or equal to 1mg/dL, 4 g, Intravenous, PRN **OR** magnesium sulfate 3 gram infusion (1gm x 3) - Mg 1.1 - 1.5 mg/dL, 1 g, Intravenous, PRN **OR** Magnesium Sulfate 2 gram infusion- Mg 1.6 - 1.9 mg/dL, 2 g, Intravenous, PRN, Ruby Albarran MD  •  ondansetron (ZOFRAN) tablet 4 mg, 4 mg, Oral, Q6H PRN **OR** ondansetron (ZOFRAN) injection 4 mg, 4 mg, Intravenous, Q6H PRN, Ruby Albarran MD  •  pantoprazole (PROTONIX) EC tablet 40 mg, 40 mg, Oral, BID AC, Ruby Albarran MD, 40 mg at 01/23/20 0632  •  potassium & sodium phosphates (PHOS-NAK) 280-160-250 MG packet - for Phosphorus less than 1.25 mg/dL, 2 packet, Oral, Q6H PRN **OR** potassium & sodium phosphates (PHOS-NAK) 280-160-250 MG packet - for Phosphorus 1.25 - 2.5 mg/dL, 2 packet, Oral, Q6H PRN, Ruby Albarran MD, 2 packet at 01/16/20 1157  •  potassium chloride (MICRO-K) CR capsule 40 mEq, 40 mEq, Oral, PRN **OR** potassium chloride (KLOR-CON) packet 40 mEq, 40 mEq, Oral, PRN **OR** potassium chloride 10 mEq in 100 mL IVPB, 10 mEq, Intravenous, Q1H PRN, Ruby Albarran MD  •  potassium phosphate 45 mmol in sodium chloride 0.9 % 500 mL infusion, 45 mmol, Intravenous, PRN **OR** potassium phosphate 30 mmol in sodium chloride 0.9 % 250 mL infusion, 30 mmol, Intravenous, PRN **OR** potassium phosphate 15 mmol in sodium chloride 0.9 % 100 mL infusion, 15 mmol, Intravenous, PRN, 15 mmol at 01/15/20 1355 **OR** sodium  "phosphates 40 mmol in sodium chloride 0.9 % 500 mL IVPB, 40 mmol, Intravenous, PRN **OR** sodium phosphates 20 mmol in sodium chloride 0.9 % 250 mL IVPB, 20 mmol, Intravenous, PRN, Ruby Albarran MD, Stopped at 01/14/20 0746  •  senna-docusate sodium (SENOKOT-S) 8.6-50 MG tablet 2 tablet, 2 tablet, Oral, Nightly, Ruby Albarran MD, 2 tablet at 01/19/20 2059  •  [COMPLETED] Insert peripheral IV, , , Once **AND** sodium chloride 0.9 % flush 10 mL, 10 mL, Intravenous, PRN, Ruby Albarran MD  •  sodium chloride 0.9 % flush 10 mL, 10 mL, Intravenous, Once PRN, Ruby Albarran MD  •  sodium chloride 0.9 % flush 10 mL, 10 mL, Intravenous, Q12H, Ruby Albarran MD, 10 mL at 01/23/20 0918  •  sodium chloride 0.9 % flush 10 mL, 10 mL, Intravenous, PRN, Ruby Albarran MD    Assessment/Plan     Active Hospital Problems    Diagnosis  POA   • **Diabetic ketoacidosis without coma associated with type 2 diabetes mellitus (CMS/HCC) [E11.10]  Yes   • Diabetes mellitus, new onset (CMS/HCC) [E11.9]  Yes   • Abnormal esophagram [R93.3]  Unknown      Resolved Hospital Problems   No resolved problems to display.     Type 2 diabetes mellitus-uncontrolled  Discontinue current insulin regimen  Start metformin thousand milligrams twice daily  Start glipizide 5 mg oral twice daily  Continue Humalog sliding scale.    Patient should be discharged on this regimen from the endocrine perspective.  Follow-up with primary care physician in 2 to 3 weeks after discharge.    Discussed plan with Nurse.     Lexie Doe MD.  01/23/20  10:04 AM      EMR Dragon / transcription disclaimer:    \"Dictated utilizing Dragon dictation\".     Electronically signed by Lexie Doe MD at 01/23/20 9717     Ping Caro MD at 01/22/20 2291                [facility]         Dr Feng Sayied   LOS: 9 days   Patient Care Team:  Provider, No Known as PCP - General    Chief Complaint: DKA with metabolic encephalopathy now resolved.  " "Esophageal abnormalities noted with question of polyps versus air bubbles.    Interval History:   He is concerned about his right middle finger not healing well.  Status post EGD results noted.    REVIEW OF SYSTEMS:   CARDIOVASCULAR: No chest pain, chest pressure or chest discomfort. No palpitations or edema.   RESPIRATORY: No shortness of breath, cough or sputum.   GASTROINTESTINAL: No anorexia, nausea, vomiting or diarrhea. No abdominal pain or blood.   HEMATOLOGIC: No bleeding or bruising.     Ventilator/Non-Invasive Ventilation Settings (From admission, onward)    None            Vital Signs  Temp:  [98 °F (36.7 °C)-98.7 °F (37.1 °C)] 98.7 °F (37.1 °C)  Heart Rate:  [] 103  Resp:  [18] 18  BP: (107-117)/(51-78) 107/65    Intake/Output Summary (Last 24 hours) at 1/22/2020 1527  Last data filed at 1/22/2020 1300  Gross per 24 hour   Intake 1836 ml   Output 575 ml   Net 1261 ml     Flowsheet Rows      First Filed Value   Admission Height  182.9 cm (72\") Documented at 01/13/2020 0628   Admission Weight  66.8 kg (147 lb 3.2 oz) Documented at 01/13/2020 0637          Physical Exam:   General Appearance:    Alert, cooperative, in no acute distress   Lungs:     Clear to auscultation,respirations regular, even and                  unlabored    Heart:    Regular rhythm and normal rate, normal S1 and S2, no            murmur, no gallop, no rub, no click   Chest Wall:    No abnormalities observed   Abdomen:     Normal bowel sounds, no masses, no organomegaly, soft        non-tender, non-distended, no guarding, no rebound                tenderness   Extremities:   Moves all extremities well, no edema, no cyanosis, no             redness  Right hand right middle finger appears to be tender with evidence of resolving infection     Results Review:        Results from last 7 days   Lab Units 01/16/20  0535   SODIUM mmol/L 141   POTASSIUM mmol/L 3.5   CHLORIDE mmol/L 104   CO2 mmol/L 22.8   BUN mg/dL 12   CREATININE mg/dL " 0.86   GLUCOSE mg/dL 139*   CALCIUM mg/dL 8.7         Results from last 7 days   Lab Units 01/20/20  0441 01/16/20  0535   WBC 10*3/mm3  --  9.86   HEMOGLOBIN g/dL  --  14.0   HEMATOCRIT %  --  40.6   PLATELETS 10*3/mm3 412 286                           I reviewed the patient's new clinical results.  I personally viewed and interpreted the patient's CXR        Medication Review:     cephalexin 500 mg Oral Q6H   enoxaparin 40 mg Subcutaneous Q24H   insulin glargine 15 Units Subcutaneous Q12H   insulin lispro 0-9 Units Subcutaneous 4x Daily With Meals & Nightly   insulin lispro 5 Units Subcutaneous TID With Meals   pantoprazole 40 mg Oral BID AC   senna-docusate sodium 2 tablet Oral Nightly   sodium chloride 10 mL Intravenous Q12H         lactated ringers 30 mL/hr Last Rate: 30 mL/hr (01/21/20 1418)   sodium chloride 1,000 mL Last Rate: Stopped (01/21/20 1308)       ASSESSMENT:   DKA resolved  Metabolic encephalopathy  Acute kidney injury  Dysphagia  Esophageal mucosal abnormalities esophageal plaques, esophagitis, gastritis and duodenitis  Paronychia  Hyponatremia      PLAN:  Status post EGD with results noted with esophageal plaques, esophagitis gastritis and duodenitis.  Protonix initiated by GI.  Biopsy results pending.  His right hand middle finger still has not healed despite antibiotics.  Earlier family was saying that there was some discharge from it.  I will ask hand surgery to evaluate further.  Could be a case of paronychia currently being treated with oral antibiotics  Continue with insulin regimen as per endocrinology.  Blood glucose appears to be stable.  Patient is learning to give himself injections subcu insulin.  He has been switched to 70/30 insulin and would be discharged on that  Hyponatremia resolved  Creatinine stable  Completes course of antibiotics for paronychia  Discussed with patient and his mother at bedside in detail  Possibly discharge in a.m. if no additional issues     Ping Sayied,  MD  01/22/20  3:27 PM          Electronically signed by Ping Caro MD at 01/22/20 1531     Ruby Albarran MD at 01/22/20 1328          [facility]  Inpatient Progress Note    Reason for Follow Up:  Dysphagia, abnormal esophagram    Subjective     Interval History:   EGD yesterday with esophagitis, plaques in the esophagus, gastritis, esophagitis.  Pathology pending.  Denies any heartburn symptoms.  No dysphagia.  Tolerating diet.    Current Facility-Administered Medications:   •  cephalexin (KEFLEX) capsule 500 mg, 500 mg, Oral, Q6H, Ruby Albarran MD, 500 mg at 01/22/20 1207  •  dextrose (D50W) 25 g/ 50mL Intravenous Solution 12.5 g, 12.5 g, Intravenous, PRN, Ruby Albarran MD, 12.5 g at 01/13/20 2240  •  dextrose (D50W) 25 g/ 50mL Intravenous Solution 25 g, 25 g, Intravenous, Q15 Min PRN, Ruby Albarran MD  •  dextrose (GLUTOSE) oral gel 15 g, 15 g, Oral, Q15 Min PRN, Rbuy Albarran MD  •  enoxaparin (LOVENOX) syringe 40 mg, 40 mg, Subcutaneous, Q24H, Ruby Albarran MD, 40 mg at 01/22/20 1217  •  glucagon (human recombinant) (GLUCAGEN DIAGNOSTIC) injection 1 mg, 1 mg, Subcutaneous, Q15 Min PRN, Ruby Albarran MD  •  insulin glargine (LANTUS) injection 15 Units, 15 Units, Subcutaneous, Q12H, Ruby Albarran MD, 15 Units at 01/22/20 0843  •  insulin lispro (humaLOG) injection 0-9 Units, 0-9 Units, Subcutaneous, 4x Daily With Meals & Nightly, Ruby Albarran MD, 2 Units at 01/22/20 1209  •  insulin lispro (humaLOG) injection 5 Units, 5 Units, Subcutaneous, TID With Meals, Ruby Albarran MD, 5 Units at 01/22/20 1208  •  lactated ringers infusion, 30 mL/hr, Intravenous, Continuous, Ruby Albarran MD, Last Rate: 30 mL/hr at 01/21/20 1418, 30 mL/hr at 01/21/20 1418  •  magnesium sulfate 4 gram infusion - Mg less than or equal to 1mg/dL, 4 g, Intravenous, PRN **OR** magnesium sulfate 3 gram infusion (1gm x 3) - Mg 1.1 - 1.5 mg/dL, 1 g, Intravenous, PRN **OR** Magnesium  Sulfate 2 gram infusion- Mg 1.6 - 1.9 mg/dL, 2 g, Intravenous, PRN, Ruby Albarran MD  •  ondansetron (ZOFRAN) tablet 4 mg, 4 mg, Oral, Q6H PRN **OR** ondansetron (ZOFRAN) injection 4 mg, 4 mg, Intravenous, Q6H PRN, Ruby Albarran MD  •  pantoprazole (PROTONIX) EC tablet 40 mg, 40 mg, Oral, QAM, Ruby Albarran MD, 40 mg at 01/22/20 0630  •  potassium & sodium phosphates (PHOS-NAK) 280-160-250 MG packet - for Phosphorus less than 1.25 mg/dL, 2 packet, Oral, Q6H PRN **OR** potassium & sodium phosphates (PHOS-NAK) 280-160-250 MG packet - for Phosphorus 1.25 - 2.5 mg/dL, 2 packet, Oral, Q6H PRN, Ruby Albarran MD, 2 packet at 01/16/20 1157  •  potassium chloride (MICRO-K) CR capsule 40 mEq, 40 mEq, Oral, PRN **OR** potassium chloride (KLOR-CON) packet 40 mEq, 40 mEq, Oral, PRN **OR** potassium chloride 10 mEq in 100 mL IVPB, 10 mEq, Intravenous, Q1H PRN, Ruby Albarran MD  •  potassium phosphate 45 mmol in sodium chloride 0.9 % 500 mL infusion, 45 mmol, Intravenous, PRN **OR** potassium phosphate 30 mmol in sodium chloride 0.9 % 250 mL infusion, 30 mmol, Intravenous, PRN **OR** potassium phosphate 15 mmol in sodium chloride 0.9 % 100 mL infusion, 15 mmol, Intravenous, PRN, 15 mmol at 01/15/20 1355 **OR** sodium phosphates 40 mmol in sodium chloride 0.9 % 500 mL IVPB, 40 mmol, Intravenous, PRN **OR** sodium phosphates 20 mmol in sodium chloride 0.9 % 250 mL IVPB, 20 mmol, Intravenous, PRN, Ruby Albarran MD, Stopped at 01/14/20 0746  •  senna-docusate sodium (SENOKOT-S) 8.6-50 MG tablet 2 tablet, 2 tablet, Oral, Nightly, Ruby Albarran MD, 2 tablet at 01/19/20 2059  •  [COMPLETED] Insert peripheral IV, , , Once **AND** sodium chloride 0.9 % flush 10 mL, 10 mL, Intravenous, PRN, Ruby Albarran MD  •  sodium chloride 0.9 % flush 10 mL, 10 mL, Intravenous, Once PRN, Ruby Albarran MD  •  sodium chloride 0.9 % flush 10 mL, 10 mL, Intravenous, Q12H, Ruby Albarran MD, 10 mL at 01/22/20 0846  •  sodium  chloride 0.9 % flush 10 mL, 10 mL, Intravenous, PRN, Ruby Albarran MD  •  sodium chloride 0.9 % infusion 1,000 mL, 1,000 mL, Intravenous, Continuous, Ruby Albarran MD, Stopped at 01/21/20 1308  Review of Systems:   The following systems were reviewed and negative: Constitution, pulmonary, cardiac, gastrointestinal, genitourinary        Objective     Vital Signs  Temp:  [98 °F (36.7 °C)-98.3 °F (36.8 °C)] 98.3 °F (36.8 °C)  Heart Rate:  [] 85  Resp:  [16-18] 18  BP: ()/(51-78) 114/64  Body mass index is 19.56 kg/m².    Intake/Output Summary (Last 24 hours) at 1/22/2020 1329  Last data filed at 1/22/2020 0900  Gross per 24 hour   Intake 1476 ml   Output 575 ml   Net 901 ml     I/O this shift:  In: 360 [P.O.:360]  Out: -      Physical Exam:   General: patient awake, alert and cooperative   Eyes: Normal lids and lashes, no scleral icterus   Neck: supple, normal ROM   Skin: warm and dry, not jaundiced   Cardiovascular: regular rhythm and rate, no murmurs auscultated   Pulm: clear to auscultation bilaterally, regular and unlabored   Abdomen: soft, nontender, nondistended; normal bowel sounds   Rectal: deferred   Extremities: no rash or edema   Psychiatric: Normal mood and behavior; memory intact     Results Review:     I reviewed the patient's new clinical results.    Results from last 7 days   Lab Units 01/20/20  0441 01/16/20  0535   WBC 10*3/mm3  --  9.86   HEMOGLOBIN g/dL  --  14.0   HEMATOCRIT %  --  40.6   PLATELETS 10*3/mm3 412 286     Results from last 7 days   Lab Units 01/16/20  0535   SODIUM mmol/L 141   POTASSIUM mmol/L 3.5   CHLORIDE mmol/L 104   CO2 mmol/L 22.8   BUN mg/dL 12   CREATININE mg/dL 0.86   CALCIUM mg/dL 8.7   GLUCOSE mg/dL 139*         No results found for: LIPASE    Radiology:  MRI Brain With & Without Contrast   Final Result   Mild mucosal thickening in the floor the right maxillary   sinus. Otherwise unremarkable MRI of the brain.       This report was finalized on 1/19/2020  2:41 PM by Dr. Marcos Sy M.D.          FL Esophagram Complete   Final Result       No tight stenoses. Several small rounded defects in the mucosal coating   of the esophagus appear to persist, potentially polyps or may be   persistent adherent air bubbles, suggest endoscopic correlation.       This report was finalized on 1/18/2020 2:06 PM by Dr. Anton Bower M.D.          CT Head Without Contrast   Final Result   1.  No findings of acute intracranial abnormality.           This report was finalized on 1/13/2020 7:20 AM by Dr. Bobby Clemons M.D.          XR Chest 1 View   Final Result   1. No acute process.       This report was finalized on 1/13/2020 6:41 AM by Dr. Reddy Lizama M.D.              Assessment/Plan     Patient Active Problem List   Diagnosis   • Diabetes mellitus, new onset (CMS/HCC)   • Diabetic ketoacidosis without coma associated with type 2 diabetes mellitus (CMS/HCC)   • Abnormal esophagram       Impression  1. Dysphagia: improved, suspect related to esophagitis    2. Abnormal esophagram: pt with plaques, esophagitis    3. Metabolic encephalopathy: resolved    4. DKA    Plan  Await EGD biopsy path results    Daily ppi    Avoid NSAIDs    Diet as tolerated    We will arrange for outpatient follow-up; will need EGD in about 12 weeks to assess healing of the esophagitis.  Office will contact him to arrange this    Okay for home from GI standpoint    GI will sign off but remain available as needed in this patient's care.  Thank you.      I discussed the patients findings and my recommendations with patient, family and consulting provider.    Ruby Albarran MD    Electronically signed by Ruby Albarran MD at 01/22/20 2816     Lexie Doe MD at 01/22/20 1001          49 y.o.   LOS: 10 days   Patient Care Team:  Provider, No Known as PCP - General    Chief Complaint: Elevated blood sugars    Chief Complaint   Patient presents with   • Altered Mental Status        Subjective   Patient's blood sugars are decently controlled on the current insulin regimen.  Called by the nurse with the concerns of patient not being able to use the vials and would rather want to be on the pens.    Interval History:    Review of Systems:   Constitutional: Positive for appetite change and fatigue.   Eyes: Negative for visual disturbance.   Respiratory: no shortness of breath.    Cardiovascular: no leg swelling.   Gastrointestinal: Negative for abdominal pain.   Endocrine: Negative for polydipsia and polyuria.   Musculoskeletal: Positive for arthralgias and myalgias.   Skin: Negative for pallor.   Neurological: Positive for weakness and numbness.   Psychiatric/Behavioral: Positive for sleep disturbance.         Review of Systems  Objective     Vital Signs   Temp:  [98 °F (36.7 °C)-98.7 °F (37.1 °C)] 98 °F (36.7 °C)  Heart Rate:  [] 72  Resp:  [16-18] 16  BP: ()/(62-72) 117/72    Physical Exam:  Gen exam       No distress, appears stated age  EYES:             PERRL, anicteric sclera  ENT:                External ears/nose normal  NECK:             No adenopathy, midline trachea  LUNGS:           Normal chest on inspection, palpation and diminished bilateral breath       sounds on auscultation  CV:                  Normal S1S2, without murmur  ABD:                Non tender, non distended, no hepatosplenomegaly, +BS  EXT:                No edema, cyanosis or clubbing          Physical ExamResults Review:     I reviewed the patient's new clinical results and summarized them in subjective and in plan.      No results found for: GLUCOSE  Lab Results (last 24 hours)     Procedure Component Value Units Date/Time    POC Glucose Once [[id number]]  (Normal) Collected:  01/23/20 0619    Specimen:  Blood Updated:  01/23/20 0633     Glucose 113 mg/dL     POC Glucose Once [[id number]]  (Abnormal) Collected:  01/22/20 2106    Specimen:  Blood Updated:  01/22/20 2108     Glucose 193 mg/dL     POC  Glucose Once [[id number]]  (Abnormal) Collected:  01/22/20 1653    Specimen:  Blood Updated:  01/22/20 1656     Glucose 159 mg/dL     POC Glucose Once [[id number]]  (Abnormal) Collected:  01/22/20 1121    Specimen:  Blood Updated:  01/22/20 1128     Glucose 175 mg/dL         Imaging Results (Last 24 Hours)     ** No results found for the last 24 hours. **          Medication Review: done      Current Facility-Administered Medications:   •  cephalexin (KEFLEX) capsule 500 mg, 500 mg, Oral, Q6H, Ruby Albarran MD, 500 mg at 01/23/20 0632  •  dextrose (D50W) 25 g/ 50mL Intravenous Solution 12.5 g, 12.5 g, Intravenous, PRN, Ruby Albarran MD, 12.5 g at 01/13/20 2240  •  dextrose (D50W) 25 g/ 50mL Intravenous Solution 25 g, 25 g, Intravenous, Q15 Min PRN, Ruby Albarran MD  •  dextrose (GLUTOSE) oral gel 15 g, 15 g, Oral, Q15 Min PRN, Ruby Albarran MD  •  enoxaparin (LOVENOX) syringe 40 mg, 40 mg, Subcutaneous, Q24H, Ruby Albarran MD, 40 mg at 01/22/20 1217  •  glucagon (human recombinant) (GLUCAGEN DIAGNOSTIC) injection 1 mg, 1 mg, Subcutaneous, Q15 Min PRN, Ruby Albarran MD  •  insulin glargine (LANTUS) injection 15 Units, 15 Units, Subcutaneous, Q12H, Ruby Albarran MD, 15 Units at 01/23/20 0917  •  insulin lispro (humaLOG) injection 0-9 Units, 0-9 Units, Subcutaneous, 4x Daily With Meals & Nightly, Ruby Albarran MD, 2 Units at 01/22/20 2119  •  insulin lispro (humaLOG) injection 5 Units, 5 Units, Subcutaneous, TID With Meals, Ruby Albarran MD, 5 Units at 01/23/20 0918  •  lactated ringers infusion, 30 mL/hr, Intravenous, Continuous, Ruby Albarran MD, Last Rate: 30 mL/hr at 01/21/20 1418, 30 mL/hr at 01/21/20 1418  •  magnesium sulfate 4 gram infusion - Mg less than or equal to 1mg/dL, 4 g, Intravenous, PRN **OR** magnesium sulfate 3 gram infusion (1gm x 3) - Mg 1.1 - 1.5 mg/dL, 1 g, Intravenous, PRN **OR** Magnesium Sulfate 2 gram infusion- Mg 1.6 - 1.9 mg/dL, 2 g, Intravenous, PRN, [place],  Ruby BEJARANO MD  •  ondansetron (ZOFRAN) tablet 4 mg, 4 mg, Oral, Q6H PRN **OR** ondansetron (ZOFRAN) injection 4 mg, 4 mg, Intravenous, Q6H PRN, Ruby Albarran MD  •  pantoprazole (PROTONIX) EC tablet 40 mg, 40 mg, Oral, BID AC, Ruby Albarran MD, 40 mg at 01/23/20 0632  •  potassium & sodium phosphates (PHOS-NAK) 280-160-250 MG packet - for Phosphorus less than 1.25 mg/dL, 2 packet, Oral, Q6H PRN **OR** potassium & sodium phosphates (PHOS-NAK) 280-160-250 MG packet - for Phosphorus 1.25 - 2.5 mg/dL, 2 packet, Oral, Q6H PRN, Ruby Albarran MD, 2 packet at 01/16/20 1157  •  potassium chloride (MICRO-K) CR capsule 40 mEq, 40 mEq, Oral, PRN **OR** potassium chloride (KLOR-CON) packet 40 mEq, 40 mEq, Oral, PRN **OR** potassium chloride 10 mEq in 100 mL IVPB, 10 mEq, Intravenous, Q1H PRN, Ruby Albarran MD  •  potassium phosphate 45 mmol in sodium chloride 0.9 % 500 mL infusion, 45 mmol, Intravenous, PRN **OR** potassium phosphate 30 mmol in sodium chloride 0.9 % 250 mL infusion, 30 mmol, Intravenous, PRN **OR** potassium phosphate 15 mmol in sodium chloride 0.9 % 100 mL infusion, 15 mmol, Intravenous, PRN, 15 mmol at 01/15/20 1355 **OR** sodium phosphates 40 mmol in sodium chloride 0.9 % 500 mL IVPB, 40 mmol, Intravenous, PRN **OR** sodium phosphates 20 mmol in sodium chloride 0.9 % 250 mL IVPB, 20 mmol, Intravenous, PRN, Ruby Albarran MD, Stopped at 01/14/20 0746  •  senna-docusate sodium (SENOKOT-S) 8.6-50 MG tablet 2 tablet, 2 tablet, Oral, Nightly, Ruby Albarran MD, 2 tablet at 01/19/20 7519  •  [COMPLETED] Insert peripheral IV, , , Once **AND** sodium chloride 0.9 % flush 10 mL, 10 mL, Intravenous, PRN, Ruby Albarran MD  •  sodium chloride 0.9 % flush 10 mL, 10 mL, Intravenous, Once PRN, Ruby Albarran MD  •  sodium chloride 0.9 % flush 10 mL, 10 mL, Intravenous, Q12H, Ruby Albarran MD, 10 mL at 01/23/20 0918  •  sodium chloride 0.9 % flush 10 mL, 10 mL, Intravenous, PRN, Ruby Albarran,  "MD    Assessment/Plan     Active Hospital Problems    Diagnosis  POA   • **Diabetic ketoacidosis without coma associated with type 2 diabetes mellitus (CMS/HCC) [E11.10]  Yes   • Diabetes mellitus, new onset (CMS/HCC) [E11.9]  Yes   • Abnormal esophagram [R93.3]  Unknown      Resolved Hospital Problems   No resolved problems to display.     Diabetes mellitus-significantly uncontrolled  It is unclear if patient is a type I or type II  Continue Lantus 15 units twice daily  Continue Humalog 5 units with each meal  Continue Humalog sliding scale 3 times daily before meals and at bedtime.  No changes to the insulin regimen.    C-peptide levels positive.  Pending john 65 antibodies.  Discussed with the patient if the john 65 antibodies are negative we should be able to do oral hypoglycemic agents.  However for some reasons the john 65 antibody blood work-up is still pending, will reorder this blood work-up.     If patient is being discharged okay to consider 70/30 insulin regimen.  Consider 70/30 - 18 units twice daily with breakfast and dinner.  Explained to the nurse that we have been doing 70/30 insulin regimen because it is convenient for the patient given his challenges.  Pertaining to the pens or the vials it would be mainly dictated by the insurance as to which is cheaper for him. ( will have dm educator look into this )     Discussed plan with Nurse.     Lexie Doe MD.  01/23/20  10:01 AM      EMR Dragon / transcription disclaimer:    \"Dictated utilizing Dragon dictation\".     Electronically signed by Lexie Doe MD at 01/23/20 1343          Consult Notes (last 48 hours) (Notes from 01/22/20 0732 through 01/24/20 0732)      Rajeev Mccarty MD at 01/23/20 1031        Patient and EMR examined.  I agree that the right middle finger has the appearance of a resolving infection but I am concerned that the loss of pulp and the dry hard skin on the volar surface of the tip probably represents some ischemic " necrosis.  He continues to have pain in the finger despite the fact there is no erythema, fluid collection or discharge.  Most of the skin wrinkles are still present so I think the acute process has largely resolved.  Nonetheless, I suspect he may need a distal amputation if the skin sloughs over the next few weeks.  With loss of pulp, reconstruction would not be possible short of amputation and  Terminalization.  Please organize a follow-up consultation with my office after he is discharged.  In the meantime if the finger remains dry with no ooze or discharge it can be left open.  Cancel the order for splinting.    Electronically signed by Rajeev Mccarty MD at 20 1035          Discharge Summary      Ping Caro MD at 20 1440            PHYSICIAN DISCHARGE SUMMARY                                                                        [facility]    Patient Identification:  Name: Joby Kulkarni  Age: 49 y.o.  Sex: male  :  1970  MRN: [id number]  Primary Care Physician: Provider, No Known    Admit date: 2020  Discharge date and time: No discharge date for patient encounter.   Discharged Condition: stable    Discharge Diagnoses:  Diabetic ketoacidosis without coma associated with type 2 diabetes mellitus (CMS/HCC)    Diabetes mellitus, new onset (CMS/HCC)    Abnormal esophagram   DKA resolved  Metabolic encephalopathy  Acute kidney injury  Dysphagia  Esophageal mucosal abnormalities esophageal plaques, esophagitis, gastritis and duodenitis  Paronychia/ischemic right middle fingertip  Hyponatremia    Hospital Course: Joby Kulkarni presented to [facility]    Patient admitted with DKA and altered mental status.  Please refer to history and physical.  49-year-old -American male who presents with confusion and weakness.  He is a very poor historian because he is drowsy but he is arousable and he does answer a few questions.  Apparently he does  not have a primary care physician and is not on any medications.  Recently went to urgent care and received clindamycin for swollen, painful and possibly infected right third finger, possible paronychia.  He took 1 dose only and then became too confused to continue his prescriptions.  He has had severe weakness over the past 24 hours, increased thirst and urination.  Has had no appetite.  Admitted to CCU initiated on DKA protocol and patient improved clinically.  He did have infection of his right middle finger initial antibiotics was initiated orally.  Continues to complain of right middle finger pain and hand surgery was consulted recommendations that he may need a terminal amputation of the right middle finger due to loss of pulp.  Currently patient is on 70/30 insulin per endocrinology.  His blood glucose are doing well.  He also had GI to see because of esophageal abnormalities and had a scope showing esophageal plaques with esophagitis gastritis and duodenitis.  GI will follow up on the biopsy resultsAnd further management as outpatient..  Patient is currently stable for discharge and follow-up with endocrinology as per the recommendations.  Consults:   IP CONSULT TO NUTRITION SERVICES  IP CONSULT TO DIABETES EDUCATOR  IP CONSULT TO PULMONOLOGY  IP CONSULT TO DIABETES EDUCATOR  IP CONSULT TO NUTRITION SERVICES  IP CONSULT TO ENDOCRINOLOGY  IP CONSULT TO CASE MANAGEMENT   IP CONSULT TO GASTROENTEROLOGY  IP CONSULT TO NEUROLOGY  IP CONSULT TO DIABETES EDUCATOR  IP CONSULT TO HAND SURGERY    Significant Diagnostic Studies:   [unfilled]    Discharge Exam:  Alert and oriented x 4, in NAD  Supple neck, midline trach  RRR, no m/r/g, no edema  Clear bilaterally, no wheezing, nonlabored  No clubbing or cyanosis     Disposition:  Home    Patient Instructions:   [unfilled]  Follow-up Information     Provider, No Known .    Contact information:  [facility]  "[id number]  [phone]             Rajeev Mccarty MD Follow up in 1 week(s).    Specialties:  Plastic Surgery, Hand Surgery  Contact information:  [address]  [place] [id number] [phone]                 [unfilled]        Discharge Medications      New Medications      Instructions Start Date   cephalexin 500 MG capsule  Commonly known as:  KEFLEX   500 mg, Oral, Every 6 Hours Scheduled      fluconazole 200 MG tablet  Commonly known as:  DIFLUCAN   200 mg, Oral, Every 24 Hours      glucose blood test strip  Commonly known as:  ONETOUCH VERIO   To check 1 -2 times a day      insulin aspart prot-insulin aspart (70-30) 100 UNIT/ML injection  Commonly known as:  novoLOG 70/30   18 Units, Subcutaneous, 2 Times Daily With Meals      Insulin Syringe 28G X 1/2\" 1 ML misc   To inject 4 times a day      Needle (Disp) 30G X 1\" misc   To inject 4 times a day      ONE TOUCH LANCETS misc   To check 1 -2 times a day      pantoprazole 40 MG EC tablet  Commonly known as:  PROTONIX   40 mg, Oral, 2 Times Daily Before Meals         Stop These Medications    clindamycin 300 MG capsule  Commonly known as:  CLEOCIN            Total time spent discharging patient including evaluation, medication reconciliation, arranging follow up, and post hospitalization instructions and education total time exceeds 30 minutes.    Signed:  Ping Caro MD  1/23/2020  2:40 PM    Electronically signed by Ping Caro MD at 01/23/20 1449       "

## 2020-01-24 NOTE — PATIENT INSTRUCTIONS
"Fat and Cholesterol Restricted Eating Plan  Getting too much fat and cholesterol in your diet may cause health problems. Choosing the right foods helps keep your fat and cholesterol at normal levels. This can keep you from getting certain diseases.  Your doctor may recommend an eating plan that includes:  · Total fat: ______% or less of total calories a day.  · Saturated fat: ______% or less of total calories a day.  · Cholesterol: less than _________mg a day.  · Fiber: ______g a day.  What are tips for following this plan?  Meal planning  · At meals, divide your plate into four equal parts:  ? Fill one-half of your plate with vegetables and green salads.  ? Fill one-fourth of your plate with whole grains.  ? Fill one-fourth of your plate with low-fat (lean) protein foods.  · Eat fish that is high in omega-3 fats at least two times a week. This includes mackerel, tuna, sardines, and salmon.  · Eat foods that are high in fiber, such as whole grains, beans, apples, broccoli, carrots, peas, and barley.  General tips    · Work with your doctor to lose weight if you need to.  · Avoid:  ? Foods with added sugar.  ? Fried foods.  ? Foods with partially hydrogenated oils.  · Limit alcohol intake to no more than 1 drink a day for nonpregnant women and 2 drinks a day for men. One drink equals 12 oz of beer, 5 oz of wine, or 1½ oz of hard liquor.  Reading food labels  · Check food labels for:  ? Trans fats.  ? Partially hydrogenated oils.  ? Saturated fat (g) in each serving.  ? Cholesterol (mg) in each serving.  ? Fiber (g) in each serving.  · Choose foods with healthy fats, such as:  ? Monounsaturated fats.  ? Polyunsaturated fats.  ? Omega-3 fats.  · Choose grain products that have whole grains. Look for the word \"whole\" as the first word in the ingredient list.  Cooking  · Cook foods using low-fat methods. These include baking, boiling, grilling, and broiling.  · Eat more home-cooked foods. Eat at restaurants and buffets " less often.  · Avoid cooking using saturated fats, such as butter, cream, palm oil, palm kernel oil, and coconut oil.  Recommended foods    Fruits  · All fresh, canned (in natural juice), or frozen fruits.  Vegetables  · Fresh or frozen vegetables (raw, steamed, roasted, or grilled). Green salads.  Grains  · Whole grains, such as whole wheat or whole grain breads, crackers, cereals, and pasta. Unsweetened oatmeal, bulgur, barley, quinoa, or brown rice. Corn or whole wheat flour tortillas.  Meats and other protein foods  · Ground beef (85% or leaner), grass-fed beef, or beef trimmed of fat. Skinless chicken or turkey. Ground chicken or turkey. Pork trimmed of fat. All fish and seafood. Egg whites. Dried beans, peas, or lentils. Unsalted nuts or seeds. Unsalted canned beans. Nut butters without added sugar or oil.  Dairy  · Low-fat or nonfat dairy products, such as skim or 1% milk, 2% or reduced-fat cheeses, low-fat and fat-free ricotta or cottage cheese, or plain low-fat and nonfat yogurt.  Fats and oils  · Tub margarine without trans fats. Light or reduced-fat mayonnaise and salad dressings. Avocado. Olive, canola, sesame, or safflower oils.  The items listed above may not be a complete list of foods and beverages you can eat. Contact a dietitian for more information.  Foods to avoid  Fruits  · Canned fruit in heavy syrup. Fruit in cream or butter sauce. Fried fruit.  Vegetables  · Vegetables cooked in cheese, cream, or butter sauce. Fried vegetables.  Grains  · White bread. White pasta. White rice. Cornbread. Bagels, pastries, and croissants. Crackers and snack foods that contain trans fat and hydrogenated oils.  Meats and other protein foods  · Fatty cuts of meat. Ribs, chicken wings, jennings, sausage, bologna, salami, chitterlings, fatback, hot dogs, bratwurst, and packaged lunch meats. Liver and organ meats. Whole eggs and egg yolks. Chicken and turkey with skin. Fried meat.  Dairy  · Whole or 2% milk, cream,  half-and-half, and cream cheese. Whole milk cheeses. Whole-fat or sweetened yogurt. Full-fat cheeses. Nondairy creamers and whipped toppings. Processed cheese, cheese spreads, and cheese curds.  Beverages  · Alcohol. Sugar-sweetened drinks such as sodas, lemonade, and fruit drinks.  Fats and oils  · Butter, stick margarine, lard, shortening, ghee, or jennings fat. Coconut, palm kernel, and palm oils.  Sweets and desserts  · Corn syrup, sugars, honey, and molasses. Candy. Jam and jelly. Syrup. Sweetened cereals. Cookies, pies, cakes, donuts, muffins, and ice cream.  The items listed above may not be a complete list of foods and beverages you should avoid. Contact a dietitian for more information.  Summary  · Choosing the right foods helps keep your fat and cholesterol at normal levels. This can keep you from getting certain diseases.  · At meals, fill one-half of your plate with vegetables and green salads.  · Eat high-fiber foods, like whole grains, beans, apples, carrots, peas, and barley.  · Limit added sugar, saturated fats, alcohol, and fried foods.  This information is not intended to replace advice given to you by your health care provider. Make sure you discuss any questions you have with your health care provider.  Document Released: 06/18/2013 Document Revised: 08/21/2019 Document Reviewed: 09/04/2018  [employer] Interactive Patient Education © 2019 [employer]  Diabetes Mellitus and Nutrition, Adult  When you have diabetes (diabetes mellitus), it is very important to have healthy eating habits because your blood sugar (glucose) levels are greatly affected by what you eat and drink. Eating healthy foods in the appropriate amounts, at about the same times every day, can help you:  · Control your blood glucose.  · Lower your risk of heart disease.  · Improve your blood pressure.  · Reach or maintain a healthy weight.  Every person with diabetes is different, and each person has different needs for a meal plan.  Your health care provider may recommend that you work with a diet and nutrition specialist (dietitian) to make a meal plan that is best for you. Your meal plan may vary depending on factors such as:  · The calories you need.  · The medicines you take.  · Your weight.  · Your blood glucose, blood pressure, and cholesterol levels.  · Your activity level.  · Other health conditions you have, such as heart or kidney disease.  How do carbohydrates affect me?  Carbohydrates, also called carbs, affect your blood glucose level more than any other type of food. Eating carbs naturally raises the amount of glucose in your blood. Carb counting is a method for keeping track of how many carbs you eat. Counting carbs is important to keep your blood glucose at a healthy level, especially if you use insulin or take certain oral diabetes medicines.  It is important to know how many carbs you can safely have in each meal. This is different for every person. Your dietitian can help you calculate how many carbs you should have at each meal and for each snack.  Foods that contain carbs include:  · Bread, cereal, rice, pasta, and crackers.  · Potatoes and corn.  · Peas, beans, and lentils.  · Milk and yogurt.  · Fruit and juice.  · Desserts, such as cakes, cookies, ice cream, and candy.  How does alcohol affect me?  Alcohol can cause a sudden decrease in blood glucose (hypoglycemia), especially if you use insulin or take certain oral diabetes medicines. Hypoglycemia can be a life-threatening condition. Symptoms of hypoglycemia (sleepiness, dizziness, and confusion) are similar to symptoms of having too much alcohol.  If your health care provider says that alcohol is safe for you, follow these guidelines:  · Limit alcohol intake to no more than 1 drink per day for nonpregnant women and 2 drinks per day for men. One drink equals 12 oz of beer, 5 oz of wine, or 1½ oz of hard liquor.  · Do not drink on an empty stomach.  · Keep yourself  "hydrated with water, diet soda, or unsweetened iced tea.  · Keep in mind that regular soda, juice, and other mixers may contain a lot of sugar and must be counted as carbs.  What are tips for following this plan?    Reading food labels  · Start by checking the serving size on the \"Nutrition Facts\" label of packaged foods and drinks. The amount of calories, carbs, fats, and other nutrients listed on the label is based on one serving of the item. Many items contain more than one serving per package.  · Check the total grams (g) of carbs in one serving. You can calculate the number of servings of carbs in one serving by dividing the total carbs by 15. For example, if a food has 30 g of total carbs, it would be equal to 2 servings of carbs.  · Check the number of grams (g) of saturated and trans fats in one serving. Choose foods that have low or no amount of these fats.  · Check the number of milligrams (mg) of salt (sodium) in one serving. Most people should limit total sodium intake to less than 2,300 mg per day.  · Always check the nutrition information of foods labeled as \"low-fat\" or \"nonfat\". These foods may be higher in added sugar or refined carbs and should be avoided.  · Talk to your dietitian to identify your daily goals for nutrients listed on the label.  Shopping  · Avoid buying canned, premade, or processed foods. These foods tend to be high in fat, sodium, and added sugar.  · Shop around the outside edge of the grocery store. This includes fresh fruits and vegetables, bulk grains, fresh meats, and fresh dairy.  Cooking  · Use low-heat cooking methods, such as baking, instead of high-heat cooking methods like deep frying.  · Cook using healthy oils, such as olive, canola, or sunflower oil.  · Avoid cooking with butter, cream, or high-fat meats.  Meal planning  · Eat meals and snacks regularly, preferably at the same times every day. Avoid going long periods of time without eating.  · Eat foods high in " fiber, such as fresh fruits, vegetables, beans, and whole grains. Talk to your dietitian about how many servings of carbs you can eat at each meal.  · Eat 4-6 ounces (oz) of lean protein each day, such as lean meat, chicken, fish, eggs, or tofu. One oz of lean protein is equal to:  ? 1 oz of meat, chicken, or fish.  ? 1 egg.  ? ¼ cup of tofu.  · Eat some foods each day that contain healthy fats, such as avocado, nuts, seeds, and fish.  Lifestyle  · Check your blood glucose regularly.  · Exercise regularly as told by your health care provider. This may include:  ? 150 minutes of moderate-intensity or vigorous-intensity exercise each week. This could be brisk walking, biking, or water aerobics.  ? Stretching and doing strength exercises, such as yoga or weightlifting, at least 2 times a week.  · Take medicines as told by your health care provider.  · Do not use any products that contain nicotine or tobacco, such as cigarettes and e-cigarettes. If you need help quitting, ask your health care provider.  · Work with a counselor or diabetes educator to identify strategies to manage stress and any emotional and social challenges.  Questions to ask a health care provider  · Do I need to meet with a diabetes educator?  · Do I need to meet with a dietitian?  · What number can I call if I have questions?  · When are the best times to check my blood glucose?  Where to find more information:  · American Diabetes Association: diabetes.org  · Academy of Nutrition and Dietetics: www.eatright.org  · National [place] of Diabetes and Digestive and Kidney Diseases (NIH): www.niddk.nih.gov  Summary  · A healthy meal plan will help you control your blood glucose and maintain a healthy lifestyle.  · Working with a diet and nutrition specialist (dietitian) can help you make a meal plan that is best for you.  · Keep in mind that carbohydrates (carbs) and alcohol have immediate effects on your blood glucose levels. It is important to count  carbs and to use alcohol carefully.  This information is not intended to replace advice given to you by your health care provider. Make sure you discuss any questions you have with your health care provider.  Document Released: 09/14/2006 Document Revised: 07/18/2018 Document Reviewed: 01/22/2018  [employer] Interactive Patient Education © 2019 [employer]        Educated about DM and disease process, checked patient's glucometer, educated about checked BS at home, call with elevated readings >300.   Cont f/u with GI and hand as scheduled,   Refer to endo and podiatry will call with appt,   Information and handouts given for DM diet, DM classes, nutritionist, and eye doctors, he will call for appt.   He will f/u in office in about 1 month for recheck, can discuss ace, statin, eye exam at that time. Will also discuss screenings and labs,   Hospital records and labs reviewed   If any worsening symptoms, elevated BS advised ER.   He will return [employer]ML[employer] for completion.   Increase fluid intake, get plenty of rest.   Patient agrees with plan of care and understands instructions. Call if worsening symptoms or any problems or concerns.

## 2020-01-24 NOTE — PROGRESS NOTES
Subjective   Joby Kulkarni is a 49 y.o. male.     History of Present Illness   Here today to establish care, no recent pcp, he works at [employer], he is single, 2 children, he does no exercise. He denies smoking. Prev did not take medications until the hospital visit.   Also here for hospital f/u, he went to [facility] ER 1/13/2020 for confusion and weakness, please refer to H&P and d/c kenneth for details, he had gone to urgent care for right middle swollen finger, states he had I&D initially, went back to [facility] as this did not look ok, went back to [facility], then sent to [facility] [place] for hand clinic, wait too long, went home and started to feel weak. given clindamycin, had 1 dose then became very drowsy, he was admitted to CCU, found to be in DKA, , treated and symptoms improved. He was given abx for finger infection, saw hand surgery recommended possible terminal amputation. He also had GI consult because of esophageal abnormalities and had a scope showing esophageal plaques with esophagitis gastritis and duodenitis.  d/t home on insulin 70/30 per endo, given protonix 40mg daily, d/t home on metformin 1000mg bid and glipizide 5mg bid, also d/c home on keflex. egd showed candida esophagitis, given diflucan 200mg daily for 2 weeks per GI, cont protonix f/u in 2 months repeat egd 3 months. a1c on 1/13/2020 14, last BS yesterday 158, d/c home from hospital yesterday. He states he has hand surgery f/u Dr. Taina Gary 1/29/2020. He states he does have machine to check BS at home. He does not have DM f/u.   C/o left great toe nail tear, he does not see foot doctor states he had evaluated in hospital. He states he has had for a couple of weeks.           The following portions of the patient's history were reviewed and updated as appropriate: allergies, current medications, past family history, past medical history, past social history, past surgical history and problem list.    Review of Systems      Constitutional: Negative for chills, diaphoresis and fever.   Respiratory: Negative for cough and shortness of breath.    Cardiovascular: Negative for chest pain.   Musculoskeletal: Negative for arthralgias and myalgias.   Skin: Positive for skin lesions.   Neurological: Negative for dizziness, light-headedness and headache.   All other systems reviewed and are negative.      Objective   Physical Exam   Constitutional: He is oriented to person, place, and time. He appears well-developed and well-nourished.   HENT:   Head: Normocephalic.   Eyes: Pupils are equal, round, and reactive to light.   Neck: Normal range of motion.   Cardiovascular: Normal rate, regular rhythm, normal heart sounds and intact distal pulses.   Pulmonary/Chest: Effort normal and breath sounds normal.   Musculoskeletal: Normal range of motion.        Lymphadenopathy:     He has no cervical adenopathy.   Neurological: He is alert and oriented to person, place, and time.   Skin: Skin is warm and dry. Lesion noted. There is erythema.        Psychiatric: He has a normal mood and affect. His behavior is normal.   Nursing note and vitals reviewed.        Assessment/Plan   Joby was seen today for new patient and follow-up.    Diagnoses and all orders for this visit:    Encounter to establish care    Diabetes mellitus, new onset (CMS/HCC)    Abnormal esophagram    Candidiasis, esophageal (CMS/HCC)    Paronychia of right middle finger    Ischemia of finger    Uncontrolled type 2 diabetes mellitus with hyperglycemia (CMS/HCC)  -     Ambulatory Referral to Endocrinology    Toenail torn away  -     Ambulatory Referral to Podiatry      Educated about DM and disease process, checked patient's glucometer, educated about checked BS at home, call with elevated readings >300.   Cont f/u with GI and hand as scheduled,   Cont current meds as prescribed, reviewed with patient today.   Refer to endo and podiatry will call with appt,   Information and handouts  given for DM diet, DM classes, nutritionist, and eye doctors, he will call for appt.   He will f/u in office in about 1 month for recheck, can discuss ace, statin, eye exam at that time. Will also discuss screenings and labs,   Hospital records and labs reviewed   If any worsening symptoms, elevated BS advised ER.   He will return [place] for completion.   Increase fluid intake, get plenty of rest.   Patient agrees with plan of care and understands instructions. Call if worsening symptoms or any problems or concerns.       Current outpatient and discharge medications have been reconciled for the patient.  Reviewed by: LEODAN Concepcion

## 2020-01-27 ENCOUNTER — READMISSION MANAGEMENT (OUTPATIENT)
Dept: CALL CENTER | Facility: HOSPITAL | Age: 50
End: 2020-01-27

## 2020-01-27 ENCOUNTER — TELEPHONE (OUTPATIENT)
Dept: FAMILY MEDICINE CLINIC | Facility: CLINIC | Age: 50
End: 2020-01-27

## 2020-01-27 LAB — GAD65 AB SER-ACNC: <5 U/ML (ref 0–5)

## 2020-01-27 RX ORDER — BLOOD-GLUCOSE METER
1 EACH MISCELLANEOUS 2 TIMES DAILY
Qty: 1 KIT | Refills: 0 | Status: SHIPPED | OUTPATIENT
Start: 2020-01-27 | End: 2022-04-19 | Stop reason: SDUPTHER

## 2020-01-27 NOTE — OUTREACH NOTE
Medical Week 1 Survey      Responses   Facility patient discharged from?  [facility]   Does the patient have one of the following disease processes/diagnoses(primary or secondary)?  Other   Is there a successful TCM telephone encounter documented?  No   Week 1 attempt successful?  No   Unsuccessful attempts  Attempt 1          Bernadine Conley RN

## 2020-01-27 NOTE — PROGRESS NOTES
Case Management Discharge Note      Final Note: Home, declined HH services after dc, follow up with outpt DM marco mckeon rn/ccp    Provided post acute provider list?: Yes  Post Acute Provider List: Home Health, Physician, Nursing Home  Post Acute Provider Quality & Resource List: Yes  Delivered To: Patient  Method of Delivery: In person    Destination      No service has been selected for the patient.      Durable Medical Equipment      No service has been selected for the patient.      Dialysis/Infusion      No service has been selected for the patient.      Home Medical Care      No service has been selected for the patient.      Therapy      No service has been selected for the patient.      Community Resources      No service has been selected for the patient.        Transportation Services  Private: Car    Final Discharge Disposition Code: 01 - home or self-care

## 2020-01-27 NOTE — TELEPHONE ENCOUNTER
Please call patient and see how he is doing on medications, checking blood sugar. What has his BS been running over weekend?

## 2020-01-27 NOTE — TELEPHONE ENCOUNTER
PT SAYS HE NEEDS AN RX FOR ACCU CHEK GLUCOSE METER KIT SENT TO PHARMACY BECAUSE THAT IS THE BRAND HIS INSURANCE WILL COVER

## 2020-01-28 ENCOUNTER — TELEPHONE (OUTPATIENT)
Dept: FAMILY MEDICINE CLINIC | Facility: CLINIC | Age: 50
End: 2020-01-28

## 2020-01-28 NOTE — TELEPHONE ENCOUNTER
Pt informed I have not received these yet, pt states he will give them till wed then he will call and have them faxed again

## 2020-01-29 ENCOUNTER — READMISSION MANAGEMENT (OUTPATIENT)
Dept: CALL CENTER | Facility: HOSPITAL | Age: 50
End: 2020-01-29

## 2020-01-29 NOTE — OUTREACH NOTE
Medical Week 1 Survey      Responses   Facility patient discharged from?  [facility]   Does the patient have one of the following disease processes/diagnoses(primary or secondary)?  Other   Is there a successful TCM telephone encounter documented?  No   Week 1 attempt successful?  No   Unsuccessful attempts  Attempt 2          Mckenzie Alfaro RN

## 2020-01-31 ENCOUNTER — READMISSION MANAGEMENT (OUTPATIENT)
Dept: CALL CENTER | Facility: HOSPITAL | Age: 50
End: 2020-01-31

## 2020-01-31 NOTE — OUTREACH NOTE
Medical Week 2 Survey      Responses   Facility patient discharged from?  [facility]   Does the patient have one of the following disease processes/diagnoses(primary or secondary)?  Other   Week 2 attempt successful?  Yes   Call start time  1159   Discharge diagnosis  Diabetic ketoacidosis without coma associated with type 2 diabetes mellitus, infected right third finger    Call end time  1205   Is patient permission given to speak with other caregiver?  No   Meds reviewed with patient/caregiver?  Yes   Is the patient having any side effects they believe may be caused by any medication additions or changes?  No   Does the patient have all medications ordered at discharge?  Yes   Is the patient taking all medications as directed (includes completed medication regime)?  Yes   Does the patient have a primary care provider?   Yes   Comments regarding PCP  New Patient with LEODAN Concepcion,  patient reports that he has seen since discharge.    Has the patient kept scheduled appointments due by today?  Yes   Has home health visited the patient within 72 hours of discharge?  N/A   Psychosocial issues?  No   Did the patient receive a copy of their discharge instructions?  Yes   Nursing interventions  Reviewed instructions with patient   What is the patient's perception of their health status since discharge?  Improving   Is the patient/caregiver able to teach back signs and symptoms related to disease process for when to call PCP?  Yes   Is the patient/caregiver able to teach back signs and symptoms related to disease process for when to call 911?  Yes   Is the patient/caregiver able to teach back the hierarchy of who to call/visit for symptoms/problems? PCP, Specialist, Home health nurse, Urgent Care, ED, 911  Yes   Additional teach back comments  Patient states that he has seen PCP and has seen the Dr about his finger. He states that he will follow up again in one month regarding his finger or earlier if needed.     Week 2 Call Completed?  Yes   Revoked  No further contact(revokes)-requires comment   Graduated/Revoked comments  Patient does not want further calls.           Michelle Byrd RN

## 2020-02-04 ENCOUNTER — TELEPHONE (OUTPATIENT)
Dept: FAMILY MEDICINE CLINIC | Facility: CLINIC | Age: 50
End: 2020-02-04

## 2020-02-04 NOTE — TELEPHONE ENCOUNTER
PT ASKED IF JOHN COULD SEND IN ANOTHER GLUCOSE MONITOR KIT TO [facility]?  SAYS HE WANTS TO HAVE ONE TO KEEP AT HOME AND ANOTHER TO TAKE WITH HIM TO WORK

## 2020-02-07 ENCOUNTER — TELEPHONE (OUTPATIENT)
Dept: FAMILY MEDICINE CLINIC | Facility: CLINIC | Age: 50
End: 2020-02-07

## 2020-02-13 ENCOUNTER — TELEPHONE (OUTPATIENT)
Dept: FAMILY MEDICINE CLINIC | Facility: CLINIC | Age: 50
End: 2020-02-13

## 2020-02-17 ENCOUNTER — TRANSCRIBE ORDERS (OUTPATIENT)
Dept: ADMINISTRATIVE | Facility: HOSPITAL | Age: 50
End: 2020-02-17

## 2020-02-17 DIAGNOSIS — I96 NECROTIC ULCERATION OF FINGERS (HCC): Primary | ICD-10-CM

## 2020-02-24 ENCOUNTER — TELEPHONE (OUTPATIENT)
Dept: FAMILY MEDICINE CLINIC | Facility: CLINIC | Age: 50
End: 2020-02-24

## 2020-02-24 NOTE — TELEPHONE ENCOUNTER
Needs dx and office notes faxed to her at [phone]  Also wants to discuss his medical problems

## 2020-02-24 NOTE — TELEPHONE ENCOUNTER
Spoke with dietitian, given information re meds and dx she will call back if needs anything faxed over.

## 2020-02-24 NOTE — TELEPHONE ENCOUNTER
Endo refused to see this patient. He was in the hospital with BS in 900s. Can we check on this or try another endo?

## 2020-02-28 ENCOUNTER — HOSPITAL ENCOUNTER (OUTPATIENT)
Dept: INFUSION THERAPY | Facility: HOSPITAL | Age: 50
Discharge: HOME | End: 2020-02-28
Admitting: PLASTIC SURGERY

## 2020-02-28 VITALS
RESPIRATION RATE: 16 BRPM | SYSTOLIC BLOOD PRESSURE: 123 MMHG | DIASTOLIC BLOOD PRESSURE: 80 MMHG | BODY MASS INDEX: 22.12 KG/M2 | HEIGHT: 71 IN | TEMPERATURE: 99 F | WEIGHT: 158 LBS | OXYGEN SATURATION: 99 % | HEART RATE: 99 BPM

## 2020-02-28 DIAGNOSIS — I96 NECROSIS OF FINGER (HCC): Primary | ICD-10-CM

## 2020-02-28 PROCEDURE — 25010000003 LIDOCAINE 1 % SOLUTION: Performed by: PLASTIC SURGERY

## 2020-02-28 RX ORDER — LIDOCAINE HYDROCHLORIDE 10 MG/ML
20 INJECTION, SOLUTION INFILTRATION; PERINEURAL ONCE
Status: COMPLETED | OUTPATIENT
Start: 2020-02-28 | End: 2020-02-28

## 2020-02-28 RX ADMIN — LIDOCAINE HYDROCHLORIDE 20 ML: 10 INJECTION, SOLUTION INFILTRATION; PERINEURAL at 12:28

## 2020-03-04 ENCOUNTER — OFFICE VISIT (OUTPATIENT)
Dept: ENDOCRINOLOGY | Age: 50
End: 2020-03-04

## 2020-03-04 VITALS
HEIGHT: 71 IN | OXYGEN SATURATION: 97 % | WEIGHT: 158 LBS | BODY MASS INDEX: 22.12 KG/M2 | SYSTOLIC BLOOD PRESSURE: 106 MMHG | HEART RATE: 103 BPM | DIASTOLIC BLOOD PRESSURE: 70 MMHG

## 2020-03-04 DIAGNOSIS — IMO0002 DM (DIABETES MELLITUS), TYPE 2, UNCONTROLLED W/NEUROLOGIC COMPLICATION: Primary | ICD-10-CM

## 2020-03-04 PROCEDURE — 99214 OFFICE O/P EST MOD 30 MIN: CPT | Performed by: INTERNAL MEDICINE

## 2020-03-04 RX ORDER — LANCETS
EACH MISCELLANEOUS
COMMUNITY
End: 2022-12-06

## 2020-03-21 ENCOUNTER — TELEPHONE (OUTPATIENT)
Dept: GASTROENTEROLOGY | Facility: CLINIC | Age: 50
End: 2020-03-21

## 2020-03-21 NOTE — TELEPHONE ENCOUNTER
I called and left a message about his upcoming March 23 appointment notifying him to come in if he has urgent issues but otherwise that we would like to reschedule him for a later date.    Bianka, can you please give him a call Monday to reschedule.  Please let me know if he has any questions that I can address by phone conversation or other.  Thank you

## 2020-03-26 ENCOUNTER — TREATMENT (OUTPATIENT)
Dept: ENDOCRINOLOGY | Age: 50
End: 2020-03-26

## 2020-03-26 DIAGNOSIS — E11.9 DIABETES MELLITUS, NEW ONSET (HCC): ICD-10-CM

## 2020-03-26 PROCEDURE — 95250 CONT GLUC MNTR PHYS/QHP EQP: CPT | Performed by: INTERNAL MEDICINE

## 2020-03-26 PROCEDURE — 95251 CONT GLUC MNTR ANALYSIS I&R: CPT | Performed by: INTERNAL MEDICINE

## 2020-03-26 NOTE — PROGRESS NOTES
Continues glucose monitoring data    Continuous glucose monitoring was placed on March 4    Patient wore continuous glucose monitoring-free style arnulfo Pro from March 4-March 18   average blood glucose reading was 162  Estimated HbA1c 8-8.5  Likelihood of low blood glucose levels was low  Likelihood of high blood glucose levels was moderate  Blood glucose trends showed high BG after breakfast and dinner    Current treatment regimen  Glipizide 5 mg oral twice daily with meals  Metformin thousand milligrams twice daily    Changes to the treatment regimen  glipizide 10 mg twice daily with meals-breakfast and dinner  Metformin thousand milligrams twice daily

## 2020-03-27 ENCOUNTER — TELEPHONE (OUTPATIENT)
Dept: FAMILY MEDICINE CLINIC | Facility: CLINIC | Age: 50
End: 2020-03-27

## 2020-03-27 RX ORDER — PANTOPRAZOLE SODIUM 40 MG/1
40 TABLET, DELAYED RELEASE ORAL
Qty: 60 TABLET | Refills: 1 | Status: SHIPPED | OUTPATIENT
Start: 2020-03-27 | End: 2020-05-14 | Stop reason: DRUGHIGH

## 2020-03-27 NOTE — TELEPHONE ENCOUNTER
Patient called stating that he needed a refill on:    -pantoprazole (PROTONIX) 40 MG EC tablet (Patient is almost out of this medication and does not have enough to last through the weekend).     Patient would like to know if he still needs to be taking this medication. Please advise.     [facility] on [address] [facility].    Patient call back; [phone].

## 2020-03-27 NOTE — TELEPHONE ENCOUNTER
Please let patient know that yes, he should be taking pantoprazole at least until he sees GI. I sent in a refill for him.

## 2020-03-30 ENCOUNTER — TELEPHONE (OUTPATIENT)
Dept: ENDOCRINOLOGY | Age: 50
End: 2020-03-30

## 2020-05-06 ENCOUNTER — TELEPHONE (OUTPATIENT)
Dept: ENDOCRINOLOGY | Age: 50
End: 2020-05-06

## 2020-05-06 NOTE — TELEPHONE ENCOUNTER
Pt needs refill  on glipizide since it has been increased and he said you was going to send in rx for the Ronal

## 2020-05-07 RX ORDER — GLIPIZIDE 10 MG/1
10 TABLET ORAL
Qty: 60 TABLET | Refills: 5 | Status: SHIPPED | OUTPATIENT
Start: 2020-05-07 | End: 2020-12-01

## 2020-05-07 RX ORDER — FLASH GLUCOSE SENSOR
1 KIT MISCELLANEOUS 4 TIMES DAILY
Qty: 1 DEVICE | Refills: 0 | Status: SHIPPED | OUTPATIENT
Start: 2020-05-07 | End: 2022-04-19 | Stop reason: SDUPTHER

## 2020-05-07 RX ORDER — FLASH GLUCOSE SENSOR
1 KIT MISCELLANEOUS
Qty: 2 EACH | Refills: 5 | Status: SHIPPED | OUTPATIENT
Start: 2020-05-07 | End: 2020-05-22 | Stop reason: SDUPTHER

## 2020-05-13 ENCOUNTER — TELEPHONE (OUTPATIENT)
Dept: GASTROENTEROLOGY | Facility: CLINIC | Age: 50
End: 2020-05-13

## 2020-05-13 NOTE — TELEPHONE ENCOUNTER
----- Message from Siena Cantu sent at 5/13/2020  1:40 PM EDT -----  Regarding: pantoprazle   Contact: [phone]  Asking he should keep taking medication..

## 2020-05-13 NOTE — TELEPHONE ENCOUNTER
"See note of 1/23/20 - \"continue ppi\".  Pt has appt with Dr Albarran 7/15.     VM to pt with request to contact office.   " Received faxed Home Health Certification and Plan of Care form from [facility]. Writer placed form in Dr Umaña's mailbox.

## 2020-05-14 RX ORDER — PANTOPRAZOLE SODIUM 40 MG/1
40 TABLET, DELAYED RELEASE ORAL DAILY
Qty: 90 TABLET | Refills: 0 | Status: ON HOLD | OUTPATIENT
Start: 2020-05-14 | End: 2020-07-24 | Stop reason: SDUPTHER

## 2020-05-14 NOTE — TELEPHONE ENCOUNTER
Message sent to Dr Albarran regarding if pt needs to continue ppi .  Pt has upcoming appt 07/15 at .

## 2020-05-14 NOTE — TELEPHONE ENCOUNTER
Call to pt.  Advise per DR Albarran note.  Verb understanding.  States needs refill.    Escribe completed for pantoprazole 40 mg 1 tab po daily, #90, R0.

## 2020-05-22 RX ORDER — FLASH GLUCOSE SENSOR
1 KIT MISCELLANEOUS
Qty: 2 EACH | Refills: 5 | Status: SHIPPED | OUTPATIENT
Start: 2020-05-22 | End: 2021-05-11

## 2020-06-29 DIAGNOSIS — R68.89 ABNORMAL ENDOCRINE LABORATORY TEST FINDING: ICD-10-CM

## 2020-06-29 DIAGNOSIS — IMO0002 DM (DIABETES MELLITUS), TYPE 2, UNCONTROLLED W/NEUROLOGIC COMPLICATION: Primary | ICD-10-CM

## 2020-06-30 LAB
ALBUMIN SERPL-MCNC: 4.9 G/DL (ref 3.5–5.2)
ALBUMIN/CREAT UR: 5 MG/G CREAT (ref 0–29)
ALBUMIN/GLOB SERPL: 1.8 G/DL
ALP SERPL-CCNC: 44 U/L (ref 39–117)
ALT SERPL-CCNC: 17 U/L (ref 1–41)
AST SERPL-CCNC: 15 U/L (ref 1–40)
BILIRUB SERPL-MCNC: 1 MG/DL (ref 0.2–1.2)
BUN SERPL-MCNC: 16 MG/DL (ref 6–20)
BUN/CREAT SERPL: 14.8 (ref 7–25)
CALCIUM SERPL-MCNC: 9.7 MG/DL (ref 8.6–10.5)
CHLORIDE SERPL-SCNC: 102 MMOL/L (ref 98–107)
CHOLEST SERPL-MCNC: 195 MG/DL (ref 0–200)
CO2 SERPL-SCNC: 24.1 MMOL/L (ref 22–29)
CREAT SERPL-MCNC: 1.08 MG/DL (ref 0.76–1.27)
CREAT UR-MCNC: 187.1 MG/DL
GLOBULIN SER CALC-MCNC: 2.8 GM/DL
GLUCOSE SERPL-MCNC: 158 MG/DL (ref 65–99)
HBA1C MFR BLD: 7.2 % (ref 4.8–5.6)
HDLC SERPL-MCNC: 54 MG/DL (ref 40–60)
INTERPRETATION: NORMAL
LDLC SERPL CALC-MCNC: 106 MG/DL (ref 0–100)
Lab: NORMAL
MICROALBUMIN UR-MCNC: 10 UG/ML
POTASSIUM SERPL-SCNC: 4.1 MMOL/L (ref 3.5–5.2)
PROT SERPL-MCNC: 7.7 G/DL (ref 6–8.5)
SODIUM SERPL-SCNC: 138 MMOL/L (ref 136–145)
TRIGL SERPL-MCNC: 177 MG/DL (ref 0–150)
VLDLC SERPL CALC-MCNC: 35.4 MG/DL

## 2020-07-09 ENCOUNTER — OFFICE VISIT (OUTPATIENT)
Dept: ENDOCRINOLOGY | Age: 50
End: 2020-07-09

## 2020-07-09 VITALS
SYSTOLIC BLOOD PRESSURE: 136 MMHG | OXYGEN SATURATION: 90 % | HEART RATE: 97 BPM | DIASTOLIC BLOOD PRESSURE: 82 MMHG | HEIGHT: 71 IN | BODY MASS INDEX: 23.18 KG/M2 | WEIGHT: 165.6 LBS

## 2020-07-09 DIAGNOSIS — E78.2 MIXED HYPERLIPIDEMIA: ICD-10-CM

## 2020-07-09 DIAGNOSIS — IMO0002 DM (DIABETES MELLITUS), TYPE 2, UNCONTROLLED W/NEUROLOGIC COMPLICATION: Primary | ICD-10-CM

## 2020-07-09 PROCEDURE — 95251 CONT GLUC MNTR ANALYSIS I&R: CPT | Performed by: INTERNAL MEDICINE

## 2020-07-09 PROCEDURE — 99214 OFFICE O/P EST MOD 30 MIN: CPT | Performed by: INTERNAL MEDICINE

## 2020-07-09 NOTE — PROGRESS NOTES
50 y.o.    Patient Care Team:  Lizz Walton APRN as PCP - General (Nurse Practitioner)  Rajeev Mccarty MD as Consulting Physician (Plastic Surgery)    Chief Complaint:    Follow up/ type 2 diabetes mellitus  Subjective     HPI    50-year-old -American male is here for the follow-up of type 2 diabetes mellitus.  He was seen by me in the hospital when he was admitted with DKA and severely elevated blood sugars in January 2020.  C-peptide levels were noted to be positive and negative john 65 levels were noted as a part of the hospital work-up.     Type 2 diabetes mellitus-diagnosed in January 2020.  Today in clinic patient reports that he is on metformin thousand milligrams twice daily, glipizide 10 mg twice daily.  He has the CGM data reveals monitoring his blood sugars.  Average blood sugars are around 140s-150s.  Due for his eye examination.  No prior history of diabetic neuropathy.  No history of CAD, CKD, CVA per patient  He did receive diabetic education while he was in the hospital.  Trying to follow diabetic diet as much as he can  Not on any ACE inhibitor or ARB.     Not on any cholesterol medication.      Reviewed primary care physician's/consulting physician documentation and lab results :     Interval History      The following portions of the patient's history were reviewed and updated as appropriate: allergies, current medications, past family history, past medical history, past social history, past surgical history and problem list.    Past Medical History:   Diagnosis Date   • Diabetes (CMS/HCC)    • Finger infection     right middle finger     Family History   Problem Relation Age of Onset   • Diabetes Mother    • No Known Problems Father    • No Known Problems Sister    • Diabetes Maternal Grandmother      Social History     Socioeconomic History   • Marital status: Unknown     Spouse name: Not on file   • Number of children: Not on file   • Years of education: Not on file   •  "Highest education level: Not on file   Tobacco Use   • Smoking status: Never Smoker   • Smokeless tobacco: Never Used   • Tobacco comment: 2nd hand smoke from parents; cigars when drinking   Substance and Sexual Activity   • Alcohol use: Not Currently     Frequency: Monthly or less     Drinks per session: 1 or 2     Binge frequency: Never   • Drug use: Never   • Sexual activity: Never     No Known Allergies    Current Outpatient Medications:   •  ACCU-CHEK FASTCLIX LANCETS misc, , Disp: , Rfl:   •  Blood Glucose Monitoring Suppl (ACCU-CHEK GUIDE) w/Device kit, 1 each 2 (Two) Times a Day. Test blood sugar 1-2 times daily- E11.9, Disp: 1 kit, Rfl: 0  •  Continuous Blood Gluc  (FREESTYLE ESTRELLA READER) device, 1 Device 4 (Four) Times a Day. To check blood sugars, Disp: 1 Device, Rfl: 0  •  Continuous Blood Gluc Sensor (FREESTYLE ESTRELLA 14 DAY SENSOR) misc, 1 application Every 14 (Fourteen) Days. Place sensor on upper arm, Disp: 2 each, Rfl: 5  •  glipizide (GLUCOTROL) 10 MG tablet, Take 1 tablet by mouth 2 (Two) Times a Day Before Meals., Disp: 60 tablet, Rfl: 5  •  glucose blood (ACCU-CHEK GUIDE) test strip, 1 each by Other route As Needed. ACCU-CHEK GUIDE TEST STRIPS, Disp: , Rfl:   •  glucose blood (ONETOUCH VERIO) test strip, To check 1 -2 times a day, Disp: 100 each, Rfl: 1  •  Needle, Disp, 30G X 1\" misc, To inject 4 times a day, Disp: 100 each, Rfl: 3  •  ONE TOUCH LANCETS misc, To check 1 -2 times a day, Disp: 200 each, Rfl: 0  •  pantoprazole (PROTONIX) 40 MG EC tablet, Take 1 tablet by mouth Daily., Disp: 90 tablet, Rfl: 0  •  metFORMIN (GLUCOPHAGE) 1000 MG tablet, Take 1 tablet by mouth 2 (Two) Times a Day With Meals., Disp: 180 tablet, Rfl: 3        Review of Systems   Constitutional: Negative for appetite change, fatigue and fever.   Eyes: Negative for visual disturbance.   Respiratory: Negative for shortness of breath.    Cardiovascular: Negative for palpitations and leg swelling. " "  Gastrointestinal: Negative for abdominal pain and vomiting.   Endocrine: Negative for polydipsia and polyuria.   Musculoskeletal: Negative for joint swelling and neck pain.   Skin: Negative for rash.   Neurological: Negative for weakness and numbness.   Psychiatric/Behavioral: Negative for behavioral problems.     I have reviewed the ROS as documented by the MA; Lexie Doe MD.      Objective       Vitals:    07/09/20 1422   BP: 136/82   Pulse: 97   SpO2: 90%   Weight: 75.1 kg (165 lb 9.6 oz)   Height: 180.3 cm (71\")     Body mass index is 23.1 kg/m².      Physical Exam   Constitutional: He is oriented to person, place, and time. He appears well-nourished.   HENT:   Head: Normocephalic and atraumatic.   Eyes: Conjunctivae and EOM are normal. No scleral icterus.   Neck: No thyromegaly present.   Cardiovascular: Normal rate and regular rhythm.   Pulmonary/Chest: Effort normal and breath sounds normal. No stridor. No respiratory distress. He has no wheezes.   Abdominal: Soft. Bowel sounds are normal. He exhibits no distension. There is no tenderness.   Musculoskeletal: He exhibits no edema or deformity.   Lymphadenopathy:     He has no cervical adenopathy.   Neurological: He is alert and oriented to person, place, and time.   Skin: Skin is warm and dry. No rash noted. He is not diaphoretic.   Psychiatric: He has a normal mood and affect.   Vitals reviewed.      Results Review:     I reviewed the patient's new clinical results and mentioned them above in HPI and in plan as well.    Medical records reviewed  Summary:Done      Orders Only on 06/29/2020   Component Date Value Ref Range Status   • Glucose 06/29/2020 158* 65 - 99 mg/dL Final   • BUN 06/29/2020 16  6 - 20 mg/dL Final   • Creatinine 06/29/2020 1.08  0.76 - 1.27 mg/dL Final   • eGFR Non African Am 06/29/2020 73  >60 mL/min/1.73 Final   • eGFR African Am 06/29/2020 89  >60 mL/min/1.73 Final   • BUN/Creatinine Ratio 06/29/2020 14.8  7.0 - 25.0 Final   • " Sodium 06/29/2020 138  136 - 145 mmol/L Final   • Potassium 06/29/2020 4.1  3.5 - 5.2 mmol/L Final   • Chloride 06/29/2020 102  98 - 107 mmol/L Final   • Total CO2 06/29/2020 24.1  22.0 - 29.0 mmol/L Final   • Calcium 06/29/2020 9.7  8.6 - 10.5 mg/dL Final   • Total Protein 06/29/2020 7.7  6.0 - 8.5 g/dL Final   • Albumin 06/29/2020 4.90  3.50 - 5.20 g/dL Final   • Globulin 06/29/2020 2.8  gm/dL Final   • A/G Ratio 06/29/2020 1.8  g/dL Final   • Total Bilirubin 06/29/2020 1.0  0.2 - 1.2 mg/dL Final   • Alkaline Phosphatase 06/29/2020 44  39 - 117 U/L Final   • AST (SGOT) 06/29/2020 15  1 - 40 U/L Final   • ALT (SGPT) 06/29/2020 17  1 - 41 U/L Final   • Total Cholesterol 06/29/2020 195  0 - 200 mg/dL Final   • Triglycerides 06/29/2020 177* 0 - 150 mg/dL Final   • HDL Cholesterol 06/29/2020 54  40 - 60 mg/dL Final   • VLDL Cholesterol 06/29/2020 35.4  mg/dL Final   • LDL Cholesterol  06/29/2020 106* 0 - 100 mg/dL Final   • Hemoglobin A1C 06/29/2020 7.20* 4.80 - 5.60 % Final    Comment: Hemoglobin A1C Ranges:  Increased Risk for Diabetes  5.7% to 6.4%  Diabetes                     >= 6.5%  Diabetic Goal                < 7.0%     • Creatinine, Urine 06/29/2020 187.1  Not Estab. mg/dL Final   • Microalbumin, Urine 06/29/2020 10.0  Not Estab. ug/mL Final   • Microalbumin/Creatinine Ratio 06/29/2020 5  0 - 29 mg/g creat Final    Comment:                        Normal:                0 -  29                         Moderately increased: 30 - 300                         Severely increased:       >300                **Please note reference interval change**     • Interpretation 06/29/2020 Note   Final    Supplemental report is available.   • PDF Image 06/29/2020 Not applicable   Final     Lab Results   Component Value Date    HGBA1C 7.20 (H) 06/29/2020    HGBA1C 14.40 (H) 01/13/2020     Lab Results   Component Value Date    MICROALBUR 10.0 06/29/2020    CREATININE 1.08 06/29/2020     Imaging Results (Most Recent)     None     "            Assessment and Plan:    Joby was seen today for diabetes.    Diagnoses and all orders for this visit:    DM (diabetes mellitus), type 2, uncontrolled w/neurologic complication (CMS/[facility])  -     Ambulatory Referral to Diabetic Education    Mixed hyperlipidemia  -     Ambulatory Referral to Diabetic Education      Type 2 diabetes mellitus-uncontrolled  Continue the current oral hypoglycemic agents  Downloaded the CGM and made the above changes    Hyperlipidemia  Follow diet restrictions.    Reviewed Lab results with the patient.                   Lexie Doe MD  07/09/20    EMR Dragon / transcription disclaimer:     \"Dictated utilizing Dragon dictation\".      "

## 2020-07-15 ENCOUNTER — OFFICE VISIT (OUTPATIENT)
Dept: GASTROENTEROLOGY | Facility: CLINIC | Age: 50
End: 2020-07-15

## 2020-07-15 VITALS
DIASTOLIC BLOOD PRESSURE: 70 MMHG | SYSTOLIC BLOOD PRESSURE: 130 MMHG | HEIGHT: 71 IN | BODY MASS INDEX: 23.66 KG/M2 | TEMPERATURE: 99 F | WEIGHT: 169 LBS

## 2020-07-15 DIAGNOSIS — K20.90 ESOPHAGITIS: Primary | ICD-10-CM

## 2020-07-15 DIAGNOSIS — B37.81 ESOPHAGEAL CANDIDIASIS (HCC): ICD-10-CM

## 2020-07-15 PROCEDURE — 99213 OFFICE O/P EST LOW 20 MIN: CPT | Performed by: INTERNAL MEDICINE

## 2020-07-15 RX ORDER — SODIUM CHLORIDE, SODIUM LACTATE, POTASSIUM CHLORIDE, CALCIUM CHLORIDE 600; 310; 30; 20 MG/100ML; MG/100ML; MG/100ML; MG/100ML
30 INJECTION, SOLUTION INTRAVENOUS CONTINUOUS
Status: CANCELLED | OUTPATIENT
Start: 2020-07-24

## 2020-07-15 NOTE — H&P (VIEW-ONLY)
Chief Complaint   Patient presents with   • Hospital follow up     Subjective     HPI  Joby Kulkarni is a 50 y.o. male who presents for hospital follow-up.  He was seen back in January for an abnormal esophagram.  He was admitted due to new onset diabetes.  EGD demonstrated candidal esophagitis as well as LA class D esophagitis.  He has been on PPI.  Due to the COVID 19 pandemic, he has not been able to follow-up in office until now.    He is doing well in follow up.  He is down to one pantoprazole daily.  He denies any issues with dysphagia, heartburn, no acid reflux.  He is eating and drinking well.  He is followed up with Dr. Doe in endocrinology.  He reports that his most recent A1c is down to 7.2.    He is eating and drinking well.  No smoking, no excess alcohol.    Overall feeling pretty good.    Today's visit was in the office.  Both the patient and I were wearing face masks and proper hand hygiene was performed before and after the physical exam.     Past Medical History:   Diagnosis Date   • Diabetes (CMS/[facility])    • Finger infection     right middle finger       Social History     Socioeconomic History   • Marital status: Unknown     Spouse name: Not on file   • Number of children: Not on file   • Years of education: Not on file   • Highest education level: Not on file   Tobacco Use   • Smoking status: Never Smoker   • Smokeless tobacco: Never Used   Substance and Sexual Activity   • Alcohol use: Never     Frequency: Monthly or less     Drinks per session: 1 or 2     Binge frequency: Never   • Drug use: Never   • Sexual activity: Never         Current Outpatient Medications:   •  ACCU-CHEK FASTCLIX LANCETS misc, , Disp: , Rfl:   •  Blood Glucose Monitoring Suppl (ACCU-CHEK GUIDE) w/Device kit, 1 each 2 (Two) Times a Day. Test blood sugar 1-2 times daily- E11.9, Disp: 1 kit, Rfl: 0  •  Continuous Blood Gluc  (FREESTYLE ESTRELLA READER) device, 1 Device 4 (Four) Times a Day. To check blood sugars,  "Disp: 1 Device, Rfl: 0  •  Continuous Blood Gluc Sensor (FREESTYLE ESTRELLA 14 DAY SENSOR) misc, 1 application Every 14 (Fourteen) Days. Place sensor on upper arm, Disp: 2 each, Rfl: 5  •  glipizide (GLUCOTROL) 10 MG tablet, Take 1 tablet by mouth 2 (Two) Times a Day Before Meals., Disp: 60 tablet, Rfl: 5  •  glucose blood (ACCU-CHEK GUIDE) test strip, 1 each by Other route As Needed. ACCU-CHEK GUIDE TEST STRIPS, Disp: , Rfl:   •  glucose blood (ONETOUCH VERIO) test strip, To check 1 -2 times a day, Disp: 100 each, Rfl: 1  •  metFORMIN (GLUCOPHAGE) 1000 MG tablet, Take 1 tablet by mouth 2 (Two) Times a Day With Meals., Disp: 180 tablet, Rfl: 3  •  Needle, Disp, 30G X 1\" misc, To inject 4 times a day, Disp: 100 each, Rfl: 3  •  ONE TOUCH LANCETS misc, To check 1 -2 times a day, Disp: 200 each, Rfl: 0  •  pantoprazole (PROTONIX) 40 MG EC tablet, Take 1 tablet by mouth Daily., Disp: 90 tablet, Rfl: 0    Review of Systems   Constitutional: Negative for activity change, appetite change and fatigue.   HENT: Negative for sore throat and trouble swallowing.    Respiratory: Negative.    Cardiovascular: Negative.    Gastrointestinal: Negative for abdominal distention, abdominal pain and blood in stool.   Endocrine: Negative for cold intolerance and heat intolerance.   Genitourinary: Negative for difficulty urinating, dysuria and frequency.   Musculoskeletal: Negative for arthralgias, back pain and myalgias.   Skin: Negative.    Hematological: Negative for adenopathy. Does not bruise/bleed easily.   All other systems reviewed and are negative.      Objective   Vitals:    07/15/20 1407   BP: 130/70   Temp: 99 °F (37.2 °C)         07/15/20  1407   Weight: 76.7 kg (169 lb)     Body mass index is 23.57 kg/m².          /70   Temp 99 °F (37.2 °C)   Ht 180.3 cm (71\")   Wt 76.7 kg (169 lb)   BMI 23.57 kg/m²     General Appearance:  Alert, cooperative, no distress, appears stated age   Head:  Normocephalic, without obvious " abnormality, atraumatic   Eyes:  PERRL, conjunctiva/corneas clear, EOM's intact   Ears:  Normal external appearance of ear, hearing intact   Nose: Nares normal,mucosa normal, no drainage or sinus tenderness   Throat: Lips, mucosa, and tongue normal; teeth and gums normal   Neck: Supple, symmetrical, trachea midline, no adenopathy;  thyroid: not enlarged, symmetric, no tenderness/mass/nodule   Back:   Symmetric, no curvature, ROM normal   Lungs:   Clear to auscultation bilaterally, respirations unlabored, effort normal   Heart:  Regular rate and rhythm, S1 and S2 normal, no murmur, rub, or gallop, no lower extremity edema   Abdomen:   Soft, non-tender, bowel sounds active all four quadrants,  no masses, no organomegaly   Rectal: Deferred   Musculoskeletal: Normal gait, normal station, no atrophy of extremities, normal strength and tone   Skin: Normal color, texture, and turgor, no rashes or lesions   Lymph nodes: Cervical and supraclavicular nodes normal   Psychiatric: Alert and oriented x 3, normal mood and affect,  normal recent and remote memory, normal judgment and insight         WBC   Date Value Ref Range Status   01/16/2020 9.86 3.40 - 10.80 10*3/mm3 Final     RBC   Date Value Ref Range Status   01/16/2020 4.96 4.14 - 5.80 10*6/mm3 Final     Hemoglobin   Date Value Ref Range Status   01/16/2020 14.0 13.0 - 17.7 g/dL Final     Hematocrit   Date Value Ref Range Status   01/16/2020 40.6 37.5 - 51.0 % Final     MCV   Date Value Ref Range Status   01/16/2020 81.9 79.0 - 97.0 fL Final     MCH   Date Value Ref Range Status   01/16/2020 28.2 26.6 - 33.0 pg Final     MCHC   Date Value Ref Range Status   01/16/2020 34.5 31.5 - 35.7 g/dL Final     RDW   Date Value Ref Range Status   01/16/2020 13.4 12.3 - 15.4 % Final     RDW-SD   Date Value Ref Range Status   01/16/2020 39.6 37.0 - 54.0 fl Final     MPV   Date Value Ref Range Status   01/16/2020 10.6 6.0 - 12.0 fL Final     Platelets   Date Value Ref Range Status    01/20/2020 412 140 - 450 10*3/mm3 Final     Neutrophil %   Date Value Ref Range Status   01/16/2020 62.8 42.7 - 76.0 % Final     Lymphocyte %   Date Value Ref Range Status   01/16/2020 21.3 19.6 - 45.3 % Final     Monocyte %   Date Value Ref Range Status   01/16/2020 14.0 (H) 5.0 - 12.0 % Final     Eosinophil %   Date Value Ref Range Status   01/16/2020 1.0 0.3 - 6.2 % Final     Basophil %   Date Value Ref Range Status   01/16/2020 0.2 0.0 - 1.5 % Final     Immature Grans %   Date Value Ref Range Status   01/16/2020 0.7 (H) 0.0 - 0.5 % Final     Neutrophils, Absolute   Date Value Ref Range Status   01/16/2020 6.19 1.70 - 7.00 10*3/mm3 Final     Lymphocytes, Absolute   Date Value Ref Range Status   01/16/2020 2.10 0.70 - 3.10 10*3/mm3 Final     Monocytes, Absolute   Date Value Ref Range Status   01/16/2020 1.38 (H) 0.10 - 0.90 10*3/mm3 Final     Eosinophils, Absolute   Date Value Ref Range Status   01/16/2020 0.10 0.00 - 0.40 10*3/mm3 Final     Basophils, Absolute   Date Value Ref Range Status   01/16/2020 0.02 0.00 - 0.20 10*3/mm3 Final     Immature Grans, Absolute   Date Value Ref Range Status   01/16/2020 0.07 (H) 0.00 - 0.05 10*3/mm3 Final     nRBC   Date Value Ref Range Status   01/16/2020 0.0 0.0 - 0.2 /100 WBC Final       Lab Results   Component Value Date    GLUCOSE 139 (H) 01/16/2020    BUN 16 06/29/2020    CREATININE 1.08 06/29/2020    EGFRIFNONA 73 06/29/2020    EGFRIFAFRI 89 06/29/2020    BCR 14.8 06/29/2020    CO2 24.1 06/29/2020    CALCIUM 9.7 06/29/2020    PROTENTOTREF 7.7 06/29/2020    ALBUMIN 4.90 06/29/2020    LABIL2 1.8 06/29/2020    AST 15 06/29/2020    ALT 17 06/29/2020         Imaging Results (Last 7 Days)     ** No results found for the last 168 hours. **            Assessment/Plan    1.  LA class D esophagitis: From his January EGD.  He completed 3 months of twice daily PPI and is now down to once daily PPI and he is doing quite well and symptom-free    2.  Esophageal candidiasis: He  completed fluconazole    Plan  Continue once daily pantoprazole  EGD for evaluation of the esophagitis, to ensure healing of the prior severe esophagitis and to rule out underlying Zamora's esophagus    Joby was seen today for hospital follow up.    Diagnoses and all orders for this visit:    Esophagitis  -     Case Request; Standing  -     Follow Anesthesia Guidelines / Standing Orders; Future  -     Obtain Informed Consent; Future  -     Case Request    Esophageal candidiasis (CMS/[facility])        Dictated utilizing Dragon dictation

## 2020-07-15 NOTE — PROGRESS NOTES
Chief Complaint   Patient presents with   • Hospital follow up     Subjective     HPI  Joby Kulkarni is a 50 y.o. male who presents for hospital follow-up.  He was seen back in January for an abnormal esophagram.  He was admitted due to new onset diabetes.  EGD demonstrated candidal esophagitis as well as LA class D esophagitis.  He has been on PPI.  Due to the COVID 19 pandemic, he has not been able to follow-up in office until now.    He is doing well in follow up.  He is down to one pantoprazole daily.  He denies any issues with dysphagia, heartburn, no acid reflux.  He is eating and drinking well.  He is followed up with Dr. Doe in endocrinology.  He reports that his most recent A1c is down to 7.2.    He is eating and drinking well.  No smoking, no excess alcohol.    Overall feeling pretty good.    Today's visit was in the office.  Both the patient and I were wearing face masks and proper hand hygiene was performed before and after the physical exam.     Past Medical History:   Diagnosis Date   • Diabetes (CMS/[facility])    • Finger infection     right middle finger       Social History     Socioeconomic History   • Marital status: Unknown     Spouse name: Not on file   • Number of children: Not on file   • Years of education: Not on file   • Highest education level: Not on file   Tobacco Use   • Smoking status: Never Smoker   • Smokeless tobacco: Never Used   Substance and Sexual Activity   • Alcohol use: Never     Frequency: Monthly or less     Drinks per session: 1 or 2     Binge frequency: Never   • Drug use: Never   • Sexual activity: Never         Current Outpatient Medications:   •  ACCU-CHEK FASTCLIX LANCETS misc, , Disp: , Rfl:   •  Blood Glucose Monitoring Suppl (ACCU-CHEK GUIDE) w/Device kit, 1 each 2 (Two) Times a Day. Test blood sugar 1-2 times daily- E11.9, Disp: 1 kit, Rfl: 0  •  Continuous Blood Gluc  (FREESTYLE ESTRELLA READER) device, 1 Device 4 (Four) Times a Day. To check blood sugars,  "Disp: 1 Device, Rfl: 0  •  Continuous Blood Gluc Sensor (FREESTYLE ESTRELLA 14 DAY SENSOR) misc, 1 application Every 14 (Fourteen) Days. Place sensor on upper arm, Disp: 2 each, Rfl: 5  •  glipizide (GLUCOTROL) 10 MG tablet, Take 1 tablet by mouth 2 (Two) Times a Day Before Meals., Disp: 60 tablet, Rfl: 5  •  glucose blood (ACCU-CHEK GUIDE) test strip, 1 each by Other route As Needed. ACCU-CHEK GUIDE TEST STRIPS, Disp: , Rfl:   •  glucose blood (ONETOUCH VERIO) test strip, To check 1 -2 times a day, Disp: 100 each, Rfl: 1  •  metFORMIN (GLUCOPHAGE) 1000 MG tablet, Take 1 tablet by mouth 2 (Two) Times a Day With Meals., Disp: 180 tablet, Rfl: 3  •  Needle, Disp, 30G X 1\" misc, To inject 4 times a day, Disp: 100 each, Rfl: 3  •  ONE TOUCH LANCETS misc, To check 1 -2 times a day, Disp: 200 each, Rfl: 0  •  pantoprazole (PROTONIX) 40 MG EC tablet, Take 1 tablet by mouth Daily., Disp: 90 tablet, Rfl: 0    Review of Systems   Constitutional: Negative for activity change, appetite change and fatigue.   HENT: Negative for sore throat and trouble swallowing.    Respiratory: Negative.    Cardiovascular: Negative.    Gastrointestinal: Negative for abdominal distention, abdominal pain and blood in stool.   Endocrine: Negative for cold intolerance and heat intolerance.   Genitourinary: Negative for difficulty urinating, dysuria and frequency.   Musculoskeletal: Negative for arthralgias, back pain and myalgias.   Skin: Negative.    Hematological: Negative for adenopathy. Does not bruise/bleed easily.   All other systems reviewed and are negative.      Objective   Vitals:    07/15/20 1407   BP: 130/70   Temp: 99 °F (37.2 °C)         07/15/20  1407   Weight: 76.7 kg (169 lb)     Body mass index is 23.57 kg/m².          /70   Temp 99 °F (37.2 °C)   Ht 180.3 cm (71\")   Wt 76.7 kg (169 lb)   BMI 23.57 kg/m²     General Appearance:  Alert, cooperative, no distress, appears stated age   Head:  Normocephalic, without obvious " abnormality, atraumatic   Eyes:  PERRL, conjunctiva/corneas clear, EOM's intact   Ears:  Normal external appearance of ear, hearing intact   Nose: Nares normal,mucosa normal, no drainage or sinus tenderness   Throat: Lips, mucosa, and tongue normal; teeth and gums normal   Neck: Supple, symmetrical, trachea midline, no adenopathy;  thyroid: not enlarged, symmetric, no tenderness/mass/nodule   Back:   Symmetric, no curvature, ROM normal   Lungs:   Clear to auscultation bilaterally, respirations unlabored, effort normal   Heart:  Regular rate and rhythm, S1 and S2 normal, no murmur, rub, or gallop, no lower extremity edema   Abdomen:   Soft, non-tender, bowel sounds active all four quadrants,  no masses, no organomegaly   Rectal: Deferred   Musculoskeletal: Normal gait, normal station, no atrophy of extremities, normal strength and tone   Skin: Normal color, texture, and turgor, no rashes or lesions   Lymph nodes: Cervical and supraclavicular nodes normal   Psychiatric: Alert and oriented x 3, normal mood and affect,  normal recent and remote memory, normal judgment and insight         WBC   Date Value Ref Range Status   01/16/2020 9.86 3.40 - 10.80 10*3/mm3 Final     RBC   Date Value Ref Range Status   01/16/2020 4.96 4.14 - 5.80 10*6/mm3 Final     Hemoglobin   Date Value Ref Range Status   01/16/2020 14.0 13.0 - 17.7 g/dL Final     Hematocrit   Date Value Ref Range Status   01/16/2020 40.6 37.5 - 51.0 % Final     MCV   Date Value Ref Range Status   01/16/2020 81.9 79.0 - 97.0 fL Final     MCH   Date Value Ref Range Status   01/16/2020 28.2 26.6 - 33.0 pg Final     MCHC   Date Value Ref Range Status   01/16/2020 34.5 31.5 - 35.7 g/dL Final     RDW   Date Value Ref Range Status   01/16/2020 13.4 12.3 - 15.4 % Final     RDW-SD   Date Value Ref Range Status   01/16/2020 39.6 37.0 - 54.0 fl Final     MPV   Date Value Ref Range Status   01/16/2020 10.6 6.0 - 12.0 fL Final     Platelets   Date Value Ref Range Status    01/20/2020 412 140 - 450 10*3/mm3 Final     Neutrophil %   Date Value Ref Range Status   01/16/2020 62.8 42.7 - 76.0 % Final     Lymphocyte %   Date Value Ref Range Status   01/16/2020 21.3 19.6 - 45.3 % Final     Monocyte %   Date Value Ref Range Status   01/16/2020 14.0 (H) 5.0 - 12.0 % Final     Eosinophil %   Date Value Ref Range Status   01/16/2020 1.0 0.3 - 6.2 % Final     Basophil %   Date Value Ref Range Status   01/16/2020 0.2 0.0 - 1.5 % Final     Immature Grans %   Date Value Ref Range Status   01/16/2020 0.7 (H) 0.0 - 0.5 % Final     Neutrophils, Absolute   Date Value Ref Range Status   01/16/2020 6.19 1.70 - 7.00 10*3/mm3 Final     Lymphocytes, Absolute   Date Value Ref Range Status   01/16/2020 2.10 0.70 - 3.10 10*3/mm3 Final     Monocytes, Absolute   Date Value Ref Range Status   01/16/2020 1.38 (H) 0.10 - 0.90 10*3/mm3 Final     Eosinophils, Absolute   Date Value Ref Range Status   01/16/2020 0.10 0.00 - 0.40 10*3/mm3 Final     Basophils, Absolute   Date Value Ref Range Status   01/16/2020 0.02 0.00 - 0.20 10*3/mm3 Final     Immature Grans, Absolute   Date Value Ref Range Status   01/16/2020 0.07 (H) 0.00 - 0.05 10*3/mm3 Final     nRBC   Date Value Ref Range Status   01/16/2020 0.0 0.0 - 0.2 /100 WBC Final       Lab Results   Component Value Date    GLUCOSE 139 (H) 01/16/2020    BUN 16 06/29/2020    CREATININE 1.08 06/29/2020    EGFRIFNONA 73 06/29/2020    EGFRIFAFRI 89 06/29/2020    BCR 14.8 06/29/2020    CO2 24.1 06/29/2020    CALCIUM 9.7 06/29/2020    PROTENTOTREF 7.7 06/29/2020    ALBUMIN 4.90 06/29/2020    LABIL2 1.8 06/29/2020    AST 15 06/29/2020    ALT 17 06/29/2020         Imaging Results (Last 7 Days)     ** No results found for the last 168 hours. **            Assessment/Plan    1.  LA class D esophagitis: From his January EGD.  He completed 3 months of twice daily PPI and is now down to once daily PPI and he is doing quite well and symptom-free    2.  Esophageal candidiasis: He  completed fluconazole    Plan  Continue once daily pantoprazole  EGD for evaluation of the esophagitis, to ensure healing of the prior severe esophagitis and to rule out underlying Zamora's esophagus    Joby was seen today for hospital follow up.    Diagnoses and all orders for this visit:    Esophagitis  -     Case Request; Standing  -     Follow Anesthesia Guidelines / Standing Orders; Future  -     Obtain Informed Consent; Future  -     Case Request    Esophageal candidiasis (CMS/[facility])        Dictated utilizing Dragon dictation

## 2020-07-21 ENCOUNTER — TRANSCRIBE ORDERS (OUTPATIENT)
Dept: SLEEP MEDICINE | Facility: HOSPITAL | Age: 50
End: 2020-07-21

## 2020-07-21 DIAGNOSIS — Z01.818 OTHER SPECIFIED PRE-OPERATIVE EXAMINATION: Primary | ICD-10-CM

## 2020-07-22 ENCOUNTER — TELEPHONE (OUTPATIENT)
Dept: GASTROENTEROLOGY | Facility: CLINIC | Age: 50
End: 2020-07-22

## 2020-07-22 ENCOUNTER — LAB (OUTPATIENT)
Dept: LAB | Facility: HOSPITAL | Age: 50
End: 2020-07-22

## 2020-07-22 DIAGNOSIS — Z01.818 OTHER SPECIFIED PRE-OPERATIVE EXAMINATION: ICD-10-CM

## 2020-07-22 PROCEDURE — C9803 HOPD COVID-19 SPEC COLLECT: HCPCS

## 2020-07-22 PROCEDURE — U0004 COV-19 TEST NON-CDC HGH THRU: HCPCS

## 2020-07-22 NOTE — TELEPHONE ENCOUNTER
----- Message from Siena Cantu sent at 7/21/2020  3:53 PM EDT -----  Regarding: covid-19 test   Contact: [phone]  Pt need a work note due to leaving work for testing      Pt work fax # [phone]

## 2020-07-23 LAB
REF LAB TEST METHOD: NORMAL
SARS-COV-2 RNA RESP QL NAA+PROBE: NOT DETECTED

## 2020-07-24 ENCOUNTER — ANESTHESIA (OUTPATIENT)
Dept: GASTROENTEROLOGY | Facility: HOSPITAL | Age: 50
End: 2020-07-24

## 2020-07-24 ENCOUNTER — HOSPITAL ENCOUNTER (OUTPATIENT)
Facility: HOSPITAL | Age: 50
Setting detail: HOSPITAL OUTPATIENT SURGERY
Discharge: HOME | End: 2020-07-24
Attending: INTERNAL MEDICINE | Admitting: INTERNAL MEDICINE

## 2020-07-24 ENCOUNTER — ANESTHESIA EVENT (OUTPATIENT)
Dept: GASTROENTEROLOGY | Facility: HOSPITAL | Age: 50
End: 2020-07-24

## 2020-07-24 VITALS
DIASTOLIC BLOOD PRESSURE: 68 MMHG | TEMPERATURE: 98.1 F | OXYGEN SATURATION: 97 % | SYSTOLIC BLOOD PRESSURE: 101 MMHG | BODY MASS INDEX: 23.28 KG/M2 | WEIGHT: 166.31 LBS | HEART RATE: 76 BPM | RESPIRATION RATE: 16 BRPM | HEIGHT: 71 IN

## 2020-07-24 DIAGNOSIS — K20.90 ESOPHAGITIS: ICD-10-CM

## 2020-07-24 LAB — GLUCOSE BLDC GLUCOMTR-MCNC: 122 MG/DL (ref 70–130)

## 2020-07-24 PROCEDURE — 25010000002 PROPOFOL 10 MG/ML EMULSION: Performed by: ANESTHESIOLOGY

## 2020-07-24 PROCEDURE — 43239 EGD BIOPSY SINGLE/MULTIPLE: CPT | Performed by: INTERNAL MEDICINE

## 2020-07-24 PROCEDURE — 82962 GLUCOSE BLOOD TEST: CPT

## 2020-07-24 PROCEDURE — 88305 TISSUE EXAM BY PATHOLOGIST: CPT | Performed by: INTERNAL MEDICINE

## 2020-07-24 RX ORDER — SODIUM CHLORIDE, SODIUM LACTATE, POTASSIUM CHLORIDE, CALCIUM CHLORIDE 600; 310; 30; 20 MG/100ML; MG/100ML; MG/100ML; MG/100ML
30 INJECTION, SOLUTION INTRAVENOUS CONTINUOUS
Status: DISCONTINUED | OUTPATIENT
Start: 2020-07-24 | End: 2020-07-24 | Stop reason: HOSPADM

## 2020-07-24 RX ORDER — SODIUM CHLORIDE 0.9 % (FLUSH) 0.9 %
10 SYRINGE (ML) INJECTION AS NEEDED
Status: DISCONTINUED | OUTPATIENT
Start: 2020-07-24 | End: 2020-07-24 | Stop reason: HOSPADM

## 2020-07-24 RX ORDER — PANTOPRAZOLE SODIUM 20 MG/1
20 TABLET, DELAYED RELEASE ORAL DAILY
Qty: 90 TABLET | Refills: 1 | Status: SHIPPED | OUTPATIENT
Start: 2020-07-24 | End: 2022-12-06

## 2020-07-24 RX ORDER — LIDOCAINE HYDROCHLORIDE 20 MG/ML
INJECTION, SOLUTION INFILTRATION; PERINEURAL AS NEEDED
Status: DISCONTINUED | OUTPATIENT
Start: 2020-07-24 | End: 2020-07-24 | Stop reason: SURG

## 2020-07-24 RX ORDER — SODIUM CHLORIDE 0.9 % (FLUSH) 0.9 %
3 SYRINGE (ML) INJECTION EVERY 12 HOURS SCHEDULED
Status: DISCONTINUED | OUTPATIENT
Start: 2020-07-24 | End: 2020-07-24 | Stop reason: HOSPADM

## 2020-07-24 RX ORDER — PROPOFOL 10 MG/ML
VIAL (ML) INTRAVENOUS AS NEEDED
Status: DISCONTINUED | OUTPATIENT
Start: 2020-07-24 | End: 2020-07-24 | Stop reason: SURG

## 2020-07-24 RX ADMIN — PROPOFOL 30 MG: 10 INJECTION, EMULSION INTRAVENOUS at 15:44

## 2020-07-24 RX ADMIN — PROPOFOL 100 MG: 10 INJECTION, EMULSION INTRAVENOUS at 15:42

## 2020-07-24 RX ADMIN — SODIUM CHLORIDE, POTASSIUM CHLORIDE, SODIUM LACTATE AND CALCIUM CHLORIDE 30 ML/HR: 600; 310; 30; 20 INJECTION, SOLUTION INTRAVENOUS at 15:25

## 2020-07-24 RX ADMIN — LIDOCAINE HYDROCHLORIDE 60 MG: 20 INJECTION, SOLUTION INFILTRATION; PERINEURAL at 15:42

## 2020-07-24 RX ADMIN — PROPOFOL 30 MG: 10 INJECTION, EMULSION INTRAVENOUS at 15:47

## 2020-07-24 NOTE — ANESTHESIA POSTPROCEDURE EVALUATION
"Patient: Joby Kulkarni    Procedure Summary     Date:  07/24/20 Room / Location:   [facility]    Anesthesia Start:  1542 Anesthesia Stop:  1554    Procedure:  ESOPHAGOGASTRODUODENOSCOPY with biopsies (N/A Esophagus) Diagnosis:      Surgeon:  Ruby Albarran MD Provider:  Westley Zafar MD    Anesthesia Type:  MAC ASA Status:  3          Anesthesia Type: MAC    Vitals  No vitals data found for the desired time range.          Post Anesthesia Care and Evaluation    Patient location during evaluation: bedside  Patient participation: complete - patient participated  Level of consciousness: sleepy but conscious  Pain management: adequate  Airway patency: patent  Anesthetic complications: No anesthetic complications  PONV Status: none  Cardiovascular status: acceptable and hemodynamically stable  Respiratory status: acceptable, nasal cannula and spontaneous ventilation  Hydration status: acceptable    Comments: /77 (BP Location: Left arm, Patient Position: Lying)   Pulse 74   Temp 36.7 °C (98.1 °F) (Oral)   Resp 18   Ht 180.3 cm (71\")   Wt 75.4 kg (166 lb 5 oz)   BMI 23.20 kg/m²         "

## 2020-07-24 NOTE — ANESTHESIA PREPROCEDURE EVALUATION
Anesthesia Evaluation     Patient summary reviewed and Nursing notes reviewed   NPO Solid Status: > 8 hours  NPO Liquid Status: > 8 hours           Airway   Mallampati: II  TM distance: >3 FB  Neck ROM: full  No difficulty expected  Dental - normal exam     Pulmonary - negative pulmonary ROS and normal exam   Cardiovascular - negative cardio ROS and normal exam  Exercise tolerance: good (4-7 METS)        Neuro/Psych- negative ROS  GI/Hepatic/Renal/Endo    (+)  GERD,  diabetes mellitus type 2,     Musculoskeletal (-) negative ROS    Abdominal    Substance History - negative use     OB/GYN          Other - negative ROS                       Anesthesia Plan    ASA 3     MAC     intravenous induction     Anesthetic plan, all risks, benefits, and alternatives have been provided, discussed and informed consent has been obtained with: patient.

## 2020-07-27 LAB
CYTO UR: NORMAL
LAB AP CASE REPORT: NORMAL
LAB AP CLINICAL INFORMATION: NORMAL
PATH REPORT.FINAL DX SPEC: NORMAL
PATH REPORT.GROSS SPEC: NORMAL

## 2020-07-29 ENCOUNTER — TELEPHONE (OUTPATIENT)
Dept: GASTROENTEROLOGY | Facility: CLINIC | Age: 50
End: 2020-07-29

## 2020-07-30 ENCOUNTER — TELEPHONE (OUTPATIENT)
Dept: GASTROENTEROLOGY | Facility: CLINIC | Age: 50
End: 2020-07-30

## 2020-07-30 NOTE — PROGRESS NOTES
EGD showed some mild gastritis. Continue the lower dose pantoprazole (20mg) and office follow up in ~6 months.

## 2020-07-30 NOTE — TELEPHONE ENCOUNTER
----- Message from Ruby Albarran MD sent at 7/30/2020 12:38 PM EDT -----  EGD showed some mild gastritis. Continue the lower dose pantoprazole (20mg) and office follow up in ~6 months.

## 2020-07-30 NOTE — TELEPHONE ENCOUNTER
Call to pt.  Advise per Dr Albarran note.  Verb understanding.     O/v for 1/24/21 placed in recall.

## 2020-12-01 RX ORDER — GLIPIZIDE 10 MG/1
TABLET ORAL
Qty: 180 TABLET | Refills: 4 | Status: SHIPPED | OUTPATIENT
Start: 2020-12-01 | End: 2021-01-11 | Stop reason: SDUPTHER

## 2020-12-28 ENCOUNTER — LAB (OUTPATIENT)
Dept: ENDOCRINOLOGY | Age: 50
End: 2020-12-28

## 2020-12-28 DIAGNOSIS — E78.2 MIXED HYPERLIPIDEMIA: ICD-10-CM

## 2020-12-28 DIAGNOSIS — E11.9 DIABETES MELLITUS, NEW ONSET (HCC): Primary | ICD-10-CM

## 2020-12-28 DIAGNOSIS — E11.9 DIABETES MELLITUS, NEW ONSET (HCC): ICD-10-CM

## 2020-12-29 LAB
ALBUMIN SERPL-MCNC: 4.5 G/DL (ref 3.5–5.2)
ALBUMIN/CREAT UR: 3 MG/G CREAT (ref 0–29)
ALBUMIN/GLOB SERPL: 1.7 G/DL
ALP SERPL-CCNC: 56 U/L (ref 39–117)
ALT SERPL-CCNC: 28 U/L (ref 1–41)
AST SERPL-CCNC: 20 U/L (ref 1–40)
BILIRUB SERPL-MCNC: 0.9 MG/DL (ref 0–1.2)
BUN SERPL-MCNC: 12 MG/DL (ref 6–20)
BUN/CREAT SERPL: 10.8 (ref 7–25)
CALCIUM SERPL-MCNC: 9.3 MG/DL (ref 8.6–10.5)
CHLORIDE SERPL-SCNC: 103 MMOL/L (ref 98–107)
CHOLEST SERPL-MCNC: 224 MG/DL (ref 0–200)
CO2 SERPL-SCNC: 26 MMOL/L (ref 22–29)
CREAT SERPL-MCNC: 1.11 MG/DL (ref 0.76–1.27)
CREAT UR-MCNC: 119.2 MG/DL
GLOBULIN SER CALC-MCNC: 2.7 GM/DL
GLUCOSE SERPL-MCNC: 215 MG/DL (ref 65–99)
HBA1C MFR BLD: 7.5 % (ref 4.8–5.6)
HDLC SERPL-MCNC: 41 MG/DL (ref 40–60)
INTERPRETATION: NORMAL
LDLC SERPL CALC-MCNC: 87 MG/DL (ref 0–100)
Lab: NORMAL
MICROALBUMIN UR-MCNC: 3.9 UG/ML
POTASSIUM SERPL-SCNC: 4.9 MMOL/L (ref 3.5–5.2)
PROT SERPL-MCNC: 7.2 G/DL (ref 6–8.5)
SODIUM SERPL-SCNC: 136 MMOL/L (ref 136–145)
TRIGL SERPL-MCNC: 591 MG/DL (ref 0–150)
VLDLC SERPL CALC-MCNC: 96 MG/DL (ref 5–40)

## 2021-01-11 ENCOUNTER — OFFICE VISIT (OUTPATIENT)
Dept: ENDOCRINOLOGY | Age: 51
End: 2021-01-11

## 2021-01-11 VITALS
DIASTOLIC BLOOD PRESSURE: 70 MMHG | HEIGHT: 69 IN | HEART RATE: 82 BPM | OXYGEN SATURATION: 99 % | SYSTOLIC BLOOD PRESSURE: 128 MMHG | BODY MASS INDEX: 27.22 KG/M2 | WEIGHT: 183.8 LBS

## 2021-01-11 DIAGNOSIS — IMO0002 DM (DIABETES MELLITUS), TYPE 2, UNCONTROLLED W/NEUROLOGIC COMPLICATION: Primary | Chronic | ICD-10-CM

## 2021-01-11 DIAGNOSIS — E78.2 MIXED HYPERLIPIDEMIA: ICD-10-CM

## 2021-01-11 PROCEDURE — 95251 CONT GLUC MNTR ANALYSIS I&R: CPT | Performed by: INTERNAL MEDICINE

## 2021-01-11 PROCEDURE — 99214 OFFICE O/P EST MOD 30 MIN: CPT | Performed by: INTERNAL MEDICINE

## 2021-01-11 RX ORDER — GLIPIZIDE 10 MG/1
10 TABLET ORAL
Qty: 180 TABLET | Refills: 4
Start: 2021-01-11 | End: 2022-02-01

## 2021-01-11 RX ORDER — ATORVASTATIN CALCIUM 10 MG/1
10 TABLET, FILM COATED ORAL NIGHTLY
Qty: 30 TABLET | Refills: 11 | Status: SHIPPED | OUTPATIENT
Start: 2021-01-11 | End: 2021-09-08

## 2021-01-11 NOTE — PROGRESS NOTES
"Chief Complaint  Diabetes  FOLLOW UP/ TYPE 2 DM  Subjective            History of Present Illness   50-year-old -American male is here for the follow-up of type 2 diabetes mellitus.  Roge 65 levels were checked in January 2020 which were negative.  C-peptide levels were positive    Type 2 diabetes mellitus-diagnosed 2-3 years ago.  On glipizide and twice daily, on metformin 1000 mg twice daily.  He has the freestyle arnulfo with which he monitors his blood sugars.  Average blood sugars are around 180s.  No known history of diabetic retinopathy  Does have history of diabetic neuropathy.    Hyperlipidemia  Not on any medications.    Reviewed primary care physician's/consulting physician documentation and lab results     I have reviewed the patient's allergies, medicines, past medical hx, family hx and social hx in detail.    Objective   Vital Signs:   /70   Pulse 82   Ht 175.3 cm (69\")   Wt 83.4 kg (183 lb 12.8 oz)   SpO2 99%   BMI 27.14 kg/m²     Physical Exam   General appearance - no distress  Eyes- anicteric sclera  Ear nose and throat-external ears and nose normal.    Respiratory-normal chest on inspection.  No respiratory distress noted.  Musculoskeletal-no edema.    Skin-no rashes.  Neuro-alert and oriented x3        Result Review :   The following data was reviewed by: Lexie Doe MD on 01/11/2021:  Lab on 12/28/2020   Component Date Value Ref Range Status   • Glucose 12/28/2020 215* 65 - 99 mg/dL Final   • BUN 12/28/2020 12  6 - 20 mg/dL Final   • Creatinine 12/28/2020 1.11  0.76 - 1.27 mg/dL Final   • eGFR Non  Am 12/28/2020 71  >60 mL/min/1.73 Final    Comment: GFR Normal >60  Chronic Kidney Disease <60  Kidney Failure <15     • eGFR  Am 12/28/2020 86  >60 mL/min/1.73 Final   • BUN/Creatinine Ratio 12/28/2020 10.8  7.0 - 25.0 Final   • Sodium 12/28/2020 136  136 - 145 mmol/L Final   • Potassium 12/28/2020 4.9  3.5 - 5.2 mmol/L Final   • Chloride 12/28/2020 103  98 - 107 " mmol/L Final   • Total CO2 12/28/2020 26.0  22.0 - 29.0 mmol/L Final   • Calcium 12/28/2020 9.3  8.6 - 10.5 mg/dL Final   • Total Protein 12/28/2020 7.2  6.0 - 8.5 g/dL Final   • Albumin 12/28/2020 4.50  3.50 - 5.20 g/dL Final   • Globulin 12/28/2020 2.7  gm/dL Final   • A/G Ratio 12/28/2020 1.7  g/dL Final   • Total Bilirubin 12/28/2020 0.9  0.0 - 1.2 mg/dL Final   • Alkaline Phosphatase 12/28/2020 56  39 - 117 U/L Final   • AST (SGOT) 12/28/2020 20  1 - 40 U/L Final   • ALT (SGPT) 12/28/2020 28  1 - 41 U/L Final   • Hemoglobin A1C 12/28/2020 7.50* 4.80 - 5.60 % Final    Comment: Hemoglobin A1C Ranges:  Increased Risk for Diabetes  5.7% to 6.4%  Diabetes                     >= 6.5%  Diabetic Goal                < 7.0%     • Total Cholesterol 12/28/2020 224* 0 - 200 mg/dL Final    Comment: Cholesterol Reference Ranges  (U.S. Department of Health and Human Services ATP III  Classifications)  Desirable          <200 mg/dL  Borderline High    200-239 mg/dL  High Risk          >240 mg/dL  Triglyceride Reference Ranges  (U.S. Department of Health and Human Services ATP III  Classifications)  Normal           <150 mg/dL  Borderline High  150-199 mg/dL  High             200-499 mg/dL  Very High        >500 mg/dL  HDL Reference Ranges  (U.S. Department of Health and Human Services ATP III  Classifcations)  Low     <40 mg/dl (major risk factor for CHD)  High    >60 mg/dl ('negative' risk factor for CHD)  LDL Reference Ranges  (U.S. Department of Health and Human Services ATP III  Classifcations)  Optimal          <100 mg/dL  Near Optimal     100-129 mg/dL  Borderline High  130-159 mg/dL  High             160-189 mg/dL  Very High        >189 mg/dL     • Triglycerides 12/28/2020 591* 0 - 150 mg/dL Final   • HDL Cholesterol 12/28/2020 41  40 - 60 mg/dL Final   • VLDL Cholesterol Arnoldo 12/28/2020 96* 5 - 40 mg/dL Final   • LDL Chol Calc ([facility]) 12/28/2020 87  0 - 100 mg/dL Final   • Creatinine, Urine 12/28/2020 119.2  Not Estab.  mg/dL Final   • Microalbumin, Urine 12/28/2020 3.9  Not Estab. ug/mL Final   • Microalbumin/Creatinine Ratio 12/28/2020 3  0 - 29 mg/g creat Final    Comment:                        Normal:                0 -  29                         Moderately increased: 30 - 300                         Severely increased:       >300     • Interpretation 12/28/2020 Note   Final    Supplemental report is available.   • PDF Image 12/28/2020 Not applicable   Final     Data reviewed: Blood work-up results, CGM information.        I reviewed the patient's new clinical results and mentioned them above in HPI and in plan as well.          Assessment and Plan    Problem List Items Addressed This Visit     None      Visit Diagnoses     DM (diabetes mellitus), type 2, uncontrolled w/neurologic complication (CMS/HCC)  (Chronic)   -  Primary    Relevant Medications    glipizide (GLUCOTROL) 10 MG tablet    Other Relevant Orders    Hemoglobin A1c    Basic Metabolic Panel    Lipid Panel    TSH    T4, Free    Hemoglobin A1c    Basic Metabolic Panel    Lipid Panel    Microalbumin / Creatinine Urine Ratio - Urine, Clean Catch    Vitamin B12 & Folate    Vitamin D 25 Hydroxy    Mixed hyperlipidemia        Relevant Medications    atorvastatin (Lipitor) 10 MG tablet    Other Relevant Orders    Hemoglobin A1c    Basic Metabolic Panel    Lipid Panel    TSH    T4, Free    Hemoglobin A1c    Basic Metabolic Panel    Lipid Panel    Microalbumin / Creatinine Urine Ratio - Urine, Clean Catch    Vitamin B12 & Folate    Vitamin D 25 Hydroxy          Type 2 diabetes mellitus-uncontrolled  HbA1c is worse  Continue glipizide and Metformin  Discussed with the patient about adding Januvia.  He would like to work on his diet and exercise before proceeding with the medication.    Hyperlipidemia  Start atorvastatin 10 mg oral daily  Discussed the benefits and the side effects of the medication  Advised the patient to take over-the-counter fish oil for his elevated  triglyceride levels.    Reviewed the information of the CGM after downloading it and made the current insulin changes.    Refills/Meds sent to pharmacy    Interpreted the blood work-up/imaging results performed by the primary care/consulting physician -    Patient was given instructions and counseling regarding his condition or for health maintenance advice. Please see specific information pulled into the AVS if appropriate.

## 2021-03-23 DIAGNOSIS — E78.2 MIXED HYPERLIPIDEMIA: ICD-10-CM

## 2021-03-23 DIAGNOSIS — R68.89 ABNORMAL ENDOCRINE LABORATORY TEST FINDING: ICD-10-CM

## 2021-03-23 DIAGNOSIS — IMO0002 DM (DIABETES MELLITUS), TYPE 2, UNCONTROLLED W/NEUROLOGIC COMPLICATION: Primary | ICD-10-CM

## 2021-03-29 ENCOUNTER — LAB (OUTPATIENT)
Dept: ENDOCRINOLOGY | Age: 51
End: 2021-03-29

## 2021-03-29 DIAGNOSIS — IMO0002 DM (DIABETES MELLITUS), TYPE 2, UNCONTROLLED W/NEUROLOGIC COMPLICATION: ICD-10-CM

## 2021-03-29 DIAGNOSIS — R68.89 ABNORMAL ENDOCRINE LABORATORY TEST FINDING: ICD-10-CM

## 2021-03-29 DIAGNOSIS — E78.2 MIXED HYPERLIPIDEMIA: ICD-10-CM

## 2021-03-30 LAB
25(OH)D3+25(OH)D2 SERPL-MCNC: 9.8 NG/ML (ref 30–100)
ALBUMIN/CREAT UR: 13 MG/G CREAT (ref 0–29)
BUN SERPL-MCNC: 9 MG/DL (ref 6–20)
BUN/CREAT SERPL: 8.8 (ref 7–25)
CALCIUM SERPL-MCNC: 9.4 MG/DL (ref 8.6–10.5)
CHLORIDE SERPL-SCNC: 104 MMOL/L (ref 98–107)
CHOLEST SERPL-MCNC: 159 MG/DL (ref 0–200)
CO2 SERPL-SCNC: 24.2 MMOL/L (ref 22–29)
CREAT SERPL-MCNC: 1.02 MG/DL (ref 0.76–1.27)
CREAT UR-MCNC: 186.4 MG/DL
FOLATE SERPL-MCNC: 6.1 NG/ML (ref 4.78–24.2)
GLUCOSE SERPL-MCNC: 153 MG/DL (ref 65–99)
HBA1C MFR BLD: 8.7 % (ref 4.8–5.6)
HDLC SERPL-MCNC: 49 MG/DL (ref 40–60)
IMP & REVIEW OF LAB RESULTS: NORMAL
LDLC SERPL CALC-MCNC: 84 MG/DL (ref 0–100)
Lab: NORMAL
MICROALBUMIN UR-MCNC: 23.6 UG/ML
POTASSIUM SERPL-SCNC: 4 MMOL/L (ref 3.5–5.2)
SODIUM SERPL-SCNC: 140 MMOL/L (ref 136–145)
T4 FREE SERPL-MCNC: 1.26 NG/DL (ref 0.93–1.7)
TRIGL SERPL-MCNC: 153 MG/DL (ref 0–150)
TSH SERPL DL<=0.005 MIU/L-ACNC: 1.78 UIU/ML (ref 0.27–4.2)
VIT B12 SERPL-MCNC: 335 PG/ML (ref 211–946)
VLDLC SERPL CALC-MCNC: 26 MG/DL (ref 5–40)

## 2021-03-31 ENCOUNTER — BULK ORDERING (OUTPATIENT)
Dept: CASE MANAGEMENT | Facility: OTHER | Age: 51
End: 2021-03-31

## 2021-03-31 DIAGNOSIS — Z23 IMMUNIZATION DUE: ICD-10-CM

## 2021-04-12 ENCOUNTER — OFFICE VISIT (OUTPATIENT)
Dept: ENDOCRINOLOGY | Age: 51
End: 2021-04-12

## 2021-04-12 VITALS
HEIGHT: 69 IN | BODY MASS INDEX: 27.49 KG/M2 | WEIGHT: 185.6 LBS | DIASTOLIC BLOOD PRESSURE: 84 MMHG | SYSTOLIC BLOOD PRESSURE: 126 MMHG

## 2021-04-12 DIAGNOSIS — E11.65 TYPE 2 DIABETES MELLITUS WITH HYPERGLYCEMIA, WITH LONG-TERM CURRENT USE OF INSULIN (HCC): Primary | ICD-10-CM

## 2021-04-12 DIAGNOSIS — Z79.4 TYPE 2 DIABETES MELLITUS WITH HYPERGLYCEMIA, WITH LONG-TERM CURRENT USE OF INSULIN (HCC): Primary | ICD-10-CM

## 2021-04-12 PROCEDURE — 99214 OFFICE O/P EST MOD 30 MIN: CPT | Performed by: NURSE PRACTITIONER

## 2021-04-12 RX ORDER — ERTUGLIFLOZIN AND METFORMIN HYDROCHLORIDE 2.5; 1 MG/1; MG/1
1 TABLET, FILM COATED ORAL 2 TIMES DAILY
Qty: 60 TABLET | Refills: 5 | Status: SHIPPED | OUTPATIENT
Start: 2021-04-12 | End: 2021-05-04

## 2021-04-12 NOTE — PATIENT INSTRUCTIONS
[phone]  Pam Pike, APRN     [phone] (urology for viagra/ED)    Stop metformin when starting Segluromet     Ertugliflozin; Metformin tablets  What is this medicine?  ERTUGLIFLOZIN; METFORMIN (er TOO gli FLOE zin; met FOR min) is a combination of 2 medicines used to treat type 2 diabetes. This medicine lowers blood sugar. Treatment is combined with a balanced diet and exercise.  This medicine may be used for other purposes; ask your health care provider or pharmacist if you have questions.  COMMON BRAND NAME(S): Segluromet  What should I tell my health care provider before I take this medicine?  They need to know if you have any of these conditions:  · anemia  · artery disease  · dehydration  · diabetic ketoacidosis  · diet low in salt  · eating less due to illness, surgery, dieting, or any other reason  · foot sores  · frequently drink alcohol-containing beverages  · having surgery  · heart disease  · high cholesterol  · history of amputation  · history of pancreatitis or pancreas problems  · history of yeast infection of the penis or vagina  · if you often drink alcohol  · infections in the bladder, kidneys, or urinary tract  · kidney disease  · liver disease  · low blood pressure  · nerve damage  · on hemodialysis  · polycystic ovary syndrome  · problems urinating  · serious infection or injury  · type 1 diabetes  · uncircumcised male  · vomiting  · an unusual or allergic reaction to ertugliflozin, metformin, other medicines, foods, dyes, or preservatives  · pregnant or trying to get pregnant  · breast-feeding  How should I use this medicine?  Take this medicine by mouth with a glass of water. Take this medicine with food. Follow the directions on the prescription label. Take your doses at regular intervals. Do not take your medicine more often than directed. Do not stop taking except on your doctor's advice.  A special MedGuide will be given to you by the pharmacist with each prescription and  refill. Be sure to read this information carefully each time.  Talk to your pediatrician regarding the use of this medicine in children. Special care may be needed.  Overdosage: If you think you have taken too much of this medicine contact a poison control center or emergency room at once.  NOTE: This medicine is only for you. Do not share this medicine with others.  What if I miss a dose?  If you miss a dose, take it as soon as you can. If it is almost time for your next dose, take only that dose. Do not take double or extra doses.  What may interact with this medicine?  Do not take this medicine with any of the following medications:  · certain contrast medicines given before X-rays, CT scans, MRI, or other procedures  · dofetilide  This medicine may also interact with the following medications:  · acetazolamide  · alcohol  · certain antivirals for HIV or hepatitis  · certain medicines for blood pressure, heart disease, irregular heart beat  · cimetidine  · dichlorphenamide  · digoxin  · diuretics  · female hormones, like estrogens or progestins and birth control pills  · glycopyrrolate  · isoniazid  · lamotrigine  · memantine  · methazolamide  · metoclopramide  · midodrine  · niacin  · phenothiazines like chlorpromazine, mesoridazine, prochlorperazine, thioridazine  · phenytoin  · ranolazine  · steroid medicines like prednisone or cortisone  · stimulant medicines for attention disorders, weight loss, or to stay awake  · thyroid medicines  · topiramate  · trospium  · vandetanib  · zonisamide  This list may not describe all possible interactions. Give your health care provider a list of all the medicines, herbs, non-prescription drugs, or dietary supplements you use. Also tell them if you smoke, drink alcohol, or use illegal drugs. Some items may interact with your medicine.  What should I watch for while using this medicine?  Visit your doctor or health care professional for regular checks on your progress.  This  medicine can cause a serious condition in which there is too much acid in the blood. If you develop nausea, vomiting, stomach pain, unusual tiredness, or breathing problems, stop taking this medicine and call your doctor right away. If possible, use a ketone dipstick to check for ketones in your urine.  A test called the HbA1C (A1C) will be monitored. This is a simple blood test. It measures your blood sugar control over the last 2 to 3 months. You will receive this test every 3 to 6 months.  Learn how to check your blood sugar. Learn the symptoms of low and high blood sugar and how to manage them.  Always carry a quick-source of sugar with you in case you have symptoms of low blood sugar. Examples include hard sugar candy or glucose tablets. Make sure others know that you can choke if you eat or drink when you develop serious symptoms of low blood sugar, such as seizures or unconsciousness. They must get medical help at once.  Tell your doctor or health care professional if you have high blood sugar. You might need to change the dose of your medicine. If you are sick or exercising more than usual, you might need to change the dose of your medicine.  Do not skip meals. Ask your doctor or health care professional if you should avoid alcohol. Many nonprescription cough and cold products contain sugar or alcohol. These can affect blood sugar.  This medicine may cause ovulation in premenopausal women who do not have regular monthly periods. This may increase your chances of becoming pregnant. You should not take this medicine if you become pregnant or think you may be pregnant. Talk with your doctor or health care professional about your birth control options while taking this medicine. Contact your doctor or health care professional right away if you think you are pregnant.  If you are going to need surgery, a MRI, CT scan, or other procedure, tell your doctor that you are taking this medicine. You may need to stop  taking this medicine before the procedure.  Wear a medical ID bracelet or chain, and carry a card that describes your disease and details of your medicine and dosage times.  This medicine may cause a decrease in folic acid and vitamin B12. You should make sure that you get enough vitamins while you are taking this medicine. Discuss the foods you eat and the vitamins you take with your health care professional.  What side effects may I notice from receiving this medicine?  Side effects that you should report to your doctor or health care professional as soon as possible:  · allergic reactions like skin rash, itching or hives, swelling of the face, lips, or tongue  · breathing problems  · dizziness  · feeling faint or lightheaded, falls  · muscle aches or pains  · muscle weakness  · new pain or tenderness, change in skin color, sores or ulcers, or infection in legs or feet  · penile discharge, itching, or pain in men  · signs and symptoms of a genital infection, such as fever; tenderness, redness, or swelling in the genitals or area from the genitals to the back of the rectum  · signs and symptoms of low blood sugar such as feeling anxious, confusion, dizziness, increased hunger, unusually weak or tired, sweating, shakiness, cold, irritable, headache, blurred vision, fast heartbeat, loss of consciousness  · signs and symptoms of a urinary tract infection, such as fever, chills, a burning feeling when urinating, blood in the urine, back pain  · slow or irregular heartbeat  · trouble passing urine or change in the amount of urine, including an urgent need to urinate more often, in larger amounts, or at night  · unusual stomach pain or discomfort  · unusually tired or weak  · vaginal discharge, itching, or odor in women  · vomiting  Side effects that usually do not require medical attention (report these to your doctor or health care professional if they continue or are  bothersome):  · diarrhea  · headache  · heartburn  · metallic taste in mouth  · mild increase in urination  · nausea  · stomach gas, upset  · thirsty  This list may not describe all possible side effects. Call your doctor for medical advice about side effects. You may report side effects to FDA at 4-407-FUL-6838.  Where should I keep my medicine?  Keep out of the reach of children.  Store at room temperature between 15 and 30 degrees C (59 and 86 degrees F). Throw away any unused medicine after the expiration date.  NOTE: This sheet is a summary. It may not cover all possible information. If you have questions about this medicine, talk to your doctor, pharmacist, or health care provider.  © 2021 [employer]/Gold [employer] (2019-01-25 15:29:22)

## 2021-04-29 ENCOUNTER — APPOINTMENT (OUTPATIENT)
Dept: PHARMACY | Facility: HOSPITAL | Age: 51
End: 2021-04-29

## 2021-05-04 ENCOUNTER — TELEPHONE (OUTPATIENT)
Dept: PHARMACY | Facility: HOSPITAL | Age: 51
End: 2021-05-04

## 2021-05-04 RX ORDER — EMPAGLIFLOZIN 10 MG/1
10 TABLET, FILM COATED ORAL DAILY
Qty: 30 TABLET | Refills: 5 | Status: SHIPPED | OUTPATIENT
Start: 2021-05-04 | End: 2021-08-16

## 2021-05-04 NOTE — TELEPHONE ENCOUNTER
Pa Approved for jardiance  Signed up for copay card given to [facility] over the phone.   Patient aware Jardiance and Metformin are at [facility] and there will be a coupon applied. Counseled to take jardiance, metformin, and glipizide together.

## 2021-05-11 RX ORDER — FLASH GLUCOSE SENSOR
KIT MISCELLANEOUS
Qty: 2 EACH | Refills: 4 | Status: SHIPPED | OUTPATIENT
Start: 2021-05-11 | End: 2021-10-29

## 2021-08-16 ENCOUNTER — OFFICE VISIT (OUTPATIENT)
Dept: ENDOCRINOLOGY | Age: 51
End: 2021-08-16

## 2021-08-16 DIAGNOSIS — E11.65 TYPE 2 DIABETES MELLITUS WITH HYPERGLYCEMIA, WITH LONG-TERM CURRENT USE OF INSULIN (HCC): Primary | ICD-10-CM

## 2021-08-16 DIAGNOSIS — E78.2 MIXED HYPERLIPIDEMIA: ICD-10-CM

## 2021-08-16 DIAGNOSIS — Z79.4 TYPE 2 DIABETES MELLITUS WITH HYPERGLYCEMIA, WITH LONG-TERM CURRENT USE OF INSULIN (HCC): Primary | ICD-10-CM

## 2021-08-16 PROCEDURE — 99214 OFFICE O/P EST MOD 30 MIN: CPT | Performed by: NURSE PRACTITIONER

## 2021-08-16 RX ORDER — ERTUGLIFLOZIN AND METFORMIN HYDROCHLORIDE 2.5; 1 MG/1; MG/1
1 TABLET, FILM COATED ORAL DAILY
Qty: 30 TABLET | Refills: 5 | Status: SHIPPED | OUTPATIENT
Start: 2021-08-16 | End: 2021-09-08 | Stop reason: SDUPTHER

## 2021-08-16 RX ORDER — ERTUGLIFLOZIN AND METFORMIN HYDROCHLORIDE 2.5; 1 MG/1; MG/1
1 TABLET, FILM COATED ORAL DAILY
Qty: 30 TABLET | Refills: 5 | Status: SHIPPED | OUTPATIENT
Start: 2021-08-16 | End: 2021-08-16

## 2021-08-16 NOTE — PROGRESS NOTES
Chief Complaint  Diabetes (checking bs 2-3x a day )     You have chosen to receive care through a telephone visit. Do you consent to use a telephone visit for your medical care today? Yes    Subjective          Joby Kulkarni presents to [facility] ENDOCRINOLOGY  History of Present Illness     Plan last visit  Change metformin to segluromet 2.5-1000mg BID, samples given. May consider stopping glipizide and increasing sgtl-2 dose   Continue glipizide 10mg BID for now       Type 2 dm  Diagnosed around 2017  Today in clinic pt reports being on glipizide 10mg BID and segluromet 2.5-1000mg QD  Sensor - libe  Dm retinopathy - no  Dm nephropathy - no  Dm neuropathy - possible, saw podiatry   CAD - no  CVA - no  Episodes of hypoglycemia - no  Pt is physically active. weight is steady now   Pt tries to follow DM diet for most part.       HLP  On atorvastatin 10mg daily     Objective   Vital Signs:   There were no vitals taken for this visit.    Physical Exam   Alert and oriented   No apparent distress     Result Review :   The following data was reviewed by: LEODAN Avila on 08/16/2021:  Common labs    Common Labsle 12/28/20 12/28/20 12/28/20 12/28/20 3/29/21 3/29/21 3/29/21 3/29/21    1601 1601 1601 1601 1508 1508 1508 1508   Glucose 215 (A)      153 (A)    BUN 12      9    Creatinine 1.11      1.02    eGFR Non African Am 71      78    eGFR  Am 86      94    Sodium 136      140    Potassium 4.9      4.0    Chloride 103      104    Calcium 9.3      9.4    Total Protein 7.2          Albumin 4.50          Total Bilirubin 0.9          Alkaline Phosphatase 56          AST (SGOT) 20          ALT (SGPT) 28          Total Cholesterol   224 (A)   159     Triglycerides   591 (A)   153 (A)     HDL Cholesterol   41   49     LDL Cholesterol    87   84     Hemoglobin A1C  7.50 (A)      8.70 (A)   Microalbumin, Urine    3.9 23.6      (A) Abnormal value       Comments are available for some flowsheets but  are not being displayed.                     Assessment and Plan    Diagnoses and all orders for this visit:    1. Type 2 diabetes mellitus with hyperglycemia, with long-term current use of insulin (CMS/[facility]) (Primary)  -     Hemoglobin A1c; Future  -     Comprehensive Metabolic Panel; Future  -     Lipid Panel; Future  -     Microalbumin / Creatinine Urine Ratio - Urine, Clean Catch; Future  -     Ertugliflozin-metFORMIN HCl (Segluromet) 2.5-1000 MG tablet; Take 1 tablet by mouth Daily.  Dispense: 30 tablet; Refill: 5  -     Vitamin D 25 Hydroxy; Future    2. Mixed hyperlipidemia  -     Hemoglobin A1c; Future  -     Comprehensive Metabolic Panel; Future  -     Lipid Panel; Future  -     Microalbumin / Creatinine Urine Ratio - Urine, Clean Catch; Future  -     Ertugliflozin-metFORMIN HCl (Segluromet) 2.5-1000 MG tablet; Take 1 tablet by mouth Daily.  Dispense: 30 tablet; Refill: 5  -     Vitamin D 25 Hydroxy; Future    Other orders  -     Discontinue: Ertugliflozin-metFORMIN HCl (Segluromet) 2.5-1000 MG tablet; Take 1 tablet by mouth Daily.  Dispense: 30 tablet; Refill: 5        Follow Up   Return in about 4 years (around 8/16/2025).     Will have labs Thursday  Continue glipizide and segluromet until a1c is back  a1c goal <7%  On statin   Diabetic foot care  Yearly eye exams  Diabetic eye exams    Patient was given instructions and counseling regarding his condition or for health maintenance advice. Please see specific information pulled into the AVS if appropriate.     LEODAN Avila     10 minutes on the phone with the patient

## 2021-09-08 DIAGNOSIS — Z79.4 TYPE 2 DIABETES MELLITUS WITH HYPERGLYCEMIA, WITH LONG-TERM CURRENT USE OF INSULIN (HCC): ICD-10-CM

## 2021-09-08 DIAGNOSIS — E11.65 TYPE 2 DIABETES MELLITUS WITH HYPERGLYCEMIA, WITH LONG-TERM CURRENT USE OF INSULIN (HCC): ICD-10-CM

## 2021-09-08 DIAGNOSIS — E78.2 MIXED HYPERLIPIDEMIA: ICD-10-CM

## 2021-09-08 RX ORDER — ERGOCALCIFEROL 1.25 MG/1
50000 CAPSULE ORAL WEEKLY
Qty: 30 CAPSULE | Refills: 0 | Status: SHIPPED | OUTPATIENT
Start: 2021-09-08 | End: 2022-12-06 | Stop reason: SDUPTHER

## 2021-09-08 RX ORDER — ERTUGLIFLOZIN AND METFORMIN HYDROCHLORIDE 2.5; 1 MG/1; MG/1
1 TABLET, FILM COATED ORAL 2 TIMES DAILY
Qty: 30 TABLET | Refills: 5 | Status: SHIPPED | OUTPATIENT
Start: 2021-09-08 | End: 2022-04-19 | Stop reason: SDUPTHER

## 2021-09-08 RX ORDER — ATORVASTATIN CALCIUM 20 MG/1
20 TABLET, FILM COATED ORAL DAILY
Qty: 30 TABLET | Refills: 5 | Status: SHIPPED | OUTPATIENT
Start: 2021-09-08 | End: 2022-09-08

## 2021-09-15 ENCOUNTER — TELEPHONE (OUTPATIENT)
Dept: ENDOCRINOLOGY | Age: 51
End: 2021-09-15

## 2021-09-15 NOTE — TELEPHONE ENCOUNTER
Patient wasn't aware he needed to continue taking atorvastatin 10mg from the 4/12 visit so he stopped taking it. Now his cholesterol is worse.     He needs to talk to one of the medical assistants about his lab results and medications he needs to be taking.

## 2021-10-29 RX ORDER — FLASH GLUCOSE SENSOR
KIT MISCELLANEOUS
Qty: 6 EACH | Refills: 2 | Status: SHIPPED | OUTPATIENT
Start: 2021-10-29 | End: 2022-04-19 | Stop reason: SDUPTHER

## 2021-12-27 ENCOUNTER — OFFICE VISIT (OUTPATIENT)
Dept: ENDOCRINOLOGY | Age: 51
End: 2021-12-27

## 2021-12-27 VITALS
HEART RATE: 89 BPM | OXYGEN SATURATION: 97 % | HEIGHT: 70 IN | DIASTOLIC BLOOD PRESSURE: 82 MMHG | WEIGHT: 184 LBS | BODY MASS INDEX: 26.34 KG/M2 | SYSTOLIC BLOOD PRESSURE: 141 MMHG

## 2021-12-27 DIAGNOSIS — E11.65 TYPE 2 DIABETES MELLITUS WITH HYPERGLYCEMIA, WITH LONG-TERM CURRENT USE OF INSULIN (HCC): Primary | ICD-10-CM

## 2021-12-27 DIAGNOSIS — Z79.4 TYPE 2 DIABETES MELLITUS WITH HYPERGLYCEMIA, WITH LONG-TERM CURRENT USE OF INSULIN (HCC): Primary | ICD-10-CM

## 2021-12-27 DIAGNOSIS — E78.2 MIXED HYPERLIPIDEMIA: ICD-10-CM

## 2021-12-27 DIAGNOSIS — E55.9 VITAMIN D DEFICIENCY: ICD-10-CM

## 2021-12-27 PROCEDURE — 99214 OFFICE O/P EST MOD 30 MIN: CPT | Performed by: NURSE PRACTITIONER

## 2021-12-27 NOTE — PROGRESS NOTES
"Chief Complaint  Diabetes Mellitus (follow up)    Subjective          Joby Kulkarni presents to [facility] ENDOCRINOLOGY  History of Present Illness     I have reviewed PMH, allergies and medications UTD at this visit     Type 2 dm  Diagnosed around 2017  Today in clinic pt reports being on glipizide 10mg BID and segluromet 2.5-1000mg BID  Sensor - arnulfo, but has been having troubles getting these filled   Dm retinopathy - no  Dm nephropathy - no  Dm neuropathy - possible, saw podiatry   CAD - no  CVA - no  Episodes of hypoglycemia - no  Pt is physically active. weight is steady now   Pt tries to follow DM diet for most part.   On statin  Lab Results   Component Value Date    HGBA1C 8.90 (H) 08/26/2021         Objective   Vital Signs:   /82   Pulse 89   Ht 177.8 cm (70\")   Wt 83.5 kg (184 lb)   SpO2 97%   BMI 26.40 kg/m²         Physical Exam  Vitals reviewed.   Constitutional:       General: He is not in acute distress.  HENT:      Head: Normocephalic and atraumatic.   Cardiovascular:      Rate and Rhythm: Normal rate and regular rhythm.   Pulmonary:      Effort: Pulmonary effort is normal. No respiratory distress.   Musculoskeletal:         General: No signs of injury. Normal range of motion.      Cervical back: Normal range of motion and neck supple.   Skin:     General: Skin is warm and dry.   Neurological:      Mental Status: He is alert and oriented to person, place, and time. Mental status is at baseline.   Psychiatric:         Mood and Affect: Mood normal.         Behavior: Behavior normal.         Thought Content: Thought content normal.         Judgment: Judgment normal.            Result Review :   The following data was reviewed by: LEODAN Avila on 12/27/2021:  Common labs    Common Labsle 3/29/21 3/29/21 3/29/21 3/29/21 8/26/21 8/26/21 8/26/21 8/26/21    1508 1508 1508 1508 1520 1520 1520 1520   Glucose   153 (A)   212 (A)     BUN   9   9     Creatinine   1.02   " 1.05     eGFR Non African Am   78   75     eGFR  Am   94   91     Sodium   140   137     Potassium   4.0   4.5     Chloride   104   101     Calcium   9.4   9.9     Total Protein      7.9     Albumin      4.60     Total Bilirubin      0.9     Alkaline Phosphatase      63     AST (SGOT)      17     ALT (SGPT)      19     Total Cholesterol  159     223 (A)    Triglycerides  153 (A)     207 (A)    HDL Cholesterol  49     46    LDL Cholesterol   84     140 (A)    Hemoglobin A1C    8.70 (A) 8.90 (A)      Microalbumin, Urine 23.6       4.4   (A) Abnormal value       Comments are available for some flowsheets but are not being displayed.                     Assessment and Plan    Diagnoses and all orders for this visit:    1. Type 2 diabetes mellitus with hyperglycemia, with long-term current use of insulin (HCC) (Primary)  -     Hemoglobin A1c  -     Comprehensive Metabolic Panel  -     Lipid Panel  -     Microalbumin / Creatinine Urine Ratio - Urine, Clean Catch  -     Vitamin D 25 Hydroxy    2. Mixed hyperlipidemia  -     Comprehensive Metabolic Panel  -     Lipid Panel    3. Vitamin D deficiency  -     Vitamin D 25 Hydroxy        Follow Up   No follow-ups on file.     Consider pioglitazone if a1c > 7%  Continue segluromet and glipizide  Yearly diabetic eye exams  Daily diabetic foot care  Diabetic diet  Routine physical activity as tolerated  Continue statin   Vitamin d 50,000u weekly     Patient was given instructions and counseling regarding his condition or for health maintenance advice. Please see specific information pulled into the AVS if appropriate.     Pam Pike, APRN

## 2021-12-28 LAB
25(OH)D3+25(OH)D2 SERPL-MCNC: 37.3 NG/ML (ref 30–100)
ALBUMIN SERPL-MCNC: 4.7 G/DL (ref 4–5)
ALBUMIN/CREAT UR: <7 MG/G CREAT (ref 0–29)
ALBUMIN/GLOB SERPL: 1.6 {RATIO} (ref 1.2–2.2)
ALP SERPL-CCNC: 59 IU/L (ref 44–121)
ALT SERPL-CCNC: 23 IU/L (ref 0–44)
AST SERPL-CCNC: 18 IU/L (ref 0–40)
BILIRUB SERPL-MCNC: 0.9 MG/DL (ref 0–1.2)
BUN SERPL-MCNC: 14 MG/DL (ref 6–24)
BUN/CREAT SERPL: 12 (ref 9–20)
CALCIUM SERPL-MCNC: 9.4 MG/DL (ref 8.7–10.2)
CHLORIDE SERPL-SCNC: 100 MMOL/L (ref 96–106)
CHOLEST SERPL-MCNC: 170 MG/DL (ref 100–199)
CO2 SERPL-SCNC: 24 MMOL/L (ref 20–29)
CREAT SERPL-MCNC: 1.16 MG/DL (ref 0.76–1.27)
CREAT UR-MCNC: 40.7 MG/DL
GLOBULIN SER CALC-MCNC: 2.9 G/DL (ref 1.5–4.5)
GLUCOSE SERPL-MCNC: 166 MG/DL (ref 65–99)
HBA1C MFR BLD: 8.9 % (ref 4.8–5.6)
HDLC SERPL-MCNC: 42 MG/DL
IMP & REVIEW OF LAB RESULTS: NORMAL
LDLC SERPL CALC-MCNC: 69 MG/DL (ref 0–99)
MICROALBUMIN UR-MCNC: <3 UG/ML
POTASSIUM SERPL-SCNC: 4.2 MMOL/L (ref 3.5–5.2)
PROT SERPL-MCNC: 7.6 G/DL (ref 6–8.5)
SODIUM SERPL-SCNC: 137 MMOL/L (ref 134–144)
TRIGL SERPL-MCNC: 375 MG/DL (ref 0–149)
VLDLC SERPL CALC-MCNC: 59 MG/DL (ref 5–40)

## 2021-12-28 RX ORDER — PIOGLITAZONEHYDROCHLORIDE 30 MG/1
30 TABLET ORAL DAILY
Qty: 30 TABLET | Refills: 11 | Status: SHIPPED | OUTPATIENT
Start: 2021-12-28 | End: 2022-04-19

## 2022-02-01 RX ORDER — GLIPIZIDE 10 MG/1
TABLET ORAL
Qty: 180 TABLET | Refills: 0 | Status: SHIPPED | OUTPATIENT
Start: 2022-02-01 | End: 2022-04-19 | Stop reason: SDUPTHER

## 2022-02-09 ENCOUNTER — TELEPHONE (OUTPATIENT)
Dept: ENDOCRINOLOGY | Age: 52
End: 2022-02-09

## 2022-03-03 ENCOUNTER — TELEPHONE (OUTPATIENT)
Dept: ENDOCRINOLOGY | Age: 52
End: 2022-03-03

## 2022-03-07 ENCOUNTER — TELEPHONE (OUTPATIENT)
Dept: ENDOCRINOLOGY | Age: 52
End: 2022-03-07

## 2022-04-04 DIAGNOSIS — Z79.4 TYPE 2 DIABETES MELLITUS WITH HYPERGLYCEMIA, WITH LONG-TERM CURRENT USE OF INSULIN: Primary | ICD-10-CM

## 2022-04-04 DIAGNOSIS — E78.2 MIXED HYPERLIPIDEMIA: ICD-10-CM

## 2022-04-04 DIAGNOSIS — E11.65 TYPE 2 DIABETES MELLITUS WITH HYPERGLYCEMIA, WITH LONG-TERM CURRENT USE OF INSULIN: Primary | ICD-10-CM

## 2022-04-05 LAB
ALBUMIN/CREAT UR: <7 MG/G CREAT (ref 0–29)
BUN SERPL-MCNC: 11 MG/DL (ref 6–24)
BUN/CREAT SERPL: 11 (ref 9–20)
CALCIUM SERPL-MCNC: 9.8 MG/DL (ref 8.7–10.2)
CHLORIDE SERPL-SCNC: 97 MMOL/L (ref 96–106)
CHOLEST SERPL-MCNC: 331 MG/DL (ref 100–199)
CO2 SERPL-SCNC: 22 MMOL/L (ref 20–29)
CREAT SERPL-MCNC: 1.04 MG/DL (ref 0.76–1.27)
CREAT UR-MCNC: 42.5 MG/DL
EGFRCR SERPLBLD CKD-EPI 2021: 88 ML/MIN/1.73
GLUCOSE SERPL-MCNC: 229 MG/DL (ref 65–99)
HBA1C MFR BLD: 9 % (ref 4.8–5.6)
HDLC SERPL-MCNC: 59 MG/DL
IMP & REVIEW OF LAB RESULTS: NORMAL
LDLC SERPL CALC-MCNC: 158 MG/DL (ref 0–99)
MICROALBUMIN UR-MCNC: <3 UG/ML
POTASSIUM SERPL-SCNC: 4.3 MMOL/L (ref 3.5–5.2)
SODIUM SERPL-SCNC: 138 MMOL/L (ref 134–144)
T4 FREE SERPL-MCNC: 1.03 NG/DL (ref 0.82–1.77)
TRIGL SERPL-MCNC: 584 MG/DL (ref 0–149)
TSH SERPL DL<=0.005 MIU/L-ACNC: 1.58 UIU/ML (ref 0.45–4.5)
VLDLC SERPL CALC-MCNC: 114 MG/DL (ref 5–40)

## 2022-04-19 ENCOUNTER — OFFICE VISIT (OUTPATIENT)
Dept: ENDOCRINOLOGY | Age: 52
End: 2022-04-19

## 2022-04-19 VITALS
BODY MASS INDEX: 26.83 KG/M2 | HEIGHT: 70 IN | DIASTOLIC BLOOD PRESSURE: 68 MMHG | WEIGHT: 187.4 LBS | SYSTOLIC BLOOD PRESSURE: 121 MMHG | OXYGEN SATURATION: 98 % | HEART RATE: 88 BPM

## 2022-04-19 DIAGNOSIS — E78.5 HYPERLIPIDEMIA ASSOCIATED WITH TYPE 2 DIABETES MELLITUS: ICD-10-CM

## 2022-04-19 DIAGNOSIS — Z79.4 TYPE 2 DIABETES MELLITUS WITH HYPERGLYCEMIA, WITH LONG-TERM CURRENT USE OF INSULIN: Primary | ICD-10-CM

## 2022-04-19 DIAGNOSIS — E11.69 HYPERLIPIDEMIA ASSOCIATED WITH TYPE 2 DIABETES MELLITUS: ICD-10-CM

## 2022-04-19 DIAGNOSIS — E78.2 MIXED HYPERLIPIDEMIA: ICD-10-CM

## 2022-04-19 DIAGNOSIS — E11.65 TYPE 2 DIABETES MELLITUS WITH HYPERGLYCEMIA, WITH LONG-TERM CURRENT USE OF INSULIN: Primary | ICD-10-CM

## 2022-04-19 PROCEDURE — 95251 CONT GLUC MNTR ANALYSIS I&R: CPT | Performed by: INTERNAL MEDICINE

## 2022-04-19 PROCEDURE — 99214 OFFICE O/P EST MOD 30 MIN: CPT | Performed by: INTERNAL MEDICINE

## 2022-04-19 RX ORDER — FLASH GLUCOSE SENSOR
1 KIT MISCELLANEOUS 4 TIMES DAILY
Qty: 1 EACH | Refills: 0 | Status: SHIPPED | OUTPATIENT
Start: 2022-04-19

## 2022-04-19 RX ORDER — DULAGLUTIDE 1.5 MG/.5ML
1.5 INJECTION, SOLUTION SUBCUTANEOUS WEEKLY
Qty: 2 ML | Refills: 11 | Status: SHIPPED | OUTPATIENT
Start: 2022-04-19 | End: 2022-08-31 | Stop reason: SDUPTHER

## 2022-04-19 RX ORDER — BLOOD-GLUCOSE METER
1 EACH MISCELLANEOUS 2 TIMES DAILY
Qty: 1 KIT | Refills: 0 | Status: SHIPPED | OUTPATIENT
Start: 2022-04-19 | End: 2022-12-06

## 2022-04-19 RX ORDER — LANCETS
EACH MISCELLANEOUS
Qty: 100 EACH | Refills: 0 | Status: SHIPPED | OUTPATIENT
Start: 2022-04-19 | End: 2022-12-06

## 2022-04-19 RX ORDER — ERTUGLIFLOZIN AND METFORMIN HYDROCHLORIDE 2.5; 1 MG/1; MG/1
1 TABLET, FILM COATED ORAL
Qty: 60 TABLET | Refills: 5 | Status: SHIPPED | OUTPATIENT
Start: 2022-04-19 | End: 2022-08-31 | Stop reason: SDUPTHER

## 2022-04-19 RX ORDER — FLASH GLUCOSE SENSOR
1 KIT MISCELLANEOUS DAILY
Qty: 6 EACH | Refills: 2 | Status: SHIPPED | OUTPATIENT
Start: 2022-04-19 | End: 2022-12-06 | Stop reason: SDUPTHER

## 2022-04-19 RX ORDER — GLIPIZIDE 10 MG/1
TABLET ORAL
Qty: 180 TABLET | Refills: 0 | Status: SHIPPED | OUTPATIENT
Start: 2022-04-19 | End: 2022-08-31 | Stop reason: SDUPTHER

## 2022-04-19 RX ORDER — BLOOD SUGAR DIAGNOSTIC
STRIP MISCELLANEOUS
Qty: 100 EACH | Refills: 1 | Status: SHIPPED | OUTPATIENT
Start: 2022-04-19 | End: 2022-12-06

## 2022-04-19 NOTE — PROGRESS NOTES
"Chief Complaint  Chief Complaint   Patient presents with   • Diabetes     Type 2       Subjective          History of Present Illness    Joby Kulkarni 51 y.o. presents with Type 2 dm as a F/u pt    Type 2 dm - Diagnosed since 2017  Today in clinic pt reports being on Glipizide 10 mg po bid with meals, segluromet 1 tab bid. Didn't start actos - as he was worried about weight gain.   Avg bg - around 200 +  Checks BG - 4 - 5 times a day   Sensor - yes  Dm retinopathy -x ,Last eye exam -   Dm nephropathy -x   Dm neuropathy - yes, sees podiatry,Dm neuropathy meds - n/a  CAD -x  CVA -x  Episodes of hypoglycemia - x  Pt is physically active. weight has been stable.   Pt tries to follow DM diet for most part.       Reviewed primary care physician's/consulting physician documentation and lab results         I have reviewed the patient's allergies, medicines, past medical hx, family hx and social hx in detail.    Objective   Vital Signs:   /68   Pulse 88   Ht 177.8 cm (70\")   Wt 85 kg (187 lb 6.4 oz)   SpO2 98%   BMI 26.89 kg/m²   Physical Exam   General appearance - no distress  Eyes- anicteric sclera  Ear nose and throat-external ears and nose normal.    Respiratory-normal chest on inspection.  No respiratory distress noted.  Skin-no rashes.  Neuro-alert and oriented x3        Result Review :   The following data was reviewed by: Lexie Doe MD on 04/19/2022:  Orders Only on 04/04/2022   Component Date Value Ref Range Status   • Glucose 04/04/2022 229 (A) 65 - 99 mg/dL Final   • BUN 04/04/2022 11  6 - 24 mg/dL Final   • Creatinine 04/04/2022 1.04  0.76 - 1.27 mg/dL Final   • EGFR Result 04/04/2022 88  >59 mL/min/1.73 Final   • BUN/Creatinine Ratio 04/04/2022 11  9 - 20 Final   • Sodium 04/04/2022 138  134 - 144 mmol/L Final   • Potassium 04/04/2022 4.3  3.5 - 5.2 mmol/L Final   • Chloride 04/04/2022 97  96 - 106 mmol/L Final   • Total CO2 04/04/2022 22  20 - 29 mmol/L Final   • Calcium 04/04/2022 9.8  8.7 - " 10.2 mg/dL Final   • Hemoglobin A1C 04/04/2022 9.0 (A) 4.8 - 5.6 % Final    Comment:          Prediabetes: 5.7 - 6.4           Diabetes: >6.4           Glycemic control for adults with diabetes: <7.0     • Total Cholesterol 04/04/2022 331 (A) 100 - 199 mg/dL Final   • Triglycerides 04/04/2022 584 (A) 0 - 149 mg/dL Final   • HDL Cholesterol 04/04/2022 59  >39 mg/dL Final   • VLDL Cholesterol Arnoldo 04/04/2022 114 (A) 5 - 40 mg/dL Final   • LDL Chol Calc ([facility]) 04/04/2022 158 (A) 0 - 99 mg/dL Final   • Creatinine, Urine 04/04/2022 42.5  Not Estab. mg/dL Final   • Microalbumin, Urine 04/04/2022 <3.0  Not Estab. ug/mL Final    **Verified by repeat analysis**   • Microalbumin/Creatinine Ratio 04/04/2022 <7  0 - 29 mg/g creat Final    Comment:                        Normal:                0 -  29                         Moderately increased: 30 - 300                         Severely increased:       >300     • TSH 04/04/2022 1.580  0.450 - 4.500 uIU/mL Final   • Free T4 04/04/2022 1.03  0.82 - 1.77 ng/dL Final   • Interpretation 04/04/2022 Note   Final    Supplemental report is available.     Data reviewed: PCP notes       Results Review:    I reviewed the patient's new clinical results.     Assessment and Plan    Problem List Items Addressed This Visit    None     Visit Diagnoses     Type 2 diabetes mellitus with hyperglycemia, with long-term current use of insulin (HCC)    -  Primary    Relevant Medications    glipizide (GLUCOTROL) 10 MG tablet    Ertugliflozin-metFORMIN HCl (Segluromet) 2.5-1000 MG tablet    Dulaglutide (Trulicity) 1.5 MG/0.5ML solution pen-injector    Hyperlipidemia associated with type 2 diabetes mellitus (HCC)        Relevant Medications    glipizide (GLUCOTROL) 10 MG tablet    Ertugliflozin-metFORMIN HCl (Segluromet) 2.5-1000 MG tablet    Dulaglutide (Trulicity) 1.5 MG/0.5ML solution pen-injector    Mixed hyperlipidemia        Relevant Medications    Ertugliflozin-metFORMIN HCl (Segluromet) 2.5-1000 MG  "tablet        Type 2 diabetes mellitus-uncontrolled with hyperglycemia  HbA1c is worse  Continue glipizide, Segluromet  Start Trulicity, discussed the benefits and the side effects of the medication.    Interpreted the information of the CGM after downloading it and made the current insulin changes.  Avg bg - 200+  Trends noted -noted elevated blood sugars.    Emphasized on diet control.    Interpreted the blood work-up/imaging results performed by the primary care/consulting physician -    Refills sent to pharmacy    Follow Up     Patient was given instructions and counseling regarding her condition or for health maintenance advice. Please see specific information pulled into the AVS if appropriate.       Thank you for asking me to see your patient, Joby Kulkarni in consultation.         Lexie Doe MD  04/19/22      [facility] Dragon / transcription disclaimer:     \"Dictated utilizing Dragon dictation\".         "

## 2022-04-19 NOTE — PATIENT INSTRUCTIONS
Dulaglutide Injection  What is this medicine?  DULAGLUTIDE (DOO la GLOO tide) controls blood sugar in people with type 2 diabetes. It is used with lifestyle changes like diet and exercise. It may lower the risk of problems that need treatment in the hospital. These problems include heart attack or stroke.  This medicine may be used for other purposes; ask your health care provider or pharmacist if you have questions.  COMMON BRAND NAME(S): Trulicity  What should I tell my health care provider before I take this medicine?  They need to know if you have any of these conditions:  endocrine tumors (MEN 2) or if someone in your family had these tumors  eye disease, vision problems  history of pancreatitis  kidney disease  liver disease  stomach or intestine problems  thyroid cancer or if someone in your family had thyroid cancer  an unusual or allergic reaction to dulaglutide, other medicines, foods, dyes, or preservatives  pregnant or trying to get pregnant  breast-feeding  How should I use this medicine?  This medicine is injected under the skin. You will be taught how to prepare and give it. Take it as directed on the prescription label on the same day of each week. Do NOT prime the pen. Keep taking it unless your health care provider tells you to stop.  If you use this medicine with insulin, you should inject this medicine and the insulin separately. Do not mix them together. Do not give the injections right next to each other. Change (rotate) injection sites with each injection.  This drug comes with INSTRUCTIONS FOR USE. Ask your pharmacist for directions on how to use this medicine. Read the information carefully. Talk to your pharmacist or health care provider if you have questions.  It is important that you put your used needles and syringes in a special sharps container. Do not put them in a trash can. If you do not have a sharps container, call your pharmacist or health care provider to get one.  A special  MedGuide will be given to you by the pharmacist with each prescription and refill. Be sure to read this information carefully each time.  Talk to your health care provider about the use of this medicine in children. Special care may be needed.  Overdosage: If you think you have taken too much of this medicine contact a poison control center or emergency room at once.  NOTE: This medicine is only for you. Do not share this medicine with others.  What if I miss a dose?  If you miss a dose, take it as soon as you can unless it is more than 3 days late. If it is more than 3 days late, skip the missed dose. Take the next dose at the normal time.  What may interact with this medicine?  other medicines for diabetes  Many medications may cause changes in blood sugar, these include:  alcohol containing beverages  antiviral medicines for HIV or AIDS  aspirin and aspirin-like drugs  certain medicines for blood pressure, heart disease, irregular heart beat  chromium  diuretics  female hormones, such as estrogens or progestins, birth control pills  fenofibrate  gemfibrozil  isoniazid  lanreotide  male hormones or anabolic steroids  MAOIs like Carbex, Eldepryl, Marplan, Nardil, and Parnate  medicines for allergies, asthma, cold, or cough  medicines for depression, anxiety, or psychotic disturbances  medicines for weight loss  niacin  nicotine  NSAIDs, medicines for pain and inflammation, like ibuprofen or naproxen  octreotide  pasireotide  pentamidine  phenytoin  probenecid  quinolone antibiotics such as ciprofloxacin, levofloxacin, ofloxacin  some herbal dietary supplements  steroid medicines such as prednisone or cortisone  sulfamethoxazole; trimethoprim  thyroid hormones  Some medications can hide the warning symptoms of low blood sugar (hypoglycemia). You may need to monitor your blood sugar more closely if you are taking one of these medications. These include:  beta-blockers, often used for high blood pressure or heart  problems (examples include atenolol, metoprolol, propranolol)  clonidine  guanethidine  reserpine  This list may not describe all possible interactions. Give your health care provider a list of all the medicines, herbs, non-prescription drugs, or dietary supplements you use. Also tell them if you smoke, drink alcohol, or use illegal drugs. Some items may interact with your medicine.  What should I watch for while using this medicine?  Visit your health care provider for regular checks on your progress.  Check with your health care provider if you have severe diarrhea, nausea, and vomiting, or if you sweat a lot. The loss of too much body fluid may make it dangerous for you to take this medicine.  A test called the HbA1C (A1C) will be monitored. This is a simple blood test. It measures your blood sugar control over the last 2 to 3 months. You will receive this test every 3 to 6 months.  Learn how to check your blood sugar. Learn the symptoms of low and high blood sugar and how to manage them.  Always carry a quick-source of sugar with you in case you have symptoms of low blood sugar. Examples include hard sugar candy or glucose tablets. Make sure others know that you can choke if you eat or drink when you develop serious symptoms of low blood sugar, such as seizures or unconsciousness. Get medical help at once.  Tell your health care provider if you have high blood sugar. You might need to change the dose of your medicine. If you are sick or exercising more than usual, you may need to change the dose of your medicine.  Do not skip meals. Ask your health care provider if you should avoid alcohol. Many nonprescription cough and cold products contain sugar or alcohol. These can affect blood sugar.  Pens should never be shared. Even if the needle is changed, sharing may result in passing of viruses like hepatitis or HIV.  Wear a medical ID bracelet or chain. Carry a card that describes your condition. List the medicines  and doses you take on the card.  What side effects may I notice from receiving this medicine?  Side effects that you should report to your doctor or health care professional as soon as possible:  allergic reactions (skin rash, itching or hives; swelling of the face, lips, or tongue)  changes in vision  diarrhea that continues or is severe  infection (fever, chills, cough, sore throat, pain or trouble passing urine)  kidney injury (trouble passing urine or change in the amount of urine)  low blood sugar (feeling anxious; confusion; dizziness; increased hunger; unusually weak or tired; increased sweating; shakiness; cold, clammy skin; irritable; headache; blurred vision; fast heartbeat; loss of consciousness)  lump or swelling on the neck  trouble breathing  trouble swallowing  unusual stomach upset or pain  vomiting  Side effects that usually do not require medical attention (report to your doctor or health care professional if they continue or are bothersome):  lack or loss of appetite  nausea  pain, redness, or irritation at site where injected  This list may not describe all possible side effects. Call your doctor for medical advice about side effects. You may report side effects to FDA at 9-117-FDA-7970.  Where should I keep my medicine?  Keep out of the reach of children and pets.  Refrigeration (preferred): Store unopened pens in a refrigerator between 2 and 8 degrees C (36 and 46 degrees F). Keep it in the original carton until you are ready to take it. Do not freeze or use if the medicine has been frozen. Protect from light. Get rid of any unused medicine after the expiration date on the label.  Room Temperature: The pen may be stored at room temperature below 30 degrees C (86 degrees F) for up to a total of 14 days if needed. Protect from light. Avoid exposure to extreme heat. If it is stored at room temperature, throw away any unused medicine after 14 days or after it expires, whichever is first.  To get  rid of medicines that are no longer needed or have :  Take the medicine to a medicine take-back program. Check with your pharmacy or law enforcement to find a location.  If you cannot return the medicine, ask your pharmacist or health care provider how to get rid of this medicine safely.  NOTE: This sheet is a summary. It may not cover all possible information. If you have questions about this medicine, talk to your doctor, pharmacist, or health care provider.  ©  [employer]vier/Gold Standard (2021-10-18 07:35:51)  ac

## 2022-04-20 ENCOUNTER — TELEPHONE (OUTPATIENT)
Dept: ENDOCRINOLOGY | Age: 52
End: 2022-04-20

## 2022-04-21 NOTE — PROGRESS NOTES
Can one of the medical assistant please call this patient and let him know that he needs to get his medications from [employer] as he is employed through [employer].  He needs to call [employer] ahead of time so that way he does not have to wait in the morning.

## 2022-04-22 ENCOUNTER — TELEPHONE (OUTPATIENT)
Dept: ENDOCRINOLOGY | Age: 52
End: 2022-04-22

## 2022-04-26 ENCOUNTER — TELEPHONE (OUTPATIENT)
Dept: ENDOCRINOLOGY | Age: 52
End: 2022-04-26

## 2022-04-27 ENCOUNTER — TELEPHONE (OUTPATIENT)
Dept: ENDOCRINOLOGY | Age: 52
End: 2022-04-27

## 2022-04-27 NOTE — TELEPHONE ENCOUNTER
Patient called stating that he forget his medication yesterday and and his sugar have been elevated  He stated that he took him medication today and wasn't sure what is he needed to do   Per Pam patient can eat a health lunch just what the carbs and do protein  Patient voice understanding   He will call back and we will go over the Trulicity injection pen  He

## 2022-05-04 ENCOUNTER — PRIOR AUTHORIZATION (OUTPATIENT)
Dept: ENDOCRINOLOGY | Age: 52
End: 2022-05-04

## 2022-08-11 ENCOUNTER — TELEPHONE (OUTPATIENT)
Dept: ENDOCRINOLOGY | Age: 52
End: 2022-08-11

## 2022-08-11 DIAGNOSIS — Z79.4 TYPE 2 DIABETES MELLITUS WITH HYPERGLYCEMIA, WITH LONG-TERM CURRENT USE OF INSULIN: Primary | ICD-10-CM

## 2022-08-11 DIAGNOSIS — E11.65 TYPE 2 DIABETES MELLITUS WITH HYPERGLYCEMIA, WITH LONG-TERM CURRENT USE OF INSULIN: Primary | ICD-10-CM

## 2022-08-23 ENCOUNTER — DOCUMENTATION (OUTPATIENT)
Dept: ENDOCRINOLOGY | Age: 52
End: 2022-08-23

## 2022-08-31 ENCOUNTER — OFFICE VISIT (OUTPATIENT)
Dept: ENDOCRINOLOGY | Age: 52
End: 2022-08-31

## 2022-08-31 VITALS
HEIGHT: 70 IN | SYSTOLIC BLOOD PRESSURE: 122 MMHG | WEIGHT: 186 LBS | OXYGEN SATURATION: 98 % | BODY MASS INDEX: 26.63 KG/M2 | DIASTOLIC BLOOD PRESSURE: 70 MMHG | HEART RATE: 86 BPM | TEMPERATURE: 95.5 F

## 2022-08-31 DIAGNOSIS — E11.65 TYPE 2 DIABETES MELLITUS WITH HYPERGLYCEMIA, WITH LONG-TERM CURRENT USE OF INSULIN: Primary | ICD-10-CM

## 2022-08-31 DIAGNOSIS — Z79.4 TYPE 2 DIABETES MELLITUS WITH HYPERGLYCEMIA, WITH LONG-TERM CURRENT USE OF INSULIN: Primary | ICD-10-CM

## 2022-08-31 DIAGNOSIS — E78.2 MIXED HYPERLIPIDEMIA: ICD-10-CM

## 2022-08-31 PROCEDURE — 99214 OFFICE O/P EST MOD 30 MIN: CPT | Performed by: NURSE PRACTITIONER

## 2022-08-31 RX ORDER — GLIPIZIDE 10 MG/1
TABLET ORAL
Qty: 180 TABLET | Refills: 1 | Status: SHIPPED | OUTPATIENT
Start: 2022-08-31 | End: 2022-12-06 | Stop reason: SDUPTHER

## 2022-08-31 RX ORDER — ERTUGLIFLOZIN AND METFORMIN HYDROCHLORIDE 2.5; 1 MG/1; MG/1
1 TABLET, FILM COATED ORAL
Qty: 60 TABLET | Refills: 5 | Status: SHIPPED | OUTPATIENT
Start: 2022-08-31 | End: 2022-08-31

## 2022-08-31 RX ORDER — ERTUGLIFLOZIN AND METFORMIN HYDROCHLORIDE 2.5; 1 MG/1; MG/1
1 TABLET, FILM COATED ORAL
Qty: 180 TABLET | Refills: 1 | Status: SHIPPED | OUTPATIENT
Start: 2022-08-31 | End: 2022-08-31

## 2022-08-31 RX ORDER — EMPAGLIFLOZIN, METFORMIN HYDROCHLORIDE 12.5; 1 MG/1; MG/1
1 TABLET, EXTENDED RELEASE ORAL DAILY
Qty: 90 TABLET | Refills: 1 | Status: SHIPPED | OUTPATIENT
Start: 2022-08-31 | End: 2022-12-06 | Stop reason: SDUPTHER

## 2022-08-31 RX ORDER — DULAGLUTIDE 1.5 MG/.5ML
1.5 INJECTION, SOLUTION SUBCUTANEOUS WEEKLY
Qty: 2 ML | Refills: 5 | Status: SHIPPED | OUTPATIENT
Start: 2022-08-31 | End: 2022-08-31

## 2022-08-31 RX ORDER — DULAGLUTIDE 1.5 MG/.5ML
1.5 INJECTION, SOLUTION SUBCUTANEOUS WEEKLY
Qty: 6 ML | Refills: 1 | Status: SHIPPED | OUTPATIENT
Start: 2022-08-31 | End: 2022-12-06 | Stop reason: SDUPTHER

## 2022-08-31 NOTE — PROGRESS NOTES
"Chief Complaint  Diabetes (Type 2)    Subjective        Joby Kulkarni presents to [facility] ENDOCRINOLOGY  History of Present Illness     Lab Results   Component Value Date    HGBA1C 9.8 (H) 08/17/2022         Type 2 dm    Diagnosed since 2017  Today in clinic pt reports being on Glipizide 10 mg po bid with meals, metformin? Doesn't know dose   Reports he hasn't been getting segluromet   Never started trulicity  Sensor - yes, doesn't use it all the time ( -200 per patient report)   Dm retinopathy -x  Dm nephropathy -x   Dm neuropathy - yes, sees podiatry  CAD -x  CVA -x  Episodes of hypoglycemia - denies    Lab Results   Component Value Date    CHLPL 331 (H) 04/04/2022    TRIG 584 (H) 04/04/2022    HDL 59 04/04/2022     (H) 04/04/2022         Objective   Vital Signs:  /70   Pulse 86   Temp 95.5 °F (35.3 °C)   Ht 177.8 cm (70\")   Wt 84.4 kg (186 lb)   SpO2 98%   BMI 26.69 kg/m²   Estimated body mass index is 26.69 kg/m² as calculated from the following:    Height as of this encounter: 177.8 cm (70\").    Weight as of this encounter: 84.4 kg (186 lb).          Physical Exam  Vitals reviewed.   Constitutional:       General: He is not in acute distress.  HENT:      Head: Normocephalic and atraumatic.   Cardiovascular:      Rate and Rhythm: Normal rate and regular rhythm.   Pulmonary:      Effort: Pulmonary effort is normal. No respiratory distress.   Musculoskeletal:         General: No signs of injury. Normal range of motion.      Cervical back: Normal range of motion and neck supple.   Skin:     General: Skin is warm and dry.   Neurological:      Mental Status: He is alert and oriented to person, place, and time. Mental status is at baseline.   Psychiatric:         Mood and Affect: Mood normal.         Behavior: Behavior normal.         Thought Content: Thought content normal.         Judgment: Judgment normal.           Result Review :  The following data was reviewed by: " LEODAN Avila on 08/31/2022:  Common labs    Common Labsle 12/27/21 12/27/21 12/27/21 12/27/21 4/4/22 4/4/22 4/4/22 4/4/22 8/17/22 8/17/22    1605 1605 1605 1605 1506 1506 1506 1506 1513 1513   Glucose  166 (A)   229 (A)    211 (A)    BUN  14   11    7    Creatinine  1.16   1.04    0.85    eGFR Non  Am  74           eGFR  Am  85           Sodium  137   138    139    Potassium  4.2   4.3    4.2    Chloride  100   97    100    Calcium  9.4   9.8    9.8    Total Protein  7.6           Albumin  4.7           Total Bilirubin  0.9           Alkaline Phosphatase  59           AST (SGOT)  18           ALT (SGPT)  23           Total Cholesterol   170    331 (A)      Triglycerides   375 (A)    584 (A)      HDL Cholesterol   42    59      LDL Cholesterol    69    158 (A)      Hemoglobin A1C 8.9 (A)     9.0 (A)    9.8 (A)   Microalbumin, Urine    <3.0    <3.0     (A) Abnormal value       Comments are available for some flowsheets but are not being displayed.                     Assessment and Plan   Diagnoses and all orders for this visit:    1. Type 2 diabetes mellitus with hyperglycemia, with long-term current use of insulin (HCC) (Primary)  -     Discontinue: Ertugliflozin-metFORMIN HCl (Segluromet) 2.5-1000 MG tablet; Take 1 tablet by mouth Daily With Breakfast & Dinner.  Dispense: 60 tablet; Refill: 5  -     Discontinue: Ertugliflozin-metFORMIN HCl (Segluromet) 2.5-1000 MG tablet; Take 1 tablet by mouth Daily With Breakfast & Dinner.  Dispense: 180 tablet; Refill: 1  -     Hemoglobin A1c; Future  -     Comprehensive Metabolic Panel; Future  -     Lipid Panel; Future  -     Microalbumin / Creatinine Urine Ratio - Urine, Clean Catch; Future    2. Mixed hyperlipidemia  -     Discontinue: Ertugliflozin-metFORMIN HCl (Segluromet) 2.5-1000 MG tablet; Take 1 tablet by mouth Daily With Breakfast & Dinner.  Dispense: 60 tablet; Refill: 5  -     Discontinue: Ertugliflozin-metFORMIN HCl (Segluromet) 2.5-1000 MG  tablet; Take 1 tablet by mouth Daily With Breakfast & Dinner.  Dispense: 180 tablet; Refill: 1  -     Hemoglobin A1c; Future  -     Comprehensive Metabolic Panel; Future  -     Lipid Panel; Future  -     Microalbumin / Creatinine Urine Ratio - Urine, Clean Catch; Future    Other orders  -     Discontinue: Dulaglutide (Trulicity) 1.5 MG/0.5ML solution pen-injector; Inject 1.5 mg under the skin into the appropriate area as directed 1 (One) Time Per Week.  Dispense: 2 mL; Refill: 5  -     glipizide (GLUCOTROL) 10 MG tablet; Twice daily with meal  Dispense: 180 tablet; Refill: 1  -     Dulaglutide (Trulicity) 1.5 MG/0.5ML solution pen-injector; Inject 1.5 mg under the skin into the appropriate area as directed 1 (One) Time Per Week.  Dispense: 6 mL; Refill: 1  -     Empagliflozin-metFORMIN HCl ER (Synjardy XR) 12.5-1000 MG tablet sustained-release 24 hour; Take 1 each by mouth Daily.  Dispense: 90 tablet; Refill: 1             Follow Up   Return in about 3 months (around 11/30/2022).     a1c worsening  Resume trulicity   Change segluromet to synjardy and see if covered by insurance   Continue glipizide  Continue statin, will need to escalate management if dietary changes and BS improvement do not improve levels    Patient was given instructions and counseling regarding his condition or for health maintenance advice. Please see specific information pulled into the AVS if appropriate.     LEODAN Avila

## 2022-10-24 ENCOUNTER — TELEPHONE (OUTPATIENT)
Dept: ENDOCRINOLOGY | Age: 52
End: 2022-10-24

## 2022-11-22 ENCOUNTER — LAB (OUTPATIENT)
Dept: ENDOCRINOLOGY | Age: 52
End: 2022-11-22

## 2022-11-22 DIAGNOSIS — E78.2 MIXED HYPERLIPIDEMIA: ICD-10-CM

## 2022-11-22 DIAGNOSIS — E11.65 TYPE 2 DIABETES MELLITUS WITH HYPERGLYCEMIA, WITH LONG-TERM CURRENT USE OF INSULIN: ICD-10-CM

## 2022-11-22 DIAGNOSIS — Z79.4 TYPE 2 DIABETES MELLITUS WITH HYPERGLYCEMIA, WITH LONG-TERM CURRENT USE OF INSULIN: ICD-10-CM

## 2022-11-23 LAB
ALBUMIN SERPL-MCNC: 4.6 G/DL (ref 3.5–5.2)
ALBUMIN/CREAT UR: 6 MG/G CREAT (ref 0–29)
ALBUMIN/GLOB SERPL: 1.8 G/DL
ALP SERPL-CCNC: 65 U/L (ref 39–117)
ALT SERPL-CCNC: 11 U/L (ref 1–41)
AST SERPL-CCNC: 17 U/L (ref 1–40)
BILIRUB SERPL-MCNC: 0.8 MG/DL (ref 0–1.2)
BUN SERPL-MCNC: 7 MG/DL (ref 6–20)
BUN/CREAT SERPL: 6.5 (ref 7–25)
CALCIUM SERPL-MCNC: 9.2 MG/DL (ref 8.6–10.5)
CHLORIDE SERPL-SCNC: 103 MMOL/L (ref 98–107)
CHOLEST SERPL-MCNC: 190 MG/DL (ref 0–200)
CO2 SERPL-SCNC: 25 MMOL/L (ref 22–29)
CREAT SERPL-MCNC: 1.08 MG/DL (ref 0.76–1.27)
CREAT UR-MCNC: 89 MG/DL
EGFRCR SERPLBLD CKD-EPI 2021: 83.6 ML/MIN/1.73
GLOBULIN SER CALC-MCNC: 2.6 GM/DL
GLUCOSE SERPL-MCNC: 107 MG/DL (ref 65–99)
HBA1C MFR BLD: 7 % (ref 4.8–5.6)
HDLC SERPL-MCNC: 46 MG/DL (ref 40–60)
IMP & REVIEW OF LAB RESULTS: NORMAL
LDLC SERPL CALC-MCNC: 108 MG/DL (ref 0–100)
MICROALBUMIN UR-MCNC: 5.6 UG/ML
POTASSIUM SERPL-SCNC: 3.5 MMOL/L (ref 3.5–5.2)
PROT SERPL-MCNC: 7.2 G/DL (ref 6–8.5)
SODIUM SERPL-SCNC: 140 MMOL/L (ref 136–145)
TRIGL SERPL-MCNC: 208 MG/DL (ref 0–150)
VLDLC SERPL CALC-MCNC: 36 MG/DL (ref 5–40)

## 2022-12-05 RX ORDER — DULAGLUTIDE 1.5 MG/.5ML
1.5 INJECTION, SOLUTION SUBCUTANEOUS WEEKLY
Qty: 6 ML | Refills: 1 | Status: CANCELLED | OUTPATIENT
Start: 2022-12-05 | End: 2023-12-05

## 2022-12-06 ENCOUNTER — OFFICE VISIT (OUTPATIENT)
Dept: ENDOCRINOLOGY | Age: 52
End: 2022-12-06

## 2022-12-06 VITALS
BODY MASS INDEX: 25.31 KG/M2 | WEIGHT: 176.8 LBS | TEMPERATURE: 96.4 F | HEIGHT: 70 IN | HEART RATE: 83 BPM | OXYGEN SATURATION: 97 % | DIASTOLIC BLOOD PRESSURE: 70 MMHG | SYSTOLIC BLOOD PRESSURE: 122 MMHG

## 2022-12-06 DIAGNOSIS — E11.65 TYPE 2 DIABETES MELLITUS WITH HYPERGLYCEMIA, WITH LONG-TERM CURRENT USE OF INSULIN: Primary | ICD-10-CM

## 2022-12-06 DIAGNOSIS — Z79.4 TYPE 2 DIABETES MELLITUS WITH HYPERGLYCEMIA, WITH LONG-TERM CURRENT USE OF INSULIN: Primary | ICD-10-CM

## 2022-12-06 DIAGNOSIS — E78.2 MIXED HYPERLIPIDEMIA: ICD-10-CM

## 2022-12-06 PROCEDURE — 99214 OFFICE O/P EST MOD 30 MIN: CPT | Performed by: NURSE PRACTITIONER

## 2022-12-06 RX ORDER — FLASH GLUCOSE SENSOR
1 KIT MISCELLANEOUS DAILY
Qty: 6 EACH | Refills: 2 | Status: SHIPPED | OUTPATIENT
Start: 2022-12-06

## 2022-12-06 RX ORDER — EMPAGLIFLOZIN, METFORMIN HYDROCHLORIDE 12.5; 1 MG/1; MG/1
1 TABLET, EXTENDED RELEASE ORAL DAILY
Qty: 90 TABLET | Refills: 1 | Status: SHIPPED | OUTPATIENT
Start: 2022-12-06 | End: 2023-03-08 | Stop reason: SDUPTHER

## 2022-12-06 RX ORDER — GLIPIZIDE 10 MG/1
TABLET ORAL
Qty: 180 TABLET | Refills: 1 | Status: SHIPPED | OUTPATIENT
Start: 2022-12-06

## 2022-12-06 RX ORDER — ERGOCALCIFEROL 1.25 MG/1
50000 CAPSULE ORAL WEEKLY
Qty: 30 CAPSULE | Refills: 0 | Status: SHIPPED | OUTPATIENT
Start: 2022-12-06 | End: 2023-03-08

## 2022-12-06 RX ORDER — DULAGLUTIDE 1.5 MG/.5ML
1.5 INJECTION, SOLUTION SUBCUTANEOUS WEEKLY
Qty: 6 ML | Refills: 1 | Status: SHIPPED | OUTPATIENT
Start: 2022-12-06 | End: 2023-03-08 | Stop reason: SDUPTHER

## 2023-02-09 ENCOUNTER — TELEPHONE (OUTPATIENT)
Dept: ENDOCRINOLOGY | Age: 53
End: 2023-02-09
Payer: COMMERCIAL

## 2023-02-09 NOTE — TELEPHONE ENCOUNTER
Patient called stating he can't get get his Synjardy until tomorrow. We do not have any samples. Is there anything he can substitute in the meantime?      He is asking for a call back

## 2023-03-08 ENCOUNTER — TELEPHONE (OUTPATIENT)
Dept: ENDOCRINOLOGY | Age: 53
End: 2023-03-08

## 2023-03-08 RX ORDER — EMPAGLIFLOZIN, METFORMIN HYDROCHLORIDE 12.5; 1 MG/1; MG/1
1 TABLET, EXTENDED RELEASE ORAL DAILY
Qty: 90 TABLET | Refills: 1 | Status: SHIPPED | OUTPATIENT
Start: 2023-03-08

## 2023-03-08 RX ORDER — DULAGLUTIDE 1.5 MG/.5ML
1.5 INJECTION, SOLUTION SUBCUTANEOUS WEEKLY
Qty: 6 ML | Refills: 1 | Status: SHIPPED | OUTPATIENT
Start: 2023-03-08 | End: 2024-03-07

## 2023-03-08 NOTE — TELEPHONE ENCOUNTER
PT CALLED FOR MED REFILL ON THE FOLLOWING MED:    Dulaglutide (Trulicity) 1.5 MG/0.5ML solution pen-injector [[id number]]    vitamin D (ERGOCALCIFEROL) 1.25 MG (11309 UT) capsule capsule [[id number]]    Empagliflozin-metFORMIN HCl ER (Synjardy XR) 12.5-1000 MG tablet sustained-release 24 hour [[id number]]      GOING TO:    [facility] [phone] - [facility] [id number] [facility] [address] - [phone] [facility] [phone] FX

## 2023-04-24 ENCOUNTER — LAB (OUTPATIENT)
Dept: ENDOCRINOLOGY | Age: 53
End: 2023-04-24
Payer: COMMERCIAL

## 2023-04-24 DIAGNOSIS — E11.65 TYPE 2 DIABETES MELLITUS WITH HYPERGLYCEMIA, WITH LONG-TERM CURRENT USE OF INSULIN: ICD-10-CM

## 2023-04-24 DIAGNOSIS — Z79.4 TYPE 2 DIABETES MELLITUS WITH HYPERGLYCEMIA, WITH LONG-TERM CURRENT USE OF INSULIN: ICD-10-CM

## 2023-04-24 DIAGNOSIS — E78.2 MIXED HYPERLIPIDEMIA: ICD-10-CM

## 2023-04-25 LAB
25(OH)D3+25(OH)D2 SERPL-MCNC: 19.2 NG/ML (ref 30–100)
ALBUMIN SERPL-MCNC: 4.5 G/DL (ref 4–5)
ALBUMIN/CREAT UR: <4 MG/G CREAT (ref 0–29)
ALBUMIN/GLOB SERPL: 1.6 {RATIO} (ref 1.2–2.2)
ALP SERPL-CCNC: 60 IU/L (ref 44–121)
ALT SERPL-CCNC: 22 IU/L (ref 0–44)
AST SERPL-CCNC: 17 IU/L (ref 0–40)
BILIRUB SERPL-MCNC: 1.1 MG/DL (ref 0–1.2)
BUN SERPL-MCNC: 9 MG/DL (ref 6–24)
BUN/CREAT SERPL: 8 (ref 9–20)
CALCIUM SERPL-MCNC: 9.8 MG/DL (ref 8.7–10.2)
CHLORIDE SERPL-SCNC: 101 MMOL/L (ref 96–106)
CHOLEST SERPL-MCNC: 226 MG/DL (ref 100–199)
CO2 SERPL-SCNC: 23 MMOL/L (ref 20–29)
CREAT SERPL-MCNC: 1.15 MG/DL (ref 0.76–1.27)
CREAT UR-MCNC: 81.1 MG/DL
EGFRCR SERPLBLD CKD-EPI 2021: 78 ML/MIN/1.73
GLOBULIN SER CALC-MCNC: 2.9 G/DL (ref 1.5–4.5)
GLUCOSE SERPL-MCNC: 109 MG/DL (ref 70–99)
HBA1C MFR BLD: 6.5 % (ref 4.8–5.6)
HDLC SERPL-MCNC: 46 MG/DL
IMP & REVIEW OF LAB RESULTS: NORMAL
LDLC SERPL CALC-MCNC: 117 MG/DL (ref 0–99)
MICROALBUMIN UR-MCNC: <3 UG/ML
POTASSIUM SERPL-SCNC: 4.8 MMOL/L (ref 3.5–5.2)
PROT SERPL-MCNC: 7.4 G/DL (ref 6–8.5)
SODIUM SERPL-SCNC: 142 MMOL/L (ref 134–144)
T3FREE SERPL-MCNC: 3.5 PG/ML (ref 2–4.4)
T4 FREE SERPL-MCNC: 1.14 NG/DL (ref 0.82–1.77)
TRIGL SERPL-MCNC: 361 MG/DL (ref 0–149)
TSH SERPL DL<=0.005 MIU/L-ACNC: 1.65 UIU/ML (ref 0.45–4.5)
VLDLC SERPL CALC-MCNC: 63 MG/DL (ref 5–40)

## 2023-05-08 ENCOUNTER — OFFICE VISIT (OUTPATIENT)
Dept: ENDOCRINOLOGY | Age: 53
End: 2023-05-08
Payer: COMMERCIAL

## 2023-05-08 VITALS
DIASTOLIC BLOOD PRESSURE: 70 MMHG | BODY MASS INDEX: 26.31 KG/M2 | WEIGHT: 183.8 LBS | HEART RATE: 89 BPM | TEMPERATURE: 97.1 F | SYSTOLIC BLOOD PRESSURE: 124 MMHG | OXYGEN SATURATION: 97 % | HEIGHT: 70 IN

## 2023-05-08 DIAGNOSIS — E78.2 MIXED HYPERLIPIDEMIA: ICD-10-CM

## 2023-05-08 DIAGNOSIS — Z79.4 TYPE 2 DIABETES MELLITUS WITH HYPERGLYCEMIA, WITH LONG-TERM CURRENT USE OF INSULIN: Primary | ICD-10-CM

## 2023-05-08 DIAGNOSIS — E11.65 TYPE 2 DIABETES MELLITUS WITH HYPERGLYCEMIA, WITH LONG-TERM CURRENT USE OF INSULIN: Primary | ICD-10-CM

## 2023-05-08 PROCEDURE — 99214 OFFICE O/P EST MOD 30 MIN: CPT | Performed by: NURSE PRACTITIONER

## 2023-05-08 RX ORDER — ROSUVASTATIN CALCIUM 5 MG/1
5 TABLET, COATED ORAL NIGHTLY
Qty: 30 TABLET | Refills: 5 | Status: SHIPPED | OUTPATIENT
Start: 2023-05-08 | End: 2024-05-07

## 2023-05-08 NOTE — PROGRESS NOTES
"Chief Complaint  Diabetes (Type 2: Pt arnulfo is attached, is not up to date on eye exam, no hx of retinopathy or neuropathy. )    Subjective        Joby Kulkarni presents to [facility] ENDOCRINOLOGY  History of Present Illness     Type 2 dm    Diagnosed since 2017  Today in clinic pt reports being on Glipizide 10 mg po bid with meals, synjardy 12.5-1000mg daily, trulicity 1.5mg weekly  Sensor - yes, arnulfo  Needs to arrange f/u eye exam   (-) CKD   Dm neuropathy - yes  CAD - denies CP  CVA - denies stroke like symptoms       Objective   Vital Signs:  /70   Pulse 89   Temp 97.1 °F (36.2 °C) (Temporal)   Ht 177.8 cm (70\")   Wt 83.4 kg (183 lb 12.8 oz)   SpO2 97%   BMI 26.37 kg/m²   Estimated body mass index is 26.37 kg/m² as calculated from the following:    Height as of this encounter: 177.8 cm (70\").    Weight as of this encounter: 83.4 kg (183 lb 12.8 oz).             Physical Exam  Vitals reviewed.   Constitutional:       General: He is not in acute distress.  HENT:      Head: Normocephalic and atraumatic.   Cardiovascular:      Rate and Rhythm: Normal rate.   Pulmonary:      Effort: Pulmonary effort is normal. No respiratory distress.   Musculoskeletal:         General: No signs of injury. Normal range of motion.      Cervical back: Normal range of motion and neck supple.   Skin:     General: Skin is warm and dry.   Neurological:      Mental Status: He is alert and oriented to person, place, and time. Mental status is at baseline.   Psychiatric:         Mood and Affect: Mood normal.         Behavior: Behavior normal.         Thought Content: Thought content normal.         Judgment: Judgment normal.        Result Review :  The following data was reviewed by: LEODAN Avila on 05/08/2023:  Common labs        8/17/2022    15:13 11/22/2022    14:41 4/24/2023    14:50   Common Labs   Glucose 211   107   109     BUN 7   7   9     Creatinine 0.85   1.08   1.15     Sodium 139   140   " 142     Potassium 4.2   3.5   4.8     Chloride 100   103   101     Calcium 9.8   9.2   9.8     Total Protein  7.2   7.4     Albumin  4.60   4.5     Total Bilirubin  0.8   1.1     Alkaline Phosphatase  65   60     AST (SGOT)  17   17     ALT (SGPT)  11   22     Total Cholesterol  190   226     Triglycerides  208   361     HDL Cholesterol  46   46     LDL Cholesterol   108   117     Hemoglobin A1C 9.8   7.00   6.5     Microalbumin, Urine  5.6   <3.0                    Assessment and Plan   Diagnoses and all orders for this visit:    1. Type 2 diabetes mellitus with hyperglycemia, with long-term current use of insulin (Primary)    2. Mixed hyperlipidemia  -     Comprehensive Metabolic Panel; Future  -     Lipid Panel; Future    Other orders  -     rosuvastatin (Crestor) 5 MG tablet; Take 1 tablet by mouth Every Night.  Dispense: 30 tablet; Refill: 5             Follow Up   No follow-ups on file.     a1c at goal, continue regimen as above  Start statin-educated on potential for side effects and to notify me for any new or worsening symptoms  Written education provided as well  Patient is going to also work on cutting back on eating fried foods   Repeat labs only in 3 months       Patient was given instructions and counseling regarding his condition or for health maintenance advice. Please see specific information pulled into the AVS if appropriate.     LEODAN Avila

## 2023-07-25 NOTE — CONSULTS
"Adult Nutrition  Assessment/PES    Patient Name:  Joby Kulkarni  YOB: 1970  MRN: [id number]  Admit Date:  1/13/2020    Assessment Date:  1/13/2020    Comments:  Nutrition screen completed. Will begin Diabetic Diet education.    Reason for Assessment     Row Name 01/13/20 1338          Reason for Assessment    Reason For Assessment  physician consult     Diagnosis  -- new DM, CAD           Anthropometrics     Row Name 01/13/20 1339 01/13/20 0727       Anthropometrics    Weight  --  63.9 kg (140 lb 14 oz)       Body Mass Index (BMI)    BMI Assessment  BMI 18.5-24.9: normal  --    Row Name 01/13/20 0637 01/13/20 06:28:14       Anthropometrics    Height  --  182.9 cm (72\")    Weight  66.8 kg (147 lb 3.2 oz)  --       Ideal Body Weight (IBW)    Ideal Body Weight (IBW) (kg)  --  82.07        Labs/Tests/Procedures/Meds     Row Name 01/13/20 1339          Labs/Procedures/Meds    Lab Results Reviewed  reviewed     Lab Results Comments  glu, wbc, H/H        Diagnostic Tests/Procedures    Diagnostic Test/Procedure Reviewed  reviewed        Medications    Pertinent Medications Reviewed  reviewed     Pertinent Medications Comments  insulin         Physical Findings     Row Name 01/13/20 1339          Physical Findings    Skin  -- lori 15         Estimated/Assessed Needs     Row Name 01/13/20 06:28:14          Calculation Measurements    Height  182.9 cm (72\")         Nutrition Prescription Ordered     Row Name 01/13/20 1339          Nutrition Prescription PO    Current PO Diet  NPO                 Problem/Interventions:  Problem 1     Row Name 01/13/20 1340          Nutrition Diagnoses Problem 1    Problem 1  Knowledge Deficit     Etiology (related to)  Medical Diagnosis     Endocrine  DM               Intervention Goal     Row Name 01/13/20 1340          Intervention Goal    General  Maintain nutrition     PO  Initiate feeding     Weight  Maintain weight         Nutrition Intervention     Row Name 01/13/20 " 1340          Nutrition Intervention    RD/Tech Action  Follow Tx progress;Care plan reviewd           Education/Evaluation     Row Name 01/13/20 1340          Education    Education  Will Instruct as appropriate        Monitor/Evaluation    Monitor  Per protocol           Electronically signed by:  Lucinda Manrique RD  01/13/20 1:40 PM   at home:

## 2023-08-25 RX ORDER — DULAGLUTIDE 1.5 MG/.5ML
INJECTION, SOLUTION SUBCUTANEOUS
Qty: 6 ML | Refills: 0 | Status: SHIPPED | OUTPATIENT
Start: 2023-08-25

## 2023-09-19 RX ORDER — EMPAGLIFLOZIN, METFORMIN HYDROCHLORIDE 12.5; 1 MG/1; MG/1
TABLET, EXTENDED RELEASE ORAL
Qty: 90 TABLET | Refills: 0 | Status: SHIPPED | OUTPATIENT
Start: 2023-09-19

## 2023-11-14 ENCOUNTER — OFFICE VISIT (OUTPATIENT)
Dept: ENDOCRINOLOGY | Age: 53
End: 2023-11-14
Payer: COMMERCIAL

## 2023-11-14 VITALS
HEIGHT: 70 IN | SYSTOLIC BLOOD PRESSURE: 130 MMHG | TEMPERATURE: 96.8 F | HEART RATE: 83 BPM | OXYGEN SATURATION: 99 % | BODY MASS INDEX: 26.37 KG/M2 | WEIGHT: 184.2 LBS | DIASTOLIC BLOOD PRESSURE: 70 MMHG

## 2023-11-14 DIAGNOSIS — E11.65 TYPE 2 DIABETES MELLITUS WITH HYPERGLYCEMIA, WITH LONG-TERM CURRENT USE OF INSULIN: Primary | ICD-10-CM

## 2023-11-14 DIAGNOSIS — Z79.4 TYPE 2 DIABETES MELLITUS WITH HYPERGLYCEMIA, WITH LONG-TERM CURRENT USE OF INSULIN: Primary | ICD-10-CM

## 2023-11-14 DIAGNOSIS — E11.69 HYPERLIPIDEMIA ASSOCIATED WITH TYPE 2 DIABETES MELLITUS: ICD-10-CM

## 2023-11-14 DIAGNOSIS — E78.5 HYPERLIPIDEMIA ASSOCIATED WITH TYPE 2 DIABETES MELLITUS: ICD-10-CM

## 2023-11-14 PROCEDURE — 99214 OFFICE O/P EST MOD 30 MIN: CPT | Performed by: NURSE PRACTITIONER

## 2023-11-14 RX ORDER — DULAGLUTIDE 1.5 MG/.5ML
1.5 INJECTION, SOLUTION SUBCUTANEOUS WEEKLY
Qty: 6 ML | Refills: 1 | Status: SHIPPED | OUTPATIENT
Start: 2023-11-14

## 2023-11-14 RX ORDER — EMPAGLIFLOZIN, METFORMIN HYDROCHLORIDE 12.5; 1 MG/1; MG/1
1 TABLET, EXTENDED RELEASE ORAL DAILY
Qty: 90 TABLET | Refills: 1 | Status: SHIPPED | OUTPATIENT
Start: 2023-11-14

## 2023-11-14 RX ORDER — ROSUVASTATIN CALCIUM 5 MG/1
5 TABLET, COATED ORAL NIGHTLY
Qty: 90 TABLET | Refills: 1 | Status: SHIPPED | OUTPATIENT
Start: 2023-11-14

## 2023-11-14 RX ORDER — GLIPIZIDE 10 MG/1
10 TABLET ORAL DAILY
Qty: 90 TABLET | Refills: 1 | Status: SHIPPED | OUTPATIENT
Start: 2023-11-14

## 2023-11-14 NOTE — TELEPHONE ENCOUNTER
Rx Refill Note  Requested Prescriptions     Pending Prescriptions Disp Refills    rosuvastatin (CRESTOR) 5 MG tablet [Pharmacy Med Name: ROSUVASTATIN CALCIUM 5 MG TAB] 30 tablet 5     Sig: TAKE ONE TABLET BY MOUTH ONCE NIGHTLY      Last office visit with prescribing clinician: 5/8/2023   Last telemedicine visit with prescribing clinician: Visit date not found   Next office visit with prescribing clinician: 11/14/2023                         Would you like a call back once the refill request has been completed: [] Yes [] No    If the office needs to give you a call back, can they leave a voicemail: [] Yes [] No    Rosetta Short  11/14/23, 07:56 EST

## 2023-11-14 NOTE — PROGRESS NOTES
"Chief Complaint  Diabetes (Ronal attached)    Subjective        Joby Kulkarni presents to [facility] ENDOCRINOLOGY  History of Present Illness    Has ronal but does not scan often- only 10% data    Type 2 dm    Diagnosed since 2017  Today in clinic pt reports being on Glipizide 10 mg po bid with meals, synjardy 12.5-1000mg daily, trulicity 1.5mg weekly  Needs to arrange f/u eye exam   (+)Dm neuropathy; stable   On statin       Objective   Vital Signs:  /70   Pulse 83   Temp 96.8 °F (36 °C) (Temporal)   Ht 177.8 cm (70\")   Wt 83.6 kg (184 lb 3.2 oz)   SpO2 99%   BMI 26.43 kg/m²   Estimated body mass index is 26.43 kg/m² as calculated from the following:    Height as of this encounter: 177.8 cm (70\").    Weight as of this encounter: 83.6 kg (184 lb 3.2 oz).             Physical Exam  Vitals reviewed.   Constitutional:       General: He is not in acute distress.  HENT:      Head: Normocephalic and atraumatic.   Cardiovascular:      Rate and Rhythm: Normal rate.   Pulmonary:      Effort: Pulmonary effort is normal. No respiratory distress.   Musculoskeletal:         General: No signs of injury. Normal range of motion.      Cervical back: Normal range of motion and neck supple.   Skin:     General: Skin is warm and dry.   Neurological:      Mental Status: He is alert and oriented to person, place, and time. Mental status is at baseline.   Psychiatric:         Mood and Affect: Mood normal.         Behavior: Behavior normal.         Thought Content: Thought content normal.         Judgment: Judgment normal.          Result Review :  The following data was reviewed by: LEODAN Avila on 11/14/2023:  Common labs          11/22/2022    14:41 4/24/2023    14:50 8/9/2023    15:01   Common Labs   Glucose 107  109  116    BUN 7  9  8    Creatinine 1.08  1.15  1.16    Sodium 140  142  140    Potassium 3.5  4.8  4.2    Chloride 103  101  103    Calcium 9.2  9.8  9.4    Total Protein 7.2  7.4  " 7.1    Albumin 4.60  4.5  4.6    Total Bilirubin 0.8  1.1  1.1    Alkaline Phosphatase 65  60  61    AST (SGOT) 17  17  20    ALT (SGPT) 11  22  18    Total Cholesterol 190  226  139    Triglycerides 208  361  190    HDL Cholesterol 46  46  43    LDL Cholesterol  108  117  64    Hemoglobin A1C 7.00  6.5     Microalbumin, Urine 5.6  <3.0                    Assessment and Plan   Diagnoses and all orders for this visit:    1. Type 2 diabetes mellitus with hyperglycemia, with long-term current use of insulin (Primary)  -     Dulaglutide (Trulicity) 1.5 MG/0.5ML solution pen-injector; Inject 1.5 mg under the skin into the appropriate area as directed 1 (One) Time Per Week.  Dispense: 6 mL; Refill: 1  -     Empagliflozin-metFORMIN HCl ER (Synjardy XR) 12.5-1000 MG tablet sustained-release 24 hour; Take 1 each by mouth Daily.  Dispense: 90 tablet; Refill: 1  -     glipizide (GLUCOTROL) 10 MG tablet; Take 1 tablet by mouth Daily. Twice daily with meal  Dispense: 90 tablet; Refill: 1  -     Hemoglobin A1c  -     Comprehensive Metabolic Panel  -     Lipid Panel  -     Microalbumin / Creatinine Urine Ratio - Urine, Clean Catch    2. Hyperlipidemia associated with type 2 diabetes mellitus  -     Hemoglobin A1c  -     Comprehensive Metabolic Panel  -     Lipid Panel  -     Microalbumin / Creatinine Urine Ratio - Urine, Clean Catch             Follow Up   Return in about 6 months (around 5/14/2024).    Labs today  Will adjust above regimen as needed based on results  Continue statin     Patient was given instructions and counseling regarding his condition or for health maintenance advice. Please see specific information pulled into the AVS if appropriate.     Pam Pike, LEODAN

## 2023-11-15 LAB
ALBUMIN SERPL-MCNC: 4.4 G/DL (ref 3.8–4.9)
ALBUMIN/CREAT UR: <5 MG/G CREAT (ref 0–29)
ALBUMIN/GLOB SERPL: 1.6 {RATIO} (ref 1.2–2.2)
ALP SERPL-CCNC: 66 IU/L (ref 44–121)
ALT SERPL-CCNC: 29 IU/L (ref 0–44)
AST SERPL-CCNC: 23 IU/L (ref 0–40)
BILIRUB SERPL-MCNC: 0.9 MG/DL (ref 0–1.2)
BUN SERPL-MCNC: 8 MG/DL (ref 6–24)
BUN/CREAT SERPL: 7 (ref 9–20)
CALCIUM SERPL-MCNC: 9.1 MG/DL (ref 8.7–10.2)
CHLORIDE SERPL-SCNC: 101 MMOL/L (ref 96–106)
CHOLEST SERPL-MCNC: 158 MG/DL (ref 100–199)
CO2 SERPL-SCNC: 18 MMOL/L (ref 20–29)
CREAT SERPL-MCNC: 1.12 MG/DL (ref 0.76–1.27)
CREAT UR-MCNC: 66.1 MG/DL
EGFRCR SERPLBLD CKD-EPI 2021: 80 ML/MIN/1.73
GLOBULIN SER CALC-MCNC: 2.8 G/DL (ref 1.5–4.5)
GLUCOSE SERPL-MCNC: 170 MG/DL (ref 70–99)
HBA1C MFR BLD: 7.2 % (ref 4.8–5.6)
HDLC SERPL-MCNC: 43 MG/DL
IMP & REVIEW OF LAB RESULTS: NORMAL
LDLC SERPL CALC-MCNC: 59 MG/DL (ref 0–99)
MICROALBUMIN UR-MCNC: <3 UG/ML
POTASSIUM SERPL-SCNC: 4.1 MMOL/L (ref 3.5–5.2)
PROT SERPL-MCNC: 7.2 G/DL (ref 6–8.5)
SODIUM SERPL-SCNC: 139 MMOL/L (ref 134–144)
TRIGL SERPL-MCNC: 363 MG/DL (ref 0–149)
VLDLC SERPL CALC-MCNC: 56 MG/DL (ref 5–40)

## 2023-11-19 DIAGNOSIS — E11.65 TYPE 2 DIABETES MELLITUS WITH HYPERGLYCEMIA, WITH LONG-TERM CURRENT USE OF INSULIN: ICD-10-CM

## 2023-11-19 DIAGNOSIS — Z79.4 TYPE 2 DIABETES MELLITUS WITH HYPERGLYCEMIA, WITH LONG-TERM CURRENT USE OF INSULIN: ICD-10-CM

## 2023-11-20 RX ORDER — DULAGLUTIDE 1.5 MG/.5ML
1.5 INJECTION, SOLUTION SUBCUTANEOUS WEEKLY
Qty: 6 ML | Refills: 1 | OUTPATIENT
Start: 2023-11-20

## 2023-12-12 ENCOUNTER — TELEPHONE (OUTPATIENT)
Dept: ENDOCRINOLOGY | Age: 53
End: 2023-12-12

## 2023-12-12 NOTE — TELEPHONE ENCOUNTER
Provider: KRISTIN ALCOCER     Caller: JOHNSON US     Relationship to Patient: SELF     Pharmacy: [facility]     Phone Number: [phone]    Reason for Call: PT RECEIVED LETTER AND HE WOULD ONLY TELL ME THAT IT IS ABOUT TRULICITY AND HE WANTS SOMEONE FROM THE OFFICE TO CALL HIM PT WAS ASKED TO SEND IT THROUGH [employer] AND HE WOULDN'T DO IT. HE WANTS TO SPEAK TO KRISTIN ALCOCER PLEASE ADVISE PT GETS OFF WORK AT 1

## 2023-12-13 NOTE — TELEPHONE ENCOUNTER
Called and spoke with patient, he will either call back or share with us on mychart what is going on with his medication.

## 2023-12-26 NOTE — TELEPHONE ENCOUNTER
Rx Refill Note  Requested Prescriptions     Pending Prescriptions Disp Refills    Continuous Blood Gluc Sensor (FreeStyle Ronal 14 Day Sensor) misc 6 each 2     Sig: Inject 1 Device under the skin into the appropriate area as directed Daily.      Last office visit with prescribing clinician: 11/14/2023   Last telemedicine visit with prescribing clinician: Visit date not found   Next office visit with prescribing clinician: 5/21/2024                         Would you like a call back once the refill request has been completed: [] Yes [] No    If the office needs to give you a call back, can they leave a voicemail: [] Yes [] No    Rosetta Short  12/26/23, 12:36 EST

## 2023-12-26 NOTE — TELEPHONE ENCOUNTER
"Caller: Joby Luna \"Joby Luna\"    Relationship: Self    Best call back number: [phone] (home)      Requested Prescriptions:   Requested Prescriptions     Pending Prescriptions Disp Refills    Continuous Blood Gluc Sensor (FreeStyle Ronal 14 Day Sensor) misc 6 each 2     Sig: Inject 1 Device under the skin into the appropriate area as directed Daily.        Pharmacy where request should be sent:    [facility] [phone] [facility] [id number] [facility] [address] [facility] [phone] [facility] [phone] FX     Last office visit with prescribing clinician: 11/14/2023   Last telemedicine visit with prescribing clinician: Visit date not found   Next office visit with prescribing clinician: 5/21/2024     Additional details provided by patient: COMPLETELY OUT OF SENSORS    Does the patient have less than a 3 day supply:  [x] Yes  [] No    Would you like a call back once the refill request has been completed: [] Yes [x] No    If the office needs to give you a call back, can they leave a voicemail: [] Yes [x] No    Vipin Faulkner Rep   12/26/23 12:35 EST   "

## 2023-12-27 RX ORDER — FLASH GLUCOSE SENSOR
1 KIT MISCELLANEOUS DAILY
Qty: 6 EACH | Refills: 2 | Status: SHIPPED | OUTPATIENT
Start: 2023-12-27

## 2024-01-26 DIAGNOSIS — Z79.4 TYPE 2 DIABETES MELLITUS WITH HYPERGLYCEMIA, WITH LONG-TERM CURRENT USE OF INSULIN: ICD-10-CM

## 2024-01-26 DIAGNOSIS — E11.65 TYPE 2 DIABETES MELLITUS WITH HYPERGLYCEMIA, WITH LONG-TERM CURRENT USE OF INSULIN: ICD-10-CM

## 2024-01-26 RX ORDER — EMPAGLIFLOZIN 10 MG/1
10 TABLET, FILM COATED ORAL DAILY
Qty: 30 TABLET | Refills: 5 | OUTPATIENT
Start: 2024-01-26

## 2024-01-26 RX ORDER — EMPAGLIFLOZIN, METFORMIN HYDROCHLORIDE 12.5; 1 MG/1; MG/1
1 TABLET, EXTENDED RELEASE ORAL DAILY
Qty: 90 TABLET | Refills: 0 | Status: SHIPPED | OUTPATIENT
Start: 2024-01-26

## 2024-01-26 NOTE — TELEPHONE ENCOUNTER
Rx Refill Note  Requested Prescriptions     Pending Prescriptions Disp Refills    Jardiance 10 MG tablet tablet [Pharmacy Med Name: JARDIANCE 10 MG TABLET] 30 tablet 5     Sig: TAKE ONE TABLET BY MOUTH DAILY      Last office visit with prescribing clinician: 11/14/2023   Last telemedicine visit with prescribing clinician: Visit date not found   Next office visit with prescribing clinician: 5/21/2024                         Would you like a call back once the refill request has been completed: [] Yes [] No    If the office needs to give you a call back, can they leave a voicemail: [] Yes [] No    Darya Short MA  01/26/24, 14:44 EST

## 2024-01-26 NOTE — TELEPHONE ENCOUNTER
"    Caller: Joby Luna \"Joby Luna\"    Relationship: Self    Best call back number: [phone]    Requested Prescriptions:   Requested Prescriptions     Pending Prescriptions Disp Refills    Empagliflozin-metFORMIN HCl ER (Synjardy XR) 12.5-1000 MG tablet sustained-release 24 hour 90 tablet 1     Sig: Take 1 each by mouth Daily.    ( OR [facility]  Pharmacy where request should be sent:  [facility] [phone] [place] [id number] [facility] AT [facility] [phone] [facility] [phone] FX [[id number]]     Last office visit with prescribing clinician: 11/14/2023   Last telemedicine visit with prescribing clinician: Visit date not found   Next office visit with prescribing clinician: 5/21/2024     Additional details provided by patient:     Does the patient have less than a 3 day supply:  [x] Yes  [] No    Would you like a call back once the refill request has been completed: [x] Yes [] No    If the office needs to give you a call back, can they leave a voicemail: [x] Yes [] No    Vipin Lafleur Rep   01/26/24 14:45 EST     "

## 2024-01-26 NOTE — TELEPHONE ENCOUNTER
Rx Refill Note  Requested Prescriptions     Pending Prescriptions Disp Refills    Empagliflozin-metFORMIN HCl ER (Synjardy XR) 12.5-1000 MG tablet sustained-release 24 hour 90 tablet 1     Sig: Take 1 each by mouth Daily.      Last office visit with prescribing clinician: 11/14/2023   Last telemedicine visit with prescribing clinician: Visit date not found   Next office visit with prescribing clinician: 5/21/2024                         Would you like a call back once the refill request has been completed: [] Yes [] No    If the office needs to give you a call back, can they leave a voicemail: [] Yes [] No    Rosetta Short  01/26/24, 14:47 EST

## 2024-04-04 DIAGNOSIS — Z79.4 TYPE 2 DIABETES MELLITUS WITH HYPERGLYCEMIA, WITH LONG-TERM CURRENT USE OF INSULIN: ICD-10-CM

## 2024-04-04 DIAGNOSIS — E11.65 TYPE 2 DIABETES MELLITUS WITH HYPERGLYCEMIA, WITH LONG-TERM CURRENT USE OF INSULIN: ICD-10-CM

## 2024-04-05 RX ORDER — GLIPIZIDE 10 MG/1
10 TABLET ORAL 2 TIMES DAILY WITH MEALS
Qty: 60 TABLET | Refills: 0 | Status: SHIPPED | OUTPATIENT
Start: 2024-04-05

## 2024-04-05 NOTE — TELEPHONE ENCOUNTER
Rx Refill Note  Requested Prescriptions     Pending Prescriptions Disp Refills    glipizide (GLUCOTROL) 10 MG tablet [Pharmacy Med Name: glipiZIDE 10 MG TABLET] 60 tablet 0     Sig: TAKE 1 TABLET BY MOUTH TWICE A DAY WITH MEALS      Last office visit with prescribing clinician: 11/14/2023   Last telemedicine visit with prescribing clinician: Visit date not found   Next office visit with prescribing clinician: 5/21/2024                         Would you like a call back once the refill request has been completed: [] Yes [] No    If the office needs to give you a call back, can they leave a voicemail: [] Yes [] No    Rosetta Short  04/05/24, 07:53 EDT

## 2024-04-29 RX ORDER — ROSUVASTATIN CALCIUM 5 MG/1
5 TABLET, COATED ORAL NIGHTLY
Qty: 90 TABLET | Refills: 0 | Status: SHIPPED | OUTPATIENT
Start: 2024-04-29

## 2024-04-29 NOTE — TELEPHONE ENCOUNTER
Rx Refill Note  Requested Prescriptions     Pending Prescriptions Disp Refills    rosuvastatin (CRESTOR) 5 MG tablet [Pharmacy Med Name: ROSUVASTATIN CALCIUM 5 MG TAB] 90 tablet 1     Sig: TAKE ONE TABLET BY MOUTH ONCE NIGHTLY      Last office visit with prescribing clinician: 11/14/2023   Last telemedicine visit with prescribing clinician: Visit date not found   Next office visit with prescribing clinician: 5/21/2024                         Would you like a call back once the refill request has been completed: [] Yes [] No    If the office needs to give you a call back, can they leave a voicemail: [] Yes [] No    Darya Short MA  04/29/24, 08:41 EDT   
Yes

## 2024-05-08 ENCOUNTER — TELEPHONE (OUTPATIENT)
Dept: ENDOCRINOLOGY | Age: 54
End: 2024-05-08
Payer: COMMERCIAL

## 2024-05-08 DIAGNOSIS — E78.5 HYPERLIPIDEMIA ASSOCIATED WITH TYPE 2 DIABETES MELLITUS: ICD-10-CM

## 2024-05-08 DIAGNOSIS — Z79.4 TYPE 2 DIABETES MELLITUS WITH HYPERGLYCEMIA, WITH LONG-TERM CURRENT USE OF INSULIN: Primary | ICD-10-CM

## 2024-05-08 DIAGNOSIS — E11.65 TYPE 2 DIABETES MELLITUS WITH HYPERGLYCEMIA, WITH LONG-TERM CURRENT USE OF INSULIN: Primary | ICD-10-CM

## 2024-05-08 DIAGNOSIS — E11.69 HYPERLIPIDEMIA ASSOCIATED WITH TYPE 2 DIABETES MELLITUS: ICD-10-CM

## 2024-05-14 ENCOUNTER — LAB (OUTPATIENT)
Dept: ENDOCRINOLOGY | Age: 54
End: 2024-05-14
Payer: COMMERCIAL

## 2024-05-14 DIAGNOSIS — E11.65 TYPE 2 DIABETES MELLITUS WITH HYPERGLYCEMIA, WITH LONG-TERM CURRENT USE OF INSULIN: ICD-10-CM

## 2024-05-14 DIAGNOSIS — Z79.4 TYPE 2 DIABETES MELLITUS WITH HYPERGLYCEMIA, WITH LONG-TERM CURRENT USE OF INSULIN: ICD-10-CM

## 2024-05-14 DIAGNOSIS — E11.69 HYPERLIPIDEMIA ASSOCIATED WITH TYPE 2 DIABETES MELLITUS: ICD-10-CM

## 2024-05-14 DIAGNOSIS — E78.5 HYPERLIPIDEMIA ASSOCIATED WITH TYPE 2 DIABETES MELLITUS: ICD-10-CM

## 2024-05-15 LAB
ALBUMIN SERPL-MCNC: 4.3 G/DL (ref 3.5–5.2)
ALBUMIN/CREAT UR: <6 MG/G CREAT (ref 0–29)
ALBUMIN/GLOB SERPL: 1.7 G/DL
ALP SERPL-CCNC: 65 U/L (ref 39–117)
ALT SERPL-CCNC: 33 U/L (ref 1–41)
AST SERPL-CCNC: 29 U/L (ref 1–40)
BILIRUB SERPL-MCNC: 0.8 MG/DL (ref 0–1.2)
BUN SERPL-MCNC: 6 MG/DL (ref 6–20)
BUN/CREAT SERPL: 6 (ref 7–25)
CALCIUM SERPL-MCNC: 9.2 MG/DL (ref 8.6–10.5)
CHLORIDE SERPL-SCNC: 103 MMOL/L (ref 98–107)
CHOLEST SERPL-MCNC: 123 MG/DL (ref 0–200)
CO2 SERPL-SCNC: 26.7 MMOL/L (ref 22–29)
CREAT SERPL-MCNC: 1 MG/DL (ref 0.76–1.27)
CREAT UR-MCNC: 50.1 MG/DL
EGFRCR SERPLBLD CKD-EPI 2021: 91.1 ML/MIN/1.73
GLOBULIN SER CALC-MCNC: 2.6 GM/DL
GLUCOSE SERPL-MCNC: 138 MG/DL (ref 65–99)
HBA1C MFR BLD: 7.5 % (ref 4.8–5.6)
HDLC SERPL-MCNC: 51 MG/DL (ref 40–60)
IMP & REVIEW OF LAB RESULTS: NORMAL
LDLC SERPL CALC-MCNC: 36 MG/DL (ref 0–100)
MICROALBUMIN UR-MCNC: <3 UG/ML
POTASSIUM SERPL-SCNC: 4 MMOL/L (ref 3.5–5.2)
PROT SERPL-MCNC: 6.9 G/DL (ref 6–8.5)
SODIUM SERPL-SCNC: 141 MMOL/L (ref 136–145)
TRIGL SERPL-MCNC: 235 MG/DL (ref 0–150)
VLDLC SERPL CALC-MCNC: 36 MG/DL (ref 5–40)

## 2024-05-16 DIAGNOSIS — Z79.4 TYPE 2 DIABETES MELLITUS WITH HYPERGLYCEMIA, WITH LONG-TERM CURRENT USE OF INSULIN: ICD-10-CM

## 2024-05-16 DIAGNOSIS — E11.65 TYPE 2 DIABETES MELLITUS WITH HYPERGLYCEMIA, WITH LONG-TERM CURRENT USE OF INSULIN: ICD-10-CM

## 2024-05-16 NOTE — TELEPHONE ENCOUNTER
Rx Refill Note  Requested Prescriptions     Pending Prescriptions Disp Refills    Trulicity 1.5 MG/0.5ML solution pen-injector [Pharmacy Med Name: TRULICITY 1.5 MG/0.5 ML PEN] 6 mL 1     Sig: INJECT 1.5 MG UNDER THE SKIN ONCE WEEKLY      Last office visit with prescribing clinician: 11/14/2023   Last telemedicine visit with prescribing clinician: Visit date not found   Next office visit with prescribing clinician: 5/21/2024                         Would you like a call back once the refill request has been completed: [] Yes [] No    If the office needs to give you a call back, can they leave a voicemail: [] Yes [] No    Linda Sanderson MA  05/16/24, 08:20 EDT

## 2024-05-21 ENCOUNTER — OFFICE VISIT (OUTPATIENT)
Dept: ENDOCRINOLOGY | Age: 54
End: 2024-05-21
Payer: COMMERCIAL

## 2024-05-21 VITALS
WEIGHT: 182.2 LBS | SYSTOLIC BLOOD PRESSURE: 124 MMHG | TEMPERATURE: 98.8 F | HEART RATE: 87 BPM | OXYGEN SATURATION: 97 % | DIASTOLIC BLOOD PRESSURE: 68 MMHG | HEIGHT: 70 IN | BODY MASS INDEX: 26.08 KG/M2

## 2024-05-21 DIAGNOSIS — E11.65 TYPE 2 DIABETES MELLITUS WITH HYPERGLYCEMIA, WITHOUT LONG-TERM CURRENT USE OF INSULIN: Primary | ICD-10-CM

## 2024-05-21 DIAGNOSIS — E11.42 DIABETIC PERIPHERAL NEUROPATHY ASSOCIATED WITH TYPE 2 DIABETES MELLITUS: ICD-10-CM

## 2024-05-21 DIAGNOSIS — E11.69 HYPERLIPIDEMIA ASSOCIATED WITH TYPE 2 DIABETES MELLITUS: ICD-10-CM

## 2024-05-21 DIAGNOSIS — E78.5 HYPERLIPIDEMIA ASSOCIATED WITH TYPE 2 DIABETES MELLITUS: ICD-10-CM

## 2024-05-21 PROCEDURE — 99214 OFFICE O/P EST MOD 30 MIN: CPT | Performed by: NURSE PRACTITIONER

## 2024-05-21 RX ORDER — EMPAGLIFLOZIN, METFORMIN HYDROCHLORIDE 12.5; 1 MG/1; MG/1
1 TABLET, EXTENDED RELEASE ORAL DAILY
Qty: 90 TABLET | Refills: 1 | Status: SHIPPED | OUTPATIENT
Start: 2024-05-21

## 2024-05-21 RX ORDER — ROSUVASTATIN CALCIUM 5 MG/1
5 TABLET, COATED ORAL NIGHTLY
Qty: 90 TABLET | Refills: 1 | Status: SHIPPED | OUTPATIENT
Start: 2024-05-21

## 2024-05-21 RX ORDER — DULAGLUTIDE 1.5 MG/.5ML
1.5 INJECTION, SOLUTION SUBCUTANEOUS WEEKLY
Qty: 6 ML | Refills: 1 | Status: SHIPPED | OUTPATIENT
Start: 2024-05-21

## 2024-05-21 RX ORDER — FLASH GLUCOSE SENSOR
1 KIT MISCELLANEOUS DAILY
Qty: 6 EACH | Refills: 2 | Status: SHIPPED | OUTPATIENT
Start: 2024-05-21

## 2024-05-21 RX ORDER — GLIPIZIDE 10 MG/1
10 TABLET ORAL 2 TIMES DAILY WITH MEALS
Qty: 180 TABLET | Refills: 1 | Status: SHIPPED | OUTPATIENT
Start: 2024-05-21

## 2024-05-21 NOTE — PROGRESS NOTES
"Chief Complaint  Diabetes    Subjective        Joby Kulkarni presents to [facility] ENDOCRINOLOGY  History of Present Illness    Type 2 dm, 2017  DM regimen: Glipizide 10 mg po QD (forgot to take BID), synjardy 12.5-1000mg daily, trulicity 1.5mg weekly  Diabetic eye exam overdue  Known Dm complications: neuropathy  CV: rosuvastatin     Objective   Vital Signs:  /68 (BP Location: Left arm, Patient Position: Sitting)   Pulse 87   Temp 98.8 °F (37.1 °C) (Oral)   Ht 177.8 cm (70\")   Wt 82.6 kg (182 lb 3.2 oz)   SpO2 97%   BMI 26.14 kg/m²   Estimated body mass index is 26.14 kg/m² as calculated from the following:    Height as of this encounter: 177.8 cm (70\").    Weight as of this encounter: 82.6 kg (182 lb 3.2 oz).             Physical Exam  Vitals reviewed.   Constitutional:       General: He is not in acute distress.  HENT:      Head: Normocephalic and atraumatic.   Cardiovascular:      Rate and Rhythm: Normal rate.   Pulmonary:      Effort: Pulmonary effort is normal. No respiratory distress.   Musculoskeletal:         General: No signs of injury. Normal range of motion.      Cervical back: Normal range of motion and neck supple.   Skin:     General: Skin is warm and dry.   Neurological:      Mental Status: He is alert and oriented to person, place, and time. Mental status is at baseline.   Psychiatric:         Mood and Affect: Mood normal.         Behavior: Behavior normal.         Thought Content: Thought content normal.         Judgment: Judgment normal.          Result Review :    The following data was reviewed by: LEODAN Avila on 05/21/2024:  Common labs          8/9/2023    15:01 11/14/2023    15:26 5/14/2024    15:04   Common Labs   Glucose 116  170  138    BUN 8  8  6    Creatinine 1.16  1.12  1.00    Sodium 140  139  141    Potassium 4.2  4.1  4.0    Chloride 103  101  103    Calcium 9.4  9.1  9.2    Total Protein 7.1  7.2  6.9    Albumin 4.6  4.4  4.3    Total " Bilirubin 1.1  0.9  0.8    Alkaline Phosphatase 61  66  65    AST (SGOT) 20  23  29    ALT (SGPT) 18  29  33    Total Cholesterol 139  158  123    Triglycerides 190  363  235    HDL Cholesterol 43  43  51    LDL Cholesterol  64  59  36    Hemoglobin A1C  7.2  7.50    Microalbumin, Urine  <3.0  <3.0                   Assessment and Plan     Diagnoses and all orders for this visit:    1. Type 2 diabetes mellitus with hyperglycemia, without long-term current use of insulin (Primary)  -     rosuvastatin (CRESTOR) 5 MG tablet; Take 1 tablet by mouth Every Night.  Dispense: 90 tablet; Refill: 1  -     glipizide (GLUCOTROL) 10 MG tablet; Take 1 tablet by mouth 2 (Two) Times a Day With Meals.  Dispense: 180 tablet; Refill: 1  -     Empagliflozin-metFORMIN HCl ER (Synjardy XR) 12.5-1000 MG tablet sustained-release 24 hour; Take 1 each by mouth Daily.  Dispense: 90 tablet; Refill: 1  -     Continuous Glucose Sensor (FreeStyle Ronal 14 Day Sensor) misc; Inject 1 Device under the skin into the appropriate area as directed Daily.  Dispense: 6 each; Refill: 2    2. Hyperlipidemia associated with type 2 diabetes mellitus  -     rosuvastatin (CRESTOR) 5 MG tablet; Take 1 tablet by mouth Every Night.  Dispense: 90 tablet; Refill: 1  -     glipizide (GLUCOTROL) 10 MG tablet; Take 1 tablet by mouth 2 (Two) Times a Day With Meals.  Dispense: 180 tablet; Refill: 1  -     Empagliflozin-metFORMIN HCl ER (Synjardy XR) 12.5-1000 MG tablet sustained-release 24 hour; Take 1 each by mouth Daily.  Dispense: 90 tablet; Refill: 1  -     Continuous Glucose Sensor (FreeStyle Ronal 14 Day Sensor) misc; Inject 1 Device under the skin into the appropriate area as directed Daily.  Dispense: 6 each; Refill: 2    3. Diabetic peripheral neuropathy associated with type 2 diabetes mellitus             Follow Up     Return in about 6 months (around 11/21/2024).    Resume glipizide BID  A1c higher than goal   Diabetic eye exam  LDL at goal, continue statin    Continue cgm     Patient was given instructions and counseling regarding his condition or for health maintenance advice. Please see specific information pulled into the AVS if appropriate.     LEODAN Avila

## 2024-08-21 ENCOUNTER — PRIOR AUTHORIZATION (OUTPATIENT)
Dept: ENDOCRINOLOGY | Age: 54
End: 2024-08-21
Payer: COMMERCIAL

## 2024-08-21 NOTE — TELEPHONE ENCOUNTER
Approved on August 23 by [employer] 2017  The request has been approved. The authorization is effective from 08/23/2024 to 08/22/2025, as long as the member is enrolled in their current health plan.  Authorization Expiration Date: 8/21/2025              Pa started Trulicity (Key: [id number])

## 2024-11-08 DIAGNOSIS — E11.65 TYPE 2 DIABETES MELLITUS WITH HYPERGLYCEMIA, WITHOUT LONG-TERM CURRENT USE OF INSULIN: Primary | ICD-10-CM

## 2024-11-21 ENCOUNTER — OFFICE VISIT (OUTPATIENT)
Dept: ENDOCRINOLOGY | Age: 54
End: 2024-11-21
Payer: COMMERCIAL

## 2024-11-21 VITALS
HEART RATE: 81 BPM | SYSTOLIC BLOOD PRESSURE: 132 MMHG | WEIGHT: 175 LBS | HEIGHT: 70 IN | OXYGEN SATURATION: 96 % | BODY MASS INDEX: 25.05 KG/M2 | TEMPERATURE: 99.1 F | DIASTOLIC BLOOD PRESSURE: 60 MMHG

## 2024-11-21 DIAGNOSIS — E11.69 HYPERLIPIDEMIA ASSOCIATED WITH TYPE 2 DIABETES MELLITUS: ICD-10-CM

## 2024-11-21 DIAGNOSIS — E11.65 TYPE 2 DIABETES MELLITUS WITH HYPERGLYCEMIA, WITHOUT LONG-TERM CURRENT USE OF INSULIN: Primary | ICD-10-CM

## 2024-11-21 DIAGNOSIS — E78.5 HYPERLIPIDEMIA ASSOCIATED WITH TYPE 2 DIABETES MELLITUS: ICD-10-CM

## 2024-11-21 DIAGNOSIS — E11.42 DIABETIC PERIPHERAL NEUROPATHY ASSOCIATED WITH TYPE 2 DIABETES MELLITUS: ICD-10-CM

## 2024-11-21 PROCEDURE — 99214 OFFICE O/P EST MOD 30 MIN: CPT | Performed by: NURSE PRACTITIONER

## 2024-11-21 RX ORDER — EMPAGLIFLOZIN, METFORMIN HYDROCHLORIDE 12.5; 1 MG/1; MG/1
1 TABLET, EXTENDED RELEASE ORAL 2 TIMES DAILY
Qty: 180 TABLET | Refills: 0 | Status: SHIPPED | OUTPATIENT
Start: 2024-11-21

## 2024-11-21 NOTE — PROGRESS NOTES
"Chief Complaint  Diabetes    Subjective        Joby Kulkarni presents to [facility] ENDOCRINOLOGY  History of Present Illness    Type 2 dm, 2017  DM regimen: Glipizide 10 mg po BIDAC, synjardy 12.5-1000mg daily, trulicity 1.5mg weekly- missing doses frequently   Diabetic eye exam: going Jan 2025  Known Dm complications: neuropathy  CV: rosuvastatin 5mg QD  Not using arnulfo much     Objective   Vital Signs:  /60   Pulse 81   Temp 99.1 °F (37.3 °C) (Oral)   Ht 177.8 cm (70\")   Wt 79.4 kg (175 lb)   SpO2 96%   BMI 25.11 kg/m²   Estimated body mass index is 25.11 kg/m² as calculated from the following:    Height as of this encounter: 177.8 cm (70\").    Weight as of this encounter: 79.4 kg (175 lb).          Physical Exam  Vitals reviewed.   Constitutional:       General: He is not in acute distress.  HENT:      Head: Normocephalic and atraumatic.   Cardiovascular:      Rate and Rhythm: Normal rate.   Pulmonary:      Effort: Pulmonary effort is normal. No respiratory distress.   Musculoskeletal:         General: No signs of injury. Normal range of motion.      Cervical back: Normal range of motion and neck supple.   Skin:     General: Skin is warm and dry.   Neurological:      Mental Status: He is alert and oriented to person, place, and time. Mental status is at baseline.   Psychiatric:         Mood and Affect: Mood normal.         Behavior: Behavior normal.         Thought Content: Thought content normal.         Judgment: Judgment normal.        Result Review :  The following data was reviewed by: LEODAN Avila on 11/21/2024:  Common labs          5/14/2024    15:04 11/14/2024    15:06 11/14/2024    15:07   Common Labs   Glucose 138  400     BUN 6  12     Creatinine 1.00  1.30     Sodium 141  138     Potassium 4.0  4.1     Chloride 103  99     Calcium 9.2  9.6     Total Protein 6.9  7.7     Albumin 4.3  4.5     Total Bilirubin 0.8  1.0     Alkaline Phosphatase 65  92     AST (SGOT) " 29  27     ALT (SGPT) 33  54     Total Cholesterol 123  184     Triglycerides 235  323     HDL Cholesterol 51  54     LDL Cholesterol  36  78     Hemoglobin A1C 7.50  9.30     Microalbumin, Urine <3.0   <3.0                Assessment and Plan   Diagnoses and all orders for this visit:    1. Type 2 diabetes mellitus with hyperglycemia, without long-term current use of insulin (Primary)  -     Empagliflozin-metFORMIN HCl ER (Synjardy XR) 12.5-1000 MG tablet sustained-release 24 hour; Take 1 each by mouth 2 (Two) Times a Day.  Dispense: 180 tablet; Refill: 0    2. Hyperlipidemia associated with type 2 diabetes mellitus  -     Empagliflozin-metFORMIN HCl ER (Synjardy XR) 12.5-1000 MG tablet sustained-release 24 hour; Take 1 each by mouth 2 (Two) Times a Day.  Dispense: 180 tablet; Refill: 0    3. Diabetic peripheral neuropathy associated with type 2 diabetes mellitus             Follow Up   Return in about 3 months (around 2/21/2025).    A1c worse- has been missing trulicity injections, improve consistency   Change synjardy to BID  Resume CGM   LDL at goal, continue statin, ensure low triglyceride diet     Patient was given instructions and counseling regarding his condition or for health maintenance advice. Please see specific information pulled into the AVS if appropriate.     Pam Pike APRN

## 2025-02-17 DIAGNOSIS — E11.65 TYPE 2 DIABETES MELLITUS WITH HYPERGLYCEMIA, WITHOUT LONG-TERM CURRENT USE OF INSULIN: ICD-10-CM

## 2025-02-17 DIAGNOSIS — E11.65 TYPE 2 DIABETES MELLITUS WITH HYPERGLYCEMIA, WITHOUT LONG-TERM CURRENT USE OF INSULIN: Primary | ICD-10-CM

## 2025-02-17 DIAGNOSIS — E11.69 HYPERLIPIDEMIA ASSOCIATED WITH TYPE 2 DIABETES MELLITUS: ICD-10-CM

## 2025-02-17 DIAGNOSIS — E78.5 HYPERLIPIDEMIA ASSOCIATED WITH TYPE 2 DIABETES MELLITUS: ICD-10-CM

## 2025-02-17 RX ORDER — ROSUVASTATIN CALCIUM 5 MG/1
5 TABLET, COATED ORAL NIGHTLY
Qty: 30 TABLET | Refills: 0 | Status: SHIPPED | OUTPATIENT
Start: 2025-02-17

## 2025-02-17 NOTE — TELEPHONE ENCOUNTER
Rx Refill Note  Requested Prescriptions     Pending Prescriptions Disp Refills    rosuvastatin (CRESTOR) 5 MG tablet [Pharmacy Med Name: ROSUVASTATIN CALCIUM 5 MG TAB] 90 tablet 1     Sig: TAKE ONE TABLET BY MOUTH ONCE NIGHTLY      Last office visit with prescribing clinician: 11/21/2024   Last telemedicine visit with prescribing clinician: Visit date not found   Next office visit with prescribing clinician: 2/25/2025                         Would you like a call back once the refill request has been completed: [] Yes [] No    If the office needs to give you a call back, can they leave a voicemail: [] Yes [] No    Darya Short MA  02/17/25, 07:56 EST

## 2025-02-25 ENCOUNTER — OFFICE VISIT (OUTPATIENT)
Dept: ENDOCRINOLOGY | Age: 55
End: 2025-02-25
Payer: COMMERCIAL

## 2025-02-25 VITALS
HEIGHT: 70 IN | DIASTOLIC BLOOD PRESSURE: 74 MMHG | HEART RATE: 72 BPM | OXYGEN SATURATION: 98 % | WEIGHT: 180.2 LBS | SYSTOLIC BLOOD PRESSURE: 118 MMHG | BODY MASS INDEX: 25.8 KG/M2

## 2025-02-25 DIAGNOSIS — E78.5 HYPERLIPIDEMIA ASSOCIATED WITH TYPE 2 DIABETES MELLITUS: ICD-10-CM

## 2025-02-25 DIAGNOSIS — E11.69 HYPERLIPIDEMIA ASSOCIATED WITH TYPE 2 DIABETES MELLITUS: ICD-10-CM

## 2025-02-25 DIAGNOSIS — E11.65 TYPE 2 DIABETES MELLITUS WITH HYPERGLYCEMIA, WITHOUT LONG-TERM CURRENT USE OF INSULIN: Primary | ICD-10-CM

## 2025-02-25 PROCEDURE — 99214 OFFICE O/P EST MOD 30 MIN: CPT | Performed by: NURSE PRACTITIONER

## 2025-02-25 RX ORDER — GLIPIZIDE 10 MG/1
10 TABLET ORAL 2 TIMES DAILY WITH MEALS
Qty: 180 TABLET | Refills: 0 | Status: SHIPPED | OUTPATIENT
Start: 2025-02-25

## 2025-02-25 RX ORDER — EMPAGLIFLOZIN, METFORMIN HYDROCHLORIDE 12.5; 1 MG/1; MG/1
1 TABLET, EXTENDED RELEASE ORAL 2 TIMES DAILY
Qty: 180 TABLET | Refills: 0 | Status: SHIPPED | OUTPATIENT
Start: 2025-02-25

## 2025-02-25 RX ORDER — DULAGLUTIDE 1.5 MG/.5ML
1.5 INJECTION, SOLUTION SUBCUTANEOUS WEEKLY
Qty: 6 ML | Refills: 0 | Status: SHIPPED | OUTPATIENT
Start: 2025-02-25

## 2025-02-25 RX ORDER — ROSUVASTATIN CALCIUM 5 MG/1
5 TABLET, COATED ORAL NIGHTLY
Qty: 90 TABLET | Refills: 0 | Status: SHIPPED | OUTPATIENT
Start: 2025-02-25

## 2025-02-25 NOTE — PROGRESS NOTES
"Chief Complaint  Diabetes (Ronal is attached. )    Subjective        Joby Kulkarni presents to [facility] ENDOCRINOLOGY  History of Present Illness    Type 2 dm, 2017  DM regimen: Glipizide 10 mg po BIDAC (out this for several weeks), synjardy 12.5-1000mg daily, trulicity 1.5mg weekly  Diabetic eye exam: did not go Jan 2025, need to RS  Known Dm complications: neuropathy- stable   CV: rosuvastatin 5mg QD  Reports feeling well overall, no complaints  Dietary habits: only eating 2 meals a day, \" I'm eating good\"    Weight has been stable     Ronal review 2/7/25-2/20/25 ( only 2% data- 1 day)  Avg glu 116  6% low  91% time in range  3% high     Objective   Vital Signs:  /74   Pulse 72   Ht 177.8 cm (70\")   Wt 81.7 kg (180 lb 3.2 oz)   SpO2 98%   BMI 25.86 kg/m²   Estimated body mass index is 25.86 kg/m² as calculated from the following:    Height as of this encounter: 177.8 cm (70\").    Weight as of this encounter: 81.7 kg (180 lb 3.2 oz).          Physical Exam  Vitals reviewed.   Constitutional:       General: He is not in acute distress.  HENT:      Head: Normocephalic and atraumatic.   Cardiovascular:      Rate and Rhythm: Normal rate.   Pulmonary:      Effort: Pulmonary effort is normal. No respiratory distress.   Musculoskeletal:         General: No signs of injury. Normal range of motion.      Cervical back: Normal range of motion and neck supple.   Skin:     General: Skin is warm and dry.   Neurological:      Mental Status: He is alert and oriented to person, place, and time. Mental status is at baseline.   Psychiatric:         Mood and Affect: Mood normal.         Behavior: Behavior normal.         Thought Content: Thought content normal.         Judgment: Judgment normal.        Result Review :  The following data was reviewed by: LEODAN Avila on 02/25/2025:  Common labs          5/14/2024    15:04 11/14/2024    15:06 11/14/2024    15:07 2/20/2025    15:03   Common Labs "   Glucose 138  400   129    BUN 6  12   4    Creatinine 1.00  1.30   1.18    Sodium 141  138   139    Potassium 4.0  4.1   3.5    Chloride 103  99   102    Calcium 9.2  9.6   9.4    Albumin 4.3  4.5      Total Bilirubin 0.8  1.0      Alkaline Phosphatase 65  92      AST (SGOT) 29  27      ALT (SGPT) 33  54      Total Cholesterol 123  184      Triglycerides 235  323      HDL Cholesterol 51  54      LDL Cholesterol  36  78      Hemoglobin A1C 7.50  9.30   8.30    Microalbumin, Urine <3.0   <3.0                 Assessment and Plan   Diagnoses and all orders for this visit:    1. Type 2 diabetes mellitus with hyperglycemia, without long-term current use of insulin (Primary)  -     Empagliflozin-metFORMIN HCl ER (Synjardy XR) 12.5-1000 MG tablet sustained-release 24 hour; Take 1 each by mouth 2 (Two) Times a Day.  Dispense: 180 tablet; Refill: 0  -     Dulaglutide (Trulicity) 1.5 MG/0.5ML solution auto-injector; Inject 1.5 mg under the skin into the appropriate area as directed 1 (One) Time Per Week.  Dispense: 6 mL; Refill: 0  -     glipizide (GLUCOTROL) 10 MG tablet; Take 1 tablet by mouth 2 (Two) Times a Day With Meals.  Dispense: 180 tablet; Refill: 0  -     rosuvastatin (CRESTOR) 5 MG tablet; Take 1 tablet by mouth Every Night.  Dispense: 90 tablet; Refill: 0    2. Hyperlipidemia associated with type 2 diabetes mellitus  -     Empagliflozin-metFORMIN HCl ER (Synjardy XR) 12.5-1000 MG tablet sustained-release 24 hour; Take 1 each by mouth 2 (Two) Times a Day.  Dispense: 180 tablet; Refill: 0  -     glipizide (GLUCOTROL) 10 MG tablet; Take 1 tablet by mouth 2 (Two) Times a Day With Meals.  Dispense: 180 tablet; Refill: 0  -     rosuvastatin (CRESTOR) 5 MG tablet; Take 1 tablet by mouth Every Night.  Dispense: 90 tablet; Refill: 0             Follow Up   Return in about 3 months (around 5/25/2025).    A1c improving, not at goal <7%  Resume routine BGM with consistent arnulfo use   Resume glipizide  Continue  statin    Patient was given instructions and counseling regarding his condition or for health maintenance advice. Please see specific information pulled into the AVS if appropriate.     LEODAN Avila

## 2025-03-22 DIAGNOSIS — E11.65 TYPE 2 DIABETES MELLITUS WITH HYPERGLYCEMIA, WITHOUT LONG-TERM CURRENT USE OF INSULIN: ICD-10-CM

## 2025-03-22 DIAGNOSIS — E78.5 HYPERLIPIDEMIA ASSOCIATED WITH TYPE 2 DIABETES MELLITUS: ICD-10-CM

## 2025-03-22 DIAGNOSIS — E11.69 HYPERLIPIDEMIA ASSOCIATED WITH TYPE 2 DIABETES MELLITUS: ICD-10-CM

## 2025-03-24 RX ORDER — FLASH GLUCOSE SENSOR
KIT MISCELLANEOUS
Qty: 6 EACH | Refills: 0 | Status: SHIPPED | OUTPATIENT
Start: 2025-03-24

## 2025-03-24 NOTE — TELEPHONE ENCOUNTER
Rx Refill Note  Requested Prescriptions     Pending Prescriptions Disp Refills    Continuous Glucose Sensor (FreeStyle Ronal 14 Day Sensor) misc [Pharmacy Med Name: FREESTYLE RONAL 14 DAY SENSOR]       Sig: INJECT 1 DEVICE UNDER THE SKIN INTO THE APPROPRIATE AREA AS DIRECTED AND REPLACE EVERY 2 WEEKS      Last office visit with prescribing clinician: 2/25/2025   Last telemedicine visit with prescribing clinician: Visit date not found   Next office visit with prescribing clinician: 5/20/2025                         Would you like a call back once the refill request has been completed: [] Yes [] No    If the office needs to give you a call back, can they leave a voicemail: [] Yes [] No    Linda Sanderson MA  03/24/25, 08:34 EDT

## 2025-05-12 DIAGNOSIS — E11.65 TYPE 2 DIABETES MELLITUS WITH HYPERGLYCEMIA, WITHOUT LONG-TERM CURRENT USE OF INSULIN: Primary | ICD-10-CM

## 2025-05-13 ENCOUNTER — LAB (OUTPATIENT)
Dept: ENDOCRINOLOGY | Age: 55
End: 2025-05-13
Payer: COMMERCIAL

## 2025-05-13 DIAGNOSIS — E11.65 TYPE 2 DIABETES MELLITUS WITH HYPERGLYCEMIA, WITHOUT LONG-TERM CURRENT USE OF INSULIN: ICD-10-CM

## 2025-05-14 LAB — HBA1C MFR BLD: 7.1 % (ref 4.8–5.6)

## 2025-05-20 ENCOUNTER — OFFICE VISIT (OUTPATIENT)
Dept: ENDOCRINOLOGY | Age: 55
End: 2025-05-20
Payer: COMMERCIAL

## 2025-05-20 VITALS
DIASTOLIC BLOOD PRESSURE: 72 MMHG | OXYGEN SATURATION: 98 % | BODY MASS INDEX: 24.82 KG/M2 | HEART RATE: 91 BPM | SYSTOLIC BLOOD PRESSURE: 128 MMHG | HEIGHT: 70 IN | WEIGHT: 173.4 LBS

## 2025-05-20 DIAGNOSIS — E11.65 TYPE 2 DIABETES MELLITUS WITH HYPERGLYCEMIA, WITHOUT LONG-TERM CURRENT USE OF INSULIN: Primary | ICD-10-CM

## 2025-05-20 DIAGNOSIS — E78.5 HYPERLIPIDEMIA ASSOCIATED WITH TYPE 2 DIABETES MELLITUS: ICD-10-CM

## 2025-05-20 DIAGNOSIS — E11.69 HYPERLIPIDEMIA ASSOCIATED WITH TYPE 2 DIABETES MELLITUS: ICD-10-CM

## 2025-05-20 PROCEDURE — 99214 OFFICE O/P EST MOD 30 MIN: CPT | Performed by: NURSE PRACTITIONER

## 2025-05-20 RX ORDER — DULAGLUTIDE 1.5 MG/.5ML
1.5 INJECTION, SOLUTION SUBCUTANEOUS WEEKLY
Qty: 6 ML | Refills: 1 | Status: SHIPPED | OUTPATIENT
Start: 2025-05-20

## 2025-05-20 RX ORDER — EMPAGLIFLOZIN, METFORMIN HYDROCHLORIDE 12.5; 1 MG/1; MG/1
1 TABLET, EXTENDED RELEASE ORAL 2 TIMES DAILY
Qty: 180 TABLET | Refills: 1 | Status: SHIPPED | OUTPATIENT
Start: 2025-05-20

## 2025-05-20 RX ORDER — ROSUVASTATIN CALCIUM 5 MG/1
5 TABLET, COATED ORAL NIGHTLY
Qty: 90 TABLET | Refills: 1 | Status: SHIPPED | OUTPATIENT
Start: 2025-05-20

## 2025-05-20 RX ORDER — GLIPIZIDE 10 MG/1
10 TABLET ORAL 2 TIMES DAILY WITH MEALS
Qty: 180 TABLET | Refills: 1 | Status: SHIPPED | OUTPATIENT
Start: 2025-05-20

## 2025-05-20 NOTE — PROGRESS NOTES
"Chief Complaint  Type 2 diabetes mellitus with hyperglycemia, without long-t    Subjective        Joby MALGORZTAA Kulkarni presents to [facility] ENDOCRINOLOGY  History of Present Illness    Type 2 dm, 2017  DM regimen: Glipizide 10 mg po BIDAC, synjardy 12.5-1000mg BID, trulicity 1.5mg weekly  Diabetic eye exam: still needs to schedule   Known Dm complications: neuropathy  CV: rosuvastatin 5mg QD  Weight is down around 10 pounds over the last year   Ronal review 5/2/25-5/15/25 ( only 7% data)       Objective   Vital Signs:  /72 (BP Location: Left arm)   Pulse 91   Ht 177.8 cm (70\")   Wt 78.7 kg (173 lb 6.4 oz)   SpO2 98%   BMI 24.88 kg/m²   Estimated body mass index is 24.88 kg/m² as calculated from the following:    Height as of this encounter: 177.8 cm (70\").    Weight as of this encounter: 78.7 kg (173 lb 6.4 oz).          Physical Exam  Vitals reviewed.   Constitutional:       General: He is not in acute distress.  HENT:      Head: Normocephalic and atraumatic.   Cardiovascular:      Rate and Rhythm: Normal rate.   Pulmonary:      Effort: Pulmonary effort is normal. No respiratory distress.   Musculoskeletal:         General: No signs of injury. Normal range of motion.      Cervical back: Normal range of motion and neck supple.   Skin:     General: Skin is warm and dry.   Neurological:      Mental Status: He is alert and oriented to person, place, and time. Mental status is at baseline.   Psychiatric:         Mood and Affect: Mood normal.         Behavior: Behavior normal.         Thought Content: Thought content normal.         Judgment: Judgment normal.          Result Review :  The following data was reviewed by: LEODAN Avila on 05/20/2025:  Common labs          11/14/2024    15:06 11/14/2024    15:07 2/20/2025    15:03 5/13/2025    15:06   Common Labs   Glucose 400   129     BUN 12   4     Creatinine 1.30   1.18     Sodium 138   139     Potassium 4.1   3.5     Chloride 99   102   "   Calcium 9.6   9.4     Albumin 4.5       Total Bilirubin 1.0       Alkaline Phosphatase 92       AST (SGOT) 27       ALT (SGPT) 54       Total Cholesterol 184       Triglycerides 323       HDL Cholesterol 54       LDL Cholesterol  78       Hemoglobin A1C 9.30   8.30  7.10    Microalbumin, Urine  <3.0                  Assessment and Plan   Diagnoses and all orders for this visit:    1. Type 2 diabetes mellitus with hyperglycemia, without long-term current use of insulin (Primary)  -     Dulaglutide (Trulicity) 1.5 MG/0.5ML solution auto-injector; Inject 1.5 mg under the skin into the appropriate area as directed 1 (One) Time Per Week.  Dispense: 6 mL; Refill: 1  -     Empagliflozin-metFORMIN HCl ER (Synjardy XR) 12.5-1000 MG tablet sustained-release 24 hour; Take 1 each by mouth 2 (Two) Times a Day.  Dispense: 180 tablet; Refill: 1  -     glipizide (GLUCOTROL) 10 MG tablet; Take 1 tablet by mouth 2 (Two) Times a Day With Meals.  Dispense: 180 tablet; Refill: 1  -     rosuvastatin (CRESTOR) 5 MG tablet; Take 1 tablet by mouth Every Night.  Dispense: 90 tablet; Refill: 1  -     Lipid Panel; Future  -     Hemoglobin A1c; Future  -     Comprehensive Metabolic Panel; Future  -     Microalbumin / Creatinine Urine Ratio - Urine, Clean Catch; Future    2. Hyperlipidemia associated with type 2 diabetes mellitus  -     Empagliflozin-metFORMIN HCl ER (Synjardy XR) 12.5-1000 MG tablet sustained-release 24 hour; Take 1 each by mouth 2 (Two) Times a Day.  Dispense: 180 tablet; Refill: 1  -     glipizide (GLUCOTROL) 10 MG tablet; Take 1 tablet by mouth 2 (Two) Times a Day With Meals.  Dispense: 180 tablet; Refill: 1  -     rosuvastatin (CRESTOR) 5 MG tablet; Take 1 tablet by mouth Every Night.  Dispense: 90 tablet; Refill: 1             Follow Up   Return in about 4 months (around 9/20/2025).    A1c improving, at goal  Continue plan as above  No additional changes made at this time     Patient was given instructions and counseling  regarding his condition or for health maintenance advice. Please see specific information pulled into the AVS if appropriate.     LEODAN Avila

## 2025-08-25 DIAGNOSIS — E11.65 TYPE 2 DIABETES MELLITUS WITH HYPERGLYCEMIA, WITHOUT LONG-TERM CURRENT USE OF INSULIN: ICD-10-CM

## 2025-08-25 DIAGNOSIS — E78.5 HYPERLIPIDEMIA ASSOCIATED WITH TYPE 2 DIABETES MELLITUS: ICD-10-CM

## 2025-08-25 DIAGNOSIS — E11.69 HYPERLIPIDEMIA ASSOCIATED WITH TYPE 2 DIABETES MELLITUS: ICD-10-CM

## 2025-08-26 RX ORDER — FLASH GLUCOSE SENSOR
KIT MISCELLANEOUS
Qty: 6 EACH | Refills: 0 | Status: SHIPPED | OUTPATIENT
Start: 2025-08-26

## (undated) DEVICE — ADAPT CLN BIOGUARD AIR/H2O DISP

## (undated) DEVICE — SENSR O2 OXIMAX FNGR A/ 18IN NONSTR

## (undated) DEVICE — KT ORCA ORCAPOD DISP STRL

## (undated) DEVICE — LN SMPL CO2 SHTRM SD STREAM W/M LUER

## (undated) DEVICE — FRCP BX RADJAW4 NDL 2.8 240CM LG OG BX40

## (undated) DEVICE — TUBING, SUCTION, 1/4" X 10', STRAIGHT: Brand: MEDLINE

## (undated) DEVICE — CANN O2 ETCO2 FITS ALL CONN CO2 SMPL A/ 7IN DISP LF

## (undated) DEVICE — BITEBLOCK OMNI BLOC